# Patient Record
Sex: FEMALE | Race: WHITE | NOT HISPANIC OR LATINO | Employment: UNEMPLOYED | URBAN - METROPOLITAN AREA
[De-identification: names, ages, dates, MRNs, and addresses within clinical notes are randomized per-mention and may not be internally consistent; named-entity substitution may affect disease eponyms.]

---

## 2017-02-13 DIAGNOSIS — M67.88 RIGHT PERONEAL TENDONOSIS: ICD-10-CM

## 2017-02-13 DIAGNOSIS — M25.571 RIGHT ANKLE PAIN: Primary | ICD-10-CM

## 2017-02-23 ENCOUNTER — CLINICAL SUPPORT (OUTPATIENT)
Dept: REHABILITATION | Facility: HOSPITAL | Age: 47
End: 2017-02-23
Attending: ORTHOPAEDIC SURGERY
Payer: MEDICAID

## 2017-02-23 DIAGNOSIS — G89.29 CHRONIC PAIN OF RIGHT ANKLE: ICD-10-CM

## 2017-02-23 DIAGNOSIS — R26.2 DIFFICULTY WALKING: ICD-10-CM

## 2017-02-23 DIAGNOSIS — M25.571 CHRONIC PAIN OF RIGHT ANKLE: ICD-10-CM

## 2017-02-23 PROCEDURE — 97161 PT EVAL LOW COMPLEX 20 MIN: CPT | Mod: PN

## 2017-02-23 NOTE — PLAN OF CARE
TIME RECORD    Date: 02/23/2017    Start Time:  10:06 am   Stop Time:  10: 56 am     PROCEDURES:    TIMED  Procedure Min.   TE supervised (bike) 5'                      UNTIMED  Procedure Min.   Initial Evaluation 45'         Total Timed Minutes:  0  Total Timed Units:  0  Total Untimed Units:  1  Charges Billed/# of units:  1 IE     OUTPATIENT PHYSICAL THERAPY   PATIENT EVALUATION  Onset Date: approximately 3 years ago  Primary Diagnosis:   1. Chronic pain of right ankle     2. Difficulty walking       Treatment Diagnosis: (R) ankle pain, difficulty ambulating, difficulty performing physical activity  No past medical history on file.  Precautions: standard, HTN,   Prior Therapy: pt stated that she attended PT in the past for her neck  Medications: Bandar Douglass currently has no medications in their medication list.  Nutrition:  Obese  History of Present Illness: chronic - approximately 3 years  Prior Level of Function: Independent  Social History: Pt stated that she is not currently employed.   Place of Residence (Steps/Adaptations): Pt stated that she does not have any steps/stairs to enter her [facility] home.   Functional Deficits Leading to Referral/Nature of Injury: difficulty ambulating, difficulty standing for prolonged period of time, difficulty performing physical activity   Patient Therapy Goals: decrease pain, improve mobility    Subjective     Bandar Douglass states that she was doing P90X and she pushed off on (R) ankle and heard something pop approximately 3 years ago. Pt reported that she experienced immediate pain in (R) ankle. Pt stated that she continued to work out for two weeks and then had to stop due to pain in (R) ankle. Pt stated that she sought medical attention for her family care MD and she underwent an xray on her (R) ankle and was told xray looked normal. Pt stated that she then went to another MD who put her in a walking boot for months, which gave her some relief, but when stopped  wearing boot had same pain in (R) ankle. Pt stated that she went back to family care MD who sent her to a Podiatrist who pt stated gave her biofreeze and some stretches to do. Pt stated that in fall of 2016 she underwent MRI on (R) ankle and was told she did not have anything torn in (R) ankle and was referred to PT.  Pt stated that over the past 3 years she has tried insoles and wearing a sleeve brace on (R) ankle, which did not provide significant relief. Pt stated that she predominantly wears flip flops.  Pt stated that she has difficulty ambulating prolonged distances, difficulty standing on (R) LE, and difficulty doing physical activity/exercise.    Pain:  Location: (R) ankle    Description: Sharp  Activities Which Increase Pain: Standing, Walking and physical activity   Activities Which Decrease Pain: pain medication and stretches  Pain Scale: 3/10 at best 5/10 now  7/10 at worst    Objective       Palpation: pt c/o tenderness to palpation along (R) peroneal tendons posterior and inferior to (R) lateral malleolus     Range of Motion/Strength:     Hip Right  Left  Pain/Dysfunction with Movement    AROM MMT AROM MMT    Flexion WFL 5/5 WFL 5/5    Extension WFL 4/5 WFL 4/5    Abduction WFL 5/5 WFL 5/5      Knee Right  Left  Pain/Dysfunction with Movement    AROM MMT AROM MMT    Flexion WFL 4+/5 WFL 4+/5    Extension WFL 5/5 WFL 5/5      Ankle  Right  Left  Pain/Dysfunction with Movement    AROM PROM MMT AROM MMT ! = pain   Plantarflexion 50! NT 4/5 50 4/5    Dorsiflexion 3 ! 5 5/5 16 5/5    Inversion 35! 40 4/5 40 5/5    Eversion 20! NT 4/5 20 5/5      Circumferential measurements: Figure 8: (L) = 60 cm; (R) = 61 cm   Flexibility: decreased gastroc flexibility  Gait: Without AD  Analysis: Pt demo antalgic gait with (R) foot everted, decreased (R) heel strike, increased (R) knee flexion, decreased stance time on (R) LE, (B) pes planus, decreased virginia, decreased (B) step length  Bed  Mobility:Independent  Transfers: Independent  Special Tests: SLS stance: (L) = 19 seconds, increased ankle strategy noted; (R) = 3 seconds increased ankle strategy   Other: FOTO ankle survey score: 55% limited  Treatment: Treatment to consist of manual therapy, (B) LE strengthening/stretching, (R) ankle ROM therex, balance/gait training, IASTM, ASTYM, FDN, and modalities prn. POC was discussed with pt and pt verbalized understanding and was agreeable to POC. Pt was educated on importance of proper footwear. Pt verbalized understanding. Pt performed stationary bike x 5' with PT supervision without any c/o increased pain in (R) ankle.     Assessment       Initial Assessment (Pertinent finding, problem list and factors affecting outcome): Ms. Douglass is a 46 year old female with c/o chronic (R) ankle pain. Pt presents with decreased/painful (R) ankle ROM, decreased (R) ankle strength, impaired gait/balance, mild swelling in (R) ankle compared to (L) ankle, c/o tenderness to palpation along (R) peroneal tendons posterior and inferior to (R) lateral malleolus, and FOTO ankle survey score of 55% limited. Pt will benefit from skilled PT to address the impairments listed above in order to return pt to her highest level of function with decreased pain and limitation.     History  Co-morbidities and personal factors that may impact the plan of care Examination  Body Structures and Functions, activity limitations and participation restrictions that may impact the plan of care Clinical Presentation   Decision Making/ Complexity Score   Co-morbidities:         HTN        Personal Factors:    Body Regions: (B) LE     Body Systems: ROM, strength, gait/balance          Activity limitations: difficulty ambulating, difficulty standing for prolonged period of time      Participation Restrictions: unable to participate in physical activity/exercise         Evolving clinical presentation with changing characteristics  Low    FOTO: 55%  limited            Rehab Potiential: fair    Short Term Goals = Long Term Goals (8 Weeks): 4/23/17  1. Pt will be independent with HEP  2. Pt will improve (R) ankle strength to 5/5 on MMT  3. Pt will improve (R) ankle DF and IV AROM = (L) ankle AROM  4. Pt will report < 3/10 pain in (R) ankle with ADLs and physical activity   5. Pt will improve FOTO ankle survey score to </= 40% limited    Plan     Certification Period: 2/23/17 to 4/23/17  Recommended Treatment Plan: 2-3 times per week for 8 weeks: Electrical Stimulation Russian, Gait Training, Group Therapy, Locomotor Training, Manual Therapy, Moist Heat/ Ice, Neuromuscular Re-ed, Patient Education, Self Care, Therapeutic Activites, Therapeutic Exercise and Other IASTM, ASTYM, FDN, and modalities prn  Other Recommendations: n/a      Therapist: Jenny Rizo, PT    I CERTIFY THE NEED FOR THESE SERVICES FURNISHED UNDER THIS PLAN OF TREATMENT AND WHILE UNDER MY CARE    Physician's comments: ________________________________________________________________________________________________________________________________________________      Physician's Name: ___________________________________    I certify the need for these services furnished under this plan of treatment and while under my care.  Efrain Wise MD

## 2017-03-02 ENCOUNTER — CLINICAL SUPPORT (OUTPATIENT)
Dept: REHABILITATION | Facility: HOSPITAL | Age: 47
End: 2017-03-02
Attending: ORTHOPAEDIC SURGERY
Payer: MEDICAID

## 2017-03-02 DIAGNOSIS — R26.2 DIFFICULTY WALKING: ICD-10-CM

## 2017-03-02 DIAGNOSIS — G89.29 CHRONIC PAIN OF RIGHT ANKLE: ICD-10-CM

## 2017-03-02 DIAGNOSIS — M25.571 CHRONIC PAIN OF RIGHT ANKLE: ICD-10-CM

## 2017-03-02 PROCEDURE — 97110 THERAPEUTIC EXERCISES: CPT | Mod: PN

## 2017-03-02 NOTE — PROGRESS NOTES
"TIME RECORD    Date:  03/02/2017    Start Time:  12:00 pm  Stop Time:  12:44 pm    PROCEDURES:    TIMED  Procedure Min.   TE supervised 5' NC   TE 29'                 UNTIMED  Procedure Min.   CP 10'          Total Timed Minutes:  29'  Total Timed Units:  2  Total Untimed Units:  1  Charges Billed/# of units:  2 TE      Progress/Current Status    Subjective:     Patient ID: Bandar Douglass is a 46 y.o. female.  Diagnosis:   1. Chronic pain of right ankle     2. Difficulty walking       Pain: 4 /10 (R) lateral ankle  Pt stated that currently she is experiencing an allergic reaction to ant bites on her (R) ankle. Pt stated therefore her (R) ankle is more swollen.     Objective:     Pt participated in therex per log below 1:1 with PT x 29' and performed stationary bike at end of session x 5' supervised by PT. MT held today due to allergic reaction on (R) ankle. CP to (R) ankle at end of session with (R) LE elevated x 10'.     Date 3/2/17   POC 4/23/17   Visit 2   bike 5'   MT (prn) prn   gastroc stretch 3x30" fitter   ABCs 2x A-Z    Ankle tband   4 directions Yellow/peach 2x10 ea   BAPS (seated) 2x10 all directions   Towel crunch -   Towel inv/ev -   Ankle AROM -   //bars    Heel raise next   BAPS -   SLS 2x20" B   Seen by CK         Assessment:     Pt received VC from therapist for proper technique when performing therex. Pt reported that she was not experiencing any increased pain in (R) ankle after therapy session.     Patient Education/Response:     Pt was educated on and given handout on HEP consisting of seated gastroc stretch and ankle TB 4 ways. Pt instructed to stop performing therex if she experiences increased pain in (R) ankle. Pt verbalized understanding.     Plans and Goals:   Continue per POC, progress as tolerated     Short Term Goals = Long Term Goals (8 Weeks): 4/23/17  1. Pt will be independent with HEP  2. Pt will improve (R) ankle strength to 5/5 on MMT  3. Pt will improve (R) ankle DF and IV AROM " = (L) ankle AROM  4. Pt will report < 3/10 pain in (R) ankle with ADLs and physical activity   5. Pt will improve FOTO ankle survey score to </= 40% limited

## 2017-03-07 ENCOUNTER — CLINICAL SUPPORT (OUTPATIENT)
Dept: REHABILITATION | Facility: HOSPITAL | Age: 47
End: 2017-03-07
Attending: ORTHOPAEDIC SURGERY
Payer: MEDICAID

## 2017-03-07 DIAGNOSIS — R26.2 DIFFICULTY WALKING: ICD-10-CM

## 2017-03-07 DIAGNOSIS — M25.571 CHRONIC PAIN OF RIGHT ANKLE: ICD-10-CM

## 2017-03-07 DIAGNOSIS — G89.29 CHRONIC PAIN OF RIGHT ANKLE: ICD-10-CM

## 2017-03-07 PROCEDURE — 97110 THERAPEUTIC EXERCISES: CPT | Mod: PN

## 2017-03-07 NOTE — PROGRESS NOTES
"TIME RECORD    Date:  03/07/2017    Start Time:  10:03 am   Stop Time:  10:54 am    PROCEDURES:    TIMED  Procedure Min.   TE supervised 16' NC   TE 15'   MT 10'             UNTIMED  Procedure Min.   CP 10'         Total Timed Minutes:  25'  Total Timed Units:  2  Total Untimed Units:  1  Charges Billed/# of units:  2 TE      Progress/Current Status    Subjective:     Patient ID: Bandar Douglass is a 46 y.o. female.  Diagnosis:   1. Chronic pain of right ankle     2. Difficulty walking       Pain: 3 /10  Pt reported experiencing 3/10 pain in (R) ankle prior to therapy session.     Objective:     Pt received MT consisting of (B) LE elevated on wedge for retrograde edema reduction to (R) ankle, manual (R) gastroc stretch, and CFM to (R) peroneal tendons x 10'. Pt participated in therex per log below 1:1 with PT x 15' and supervised therex x 16' including stationary bike. CP to (R) ankle at end of session with (R) LE elevated x 10'.     Date 3/7/17 3/2/17   POC 4/23/17 4/23/17   Visit 3 2   bike 7' 5'   MT (prn) 10' prn   gastroc stretch 3x30" fitter 3x30" fitter   ABCs 2x A-Z 2x A-Z    Ankle tband   4 directions Yellow/peach 2x15 ea Yellow/peach 2x10 ea   BAPS (seated) 2x15 all directions 2x10 all directions   Towel crunch - -   Towel inv/ev - -   Ankle AROM - -   //bars     Heel raise 2x10 SL B next   BAPS - -   SLS 2x20" B 2x20" B   Seen by CK CK       Assessment:     Pt was able to tolerate additional reps noted per log above and additional therex of SL heel raises. Pt reported that she was not experiencing increased pain in (R) ankle after therapy session.     Patient Education/Response:     Continue with HEP     Plans and Goals:     Continue per POC, progress as tolerated     Short Term Goals = Long Term Goals (8 Weeks): 4/23/17  1. Pt will be independent with HEP  2. Pt will improve (R) ankle strength to 5/5 on MMT  3. Pt will improve (R) ankle DF and IV AROM = (L) ankle AROM  4. Pt will report < 3/10 pain in " (R) ankle with ADLs and physical activity   5. Pt will improve FOTO ankle survey score to </= 40% limited

## 2017-03-09 ENCOUNTER — CLINICAL SUPPORT (OUTPATIENT)
Dept: REHABILITATION | Facility: HOSPITAL | Age: 47
End: 2017-03-09
Attending: ORTHOPAEDIC SURGERY
Payer: MEDICAID

## 2017-03-09 DIAGNOSIS — R26.2 DIFFICULTY WALKING: ICD-10-CM

## 2017-03-09 DIAGNOSIS — G89.29 CHRONIC PAIN OF RIGHT ANKLE: ICD-10-CM

## 2017-03-09 DIAGNOSIS — M25.571 CHRONIC PAIN OF RIGHT ANKLE: ICD-10-CM

## 2017-03-09 PROCEDURE — 97110 THERAPEUTIC EXERCISES: CPT | Mod: PN

## 2017-03-09 NOTE — PROGRESS NOTES
"TIME RECORD    Date:  03/09/2017    Start Time:  900  Stop Time:  1005    PROCEDURES:    TIMED  Procedure Min.   Manual therapy 12   Therex 30     UNTIMED  Procedure Min.   Bike 8   Cold pack 10     Total Timed Minutes:  42  Total Timed Units:  3  Total Untimed Units:  0  Charges Billed/# of units:  3 TE      Progress/Current Status    Subjective:     Patient ID: Bandar Douglass is a 46 y.o. female.  Diagnosis:   1. Chronic pain of right ankle     2. Difficulty walking       Pain: 2 /10  "Actually its doing ok today, I still feel the popping especially first thing am."  States sometimes with stretching she feels like the right is not stretching as much as the left.    Objective:     Pt received MT consisting of (B) LE elevated on wedge for retrograde edema reduction to (R) ankle, manual (R) gastroc stretch, and CFM to (R) peroneal tendons x 12'. Pt participated in therex per log below 1:1 with PTA x 30', expanded standing therex with added step ups and step downs as noted and trial SL R gastroc stretch on stairs.  Performed stationary bike x 8 minutes with supervision. CP to (R) ankle at end of session with (R) LE elevated x 10'.     Date 3/9/17 3/7/17 3/2/17   POC 4/23/17 4/23/17 4/23/17   Visit 4 3 2   bike  7' 5'   MT (prn) 12' 10' prn   gastroc stretch 30"x3 fitter  Stairs R 3x30" fitter 3x30" fitter   ABCs A-Z x 2 2x A-Z 2x A-Z    Ankle tband   4 directions OTB 2x15 R Yellow/peach 2x15 ea Yellow/peach 2x10 ea   BAPS (seated) 2x15 R  6 dir 2x15 all directions 2x10 all directions   Towel crunch -- - -   Towel inv/ev - - -   Ankle AROM -- - -   //bars      Heel raise 2x12 B/R 2x10 SL B next   Step ups L1 2x10 R     Step downs      BAPS -- - -   SLS 2x10" B No UE 2x20" B 2x20" B   Seen by DH 1/6 CK CK       Assessment:     Patient able to increase activity as noted with added therex.  Required min assist at right knee with initial reps all directions on BAPS to decrease substitution with same.  Patient reports " ""good" after treatment.  "I know I've done something!"    Patient Education/Response:     Self stretching at stairs for SL gastroc focus R.  Demonstrated understanding.    Plans and Goals:     Continue per POC, progress as tolerated     Short Term Goals = Long Term Goals (8 Weeks): 4/23/17  1. Pt will be independent with HEP  2. Pt will improve (R) ankle strength to 5/5 on MMT  3. Pt will improve (R) ankle DF and IV AROM = (L) ankle AROM  4. Pt will report < 3/10 pain in (R) ankle with ADLs and physical activity   5. Pt will improve FOTO ankle survey score to </= 40% limited    "

## 2017-03-16 ENCOUNTER — CLINICAL SUPPORT (OUTPATIENT)
Dept: REHABILITATION | Facility: HOSPITAL | Age: 47
End: 2017-03-16
Attending: ORTHOPAEDIC SURGERY
Payer: MEDICAID

## 2017-03-16 DIAGNOSIS — R26.2 DIFFICULTY WALKING: ICD-10-CM

## 2017-03-16 DIAGNOSIS — G89.29 CHRONIC PAIN OF RIGHT ANKLE: ICD-10-CM

## 2017-03-16 DIAGNOSIS — M25.571 CHRONIC PAIN OF RIGHT ANKLE: ICD-10-CM

## 2017-03-16 PROCEDURE — 97110 THERAPEUTIC EXERCISES: CPT | Mod: PN

## 2017-03-16 NOTE — PROGRESS NOTES
"TIME RECORD    Date:  03/16/2017    Start Time:  900  Stop Time:  1000    PROCEDURES:    TIMED  Procedure Min.   Manual therapy 12   Therex 30                 UNTIMED  Procedure Min.   Bike 10         Total Timed Minutes:  42  Total Timed Units:  3  Total Untimed Units:  0  Charges Billed/# of units:  3  TE      Progress/Current Status    Subjective:     Patient ID: Bandar Douglass is a 46 y.o. female.  Diagnosis:   1. Chronic pain of right ankle     2. Difficulty walking       Pain: 3 /10  Patient states she was a little sore after last visit, but that night got a cramp in her calf that woke her, then the next night a cramp in her right hip.  "I don't know what that was about."    Objective:     Pt received MT consisting of (B) LE elevated on wedge for retrograde edema reduction to (R) ankle, manual (R) gastroc stretch, and CFM to (R) peroneal tendons x 10'. Pt participated in therex per log below 1:1 with PTA x 30', expanded standing therex with added step ups and step downs as noted and trial SL R gastroc stretch at wall.  Performed stationary bike x 10 minutes with supervision at end of session while completed FOTO.      Date 3/16/17 3/9/17 3/7/17 3/2/17   POC 4/23/17 4/23/17 4/23/17 4/23/17   Visit 5 FOTO 4 3 2   bike   7' 5'   MT (prn) 10 min 12' 10' prn   gastroc stretch 30"x3 wall 30"x3 fitter  Stairs R 3x30" fitter 3x30" fitter   ABCs A-z x2 A-Z x 2 2x A-Z 2x A-Z    Ankle tband   4 directions OTB 2x15 R OTB 2x15 R Yellow/peach 2x15 ea Yellow/peach 2x10 ea   BAPS (seated) 2x15 R  6 dir 2x15 R  6 dir 2x15 all directions 2x10 all directions   Towel crunch -- -- - -   Towel inv/ev -- - - -   Ankle AROM -- -- - -   //bars       Heel raise 2x15 B  2x10 R 2x12 B/R 2x10 SL B next   Step ups L1 2x10 R L1 2x10 R     Step downs L1 2x10 on R      BAPS  -- - -   SLS 2x20" R/L  No UE 2x10" B No UE 2x20" B 2x20" B   Seen by DH 2/6 DH 1/6 CK CK       Assessment:     Patient able to increase activity as noted including " trial step downs without complaint of increased pain or difficulty.  Able to decrease substitution at knee on BAPS with CW/CCW compared to last visit.    Patient Education/Response:     Self gastroc stretch in standing.  Demonstrated understanding.    Plans and Goals:     Continue per POC, progress as tolerated     Short Term Goals = Long Term Goals (8 Weeks): 4/23/17  1. Pt will be independent with HEP  2. Pt will improve (R) ankle strength to 5/5 on MMT  3. Pt will improve (R) ankle DF and IV AROM = (L) ankle AROM  4. Pt will report < 3/10 pain in (R) ankle with ADLs and physical activity   5. Pt will improve FOTO ankle survey score to </= 40% limited

## 2017-03-21 ENCOUNTER — CLINICAL SUPPORT (OUTPATIENT)
Dept: REHABILITATION | Facility: HOSPITAL | Age: 47
End: 2017-03-21
Attending: ORTHOPAEDIC SURGERY
Payer: MEDICAID

## 2017-03-21 DIAGNOSIS — M25.571 CHRONIC PAIN OF RIGHT ANKLE: ICD-10-CM

## 2017-03-21 DIAGNOSIS — R26.2 DIFFICULTY WALKING: ICD-10-CM

## 2017-03-21 DIAGNOSIS — G89.29 CHRONIC PAIN OF RIGHT ANKLE: ICD-10-CM

## 2017-03-21 PROCEDURE — 97110 THERAPEUTIC EXERCISES: CPT | Mod: PN

## 2017-03-21 NOTE — PROGRESS NOTES
"TIME RECORD    Date:  03/21/2017    Start Time:  905  Stop Time:  1000    PROCEDURES:    TIMED  Procedure Min.   Manual therapy 10   Therex 30     UNTIMED  Procedure Min.   Bike 10         Total Timed Minutes:  40  Total Timed Units:  3  Total Untimed Units:  0  Charges Billed/# of units:  3 TE      Progress/Current Status    Subjective:     Patient ID: Bandar Douglass is a 46 y.o. female.  Diagnosis:   1. Chronic pain of right ankle     2. Difficulty walking       "It always cracks first thing in the morning."  Patient reports only minimal calf cramping the night of her last visit.  "Doing much better with the wall stretch instead of the stairs.    Objective:     Pt received MT consisting of (B) LE elevated on wedge for retrograde edema reduction to (R) ankle, manual (R) gastroc stretch, and CFM to (R) peroneal tendons x 10'. Pt participated in therex per log below 1:1 with PTA x 30', added trial BAPS in standing today.  Performed stationary bike x 10 minutes with supervision at end of session .     Date 3/21/17 3/16/17 3/9/17 3/7/17 3/2/17   POC 4/23/17 4/23/17 4/23/17 4/23/17 4/23/17   Visit 6/10 5 FOTO 4 3 2   bike 10'   7' 5'   MT (prn) 10 min 10 min 12' 10' prn   gastroc stretch  30"x3 wall 30"x3 fitter  Stairs R 3x30" fitter 3x30" fitter   ABCs HEP A-z x2 A-Z x 2 2x A-Z 2x A-Z    Ankle tband   4 directions OTB   2x15 R  ^ next   OTB 2x15 R OTB 2x15 R Yellow/peach 2x15 ea Yellow/peach 2x10 ea   BAPS (seated) stand 2x15 R  6 dir 2x15 R  6 dir 2x15 all directions 2x10 all directions   Towel crunch  -- -- - -   Towel inv/ev  -- - - -   Ankle AROM  -- -- - -   //bars        Heel raise 2x15 B  2x10 R 2x15 B  2x10 R 2x12 B/R 2x10 SL B next   Step ups Bl 2x10 R L1 2x10 R L1 2x10 R     Step downs L1 2x12 on R L1 2x10 on R      BAPS L2 2x10   6 dir R  -- - -   SLS 25"x2 R/L  No UE 2x20" R/L  No UE 2x10" B No UE 2x20" B 2x20" B   Seen by DH 3/6 DH 2/6 DH 1/6 CK CK       Assessment:     Patient able to perform standing " BAPS with good technique.  Less substitution compared to BAPS in sitting.  Increased time with SLS without difficulty.  No complaint of pain after treatment.    Patient Education/Response:     Patient educated to continue HEP.  Verbalized understanding.    Plans and Goals:     Continue per POC, progress as tolerated     Short Term Goals = Long Term Goals (8 Weeks): 4/23/17  1. Pt will be independent with HEP  2. Pt will improve (R) ankle strength to 5/5 on MMT  3. Pt will improve (R) ankle DF and IV AROM = (L) ankle AROM  4. Pt will report < 3/10 pain in (R) ankle with ADLs and physical activity   5. Pt will improve FOTO ankle survey score to </= 40% limited

## 2017-03-23 ENCOUNTER — CLINICAL SUPPORT (OUTPATIENT)
Dept: REHABILITATION | Facility: HOSPITAL | Age: 47
End: 2017-03-23
Attending: ORTHOPAEDIC SURGERY
Payer: MEDICAID

## 2017-03-23 DIAGNOSIS — G89.29 CHRONIC PAIN OF RIGHT ANKLE: ICD-10-CM

## 2017-03-23 DIAGNOSIS — R26.2 DIFFICULTY WALKING: ICD-10-CM

## 2017-03-23 DIAGNOSIS — M25.571 CHRONIC PAIN OF RIGHT ANKLE: ICD-10-CM

## 2017-03-23 PROCEDURE — 97110 THERAPEUTIC EXERCISES: CPT | Mod: PN

## 2017-03-23 NOTE — PROGRESS NOTES
"TIME RECORD    Date:  03/23/2017    Start Time:  12:03 pm   Stop Time:  12:58 pm     PROCEDURES:    TIMED  Procedure Min.   TE supervised 5' NC   TE 30'   MT 10'              UNTIMED  Procedure Min.   CP  10'         Total Timed Minutes:  50'  Total Timed Units:  3  Total Untimed Units:  1  Charges Billed/# of units:  3 TE      Progress/Current Status    Subjective:     Patient ID: Bandar Douglass is a 46 y.o. female.  Diagnosis:   1. Chronic pain of right ankle     2. Difficulty walking       Pain: 2 /10 (R) ankle prior to therapy session  Pt stated that she has noticed improvement in her (R) ankle.     Objective:     Pt initiated therapy session x 5' on stationary bike supervised by PT. Pt received MT consisting of (B) LE elevated on wedge for retrograde edema reduction to (R) ankle, manual (R) gastroc stretch, and CFM to (R) peroneal tendons x 10'. Pt participated in therex per log below 1:1 with PT x 30'. Pt received CP to (R) ankle at the end of therapy session with (R) ankle elevated.     Date 3/23/17 3/21/17 3/16/17 3/9/17 3/7/17 3/2/17   POC 4/23/17 4/23/17 4/23/17 4/23/17 4/23/17 4/23/17   Visit 7/10 6/10 5 FOTO 4 3 2   bike 5' 10'   7' 5'   MT (prn) 10' 10 min 10 min 12' 10' prn   gastroc stretch 3x30" fitter   30"x3 wall 30"x3 fitter  Stairs R 3x30" fitter 3x30" fitter   ABCs HEP HEP A-z x2 A-Z x 2 2x A-Z 2x A-Z    Ankle tband   4 directions RTB 2x15 R OTB   2x15 R  ^ next   OTB 2x15 R OTB 2x15 R Yellow/peach 2x15 ea Yellow/peach 2x10 ea   BAPS (seated) - stand 2x15 R  6 dir 2x15 R  6 dir 2x15 all directions 2x10 all directions   Towel crunch -  -- -- - -   Towel inv/ev -  -- - - -   Ankle AROM -  -- -- - -   //bars         Heel raise 2x15 R 2x15 B  2x10 R 2x15 B  2x10 R 2x12 B/R 2x10 SL B next   Step ups Blue 2x15 R Bl 2x10 R L1 2x10 R L1 2x10 R     Step downs L1 2x12 on R L1 2x12 on R L1 2x10 on R      BAPS L2 2x15 6 dir R L2 2x10   6 dir R  -- - -   SLS 25"x3 R/L no U 25"x2 R/L  No UE 2x20" R/L  No UE " "2x10" B No UE 2x20" B 2x20" B   Tandem walking on blue foam 5x // fwd/bkwd        Seen by CK DH 3/6 DH 2/6 DH 1/6 CK CK       Assessment:     Pt was able to tolerate progression of therex noted per log above. Pt tolerated therapy session without any c/o increased pain in (R) ankle.     Patient Education/Response:     Continue with HEP     Plans and Goals:     Continue per POC, progress as tolerated     Short Term Goals = Long Term Goals (8 Weeks): 4/23/17  1. Pt will be independent with HEP  2. Pt will improve (R) ankle strength to 5/5 on MMT  3. Pt will improve (R) ankle DF and IV AROM = (L) ankle AROM  4. Pt will report < 3/10 pain in (R) ankle with ADLs and physical activity   5. Pt will improve FOTO ankle survey score to </= 40% limited    "

## 2017-03-28 ENCOUNTER — CLINICAL SUPPORT (OUTPATIENT)
Dept: REHABILITATION | Facility: HOSPITAL | Age: 47
End: 2017-03-28
Attending: ORTHOPAEDIC SURGERY
Payer: MEDICAID

## 2017-03-28 DIAGNOSIS — R26.2 DIFFICULTY WALKING: ICD-10-CM

## 2017-03-28 DIAGNOSIS — M25.571 CHRONIC PAIN OF RIGHT ANKLE: ICD-10-CM

## 2017-03-28 DIAGNOSIS — G89.29 CHRONIC PAIN OF RIGHT ANKLE: ICD-10-CM

## 2017-03-28 PROCEDURE — 97140 MANUAL THERAPY 1/> REGIONS: CPT | Mod: PN

## 2017-03-28 PROCEDURE — 97110 THERAPEUTIC EXERCISES: CPT | Mod: PN

## 2017-03-28 NOTE — PROGRESS NOTES
"TIME RECORD    Date:  03/28/2017    Start Time:  0900  Stop Time:  1051    PROCEDURES:    TIMED  Procedure Min.   Supervised TE 5   TE 28   MT 10             UNTIMED  Procedure Min.   CP 8         Total Timed Minutes:  38  Total Timed Units:  3  Total Untimed Units:  0  Charges Billed/# of units:  3 (2 TE, 1 MT)      Progress/Current Status    Subjective:     Patient ID: Bandar Douglass is a 46 y.o. female.  Diagnosis:   1. Chronic pain of right ankle     2. Difficulty walking       Pain: 2 /10  Pt reported that she was more stiff than in pain today.    Objective:     Pt initiated therapy session x 5' on stationary bike supervised by PT. Pt received MT consisting of (B) LE elevated on wedge for retrograde edema reduction to (R) ankle, manual (R) gastroc stretch, and CFM to (R) peroneal tendons and R calceanal rocking x 10'. Pt participated in therex per log below 1:1 with PT x 30'. Pt received CP to (R) ankle at the end of therapy session with (R) ankle elevated.     Date 3/28/17 3/23/17 3/21/17 3/16/17 3/9/17 3/7/17 3/2/17   POC 4/23/17 4/23/17 4/23/17 4/23/17 4/23/17 4/23/17 4/23/17   Visit 8/10 7/10 6/10 5 FOTO 4 3 2   bike 5' 5' 10'   7' 5'   MT (prn) 10' 10' 10 min 10 min 12' 10' prn   gastroc stretch 3x30" fitter  3x30" fitter   30"x3 wall 30"x3 fitter  Stairs R 3x30" fitter 3x30" fitter   ABCs HEP HEP HEP A-z x2 A-Z x 2 2x A-Z 2x A-Z    Ankle tband   4 directions RTB 2x15 R RTB 2x15 R OTB   2x15 R  ^ next   OTB 2x15 R OTB 2x15 R Yellow/peach 2x15 ea Yellow/peach 2x10 ea   BAPS (seated) Standing  2x15 R  6 dir  - stand 2x15 R  6 dir 2x15 R  6 dir 2x15 all directions 2x10 all directions   Towel crunch -- -  -- -- - -   Towel inv/ev -- -  -- - - -   Ankle AROM -- -  -- -- - -   //bars          Heel raise 2x15 R 2x15 R 2x15 B  2x10 R 2x15 B  2x10 R 2x12 B/R 2x10 SL B next   Step ups Blue 2x15 R Blue 2x15 R Bl 2x10 R L1 2x10 R L1 2x10 R     Step downs L1 2x15 on R L1 2x12 on R L1 2x12 on R L1 2x10 on R      BAPS " "See above L2 2x15 6 dir R L2 2x10   6 dir R  -- - -   SLS 30"x3 R/L no U 25"x3 R/L no U 25"x2 R/L  No UE 2x20" R/L  No UE 2x10" B No UE 2x20" B 2x20" B   Side stepping on foam 3 laps //         Tandem walking on blue foam 3 laps // 5x // fwd/bkwd        CP 8         Seen by FS CK DH 3/6 DH 2/6 DH 1/6 CK CK       Assessment:     Pt tolerated session well today with no reported increase in pain upon exit.    Patient Education/Response:     CONT HEP    Plans and Goals:     Continue per POC, progress as tolerated     Short Term Goals = Long Term Goals (8 Weeks): 4/23/17  1. Pt will be independent with HEP  2. Pt will improve (R) ankle strength to 5/5 on MMT  3. Pt will improve (R) ankle DF and IV AROM = (L) ankle AROM  4. Pt will report < 3/10 pain in (R) ankle with ADLs and physical activity   5. Pt will improve FOTO ankle survey score to </= 40% limited        "

## 2017-03-30 ENCOUNTER — CLINICAL SUPPORT (OUTPATIENT)
Dept: REHABILITATION | Facility: HOSPITAL | Age: 47
End: 2017-03-30
Attending: ORTHOPAEDIC SURGERY
Payer: MEDICAID

## 2017-03-30 DIAGNOSIS — G89.29 CHRONIC PAIN OF RIGHT ANKLE: ICD-10-CM

## 2017-03-30 DIAGNOSIS — R26.2 DIFFICULTY WALKING: ICD-10-CM

## 2017-03-30 DIAGNOSIS — M25.571 CHRONIC PAIN OF RIGHT ANKLE: ICD-10-CM

## 2017-03-30 PROCEDURE — 97110 THERAPEUTIC EXERCISES: CPT | Mod: PN

## 2017-03-30 NOTE — PROGRESS NOTES
"TIME RECORD    Date:  03/30/2017    Start Time:  9:00  Stop Time:  10:08    PROCEDURES:    TIMED  Procedure Min.   Bike 10 min    MT 25   TE 20             UNTIMED  Procedure Min.             Total Timed Minutes:  45  Total Timed Units:  3  Total Untimed Units:  0  Charges Billed/# of units:  3TE      Progress/Current Status    Subjective:     Patient ID: Bandar Douglass is a 46 y.o. female.  Diagnosis:   1. Chronic pain of right ankle     2. Difficulty walking       Pain: Pt reports she has been having tightness and ache to R lateral malleolar aspect of ankle. She is agreeable to PT session today.     Objective:     Pt initiated therapy session x 5' on stationary bike supervised by PTA. Pt then positioned on mat for Manual therapy consisting of (B) LE elevated on wedge for retrograde edema reduction to (R) ankle, manual (R) gastroc stretch, and CFM to (R) peroneal tendons and R calceanal rocking, IASTM to ahilles, Gastroc heads & mm belly, plantar fascia 25'. Pt participated in therex per log below 1:1 with PTA x 25'. Pt received CP to (R) ankle at the end of therapy session with (R) ankle elevated.     Date 3/30/17 3/28/17 3/23/17 3/21/17 3/16/17 3/9/17 3/7/17 3/2/17   POC 4/23/17 4/23/17 4/23/17 4/23/17 4/23/17 4/23/17 4/23/17 4/23/17   Visit 9/10 8/10 7/10 6/10 5 FOTO 4 3 2   bike 10 min  5' 5' 10'   7' 5'   MT (prn) 25 10' 10' 10 min 10 min 12' 10' prn   gastroc stretch 3x30" fitter L1 3x30" fitter  3x30" fitter   30"x3 wall 30"x3 fitter  Stairs R 3x30" fitter 3x30" fitter   ABCs - HEP HEP HEP A-z x2 A-Z x 2 2x A-Z 2x A-Z    Ankle tband   4 directions NT RTB 2x15 R RTB 2x15 R OTB   2x15 R  ^ next   OTB 2x15 R OTB 2x15 R Yellow/peach 2x15 ea Yellow/peach 2x10 ea   BAPS (seated) Standing  2x15 R  6 dir  Standing  2x15 R  6 dir  - stand 2x15 R  6 dir 2x15 R  6 dir 2x15 all directions 2x10 all directions   Towel crunch - -- -  -- -- - -   Towel inv/ev - -- -  -- - - -   Ankle AROM - -- -  -- -- - -   //bars        " "   Heel raise 2x15 R 2x15 R 2x15 R 2x15 B  2x10 R 2x15 B  2x10 R 2x12 B/R 2x10 SL B next   Step ups Blue 2x15 R Blue 2x15 R Blue 2x15 R Bl 2x10 R L1 2x10 R L1 2x10 R     Step downs L1 2x15 on R L1 2x15 on R L1 2x12 on R L1 2x12 on R L1 2x10 on R      BAPS - See above L2 2x15 6 dir R L2 2x10   6 dir R  -- - -   SLS oot 30"x3 R/L no U 25"x3 R/L no U 25"x2 R/L  No UE 2x20" R/L  No UE 2x10" B No UE 2x20" B 2x20" B   Side stepping on foam oot 3 laps //         Tandem walking on blue foam oot 3 laps // 5x // fwd/bkwd        CP 10 8         Seen by OSCAR 1/6 FS CK DH 3/6 DH 2/6 DH 1/6 CK CK       Assessment:     Pt responded well to manual therapy and use of IASTM today to R ankle. No reports of increased pain noted per pt.    Patient Education/Response:     Cont PT    Plans and Goals:   Cont PT as per POC  Short Term Goals = Long Term Goals (8 Weeks): 4/23/17  1. Pt will be independent with HEP  2. Pt will improve (R) ankle strength to 5/5 on MMT  3. Pt will improve (R) ankle DF and IV AROM = (L) ankle AROM  4. Pt will report < 3/10 pain in (R) ankle with ADLs and physical activity   5. Pt will improve FOTO ankle survey score to </= 40% limited        "

## 2017-03-30 NOTE — PROGRESS NOTES
"TIME RECORD    Date:  03/30/2017    Start Time:  ***  Stop Time:  ***    PROCEDURES:    TIMED  Procedure Min.                         UNTIMED  Procedure Min.             Total Timed Minutes:  ***  Total Timed Units:  ***  Total Untimed Units:  ***  Charges Billed/# of units:  ***      Progress/Current Status    Subjective:     Patient ID: Bandar Douglass is a 46 y.o. female.  Diagnosis:   1. Chronic pain of right ankle     2. Difficulty walking       Pain: {PAIN 0-10:52731} /10  ***    Objective:     t initiated therapy session x 5' on stationary bike supervised by PT. Pt received MT consisting of (B) LE elevated on wedge for retrograde edema reduction to (R) ankle, manual (R) gastroc stretch, and CFM to (R) peroneal tendons and R calceanal rocking x 10'. Pt participated in therex per log below 1:1 with PT x 30'. Pt received CP to (R) ankle at the end of therapy session with (R) ankle elevated.     Date  3/28/17 3/23/17 3/21/17 3/16/17 3/9/17 3/7/17 3/2/17   POC  4/23/17 4/23/17 4/23/17 4/23/17 4/23/17 4/23/17 4/23/17   Visit  8/10 7/10 6/10 5 FOTO 4 3 2   bike  5' 5' 10'   7' 5'   MT (prn)  10' 10' 10 min 10 min 12' 10' prn   gastroc stretch  3x30" fitter  3x30" fitter   30"x3 wall 30"x3 fitter  Stairs R 3x30" fitter 3x30" fitter   ABCs  HEP HEP HEP A-z x2 A-Z x 2 2x A-Z 2x A-Z    Ankle tband   4 directions  RTB 2x15 R RTB 2x15 R OTB   2x15 R  ^ next   OTB 2x15 R OTB 2x15 R Yellow/peach 2x15 ea Yellow/peach 2x10 ea   BAPS (seated)  Standing  2x15 R  6 dir  - stand 2x15 R  6 dir 2x15 R  6 dir 2x15 all directions 2x10 all directions   Towel crunch  -- -  -- -- - -   Towel inv/ev  -- -  -- - - -   Ankle AROM  -- -  -- -- - -   //bars           Heel raise  2x15 R 2x15 R 2x15 B  2x10 R 2x15 B  2x10 R 2x12 B/R 2x10 SL B next   Step ups  Blue 2x15 R Blue 2x15 R Bl 2x10 R L1 2x10 R L1 2x10 R     Step downs  L1 2x15 on R L1 2x12 on R L1 2x12 on R L1 2x10 on R      BAPS  See above L2 2x15 6 dir R L2 2x10   6 dir R  -- - - " "  SLS  30"x3 R/L no U 25"x3 R/L no U 25"x2 R/L  No UE 2x20" R/L  No UE 2x10" B No UE 2x20" B 2x20" B   Side stepping on foam  3 laps //         Tandem walking on blue foam  3 laps // 5x // fwd/bkwd        CP  8         Seen by  FS CK DH 3/6 DH 2/6 DH 1/6 CK CK       Assessment:     ***    Patient Education/Response:     ***    Plans and Goals:     ***    "

## 2017-04-04 ENCOUNTER — CLINICAL SUPPORT (OUTPATIENT)
Dept: REHABILITATION | Facility: HOSPITAL | Age: 47
End: 2017-04-04
Attending: ORTHOPAEDIC SURGERY
Payer: MEDICAID

## 2017-04-04 DIAGNOSIS — G89.29 CHRONIC PAIN OF RIGHT ANKLE: ICD-10-CM

## 2017-04-04 DIAGNOSIS — R26.2 DIFFICULTY WALKING: ICD-10-CM

## 2017-04-04 DIAGNOSIS — M25.571 CHRONIC PAIN OF RIGHT ANKLE: ICD-10-CM

## 2017-04-04 PROCEDURE — 97110 THERAPEUTIC EXERCISES: CPT | Mod: PN

## 2017-04-04 NOTE — PROGRESS NOTES
"TIME RECORD    Date:  04/04/2017    Start Time:  10:00 am   Stop Time:  11:06 am     PROCEDURES:    TIMED  Procedure Min.   TE supervised 5' NC   TE 31'   MT 20'             UNTIMED  Procedure Min.   CP 10'         Total Timed Minutes:  51'  Total Timed Units:  3  Total Untimed Units:  0  Charges Billed/# of units:  3 TE      Progress/Current Status    Subjective:     Patient ID: Bandar Douglass is a 46 y.o. female.  Diagnosis:   1. Chronic pain of right ankle     2. Difficulty walking       Pain: 3/10 (R) ankle prior to therapy session  Pt stated that she had a flare up of (R) ankle pain yesterday, possibly due to weather.     Objective:     Pt initiated therapy session x 5' on stationary bike supervised by PT. Pt then positioned on mat for Manual therapy consisting of (B) LE elevated on wedge for retrograde edema reduction to (R) ankle, manual (R) gastroc stretch, and CFM to (R) peroneal tendons, pt in prone for IASTM to achilles, Gastroc heads & mm belly x 20'. Pt participated in therex per log below 1:1 with PT x 31'. Pt received CP to (R) ankle at the end of therapy session with (R) ankle elevated.     Date 4/4/17 3/30/17 3/28/17 3/23/17 3/21/17 3/16/17 3/9/17 3/7/17 3/2/17   POC 4/23/17 4/23/17 4/23/17 4/23/17 4/23/17 4/23/17 4/23/17 4/23/17 4/23/17   Visit 10/10  FOTO done 9/10 8/10 7/10 6/10 5 FOTO 4 3 2   bike 5' 10 min  5' 5' 10'   7' 5'   MT (prn) 20' 25 10' 10' 10 min 10 min 12' 10' prn   gastroc stretch  3x30" fitter L1 3x30" fitter  3x30" fitter   30"x3 wall 30"x3 fitter  Stairs R 3x30" fitter 3x30" fitter   ABCs - - HEP HEP HEP A-z x2 A-Z x 2 2x A-Z 2x A-Z    Ankle tband   4 directions GTB 2x15 R NT RTB 2x15 R RTB 2x15 R OTB   2x15 R  ^ next   OTB 2x15 R OTB 2x15 R Yellow/peach 2x15 ea Yellow/peach 2x10 ea   BAPS (seated) - Standing  2x15 R  6 dir  Standing  2x15 R  6 dir  - stand 2x15 R  6 dir 2x15 R  6 dir 2x15 all directions 2x10 all directions   Towel crunch - - -- -  -- -- - -   Towel inv/ev - - " "-- -  -- - - -   Ankle AROM - - -- -  -- -- - -   //bars            Heel raise 2x15 R 2x15 R 2x15 R 2x15 R 2x15 B  2x10 R 2x15 B  2x10 R 2x12 B/R 2x10 SL B next   Step ups - Blue 2x15 R Blue 2x15 R Blue 2x15 R Bl 2x10 R L1 2x10 R L1 2x10 R     Step downs L1 2x15 R L1 2x15 on R L1 2x15 on R L1 2x12 on R L1 2x12 on R L1 2x10 on R      BAPS L2 2x15 6 dir R - See above L2 2x15 6 dir R L2 2x10   6 dir R  -- - -   SLS 30"x3 B// oot 30"x3 R/L no U 25"x3 R/L no U 25"x2 R/L  No UE 2x20" R/L  No UE 2x10" B No UE 2x20" B 2x20" B   Side stepping on foam 5x RTB around ankles //  oot 3 laps //         Tandem walking on blue foam 5x RTB around ankles // oot 3 laps // 5x // fwd/bkwd        Ball throws at [employer] 1x10 forward           CP 10' 10 8         Seen by CK JA 1/6 FS CK DH 3/6 DH 2/6 DH 1/6 CK CK     Range of Motion/Strength:      Hip Right   Left   Pain/Dysfunction with Movement     AROM MMT AROM MMT     Flexion WFL 5/5 WFL 5/5     Extension WFL 4+/5 WFL 4+/5     Abduction WFL 5/5 WFL 5/5        Knee Right   Left   Pain/Dysfunction with Movement     AROM MMT AROM MMT     Flexion WFL 5/5 WFL 5/5     Extension WFL 5/5 WFL 5/5        Ankle   Right   Left   Pain/Dysfunction with Movement     AROM PROM MMT AROM MMT ! = pain   Plantarflexion 50! NT 5/5 50 4/5     Dorsiflexion 3 5 5/5 13 5/5     Inversion 35 40 4+/5 40 5/5  (R) "uncomfortable"   Eversion 20 NT 4+/5 20 5/5  (R) = "uncomfortable"     FOTO ankle survey score: 42% limited    Assessment:     Pt reported decreased pain when performing (R) ankle IV and EV compared to initial evaluation. Pt also had improved FOTO ankle survey score of 42% limited compared to 55% limited on initial evaluation. Pt tolerated therapy session without any c/o increased pain in (R) ankle.     Patient Education/Response:     Continue with HEP     Plans and Goals:     Cont PT as per POC  Short Term Goals = Long Term Goals (8 Weeks): 4/23/17  1. Pt will be independent with HEP  2. Pt will " improve (R) ankle strength to 5/5 on MMT  3. Pt will improve (R) ankle DF and IV AROM = (L) ankle AROM  4. Pt will report < 3/10 pain in (R) ankle with ADLs and physical activity   5. Pt will improve FOTO ankle survey score to </= 40% limited

## 2017-04-06 ENCOUNTER — CLINICAL SUPPORT (OUTPATIENT)
Dept: REHABILITATION | Facility: HOSPITAL | Age: 47
End: 2017-04-06
Attending: ORTHOPAEDIC SURGERY
Payer: MEDICAID

## 2017-04-06 DIAGNOSIS — R26.2 DIFFICULTY WALKING: ICD-10-CM

## 2017-04-06 DIAGNOSIS — M25.571 CHRONIC PAIN OF RIGHT ANKLE: ICD-10-CM

## 2017-04-06 DIAGNOSIS — G89.29 CHRONIC PAIN OF RIGHT ANKLE: ICD-10-CM

## 2017-04-06 PROCEDURE — 97110 THERAPEUTIC EXERCISES: CPT | Mod: PN

## 2017-04-06 NOTE — PROGRESS NOTES
"TIME RECORD    Date:  04/06/2017    Start Time:  10:00 am   Stop Time:  11:09 am     PROCEDURES:    TIMED  Procedure Min.   TE supervised 29' NC   TE 10'   MT 20'             UNTIMED  Procedure Min.   CP 10'         Total Timed Minutes:  30'  Total Timed Units:  2  Total Untimed Units:  0  Charges Billed/# of units:  2 TE      Progress/Current Status    Subjective:     Patient ID: Bandar Douglass is a 46 y.o. female.  Diagnosis:   1. Chronic pain of right ankle     2. Difficulty walking       Pain: 3 /10  Pt stated that she was sitting in bed last night and bent her (R) toes forward and felt as if someone had dragged an elastic band across the back of her foot/achilles tendon. Pt stated that her (R) ankle was not feeling worse today, but felt more swollen.     Objective:     Pt initiated therapy session x 10' on stationary bike supervised by PT. Pt participated in therex per log below supervised by PT x 19' and 1:1 with PT x 10'.  Pt then positioned on mat for Manual therapy consisting of (B) LE elevated on wedge for retrograde edema reduction to (R) ankle, manual (R) gastroc stretch, and CFM to (R) peroneal tendons, pt in prone for IASTM to achilles, Gastroc heads & mm belly x 20'.  Pt received CP to (R) ankle at the end of therapy session with (R) ankle elevated.     Date 4/6/17 4/4/17 3/30/17 3/28/17 3/23/17 3/21/17 3/16/17 3/9/17 3/7/17 3/2/17   POC 4/23/17 4/23/17 4/23/17 4/23/17 4/23/17 4/23/17 4/23/17 4/23/17 4/23/17 4/23/17   Visit  10/10  FOTO done 9/10 8/10 7/10 6/10 5 FOTO 4 3 2   bike 10' 5' 10 min  5' 5' 10'   7' 5'   MT (prn) 20 20' 25 10' 10' 10 min 10 min 12' 10' prn   gastroc stretch   3x30" fitter L1 3x30" fitter  3x30" fitter   30"x3 wall 30"x3 fitter  Stairs R 3x30" fitter 3x30" fitter   ABCs - - - HEP HEP HEP A-z x2 A-Z x 2 2x A-Z 2x A-Z    Ankle tband   4 directions GTB 2x15 GTB 2x15 R NT RTB 2x15 R RTB 2x15 R OTB   2x15 R  ^ next   OTB 2x15 R OTB 2x15 R Yellow/peach 2x15 ea Yellow/peach 2x10 " "ea   BAPS (seated) - - Standing  2x15 R  6 dir  Standing  2x15 R  6 dir  - stand 2x15 R  6 dir 2x15 R  6 dir 2x15 all directions 2x10 all directions   Towel crunch - - - -- -  -- -- - -   Towel inv/ev - - - -- -  -- - - -   Ankle AROM - - - -- -  -- -- - -   //bars             Heel raise 2x15 R 2x15 R 2x15 R 2x15 R 2x15 R 2x15 B  2x10 R 2x15 B  2x10 R 2x12 B/R 2x10 SL B next   Step ups - - Blue 2x15 R Blue 2x15 R Blue 2x15 R Bl 2x10 R L1 2x10 R L1 2x10 R     Step downs L1 2x15 L1 2x15 R L1 2x15 on R L1 2x15 on R L1 2x12 on R L1 2x12 on R L1 2x10 on R      BAPS L2 2x15 6 directins L2 2x15 6 dir R - See above L2 2x15 6 dir R L2 2x10   6 dir R  -- - -   SLS 30"x3 B// 30"x3 B// oot 30"x3 R/L no U 25"x3 R/L no U 25"x2 R/L  No UE 2x20" R/L  No UE 2x10" B No UE 2x20" B 2x20" B   Side stepping on foam 5x RTB around ankles // 5x RTB around ankles //  oot 3 laps //         Tandem walking on blue foam 5x RTB around ankles // 5x RTB around ankles // oot 3 laps // 5x // fwd/bkwd        Ball throws at trampoline 1x10 forward (R)   1x10 sideways (R)  1x10 forward           CP 10' 10' 10 8         Seen by CK CK JA 1/6 FS CK DH 3/6 DH 2/6 DH 1/6 CK CK       Assessment:     Pt was able to tolerate therapy session without any c/o increased pain in (R) ankle.     Patient Education/Response:     Continue with HEP     Plans and Goals:     Cont PT as per POC  Short Term Goals = Long Term Goals (8 Weeks): 4/23/17  1. Pt will be independent with HEP  2. Pt will improve (R) ankle strength to 5/5 on MMT  3. Pt will improve (R) ankle DF and IV AROM = (L) ankle AROM  4. Pt will report < 3/10 pain in (R) ankle with ADLs and physical activity   5. Pt will improve FOTO ankle survey score to </= 40% limited    "

## 2017-04-20 ENCOUNTER — CLINICAL SUPPORT (OUTPATIENT)
Dept: REHABILITATION | Facility: HOSPITAL | Age: 47
End: 2017-04-20
Attending: ORTHOPAEDIC SURGERY
Payer: MEDICAID

## 2017-04-20 DIAGNOSIS — G89.29 CHRONIC PAIN OF RIGHT ANKLE: ICD-10-CM

## 2017-04-20 DIAGNOSIS — R26.2 DIFFICULTY WALKING: ICD-10-CM

## 2017-04-20 DIAGNOSIS — M25.571 CHRONIC PAIN OF RIGHT ANKLE: ICD-10-CM

## 2017-04-20 PROCEDURE — 97110 THERAPEUTIC EXERCISES: CPT | Mod: PN

## 2017-04-20 NOTE — Clinical Note
Please see PT DC summary for Bandar Douglass,  68. Thank you for this referral.   Jenyn Rizo, PT 2017

## 2017-04-20 NOTE — PROGRESS NOTES
"TIME RECORD    Date:  04/20/2017    Start Time:  10:02 am   Stop Time:  10:36 am    PROCEDURES:    TIMED  Procedure Min.   TE 34'                     UNTIMED  Procedure Min.             Total Timed Minutes:  34'  Total Timed Units:  2  Total Untimed Units:  0  Charges Billed/# of units:  2 TE      Progress/Current Status    Subjective:     Patient ID: Bandar Douglass is a 46 y.o. female.  Diagnosis:   1. Chronic pain of right ankle     2. Difficulty walking       Pain: 2 /10    Pt stated that she is having a hysterectomy first week of May and then having surgery on her neck over the summer. Pt stated that she has noticed 50% improvement in symptoms since SOC.   Objective:     Objective measurements were taken and pt participated in therex per log below 1:1 with PT x 34'    Date 4/20/17 4/18/17 4/6/17 4/4/17 3/30/17 3/28/17 3/23/17 3/21/17 3/16/17 3/9/17 3/7/17 3/2/17   POC 4/23/17 4/23/17 4/23/17 4/23/17 4/23/17 4/23/17 4/23/17 4/23/17 4/23/17 4/23/17 4/23/17 4/23/17   Visit 13 12 11 10/10  FOTO done 9/10 8/10 7/10 6/10 5 FOTO 4 3 2   bike -  10' 5' 10 min  5' 5' 10'   7' 5'   MT (prn) -  20 20' 25 10' 10' 10 min 10 min 12' 10' prn   gastroc stretch 3x30" fitter L1    3x30" fitter L1 3x30" fitter  3x30" fitter   30"x3 wall 30"x3 fitter  Stairs R 3x30" fitter 3x30" fitter   ABCs -  - - - HEP HEP HEP A-z x2 A-Z x 2 2x A-Z 2x A-Z    Ankle tband   4 directions GTB 2x15 R  GTB 2x15 GTB 2x15 R NT RTB 2x15 R RTB 2x15 R OTB   2x15 R  ^ next   OTB 2x15 R OTB 2x15 R Yellow/peach 2x15 ea Yellow/peach 2x10 ea   BAPS (seated) -  - - Standing  2x15 R  6 dir  Standing  2x15 R  6 dir  - stand 2x15 R  6 dir 2x15 R  6 dir 2x15 all directions 2x10 all directions   Towel crunch -  - - - -- -  -- -- - -   Towel inv/ev -  - - - -- -  -- - - -   Ankle AROM   - - - -- -  -- -- - -   //bars               Heel raise 2x15 R  2x15 R 2x15 R 2x15 R 2x15 R 2x15 R 2x15 B  2x10 R 2x15 B  2x10 R 2x12 B/R 2x10 SL B next   Step ups -  - - Blue 2x15 R " "Blue 2x15 R Blue 2x15 R Bl 2x10 R L1 2x10 R L1 2x10 R     Step downs L1 2x15   L1 2x15 L1 2x15 R L1 2x15 on R L1 2x15 on R L1 2x12 on R L1 2x12 on R L1 2x10 on R      BAPS -  L2 2x15 6 directins L2 2x15 6 dir R - See above L2 2x15 6 dir R L2 2x10   6 dir R  -- - -   SLS 30"x3 B //  30"x3 B// 30"x3 B// oot 30"x3 R/L no U 25"x3 R/L no U 25"x2 R/L  No UE 2x20" R/L  No UE 2x10" B No UE 2x20" B 2x20" B   Side stepping on foam 5x RTB around ankles //  5x RTB around ankles // 5x RTB around ankles //  oot 3 laps //         Tandem walking on blue foam 5x RTB around ankles //  5x RTB around ankles // 5x RTB around ankles // oot 3 laps // 5x // fwd/bkwd        Ball throws at trampoline 1x15 forward (R)   1x10 sideways (R)   1x10 forward (R)   1x10 sideways (R)  1x10 forward           CP   10' 10' 10 8         Seen by CK   CK CK JA 1/6 FS CK DH 3/6 DH 2/6 DH 1/6 CK CK       Assessment:     See DC summary below    Patient Education/Response:     See DC summary below    Plans and Goals:     DC from PT     REHAB SERVICES OUTPATIENT DISCHARGE SUMMARY  Physical Therapy      Name:  Bandartony Douglass  Date:  4/20/17  Date of Evaluation:  2/23/17  Physician:  Efrain Wise MD  Total # Of Visits: 13  Diagnosis:    1. Chronic pain of right ankle     2. Difficulty walking         Physical/Functional Status:  At time of discharge, patient reported 50% improvement in symptoms since beginning PT. Pt demo improved (B) LE strength, improved reports of (R) ankle pain, and improved FOTO ankle survey compared to initial evaluation. Pt stated that she is scheduled to undergo a hysterectomy the first week of May and then is planning to undergo neck surgery in the summer, so pt was agreeable to being discharged with [facility] at this time. See objective measurements below.     Range of Motion/Strength: 4/20/17     Hip Right   Left   Pain/Dysfunction with Movement     AROM MMT AROM MMT     Flexion WFL 5/5 WFL 5/5     Extension WFL 5/5 WFL 5/5   " "  Abduction WFL 5/5 WFL 5/5        Knee Right   Left   Pain/Dysfunction with Movement     AROM MMT AROM MMT     Flexion WFL 5/5 WFL 5/5     Extension WFL 5/5 WFL 5/5        Ankle   Right   Left   Pain/Dysfunction with Movement     AROM PROM MMT AROM MMT ! = pain   Plantarflexion 50! NT 5/5 50 5/5     Dorsiflexion 3 5 5/5 13 5/5     Inversion 40 40 5/5 40 5/5  (R) "uncomfortable"   Eversion 20 NT 5/5 20 5/5  (R) = "uncomfortable"     (L) = 57.5 cm; (R) = 58.5 cm   FOTO ankle survey score: 42% limited    Range of Motion/Strength: initial evaluation     Hip Right   Left   Pain/Dysfunction with Movement     AROM MMT AROM MMT     Flexion WFL 5/5 WFL 5/5     Extension WFL 4/5 WFL 4/5     Abduction WFL 5/5 WFL 5/5        Knee Right   Left   Pain/Dysfunction with Movement     AROM MMT AROM MMT     Flexion WFL 4+/5 WFL 4+/5     Extension WFL 5/5 WFL 5/5        Ankle   Right   Left   Pain/Dysfunction with Movement     AROM PROM MMT AROM MMT ! = pain   Plantarflexion 50! NT 4/5 50 4/5     Dorsiflexion 3 ! 5 5/5 16 5/5     Inversion 35! 40 4/5 40 5/5     Eversion 20! NT 4/5 20 5/5       FOTO ankle survey: 55% limited       The patient is to be discharged from our Therapy service for the following reason(s):  Patient has reached the maximum rehab potential for the present time and Patient requested discharge    Degree of Goal Achievement:  Patient has partially met goals    Cont to advance PT as per POC  Short Term Goals = Long Term Goals (8 Weeks): 4/23/17  1. Pt will be independent with HEP - MET  2. Pt will improve (R) ankle strength to 5/5 on MMT - MET  3. Pt will improve (R) ankle DF and IV AROM = (L) ankle AROM - PARTIALLY MET  4. Pt will report < 3/10 pain in (R) ankle with ADLs and physical activity - MET  5. Pt will improve FOTO ankle survey score to </= 40% limited - PARTIALLY MET    Patient Education:  Continue with HEP. Pt stated that she knew all of her exercises and did not need a handout on any of her exercises. "     Discharge Plan:  Home Program:  See above.

## 2017-05-25 ENCOUNTER — LAB VISIT (OUTPATIENT)
Dept: LAB | Facility: HOSPITAL | Age: 47
End: 2017-05-25
Attending: NURSE PRACTITIONER
Payer: MEDICAID

## 2017-05-25 DIAGNOSIS — E11.9 DIABETES MELLITUS WITHOUT COMPLICATION: Primary | ICD-10-CM

## 2017-05-25 DIAGNOSIS — E03.9 UNSPECIFIED HYPOTHYROIDISM: ICD-10-CM

## 2017-05-25 LAB
ALBUMIN SERPL BCP-MCNC: 4.1 G/DL
ALP SERPL-CCNC: 72 IU/L
ALT SERPL W/O P-5'-P-CCNC: 31 IU/L
ANION GAP SERPL CALC-SCNC: 12 MMOL/L
AST SERPL-CCNC: 56 IU/L
BACTERIA #/AREA URNS AUTO: ABNORMAL /HPF
BASOPHILS # BLD AUTO: 0.03 K/UL
BASOPHILS NFR BLD: 0.3 %
BILIRUB SERPL-MCNC: 0.7 MG/DL
BILIRUB UR QL STRIP: NEGATIVE
BUN SERPL-MCNC: 12 MG/DL
CALCIUM SERPL-MCNC: 9.4 MG/DL
CHLORIDE SERPL-SCNC: 98 MMOL/L
CHOLEST/HDLC SERPL: 5 {RATIO}
CLARITY UR REFRACT.AUTO: ABNORMAL
CO2 SERPL-SCNC: 30 MMOL/L
COLOR UR AUTO: YELLOW
CREAT SERPL-MCNC: 0.68 MG/DL
CREAT UR-MCNC: 27 MG/DL
DIFFERENTIAL METHOD: ABNORMAL
EOSINOPHIL # BLD AUTO: 1.6 K/UL
EOSINOPHIL NFR BLD: 18.4 %
ERYTHROCYTE [DISTWIDTH] IN BLOOD BY AUTOMATED COUNT: 12.5 %
EST. GFR  (AFRICAN AMERICAN): >60 ML/MIN/1.73 M^2
EST. GFR  (NON AFRICAN AMERICAN): >60 ML/MIN/1.73 M^2
GLUCOSE SERPL-MCNC: 146 MG/DL
GLUCOSE UR QL STRIP: ABNORMAL
HCT VFR BLD AUTO: 47.9 %
HDL/CHOLESTEROL RATIO: 19.8 %
HDLC SERPL-MCNC: 237 MG/DL
HDLC SERPL-MCNC: 47 MG/DL
HGB BLD-MCNC: 16.1 G/DL
HGB UR QL STRIP: ABNORMAL
KETONES UR QL STRIP: ABNORMAL
LDLC SERPL CALC-MCNC: 123.2 MG/DL
LEUKOCYTE ESTERASE UR QL STRIP: ABNORMAL
LYMPHOCYTES # BLD AUTO: 2.2 K/UL
LYMPHOCYTES NFR BLD: 24.3 %
MCH RBC QN AUTO: 30.9 PG
MCHC RBC AUTO-ENTMCNC: 33.6 %
MCV RBC AUTO: 92 FL
MICROALBUMIN UR DL<=1MG/L-MCNC: 8 UG/ML
MICROALBUMIN/CREATININE RATIO: 29.6 UG/MG
MICROSCOPIC COMMENT: ABNORMAL
MONOCYTES # BLD AUTO: 0.7 K/UL
MONOCYTES NFR BLD: 7.8 %
NEUTROPHILS # BLD AUTO: 4.3 K/UL
NEUTROPHILS NFR BLD: 48.2 %
NITRITE UR QL STRIP: NEGATIVE
NONHDLC SERPL-MCNC: 190 MG/DL
PH UR STRIP: 7 [PH] (ref 5–8)
PLATELET # BLD AUTO: 282 K/UL
PMV BLD AUTO: 10.7 FL
POTASSIUM SERPL-SCNC: 4.1 MMOL/L
PROT SERPL-MCNC: 7.6 G/DL
PROT UR QL STRIP: NEGATIVE
RBC # BLD AUTO: 5.21 M/UL
RBC #/AREA URNS AUTO: 15 /HPF (ref 0–4)
SODIUM SERPL-SCNC: 140 MMOL/L
SP GR UR STRIP: 1 (ref 1–1.03)
T3FREE SERPL-MCNC: 2.4 PG/ML
T4 SERPL-MCNC: 9 UG/DL
TRIGL SERPL-MCNC: 334 MG/DL
TSH SERPL DL<=0.005 MIU/L-ACNC: 1.27 UIU/ML
URN SPEC COLLECT METH UR: ABNORMAL
UROBILINOGEN UR STRIP-ACNC: NEGATIVE EU/DL
WBC # BLD AUTO: 8.84 K/UL
WBC #/AREA URNS AUTO: 30 /HPF (ref 0–5)
YEAST UR QL AUTO: ABNORMAL

## 2017-05-25 PROCEDURE — 80061 LIPID PANEL: CPT

## 2017-05-25 PROCEDURE — 84481 FREE ASSAY (FT-3): CPT | Mod: PO

## 2017-05-25 PROCEDURE — 83036 HEMOGLOBIN GLYCOSYLATED A1C: CPT | Mod: PO

## 2017-05-25 PROCEDURE — 84443 ASSAY THYROID STIM HORMONE: CPT

## 2017-05-25 PROCEDURE — 80053 COMPREHEN METABOLIC PANEL: CPT | Mod: PO

## 2017-05-25 PROCEDURE — 36415 COLL VENOUS BLD VENIPUNCTURE: CPT | Mod: PO

## 2017-05-25 PROCEDURE — 82570 ASSAY OF URINE CREATININE: CPT | Mod: PO

## 2017-05-25 PROCEDURE — 81000 URINALYSIS NONAUTO W/SCOPE: CPT | Mod: PO

## 2017-05-25 PROCEDURE — 84436 ASSAY OF TOTAL THYROXINE: CPT

## 2017-05-25 PROCEDURE — 85025 COMPLETE CBC W/AUTO DIFF WBC: CPT | Mod: PO

## 2017-05-26 LAB
ESTIMATED AVG GLUCOSE: 146 MG/DL
HBA1C MFR BLD HPLC: 6.7 %

## 2017-09-28 ENCOUNTER — HOSPITAL ENCOUNTER (EMERGENCY)
Facility: HOSPITAL | Age: 47
Discharge: HOME | End: 2017-09-28
Attending: FAMILY MEDICINE
Payer: MEDICAID

## 2017-09-28 VITALS
RESPIRATION RATE: 16 BRPM | DIASTOLIC BLOOD PRESSURE: 81 MMHG | HEART RATE: 96 BPM | HEIGHT: 69 IN | SYSTOLIC BLOOD PRESSURE: 159 MMHG | TEMPERATURE: 99 F | WEIGHT: 270 LBS | BODY MASS INDEX: 39.99 KG/M2 | OXYGEN SATURATION: 98 %

## 2017-09-28 DIAGNOSIS — K13.0 CELLULITIS OF LIP: Primary | ICD-10-CM

## 2017-09-28 PROCEDURE — 25000003 PHARM REV CODE 250: Performed by: FAMILY MEDICINE

## 2017-09-28 PROCEDURE — 99283 EMERGENCY DEPT VISIT LOW MDM: CPT

## 2017-09-28 RX ORDER — CLINDAMYCIN HYDROCHLORIDE 150 MG/1
300 CAPSULE ORAL
Status: COMPLETED | OUTPATIENT
Start: 2017-09-28 | End: 2017-09-28

## 2017-09-28 RX ORDER — IBUPROFEN 800 MG/1
800 TABLET ORAL 3 TIMES DAILY PRN
Qty: 30 TABLET | Refills: 0 | Status: SHIPPED | OUTPATIENT
Start: 2017-09-28 | End: 2019-05-17 | Stop reason: SDUPTHER

## 2017-09-28 RX ORDER — OXYCODONE AND ACETAMINOPHEN 10; 325 MG/1; MG/1
1 TABLET ORAL EVERY 4 HOURS PRN
COMMUNITY
End: 2018-10-28

## 2017-09-28 RX ORDER — AMLODIPINE BESYLATE 10 MG/1
10 TABLET ORAL DAILY
COMMUNITY
End: 2019-09-09

## 2017-09-28 RX ORDER — LEVOTHYROXINE SODIUM 25 UG/1
25 TABLET ORAL DAILY
COMMUNITY
End: 2019-05-17 | Stop reason: SDUPTHER

## 2017-09-28 RX ORDER — MELOXICAM 7.5 MG/1
7.5 TABLET ORAL DAILY
COMMUNITY
End: 2018-10-24

## 2017-09-28 RX ORDER — GABAPENTIN 300 MG/1
300 CAPSULE ORAL 3 TIMES DAILY
COMMUNITY
End: 2018-11-16

## 2017-09-28 RX ORDER — CLINDAMYCIN HYDROCHLORIDE 300 MG/1
300 CAPSULE ORAL EVERY 6 HOURS
Qty: 40 CAPSULE | Refills: 0 | Status: ON HOLD | OUTPATIENT
Start: 2017-09-28 | End: 2017-10-02 | Stop reason: HOSPADM

## 2017-09-28 RX ORDER — KETOROLAC TROMETHAMINE 10 MG/1
10 TABLET, FILM COATED ORAL
Status: COMPLETED | OUTPATIENT
Start: 2017-09-28 | End: 2017-09-28

## 2017-09-28 RX ADMIN — KETOROLAC TROMETHAMINE 10 MG: 10 TABLET, FILM COATED ORAL at 10:09

## 2017-09-28 RX ADMIN — CLINDAMYCIN HYDROCHLORIDE 300 MG: 150 CAPSULE ORAL at 10:09

## 2017-09-28 NOTE — ED PROVIDER NOTES
Encounter Date: 2017       History     Chief Complaint   Patient presents with    Lip Swelling     R sided upper lip swelling that started last night, states she feels it into her nose. Denies tongue swelling.     Patient complains of pimple on the right upper lip developing since last few days.  She tried to pop it yesterday and since then swelling and tenderness has increased.  She denies any fever.  No tongue swelling.  Did not eat anything new which can cause ALLERGIC reaction.  Patient denies taking any new medications.      The history is provided by the patient.     Review of patient's allergies indicates:   Allergen Reactions    Bactrim [sulfamethoxazole-trimethoprim] Swelling     Past Medical History:   Diagnosis Date    Diabetes mellitus     Hypertension     Thyroid disease      Past Surgical History:   Procedure Laterality Date    CERVICAL FUSION       SECTION      CHOLECYSTECTOMY      HERNIA REPAIR      HYSTERECTOMY      TONSILLECTOMY       History reviewed. No pertinent family history.  Social History   Substance Use Topics    Smoking status: Never Smoker    Smokeless tobacco: Never Used    Alcohol use Yes      Comment: socially     Review of Systems   Constitutional: Negative for activity change, appetite change and chills.   HENT: Negative for congestion, ear discharge, ear pain, rhinorrhea and sore throat.    Eyes: Negative for pain, discharge, redness and itching.   Respiratory: Negative for cough, choking, chest tightness, shortness of breath, wheezing and stridor.    Cardiovascular: Negative for chest pain and palpitations.   Gastrointestinal: Negative for abdominal distention, abdominal pain, nausea and vomiting.   Genitourinary: Negative for flank pain.   Musculoskeletal: Negative for back pain, neck pain and neck stiffness.   Neurological: Negative for dizziness, light-headedness and headaches.   Psychiatric/Behavioral: Negative for confusion.   All other systems  reviewed and are negative.      Physical Exam     Initial Vitals [09/28/17 1000]   BP Pulse Resp Temp SpO2   (!) 159/81 96 16 98.6 °F (37 °C) 98 %      MAP       107         Physical Exam    Nursing note and vitals reviewed.  Constitutional: She appears well-developed and well-nourished.   HENT:   Head: Normocephalic.   Mouth/Throat:       Right upper lip swelling and tenderness with small pimple.  Most of the swelling is from inflammation and infection.  No obvious fluctuation or abscess formation.   Eyes: Conjunctivae and EOM are normal. Pupils are equal, round, and reactive to light.   Neck: Normal range of motion.   Cardiovascular: Normal rate, regular rhythm, normal heart sounds and intact distal pulses. Exam reveals no gallop and no friction rub.    No murmur heard.  Pulmonary/Chest: Breath sounds normal. No respiratory distress. She has no wheezes. She has no rhonchi. She has no rales.   Abdominal: Soft. Bowel sounds are normal. She exhibits no distension.   Musculoskeletal: Normal range of motion.   Neurological: She is alert and oriented to person, place, and time. She has normal strength and normal reflexes. She displays normal reflexes. No cranial nerve deficit.   Skin: Skin is warm. Capillary refill takes less than 2 seconds.         ED Course   Procedures  Labs Reviewed - No data to display          Medical Decision Making:   ED Management:  Right upper lip pimple causing swelling and tenderness with surrounding inflammation and infection.  Patient will be started on clindamycin and ibuprofen.  Follow up with the primary care physician in next 2-3 days for reevaluation of the swelling.  Advised to follow-up ER if the swelling increases and not getting better in 1-2 days.                   ED Course      Clinical Impression:   The encounter diagnosis was Cellulitis of lip.    Disposition:   Disposition: Discharged  Condition: Jose A Siu MD  09/28/17 1038

## 2017-09-28 NOTE — ED TRIAGE NOTES
R sided upper lip swelling that started last night, states she feels it into her nose. Denies tongue swelling.

## 2017-09-30 ENCOUNTER — HOSPITAL ENCOUNTER (INPATIENT)
Facility: HOSPITAL | Age: 47
LOS: 2 days | Discharge: HOME | DRG: 603 | End: 2017-10-02
Attending: EMERGENCY MEDICINE | Admitting: INTERNAL MEDICINE
Payer: MEDICAID

## 2017-09-30 DIAGNOSIS — E03.9 HYPOTHYROIDISM, UNSPECIFIED TYPE: ICD-10-CM

## 2017-09-30 DIAGNOSIS — I10 HYPERTENSION, UNSPECIFIED TYPE: Chronic | ICD-10-CM

## 2017-09-30 DIAGNOSIS — L03.211 FACIAL CELLULITIS: Primary | ICD-10-CM

## 2017-09-30 DIAGNOSIS — M54.2 NECK PAIN: ICD-10-CM

## 2017-09-30 PROBLEM — E11.9 DMII (DIABETES MELLITUS, TYPE 2): Chronic | Status: ACTIVE | Noted: 2017-09-30

## 2017-09-30 LAB
ALBUMIN SERPL BCP-MCNC: 4.3 G/DL
ALP SERPL-CCNC: 80 U/L
ALT SERPL W/O P-5'-P-CCNC: 37 U/L
ANION GAP SERPL CALC-SCNC: 9 MMOL/L
AST SERPL-CCNC: 81 U/L
BASOPHILS # BLD AUTO: 0.04 K/UL
BASOPHILS NFR BLD: 0.5 %
BILIRUB SERPL-MCNC: 0.6 MG/DL
BUN SERPL-MCNC: 10 MG/DL
CALCIUM SERPL-MCNC: 9.8 MG/DL
CHLORIDE SERPL-SCNC: 103 MMOL/L
CO2 SERPL-SCNC: 27 MMOL/L
CREAT SERPL-MCNC: 0.61 MG/DL
DIFFERENTIAL METHOD: ABNORMAL
EOSINOPHIL # BLD AUTO: 0.5 K/UL
EOSINOPHIL NFR BLD: 6.6 %
ERYTHROCYTE [DISTWIDTH] IN BLOOD BY AUTOMATED COUNT: 12.7 %
EST. GFR  (AFRICAN AMERICAN): >60 ML/MIN/1.73 M^2
EST. GFR  (NON AFRICAN AMERICAN): >60 ML/MIN/1.73 M^2
GLUCOSE SERPL-MCNC: 130 MG/DL
HCT VFR BLD AUTO: 44.2 %
HGB BLD-MCNC: 15.2 G/DL
INR PPP: 1.1
LYMPHOCYTES # BLD AUTO: 1.9 K/UL
LYMPHOCYTES NFR BLD: 24.7 %
MCH RBC QN AUTO: 31.2 PG
MCHC RBC AUTO-ENTMCNC: 34.4 G/DL
MCV RBC AUTO: 91 FL
MONOCYTES # BLD AUTO: 0.6 K/UL
MONOCYTES NFR BLD: 8 %
NEUTROPHILS # BLD AUTO: 4.6 K/UL
NEUTROPHILS NFR BLD: 59.2 %
PLATELET # BLD AUTO: 238 K/UL
PMV BLD AUTO: 9.7 FL
POCT GLUCOSE: 137 MG/DL (ref 70–110)
POTASSIUM SERPL-SCNC: 4.2 MMOL/L
PROT SERPL-MCNC: 7.7 G/DL
PROTHROMBIN TIME: 12.1 SEC
RBC # BLD AUTO: 4.87 M/UL
SODIUM SERPL-SCNC: 139 MMOL/L
WBC # BLD AUTO: 7.72 K/UL

## 2017-09-30 PROCEDURE — 11000001 HC ACUTE MED/SURG PRIVATE ROOM

## 2017-09-30 PROCEDURE — 63600175 PHARM REV CODE 636 W HCPCS: Performed by: INTERNAL MEDICINE

## 2017-09-30 PROCEDURE — A4216 STERILE WATER/SALINE, 10 ML: HCPCS | Performed by: INTERNAL MEDICINE

## 2017-09-30 PROCEDURE — 25000003 PHARM REV CODE 250: Performed by: EMERGENCY MEDICINE

## 2017-09-30 PROCEDURE — 96365 THER/PROPH/DIAG IV INF INIT: CPT

## 2017-09-30 PROCEDURE — 25000003 PHARM REV CODE 250: Performed by: INTERNAL MEDICINE

## 2017-09-30 PROCEDURE — 94761 N-INVAS EAR/PLS OXIMETRY MLT: CPT

## 2017-09-30 PROCEDURE — 99285 EMERGENCY DEPT VISIT HI MDM: CPT | Mod: 25

## 2017-09-30 PROCEDURE — 25000003 PHARM REV CODE 250: Performed by: STUDENT IN AN ORGANIZED HEALTH CARE EDUCATION/TRAINING PROGRAM

## 2017-09-30 PROCEDURE — 85025 COMPLETE CBC W/AUTO DIFF WBC: CPT

## 2017-09-30 PROCEDURE — 85610 PROTHROMBIN TIME: CPT

## 2017-09-30 PROCEDURE — 96367 TX/PROPH/DG ADDL SEQ IV INF: CPT

## 2017-09-30 PROCEDURE — 63600175 PHARM REV CODE 636 W HCPCS: Performed by: EMERGENCY MEDICINE

## 2017-09-30 PROCEDURE — 80053 COMPREHEN METABOLIC PANEL: CPT

## 2017-09-30 PROCEDURE — 25500020 PHARM REV CODE 255: Performed by: EMERGENCY MEDICINE

## 2017-09-30 PROCEDURE — 96366 THER/PROPH/DIAG IV INF ADDON: CPT

## 2017-09-30 RX ORDER — IBUPROFEN 200 MG
16 TABLET ORAL
Status: DISCONTINUED | OUTPATIENT
Start: 2017-09-30 | End: 2017-10-02 | Stop reason: HOSPADM

## 2017-09-30 RX ORDER — SODIUM CHLORIDE 0.9 % (FLUSH) 0.9 %
3 SYRINGE (ML) INJECTION EVERY 8 HOURS
Status: DISCONTINUED | OUTPATIENT
Start: 2017-09-30 | End: 2017-10-02 | Stop reason: HOSPADM

## 2017-09-30 RX ORDER — HYDROCODONE BITARTRATE AND ACETAMINOPHEN 5; 325 MG/1; MG/1
1 TABLET ORAL EVERY 4 HOURS PRN
Status: DISCONTINUED | OUTPATIENT
Start: 2017-09-30 | End: 2017-09-30

## 2017-09-30 RX ORDER — CYCLOBENZAPRINE HCL 10 MG
10 TABLET ORAL DAILY PRN
Status: DISCONTINUED | OUTPATIENT
Start: 2017-09-30 | End: 2017-10-02 | Stop reason: HOSPADM

## 2017-09-30 RX ORDER — IBUPROFEN 200 MG
24 TABLET ORAL
Status: DISCONTINUED | OUTPATIENT
Start: 2017-09-30 | End: 2017-10-02 | Stop reason: HOSPADM

## 2017-09-30 RX ORDER — ALPRAZOLAM 1 MG/1
1 TABLET ORAL NIGHTLY PRN
COMMUNITY
End: 2018-11-16

## 2017-09-30 RX ORDER — HYDROCODONE BITARTRATE AND ACETAMINOPHEN 5; 325 MG/1; MG/1
2 TABLET ORAL EVERY 4 HOURS PRN
Status: DISCONTINUED | OUTPATIENT
Start: 2017-09-30 | End: 2017-10-02 | Stop reason: HOSPADM

## 2017-09-30 RX ORDER — DIAZEPAM 10 MG/1
10 TABLET ORAL NIGHTLY PRN
COMMUNITY
End: 2018-11-16

## 2017-09-30 RX ORDER — GABAPENTIN 300 MG/1
300 CAPSULE ORAL 3 TIMES DAILY
Status: DISCONTINUED | OUTPATIENT
Start: 2017-09-30 | End: 2017-10-02 | Stop reason: HOSPADM

## 2017-09-30 RX ORDER — INSULIN ASPART 100 [IU]/ML
1-10 INJECTION, SOLUTION INTRAVENOUS; SUBCUTANEOUS
Status: DISCONTINUED | OUTPATIENT
Start: 2017-09-30 | End: 2017-10-02 | Stop reason: HOSPADM

## 2017-09-30 RX ORDER — ENOXAPARIN SODIUM 100 MG/ML
40 INJECTION SUBCUTANEOUS EVERY 24 HOURS
Status: DISCONTINUED | OUTPATIENT
Start: 2017-09-30 | End: 2017-10-02 | Stop reason: HOSPADM

## 2017-09-30 RX ORDER — CYCLOBENZAPRINE HCL 10 MG
10 TABLET ORAL DAILY
COMMUNITY
End: 2018-11-16

## 2017-09-30 RX ORDER — LEVOTHYROXINE SODIUM 25 UG/1
25 TABLET ORAL DAILY
Status: DISCONTINUED | OUTPATIENT
Start: 2017-10-01 | End: 2017-10-02 | Stop reason: HOSPADM

## 2017-09-30 RX ORDER — DIAZEPAM 5 MG/1
10 TABLET ORAL NIGHTLY PRN
Status: DISCONTINUED | OUTPATIENT
Start: 2017-09-30 | End: 2017-10-02 | Stop reason: HOSPADM

## 2017-09-30 RX ORDER — GLUCAGON 1 MG
1 KIT INJECTION
Status: DISCONTINUED | OUTPATIENT
Start: 2017-09-30 | End: 2017-10-02 | Stop reason: HOSPADM

## 2017-09-30 RX ORDER — AMLODIPINE BESYLATE 5 MG/1
10 TABLET ORAL DAILY
Status: DISCONTINUED | OUTPATIENT
Start: 2017-10-01 | End: 2017-10-02 | Stop reason: HOSPADM

## 2017-09-30 RX ADMIN — ENOXAPARIN SODIUM 40 MG: 100 INJECTION SUBCUTANEOUS at 06:09

## 2017-09-30 RX ADMIN — PIPERACILLIN SODIUM, TAZOBACTAM SODIUM 4.5 G: 4; .5 INJECTION, POWDER, LYOPHILIZED, FOR SOLUTION INTRAVENOUS at 11:09

## 2017-09-30 RX ADMIN — VANCOMYCIN HYDROCHLORIDE 1000 MG: 1 INJECTION, POWDER, LYOPHILIZED, FOR SOLUTION INTRAVENOUS at 01:09

## 2017-09-30 RX ADMIN — CYCLOBENZAPRINE HYDROCHLORIDE 10 MG: 10 TABLET, FILM COATED ORAL at 11:09

## 2017-09-30 RX ADMIN — SODIUM CHLORIDE, PRESERVATIVE FREE 3 ML: 5 INJECTION INTRAVENOUS at 11:09

## 2017-09-30 RX ADMIN — PIPERACILLIN SODIUM, TAZOBACTAM SODIUM 3.38 G: 4; .5 INJECTION, POWDER, LYOPHILIZED, FOR SOLUTION INTRAVENOUS at 03:09

## 2017-09-30 RX ADMIN — HYDROCODONE BITARTRATE AND ACETAMINOPHEN 2 TABLET: 5; 325 TABLET ORAL at 11:09

## 2017-09-30 RX ADMIN — DIAZEPAM 10 MG: 5 TABLET ORAL at 11:09

## 2017-09-30 RX ADMIN — GABAPENTIN 300 MG: 300 CAPSULE ORAL at 11:09

## 2017-09-30 RX ADMIN — IOHEXOL 75 ML: 350 INJECTION, SOLUTION INTRAVENOUS at 02:09

## 2017-09-30 NOTE — ED PROVIDER NOTES
Encounter Date: 2017       History     Chief Complaint   Patient presents with    Mouth Lesions     PT c/o abscess to RT upper lip for the past few days.  PT seen here for this and dx with cellulitis.  Pt started on antibiotics with no improvement.  PT with swelling to RT upper lip noted.       Seen yesterday for same complaint, placed on clindamycin with instructions to return for worsening, which is subjectively worse per patient account.      The history is provided by the patient.   Facial Injury    The current episode started several days ago. The incident occurred at home. The injury mechanism was a cut/puncture wound ((pimple)). There is an injury to the lip. It is unlikely that a foreign body is present. Pertinent negatives include no visual disturbance and no headaches.     Review of patient's allergies indicates:   Allergen Reactions    Bactrim [sulfamethoxazole-trimethoprim] Swelling     Past Medical History:   Diagnosis Date    Diabetes mellitus     Hypertension     Thyroid disease      Past Surgical History:   Procedure Laterality Date    CERVICAL FUSION       SECTION      CHOLECYSTECTOMY      HERNIA REPAIR      HYSTERECTOMY      TONSILLECTOMY       History reviewed. No pertinent family history.  Social History   Substance Use Topics    Smoking status: Never Smoker    Smokeless tobacco: Never Used    Alcohol use Yes      Comment: socially     Review of Systems   Constitutional: Negative for chills and fever.   HENT: Positive for facial swelling. Negative for dental problem, drooling, sore throat, trouble swallowing and voice change.    Eyes: Negative for photophobia, pain and visual disturbance.   Respiratory: Negative for shortness of breath and stridor.    Neurological: Negative for headaches.   All other systems reviewed and are negative.      Physical Exam     Initial Vitals [17 1203]   BP Pulse Resp Temp SpO2   (!) 158/85 97 18 98.4 °F (36.9 °C) 98 %      MAP        109.33         Physical Exam    Nursing note and vitals reviewed.  Constitutional: She appears well-developed and well-nourished.   HENT:   Mouth/Throat: Oropharynx is clear and moist.   Upper lip edema/cellulitis with R sided pocked region at apparent portal of entry.  R maxillary STS  Floor without edema, tongue normal, mild RL lip swelling   Eyes: Conjunctivae and EOM are normal.   Neck: Normal range of motion. Neck supple.   Anterior scar well healed faint erythema anterior neck, no edema, R sided submandibular tenderness without edema/cellulitis/fluctuance with normal appearance     Cardiovascular: Normal rate, regular rhythm, normal heart sounds and intact distal pulses.   No murmur heard.  Pulmonary/Chest: Breath sounds normal. No respiratory distress.   Abdominal: Soft. There is no tenderness.   Musculoskeletal: Normal range of motion. She exhibits no edema or tenderness.   Lymphadenopathy:     She has cervical adenopathy.   Neurological: She is alert and oriented to person, place, and time. She has normal strength. No cranial nerve deficit.   Skin: Skin is warm and dry. Capillary refill takes less than 2 seconds.   Psychiatric: She has a normal mood and affect.         ED Course   Procedures  Labs Reviewed   CBC W/ AUTO DIFFERENTIAL - Abnormal; Notable for the following:        Result Value    MCH 31.2 (*)     All other components within normal limits   COMPREHENSIVE METABOLIC PANEL - Abnormal; Notable for the following:     Glucose 130 (*)     AST 81 (*)     All other components within normal limits   PROTIME-INR          X-Rays:   Independently Interpreted Readings:   Other Readings:  CT Maxillofacial With Contrast (Final result)   Result time 09/30/17 14:17:44   Final result by Yonathan Garcia MD (09/30/17 14:17:44)             Impression:      Soft tissue edema adjacent to the right seventh rib gland and extending inferiorly with multiple bilateral nonenlarged cervical lymph nodes.  No abscess formation  identified.  Followup suggested to insure resolution of patient's cervical adenopathy.    All CT scans at this facility use dose modulation, iterative reconstruction, and/or weight based dosing when appropriate to reduce radiation dose to as low as reasonably achievable.      Electronically signed by: ALEXIS MARS MD  Date: 09/30/17  Time: 14:17         Narrative:     Exam: CT scan of the face without contrast    History:   Facial swelling and pain.  Possible abscess.    Findings:    The paranasal sinuses are clear.    No fractures are identified.  There is some edema in the soft tissues adjacent to the right submandibular gland.  No abscess formation is identified.  There are subcentimeter lymph nodes in the internal jugular chain regions.  The orbital structures are intact.                 Medical Decision Making:   Differential Diagnosis:   Not limited to abscess, cellulitis, ludwigs  ED Management:  Covered for CHIRAG due to recent sx/hospitalization, DM, area of concern, will admit for iv abx on tele to [facility] with no anticipated airway compromise at this time.                   ED Course      Clinical Impression:   The encounter diagnosis was Facial cellulitis.    Disposition:   Disposition: Discharged  Condition: Stable                        Guy J. Lefort, MD  09/30/17 1534

## 2017-09-30 NOTE — PROGRESS NOTES
Patient is a transfer from [facility].  Patient is complaining of swelling lips and lower face pain of 3/10.  Patient is resting in bed at this time.  Will notify MD patient has arrived.  Patient's admit assessment initiated.  Oriented to room and environment.  Instructed to call for assistance if needed for the bathroom.  Safety maintained.  Will continue to monitor.  MDs in room at this time.

## 2017-09-30 NOTE — H&P
U Internal Medicine History and Physical - Resident Note    Admitting Team: U IM Team B  Attending Physician: Negrito  Resident: Cody  Interns: Robert    Date of Admit: 2017    Chief Complaint     Lip infection for two days    Subjective:      History of Present Illness:  Bandar Douglass is a 47 y.o. female who  has a past medical history of Diabetes mellitus; Hypertension; and Thyroid disease.. The patient presented to [facility] on 2017 with a primary complaint of Mouth Lesions (PT c/o abscess to RT upper lip for the past few days.  PT seen here for this and dx with cellulitis.  Pt started on antibiotics with no improvement.  PT with swelling to RT upper lip noted.  )    Patient presents for lip infection for two days. States woke up Thursday morning with swelling in her upper lip with tenderness. Patient states that she called her PCP who told her to go to the ER. Patient presented to ER and was discharged with clindamycin. Patient states she went home and took clinda as prescribed for a total of 7 doses without any improvement in symptoms. Patient states she felt like the infection was spreading inferiorly to involve her cheek so she returned to ED today. She was started on IV vanc and zosyn. Patient states that she already feels an improvement after one dose of each IV abx. She denies febrile symptoms, chills. She denies trauma, vision changes, or recent dental work. She denies other complaints at this time.     Past Medical History:  Past Medical History:   Diagnosis Date    Diabetes mellitus     Hypertension     Thyroid disease        Past Surgical History:  Past Surgical History:   Procedure Laterality Date    CERVICAL FUSION       SECTION      CHOLECYSTECTOMY      HERNIA REPAIR      HYSTERECTOMY      TONSILLECTOMY         Allergies:  Review of patient's allergies indicates:   Allergen Reactions    Bactrim [sulfamethoxazole-trimethoprim] Swelling        Home Medications:  Prior to Admission medications    Medication Sig Start Date End Date Taking? Authorizing Provider   amlodipine (NORVASC) 10 MG tablet Take 10 mg by mouth once daily.    Historical Provider, MD   canagliflozin (INVOKANA) 300 mg Tab tablet Take 300 mg by mouth once daily.    Historical Provider, MD   clindamycin (CLEOCIN) 300 MG capsule Take 1 capsule (300 mg total) by mouth every 6 (six) hours. 17   Stuart Siu MD   gabapentin (NEURONTIN) 300 MG capsule Take 300 mg by mouth 3 (three) times daily.    Historical Provider, MD   ibuprofen (ADVIL,MOTRIN) 800 MG tablet Take 1 tablet (800 mg total) by mouth 3 (three) times daily as needed. 17   Stuart Siu MD   levothyroxine (SYNTHROID) 25 MCG tablet Take 25 mcg by mouth once daily.    Historical Provider, MD   meloxicam (MOBIC) 7.5 MG tablet Take 7.5 mg by mouth once daily.    Historical Provider, MD   oxycodone-acetaminophen (PERCOCET)  mg per tablet Take 1 tablet by mouth every 4 (four) hours as needed for Pain.    Historical Provider, MD   TIZANIDINE HCL (ZANAFLEX ORAL) Take by mouth.    Historical Provider, MD       Family History:  History reviewed. No pertinent family history.    Social History:  Social History   Substance Use Topics    Smoking status: Never Smoker    Smokeless tobacco: Never Used    Alcohol use Yes      Comment: socially       Review of Systems:  Pertinent items are noted in HPI. All other systems are reviewed and are negative.    Health Maintaince :   Primary Care Physician: Green  Immunizations:     TDap is up to date,.  Influenza is up to date,.  Pneumovax is up to date,.  Cancer Screening:  PAP: is up to date.  Mammogram: is not up to date. Last done 2016  Colonoscopy: is not indicated     Objective:   Last 24 Hour Vital Signs:  BP  Min: 123/86  Max: 158/85  Temp  Av.8 °F (37.1 °C)  Min: 98 °F (36.7 °C)  Max: 99.9 °F (37.7 °C)  Pulse  Av.8  Min: 75  Max: 97  Resp  Av.5   "Min: 16  Max: 18  SpO2  Av %  Min: 98 %  Max: 98 %  Height  Av' 9" (175.3 cm)  Min: 5' 9" (175.3 cm)  Max: 5' 9" (175.3 cm)  Weight  Av.5 kg (270 lb)  Min: 122.5 kg (270 lb)  Max: 122.5 kg (270 lb)  Body mass index is 39.87 kg/m².  No intake/output data recorded.    Physical Examination:  General appearance: alert, appears stated age, cooperative and no distress  Eyes: conjunctivae/corneas clear. PERRL, EOM's intact.  Nose: Nares normal. Septum midline. Mucosa normal. No drainage or sinus tenderness.  Throat: Upper lip with edema and erythema with slight ulceration. Tender to palpation. Lower lip, mucosa, teeth, and gums appear normal. No abscess or fluid collection visualized  Neck: no adenopathy, no JVD, supple, symmetrical, trachea midline, thyroid not enlarged, symmetric, no tenderness/mass/nodules and anterior scar 2/2 cervical fusion, well healed  Lungs: Very minor end expiratory wheezing bilateral lung basees  Heart: regular rate and rhythm, S1, S2 normal, no murmur, click, rub or gallop  Abdomen: soft, non-tender; bowel sounds normal; no masses,  no organomegaly and obese  Extremities: extremities normal, atraumatic, no cyanosis or edema  Pulses: 2+ and symmetric  Skin: Skin color, texture, turgor normal. No rashes or lesions  Neurologic: AAOx3    Laboratory:  Most Recent Data:  CBC: Lab Results   Component Value Date    WBC 7.72 2017    HGB 15.2 2017    HCT 44.2 2017     2017    MCV 91 2017    RDW 12.7 2017     BMP: Lab Results   Component Value Date     2017    K 4.2 2017     2017    CO2 27 2017    BUN 10 2017    CREATININE 0.61 2017     (H) 2017    CALCIUM 9.8 2017     LFTs: Lab Results   Component Value Date    PROT 7.7 2017    ALBUMIN 4.3 2017    BILITOT 0.6 2017    AST 81 (H) 2017    ALKPHOS 80 2017    ALT 37 2017     Coags:   Lab Results "   Component Value Date    INR 1.1 09/30/2017     FLP: Lab Results   Component Value Date    CHOL 237 (H) 05/25/2017    HDL 47 05/25/2017    LDLCALC 123.2 05/25/2017    TRIG 334 (H) 05/25/2017    CHOLHDL 19.8 (L) 05/25/2017     DM: Lab Results   Component Value Date    HGBA1C 6.7 (H) 05/25/2017    HGBA1C 6.7 (H) 09/20/2016    HGBA1C 6.9 (H) 05/06/2016    LDLCALC 123.2 05/25/2017    CREATININE 0.61 09/30/2017     Thyroid: Lab Results   Component Value Date    TSH 1.268 05/25/2017    FREET4 1.02 05/22/2015    K7KYZKO 9.0 05/25/2017    X1JWAJV 90 05/22/2015     Anemia: No results found for: IRON, TIBC, FERRITIN, ENZGSJWT94, FOLATE  Cardiac: No results found for: TROPONINI, CKTOTAL, CKMB, BNP  Urinalysis: Lab Results   Component Value Date    COLORU Yellow 05/25/2017    SPECGRAV 1.005 05/25/2017    NITRITE Negative 05/25/2017    KETONESU 2+ (A) 05/25/2017    UROBILINOGEN Negative 05/25/2017    WBCUA 30 (H) 05/25/2017       Trended Lab Data:    Recent Labs  Lab 09/30/17  1306   WBC 7.72   HGB 15.2   HCT 44.2      MCV 91   RDW 12.7      K 4.2      CO2 27   BUN 10   CREATININE 0.61   *   PROT 7.7   ALBUMIN 4.3   BILITOT 0.6   AST 81*   ALKPHOS 80   ALT 37       Trended Cardiac Data:  No results for input(s): TROPONINI, CKTOTAL, CKMB, BNP in the last 168 hours.    Microbiology Data:  None    Other Laboratory Data:    Other Results:  EKG (my interpretation): none    Radiology:  Imaging Results          CT Maxillofacial With Contrast (Final result)  Result time 09/30/17 14:17:44    Final result by Yonathan Garcia MD (09/30/17 14:17:44)                 Impression:     Soft tissue edema adjacent to the right seventh rib gland and extending inferiorly with multiple bilateral nonenlarged cervical lymph nodes.  No abscess formation identified.  Followup suggested to insure resolution of patient's cervical adenopathy.    All CT scans at this facility use dose modulation, iterative reconstruction, and/or weight  based dosing when appropriate to reduce radiation dose to as low as reasonably achievable.      Electronically signed by: ALEXIS MARS MD  Date:     09/30/17  Time:    14:17              Narrative:    Exam: CT scan of the face without contrast    History:   Facial swelling and pain.  Possible abscess.    Findings:    The paranasal sinuses are clear.    No fractures are identified.  There is some edema in the soft tissues adjacent to the right submandibular gland.  No abscess formation is identified.  There are subcentimeter lymph nodes in the internal jugular chain regions.  The orbital structures are intact.                                 Assessment:     Bandar Douglass is a 47 y.o. female with:  Patient Active Problem List    Diagnosis Date Noted    Facial cellulitis 09/30/2017    Chronic pain of right ankle 02/23/2017    Difficulty walking 02/23/2017    Neck pain 08/14/2015    Decreased  strength 08/14/2015    Decreased strength 08/14/2015        Plan:     Facial cellulitis  - Upper lip cellulitis that began two days prior to arrival.  - States awoke with upper lip edema and erythema.  - Prescribed clindamycin initially without improvement after total of 7 doses  - States she felt like cellulitis was spreading inferiorly to involve cheek.  - Denies trauma or febrile symptoms  - WBC unremarkable, no tachycardia or hypotension.  - S/p one dose of vanc and zosyn in ED, will continue.  - Norco for pain  - Patient reports improvement in edema after IV abx  - ENT consulted    DMII  - On invokana at home, will cover with SSI while inpatient  - Repeat A1C pending    Hypothyroidism  - Continue home synthroid  - TSH pending    HTN  - BP controlled on arrival  - Continue home amlodipine    Cervical fusion  - S/p cervical fusion one month ago  - Takes gabapentin, zanaflex as needed, will continue.    F: None  E: replete as needed  N: diabetic    Ppx: lovenox    Dispo: pending improvement in cellulitis with IV abx,  ENT consult    Code Status:     Full    Jaswant Woodwardell  \[facility]\"" Internal Medicine HO-III  \[facility]\"" Internal Medicine Service    \[facility]\"" Medicine Hospitalist Pager numbers:   [facility] Hospitalist Medicine Team A (Randal/Iain): [phone]  \[facility]\"" Hospitalist Medicine Team B (Alba/Negrito):  [phone]

## 2017-10-01 LAB
ANION GAP SERPL CALC-SCNC: 11 MMOL/L
BASOPHILS # BLD AUTO: ABNORMAL K/UL
BASOPHILS NFR BLD: 0 %
BUN SERPL-MCNC: 10 MG/DL
CALCIUM SERPL-MCNC: 9.8 MG/DL
CHLORIDE SERPL-SCNC: 103 MMOL/L
CO2 SERPL-SCNC: 26 MMOL/L
CREAT SERPL-MCNC: 0.8 MG/DL
DIFFERENTIAL METHOD: ABNORMAL
EOSINOPHIL # BLD AUTO: ABNORMAL K/UL
EOSINOPHIL NFR BLD: 6 %
ERYTHROCYTE [DISTWIDTH] IN BLOOD BY AUTOMATED COUNT: 12.7 %
EST. GFR  (AFRICAN AMERICAN): >60 ML/MIN/1.73 M^2
EST. GFR  (NON AFRICAN AMERICAN): >60 ML/MIN/1.73 M^2
ESTIMATED AVG GLUCOSE: 146 MG/DL
GLUCOSE SERPL-MCNC: 143 MG/DL
HBA1C MFR BLD HPLC: 6.7 %
HCT VFR BLD AUTO: 43.6 %
HGB BLD-MCNC: 14.8 G/DL
LYMPHOCYTES # BLD AUTO: ABNORMAL K/UL
LYMPHOCYTES NFR BLD: 48 %
MCH RBC QN AUTO: 31.2 PG
MCHC RBC AUTO-ENTMCNC: 33.9 G/DL
MCV RBC AUTO: 92 FL
MONOCYTES # BLD AUTO: ABNORMAL K/UL
MONOCYTES NFR BLD: 6 %
NEUTROPHILS NFR BLD: 38 %
NEUTS BAND NFR BLD MANUAL: 2 %
PLATELET # BLD AUTO: 255 K/UL
PLATELET BLD QL SMEAR: ABNORMAL
PMV BLD AUTO: 9.8 FL
POCT GLUCOSE: 135 MG/DL (ref 70–110)
POCT GLUCOSE: 151 MG/DL (ref 70–110)
POCT GLUCOSE: 151 MG/DL (ref 70–110)
POTASSIUM SERPL-SCNC: 4 MMOL/L
RBC # BLD AUTO: 4.74 M/UL
SODIUM SERPL-SCNC: 140 MMOL/L
TSH SERPL DL<=0.005 MIU/L-ACNC: 0.96 UIU/ML
WBC # BLD AUTO: 6.39 K/UL

## 2017-10-01 PROCEDURE — 85007 BL SMEAR W/DIFF WBC COUNT: CPT

## 2017-10-01 PROCEDURE — S0077 INJECTION, CLINDAMYCIN PHOSP: HCPCS | Performed by: INTERNAL MEDICINE

## 2017-10-01 PROCEDURE — 84443 ASSAY THYROID STIM HORMONE: CPT

## 2017-10-01 PROCEDURE — 11000001 HC ACUTE MED/SURG PRIVATE ROOM

## 2017-10-01 PROCEDURE — 80048 BASIC METABOLIC PNL TOTAL CA: CPT

## 2017-10-01 PROCEDURE — 36415 COLL VENOUS BLD VENIPUNCTURE: CPT

## 2017-10-01 PROCEDURE — 85027 COMPLETE CBC AUTOMATED: CPT

## 2017-10-01 PROCEDURE — 25000003 PHARM REV CODE 250: Performed by: STUDENT IN AN ORGANIZED HEALTH CARE EDUCATION/TRAINING PROGRAM

## 2017-10-01 PROCEDURE — 94761 N-INVAS EAR/PLS OXIMETRY MLT: CPT

## 2017-10-01 PROCEDURE — 83036 HEMOGLOBIN GLYCOSYLATED A1C: CPT

## 2017-10-01 PROCEDURE — A4216 STERILE WATER/SALINE, 10 ML: HCPCS | Performed by: INTERNAL MEDICINE

## 2017-10-01 PROCEDURE — 63600175 PHARM REV CODE 636 W HCPCS: Performed by: INTERNAL MEDICINE

## 2017-10-01 PROCEDURE — 25000003 PHARM REV CODE 250: Performed by: INTERNAL MEDICINE

## 2017-10-01 RX ORDER — L. ACIDOPHILUS/L.BULGARICUS 100MM CELL
1 GRANULES IN PACKET (EA) ORAL 2 TIMES DAILY
Status: DISCONTINUED | OUTPATIENT
Start: 2017-10-01 | End: 2017-10-02 | Stop reason: HOSPADM

## 2017-10-01 RX ORDER — CLINDAMYCIN PHOSPHATE 600 MG/50ML
600 INJECTION, SOLUTION INTRAVENOUS
Status: DISCONTINUED | OUTPATIENT
Start: 2017-10-01 | End: 2017-10-02 | Stop reason: HOSPADM

## 2017-10-01 RX ADMIN — VANCOMYCIN HYDROCHLORIDE 1250 MG: 10 INJECTION, POWDER, LYOPHILIZED, FOR SOLUTION INTRAVENOUS at 03:10

## 2017-10-01 RX ADMIN — GABAPENTIN 300 MG: 300 CAPSULE ORAL at 02:10

## 2017-10-01 RX ADMIN — GABAPENTIN 300 MG: 300 CAPSULE ORAL at 09:10

## 2017-10-01 RX ADMIN — PIPERACILLIN SODIUM, TAZOBACTAM SODIUM 4.5 G: 4; .5 INJECTION, POWDER, LYOPHILIZED, FOR SOLUTION INTRAVENOUS at 07:10

## 2017-10-01 RX ADMIN — LACTOBACILLUS ACIDOPHILUS / LACTOBACILLUS BULGARICUS 1 EACH: 100 MILLION CFU STRENGTH GRANULES at 09:10

## 2017-10-01 RX ADMIN — CLINDAMYCIN IN 5 PERCENT DEXTROSE 600 MG: 12 INJECTION, SOLUTION INTRAVENOUS at 11:10

## 2017-10-01 RX ADMIN — HYDROCODONE BITARTRATE AND ACETAMINOPHEN 2 TABLET: 5; 325 TABLET ORAL at 02:10

## 2017-10-01 RX ADMIN — SODIUM CHLORIDE, PRESERVATIVE FREE 3 ML: 5 INJECTION INTRAVENOUS at 02:10

## 2017-10-01 RX ADMIN — LEVOTHYROXINE SODIUM 25 MCG: 25 TABLET ORAL at 08:10

## 2017-10-01 RX ADMIN — ENOXAPARIN SODIUM 40 MG: 100 INJECTION SUBCUTANEOUS at 05:10

## 2017-10-01 RX ADMIN — AMLODIPINE BESYLATE 10 MG: 5 TABLET ORAL at 08:10

## 2017-10-01 RX ADMIN — LACTOBACILLUS ACIDOPHILUS / LACTOBACILLUS BULGARICUS 1 EACH: 100 MILLION CFU STRENGTH GRANULES at 11:10

## 2017-10-01 RX ADMIN — SODIUM CHLORIDE, PRESERVATIVE FREE 3 ML: 5 INJECTION INTRAVENOUS at 06:10

## 2017-10-01 RX ADMIN — CLINDAMYCIN IN 5 PERCENT DEXTROSE 600 MG: 12 INJECTION, SOLUTION INTRAVENOUS at 05:10

## 2017-10-01 RX ADMIN — HYDROCODONE BITARTRATE AND ACETAMINOPHEN 2 TABLET: 5; 325 TABLET ORAL at 09:10

## 2017-10-01 RX ADMIN — SODIUM CHLORIDE, PRESERVATIVE FREE 3 ML: 5 INJECTION INTRAVENOUS at 10:10

## 2017-10-01 RX ADMIN — HYDROCODONE BITARTRATE AND ACETAMINOPHEN 2 TABLET: 5; 325 TABLET ORAL at 08:10

## 2017-10-01 RX ADMIN — GABAPENTIN 300 MG: 300 CAPSULE ORAL at 07:10

## 2017-10-01 NOTE — PROGRESS NOTES
Notified Dr. Leslie Patient requested some of her home medication she takes for her neck surgery she had 4 weeks ago. Dr Leslie said he would look at her home medication and order something.

## 2017-10-01 NOTE — PLAN OF CARE
Problem: Patient Care Overview  Goal: Plan of Care Review  Outcome: Ongoing (interventions implemented as appropriate)  Pt's SpO2 94% on RA. No respiratory distress noted. Will continue to monitor SpO2.

## 2017-10-01 NOTE — PROGRESS NOTES
"[facility] Resident HO-III Progress Note    Subjective:      Patient states continued improvement with IV antibiotics. She states lip remains edematous but cheek and jaw improved, nearly back to baseline     Objective:   Last 24 Hour Vital Signs:  BP  Min: 95/54  Max: 158/85  Temp  Av.4 °F (36.9 °C)  Min: 97.3 °F (36.3 °C)  Max: 99.9 °F (37.7 °C)  Pulse  Av.6  Min: 60  Max: 97  Resp  Av.8  Min: 16  Max: 18  SpO2  Av.6 %  Min: 92 %  Max: 98 %  Height  Av' 9" (175.3 cm)  Min: 5' 9" (175.3 cm)  Max: 5' 9" (175.3 cm)  Weight  Av.5 kg (270 lb)  Min: 122.5 kg (270 lb)  Max: 122.5 kg (270 lb)  I/O last 3 completed shifts:  In: 220 [P.O.:120; IV Piggyback:100]  Out: 700 [Urine:700]    Physical Examination:    General appearance: alert, appears stated age, cooperative and no distress  Eyes: conjunctivae/corneas clear. PERRL, EOM's intact.  Nose: Nares normal. Septum midline. Mucosa normal. No drainage or sinus tenderness.  Throat: Upper lip with edema and erythema with slight ulceration. Tender to palpation. Lower lip, mucosa, teeth, and gums appear normal. No abscess or fluid collection visualized  Neck: no adenopathy, no JVD, supple, symmetrical, trachea midline, thyroid not enlarged, symmetric, no tenderness/mass/nodules and anterior scar 2/2 cervical fusion, well healed  Lungs: Very minor end expiratory wheezing bilateral lung basees  Heart: regular rate and rhythm, S1, S2 normal, no murmur, click, rub or gallop  Abdomen: soft, non-tender; bowel sounds normal; no masses,  no organomegaly and obese  Extremities: extremities normal, atraumatic, no cyanosis or edema  Pulses: 2+ and symmetric  Skin: Skin color, texture, turgor normal. No rashes or lesions  Neurologic: AAOx3     Laboratory:    Laboratory Data Reviewed: yes  Pertinent Findings:      Recent Labs  Lab 17  1306 10/01/17  0455   WBC 7.72 6.39   HGB 15.2 14.8   HCT 44.2 43.6    255   MCV 91 92   INR 1.1  --     "       Recent Labs  Lab 09/30/17  1306 10/01/17  0455    140   K 4.2 4.0    103   CO2 27 26   BUN 10 10   CREATININE 0.61 0.8   AST 81*  --    ALT 37  --    ALBUMIN 4.3  --         Microbiology Data Reviewed: yes  Pertinent Findings:  Microbiology Results (last 7 days)     ** No results found for the last 168 hours. **          Other Results:  EKG (my interpretation): None new    Radiology Data Reviewed: yes  Pertinent Findings:  Imaging Results          CT Maxillofacial With Contrast (Final result)  Result time 09/30/17 14:17:44    Final result by Alexis Mars MD (09/30/17 14:17:44)                 Impression:     Soft tissue edema adjacent to the right seventh rib gland and extending inferiorly with multiple bilateral nonenlarged cervical lymph nodes.  No abscess formation identified.  Followup suggested to insure resolution of patient's cervical adenopathy.    All CT scans at this facility use dose modulation, iterative reconstruction, and/or weight based dosing when appropriate to reduce radiation dose to as low as reasonably achievable.      Electronically signed by: ALEXIS MARS MD  Date:     09/30/17  Time:    14:17              Narrative:    Exam: CT scan of the face without contrast    History:   Facial swelling and pain.  Possible abscess.    Findings:    The paranasal sinuses are clear.    No fractures are identified.  There is some edema in the soft tissues adjacent to the right submandibular gland.  No abscess formation is identified.  There are subcentimeter lymph nodes in the internal jugular chain regions.  The orbital structures are intact.                              Current Medications:   Infusions:           Scheduled:   amlodipine  10 mg Oral Daily    enoxaparin  40 mg Subcutaneous Daily    gabapentin  300 mg Oral TID    levothyroxine  25 mcg Oral Daily    piperacillin-tazobactam (ZOSYN) IVPB  4.5 g Intravenous Q8H    sodium chloride 0.9%  3 mL Intravenous Q8H    vancomycin  (VANCOCIN) IVPB  1,250 mg Intravenous Q12H         PRN:  cyclobenzaprine, dextrose 50%, dextrose 50%, diazePAM, glucagon (human recombinant), glucose, glucose, hydrocodone-acetaminophen 5-325mg, insulin aspart    Antibiotics and Day Number of Therapy:  Antibiotics     Start     Stop Route Frequency Ordered    10/01/17 0300  vancomycin (VANCOCIN) 1,250 mg in dextrose 5 % 250 mL IVPB      -- IV Every 12 hours (non-standard times) 09/30/17 1739    09/30/17 1845  piperacillin-tazobactam 4.5 g in dextrose 5 % 100 mL IVPB (ready to mix system)      -- IV Every 8 hours (non-standard times) 09/30/17 1726          Lines and Day Number of Therapy:    Assessment:     Bandar Douglass is a 47 y.o.female with  Patient Active Problem List    Diagnosis Date Noted    Facial cellulitis 09/30/2017    Hypothyroidism 09/30/2017    HTN (hypertension) 09/30/2017    DMII (diabetes mellitus, type 2) 09/30/2017    Chronic pain of right ankle 02/23/2017    Difficulty walking 02/23/2017    Neck pain 08/14/2015    Decreased  strength 08/14/2015    Decreased strength 08/14/2015        Plan:     Facial cellulitis  - Upper lip cellulitis that began two days prior to arrival.  - States awoke with upper lip edema and erythema.  - Prescribed clindamycin initially without improvement after total of 7 doses  - States she felt like cellulitis was spreading inferiorly to involve cheek.  - Denies trauma or febrile symptoms  - WBC unremarkable, no tachycardia or hypotension.  - S/p one dose of vanc and zosyn in ED, will continue.  - Norco for pain  - Patient reports improvement in edema after IV abx  - ENT consulted     DMII  - On invokana at home, will cover with SSI while inpatient  - Repeat A1C pending     Hypothyroidism  - Continue home synthroid  - TSH pending     HTN  - BP controlled on arrival  - Continue home amlodipine     Cervical fusion  - S/p cervical fusion one month ago  - Takes gabapentin, zanaflex as needed, will  continue.     F: None  E: replete as needed  N: diabetic     Ppx: lovenox     Dispo: pending improvement in cellulitis with IV abx, ENT consult    Jaswant Ross  [facility] HO-III  [facility] Internal [facility] Service Team B    [facility] Medicine Hospitalist Pager numbers:   [facility] Hospitalist Medicine Team A (Randal/Iain): [phone]  [facility] Hospitalist Medicine Team B (Alba/Negrito):  [phone]

## 2017-10-01 NOTE — PLAN OF CARE
Problem: Patient Care Overview  Goal: Plan of Care Review  Outcome: Ongoing (interventions implemented as appropriate)   Patient remains free from falls, bed alarm in use and patient instructed to call for assistance. HS blood glucose 137. Medicated for pain with good results. IV antibiotics given as ordered.

## 2017-10-02 ENCOUNTER — TELEPHONE (OUTPATIENT)
Dept: OTOLARYNGOLOGY | Facility: CLINIC | Age: 47
End: 2017-10-02

## 2017-10-02 VITALS
DIASTOLIC BLOOD PRESSURE: 85 MMHG | BODY MASS INDEX: 39.99 KG/M2 | TEMPERATURE: 99 F | RESPIRATION RATE: 18 BRPM | OXYGEN SATURATION: 97 % | HEART RATE: 76 BPM | HEIGHT: 69 IN | SYSTOLIC BLOOD PRESSURE: 130 MMHG | WEIGHT: 270 LBS

## 2017-10-02 LAB
ANION GAP SERPL CALC-SCNC: 10 MMOL/L
BASOPHILS # BLD AUTO: 0.03 K/UL
BASOPHILS NFR BLD: 0.5 %
BUN SERPL-MCNC: 9 MG/DL
CALCIUM SERPL-MCNC: 9.4 MG/DL
CHLORIDE SERPL-SCNC: 100 MMOL/L
CO2 SERPL-SCNC: 25 MMOL/L
CREAT SERPL-MCNC: 0.7 MG/DL
DIFFERENTIAL METHOD: ABNORMAL
EOSINOPHIL # BLD AUTO: 0.6 K/UL
EOSINOPHIL NFR BLD: 10 %
ERYTHROCYTE [DISTWIDTH] IN BLOOD BY AUTOMATED COUNT: 12.5 %
EST. GFR  (AFRICAN AMERICAN): >60 ML/MIN/1.73 M^2
EST. GFR  (NON AFRICAN AMERICAN): >60 ML/MIN/1.73 M^2
GLUCOSE SERPL-MCNC: 136 MG/DL
HCT VFR BLD AUTO: 43.8 %
HGB BLD-MCNC: 14.9 G/DL
LYMPHOCYTES # BLD AUTO: 2 K/UL
LYMPHOCYTES NFR BLD: 31.8 %
MCH RBC QN AUTO: 30.8 PG
MCHC RBC AUTO-ENTMCNC: 34 G/DL
MCV RBC AUTO: 91 FL
MONOCYTES # BLD AUTO: 0.6 K/UL
MONOCYTES NFR BLD: 9.3 %
NEUTROPHILS # BLD AUTO: 3.1 K/UL
NEUTROPHILS NFR BLD: 47.5 %
PLATELET # BLD AUTO: 250 K/UL
PMV BLD AUTO: 9.6 FL
POCT GLUCOSE: 141 MG/DL (ref 70–110)
POTASSIUM SERPL-SCNC: 3.5 MMOL/L
RBC # BLD AUTO: 4.83 M/UL
SODIUM SERPL-SCNC: 135 MMOL/L
WBC # BLD AUTO: 6.42 K/UL

## 2017-10-02 PROCEDURE — 99232 SBSQ HOSP IP/OBS MODERATE 35: CPT | Mod: ,,, | Performed by: OTOLARYNGOLOGY

## 2017-10-02 PROCEDURE — A4216 STERILE WATER/SALINE, 10 ML: HCPCS | Performed by: INTERNAL MEDICINE

## 2017-10-02 PROCEDURE — 94761 N-INVAS EAR/PLS OXIMETRY MLT: CPT

## 2017-10-02 PROCEDURE — 85025 COMPLETE CBC W/AUTO DIFF WBC: CPT

## 2017-10-02 PROCEDURE — 25000003 PHARM REV CODE 250: Performed by: INTERNAL MEDICINE

## 2017-10-02 PROCEDURE — 25000003 PHARM REV CODE 250: Performed by: STUDENT IN AN ORGANIZED HEALTH CARE EDUCATION/TRAINING PROGRAM

## 2017-10-02 PROCEDURE — S0077 INJECTION, CLINDAMYCIN PHOSP: HCPCS | Performed by: INTERNAL MEDICINE

## 2017-10-02 PROCEDURE — 80048 BASIC METABOLIC PNL TOTAL CA: CPT

## 2017-10-02 PROCEDURE — 36415 COLL VENOUS BLD VENIPUNCTURE: CPT

## 2017-10-02 RX ORDER — CLINDAMYCIN HYDROCHLORIDE 300 MG/1
300 CAPSULE ORAL EVERY 6 HOURS
Qty: 40 CAPSULE | Refills: 0 | Status: SHIPPED | OUTPATIENT
Start: 2017-10-02 | End: 2017-10-02 | Stop reason: HOSPADM

## 2017-10-02 RX ORDER — L. ACIDOPHILUS/L.BULGARICUS 100MM CELL
1 GRANULES IN PACKET (EA) ORAL 2 TIMES DAILY
Qty: 30 PACKET | Refills: 0 | Status: SHIPPED | OUTPATIENT
Start: 2017-10-02 | End: 2018-10-02

## 2017-10-02 RX ORDER — LINEZOLID 600 MG/1
600 TABLET, FILM COATED ORAL EVERY 12 HOURS
Qty: 20 TABLET | Refills: 0 | Status: SHIPPED | OUTPATIENT
Start: 2017-10-02 | End: 2017-10-12

## 2017-10-02 RX ADMIN — LEVOTHYROXINE SODIUM 25 MCG: 25 TABLET ORAL at 08:10

## 2017-10-02 RX ADMIN — AMLODIPINE BESYLATE 10 MG: 5 TABLET ORAL at 08:10

## 2017-10-02 RX ADMIN — SODIUM CHLORIDE, PRESERVATIVE FREE 3 ML: 5 INJECTION INTRAVENOUS at 02:10

## 2017-10-02 RX ADMIN — CLINDAMYCIN IN 5 PERCENT DEXTROSE 600 MG: 12 INJECTION, SOLUTION INTRAVENOUS at 11:10

## 2017-10-02 RX ADMIN — SODIUM CHLORIDE, PRESERVATIVE FREE 3 ML: 5 INJECTION INTRAVENOUS at 05:10

## 2017-10-02 RX ADMIN — LACTOBACILLUS ACIDOPHILUS / LACTOBACILLUS BULGARICUS 1 EACH: 100 MILLION CFU STRENGTH GRANULES at 08:10

## 2017-10-02 RX ADMIN — GABAPENTIN 300 MG: 300 CAPSULE ORAL at 02:10

## 2017-10-02 RX ADMIN — HYDROCODONE BITARTRATE AND ACETAMINOPHEN 2 TABLET: 5; 325 TABLET ORAL at 08:10

## 2017-10-02 RX ADMIN — HYDROCODONE BITARTRATE AND ACETAMINOPHEN 2 TABLET: 5; 325 TABLET ORAL at 02:10

## 2017-10-02 RX ADMIN — CLINDAMYCIN IN 5 PERCENT DEXTROSE 600 MG: 12 INJECTION, SOLUTION INTRAVENOUS at 12:10

## 2017-10-02 RX ADMIN — GABAPENTIN 300 MG: 300 CAPSULE ORAL at 05:10

## 2017-10-02 RX ADMIN — CLINDAMYCIN IN 5 PERCENT DEXTROSE 600 MG: 12 INJECTION, SOLUTION INTRAVENOUS at 05:10

## 2017-10-02 NOTE — PLAN OF CARE
Patient presents with    Mouth Lesions       PT c/o abscess to RT upper lip for the past few days.  PT seen here for this and dx with cellulitis.  Pt started on antibiotics with no improvement.  PT with swelling to RT upper lip noted.       Pt is independent with ADLs, no HH, has glucometer. Lives with supportive friend, Everardo who will provide transportation on discharge       10/01/17 7812   Discharge Assessment   Assessment Type Discharge Planning Assessment   Confirmed/corrected address and phone number on facesheet? Yes   Assessment information obtained from? Patient  (patient and friend, Everardo)   Expected Length of Stay (days) 2   Communicated expected length of stay with patient/caregiver yes   Prior to hospitilization cognitive status: Alert/Oriented   Prior to hospitalization functional status: Independent   Current cognitive status: Alert/Oriented   Current Functional Status: Independent   Facility Arrived From: [facility] ER   Lives With friend(s)   Able to Return to Prior Arrangements yes   Is patient able to care for self after discharge? Yes   Who are your caregiver(s) and their phone number(s)? Friend: Everardo Tate [phone]   Patient's perception of discharge disposition home or selfcare   Readmission Within The Last 30 Days no previous admission in last 30 days   Patient currently being followed by outpatient case management? No   Patient currently receives any other outside agency services? No   Equipment Currently Used at Home glucometer   Do you have any problems affording any of your prescribed medications? No   Is the patient taking medications as prescribed? yes   Does the patient have transportation home? Yes   Transportation Available family or friend will provide;car   Dialysis Name and Scheduled days N/A   Does the patient receive services at the [facility]? No   Discharge Plan A Home   Discharge Plan B Home with family   Patient/Family In Agreement With Plan yes    Does the patient have transportation to healthcare appointments? Yes     Gunjan Mora RN Transitional Navigator  [phone]

## 2017-10-02 NOTE — TELEPHONE ENCOUNTER
----- Message from Aaliyah Spaulding sent at 10/2/2017  9:15 AM CDT -----  Contact: [phone]/Nicolasa   CONSULTS:   Patient: Evonne Portillo  : 1970  Clinic#: [id number]  Room /bed number: Room 501  Referring MD: Dr Jaswant Lara   Diagnosis: Facial Cellulitis  Person calling & phone number: [phone]/Nicolasa

## 2017-10-02 NOTE — PROGRESS NOTES
[facility] Internal Medicine Resident HO-III Progress Note    Subjective:      Patient states all but her upper lip has resolved. Still endorses erythema and induration in that area but cheek/jaw area improved drastically. She denies fever, chills.     Objective:   Last 24 Hour Vital Signs:  BP  Min: 121/78  Max: 143/83  Temp  Av.4 °F (36.9 °C)  Min: 98.1 °F (36.7 °C)  Max: 99.1 °F (37.3 °C)  Pulse  Av.2  Min: 66  Max: 81  Resp  Av.6  Min: 17  Max: 20  SpO2  Av.3 %  Min: 92 %  Max: 94 %  I/O last 3 completed shifts:  In: 870 [P.O.:320; I.V.:100; IV Piggyback:450]  Out: 2200 [Urine:2200]    Physical Examination:    General appearance: alert, appears stated age, cooperative and no distress  Eyes: conjunctivae/corneas clear. PERRL, EOM's intact.  Nose: Nares normal. Septum midline. Mucosa normal. No drainage or sinus tenderness.  Throat: Upper lip with edema and erythema with slight ulceration. Tender to palpation. Lower lip, mucosa, teeth, and gums appear normal. No abscess or fluid collection visualized  Neck: no adenopathy, no JVD, supple, symmetrical, trachea midline, thyroid not enlarged, symmetric, no tenderness/mass/nodules and anterior scar 2/2 cervical fusion, well healed  Lungs: Very minor end expiratory wheezing bilateral lung basees  Heart: regular rate and rhythm, S1, S2 normal, no murmur, click, rub or gallop  Abdomen: soft, non-tender; bowel sounds normal; no masses,  no organomegaly and obese  Extremities: extremities normal, atraumatic, no cyanosis or edema  Pulses: 2+ and symmetric  Skin: Skin color, texture, turgor normal. No rashes or lesions  Neurologic: AAOx3     Laboratory:    Laboratory Data Reviewed: yes  Pertinent Findings:      Recent Labs  Lab 17  1306 10/01/17  0455 10/02/17  0709   WBC 7.72 6.39 6.42   HGB 15.2 14.8 14.9   HCT 44.2 43.6 43.8    255 250   MCV 91 92 91   INR 1.1  --   --           Recent Labs  Lab 17  1306 10/01/17  0455    140   K 4.2  4.0    103   CO2 27 26   BUN 10 10   CREATININE 0.61 0.8   AST 81*  --    ALT 37  --    ALBUMIN 4.3  --         Microbiology Data Reviewed: yes  Pertinent Findings:  Microbiology Results (last 7 days)     ** No results found for the last 168 hours. **          Other Results:  EKG (my interpretation): None new    Radiology Data Reviewed: yes  Pertinent Findings:  Imaging Results          CT Maxillofacial With Contrast (Final result)  Result time 09/30/17 14:17:44    Final result by Alexis Mars MD (09/30/17 14:17:44)                 Impression:     Soft tissue edema adjacent to the right seventh rib gland and extending inferiorly with multiple bilateral nonenlarged cervical lymph nodes.  No abscess formation identified.  Followup suggested to insure resolution of patient's cervical adenopathy.    All CT scans at this facility use dose modulation, iterative reconstruction, and/or weight based dosing when appropriate to reduce radiation dose to as low as reasonably achievable.      Electronically signed by: ALEXIS MARS MD  Date:     09/30/17  Time:    14:17              Narrative:    Exam: CT scan of the face without contrast    History:   Facial swelling and pain.  Possible abscess.    Findings:    The paranasal sinuses are clear.    No fractures are identified.  There is some edema in the soft tissues adjacent to the right submandibular gland.  No abscess formation is identified.  There are subcentimeter lymph nodes in the internal jugular chain regions.  The orbital structures are intact.                              Current Medications:   Infusions:           Scheduled:   amlodipine  10 mg Oral Daily    clindamycin (CLEOCIN) IVPB  600 mg Intravenous Q6H    enoxaparin  40 mg Subcutaneous Daily    gabapentin  300 mg Oral TID    lactobacillus acidophilus & bulgar  1 packet Oral BID    levothyroxine  25 mcg Oral Daily    sodium chloride 0.9%  3 mL Intravenous Q8H         PRN:  cyclobenzaprine, dextrose  50%, dextrose 50%, diazePAM, glucagon (human recombinant), glucose, glucose, hydrocodone-acetaminophen 5-325mg, insulin aspart    Antibiotics and Day Number of Therapy:  Antibiotics     Start     Stop Route Frequency Ordered    10/01/17 1115  clindamycin 600 MG/50 ML D5W 600 mg/50 mL IVPB 600 mg      -- IV Every 6 hours (non-standard times) 10/01/17 1014          Lines and Day Number of Therapy:    Assessment:     Bandar Douglass is a 47 y.o.female with  Patient Active Problem List    Diagnosis Date Noted    Facial cellulitis 09/30/2017    Hypothyroidism 09/30/2017    HTN (hypertension) 09/30/2017    DMII (diabetes mellitus, type 2) 09/30/2017    Chronic pain of right ankle 02/23/2017    Difficulty walking 02/23/2017    Neck pain 08/14/2015    Decreased  strength 08/14/2015    Decreased strength 08/14/2015        Plan:     Facial cellulitis  - Upper lip cellulitis that began two days prior to arrival.  - States awoke with upper lip edema and erythema.  - Prescribed clindamycin initially without improvement after total of 7 doses  - States she felt like cellulitis was spreading inferiorly to involve cheek.  - Denies trauma or febrile symptoms  - WBC unremarkable, no tachycardia or hypotension.  - S/p one dose of vanc and zosyn in ED. Switched to IV clinda. If good response will discharge with PO clinda.  - Norco for pain  - Patient reports improvement in edema after IV abx  - ENT consulted     DMII  - On invokana at home, will cover with SSI while inpatient  - Repeat A1C 6.7%     Hypothyroidism  - Continue home synthroid  - TSH wnl     HTN  - BP controlled on arrival  - Continue home amlodipine     Cervical fusion  - S/p cervical fusion one month ago  - Takes gabapentin, zanaflex as needed, will continue.     F: None  E: replete as needed  N: diabetic     Ppx: lovenox     Dispo: Likely discharge today with PO abx, ENT consult    Jaswant Ross  [facility] Internal Medicine -III  [facility] Internal Medicine Service  Team B    [facility] Medicine Hospitalist Pager numbers:   [facility] Hospitalist Medicine Team A (Randal/Iain): [phone]  [facility] Hospitalist Medicine Team B (Alba/Negrito):  [phone]

## 2017-10-02 NOTE — PROGRESS NOTES
Patient AAOx3, NAD noted.  Bedside delivery of medications done.   at the bedside.  Reviewed discharge instructions with patient.  Patient verbalized understanding.  IV removed.  Safety has been maintained.  Patient walked with  to lobby.

## 2017-10-02 NOTE — PROGRESS NOTES
Message received from pharmacy, patients abx  will need prior auth . Pharmacy will update patient and TN.  Team also  Updated of above. TN to follow.      update:  zyvox script $3. Pt updated and able to afford medication.

## 2017-10-02 NOTE — CONSULTS
[facility]  Otorhinolaryngology-Head & Neck Surgery  Consult Note    Patient Name: Bandar Douglass  MRN: [id number]  Code Status: Full Code  Admission Date: 9/30/2017  Hospital Length of Stay: 2 days  Attending Physician: Rakesh Willis MD  Primary Care Provider: Chastity Silva NP    Patient information was obtained from patient, past medical records and ER records.     Consults  Subjective:     Chief Complaint/Reason for Admission: facial cellulitis    History of Present Illness: History of Present Illness:  Bandar Douglass is a 47 y.o. female who  has a past medical history of Diabetes mellitus; Hypertension; and Thyroid disease. She presented to [facility] on 9/30/2017 with a primary complaint of Mouth Lesions     She presented to the ED on 9/30/17 for lip infection for two days. She woke up Thursday morning with swelling in her upper lip with tenderness and presented to the ER where she was diagnosed with facial cellulitis and was discharged on Clindamycin. Patient states she went home and took clinda as prescribed for a total of 7 doses without any improvement in symptoms. Patient states she felt like the infection was spreading inferiorly to involve her cheek so she returned to ED today. She was started on IV vanc and zosyn. She states that the swelling has improved to the right cheek region but still notes redness and swelling above her right upper lip.  She denies febrile symptoms, chills. She denies trauma, vision changes, or recent dental work. She denies other complaints at this time.     Medications:  Continuous Infusions:   Scheduled Meds:   amlodipine  10 mg Oral Daily    clindamycin (CLEOCIN) IVPB  600 mg Intravenous Q6H    enoxaparin  40 mg Subcutaneous Daily    gabapentin  300 mg Oral TID    lactobacillus acidophilus & bulgar  1 packet Oral BID    levothyroxine  25 mcg Oral Daily    sodium chloride 0.9%  3 mL Intravenous Q8H     PRN Meds:cyclobenzaprine,  dextrose 50%, dextrose 50%, diazePAM, glucagon (human recombinant), glucose, glucose, hydrocodone-acetaminophen 5-325mg, insulin aspart     No current facility-administered medications on file prior to encounter.      Current Outpatient Prescriptions on File Prior to Encounter   Medication Sig    amlodipine (NORVASC) 10 MG tablet Take 10 mg by mouth once daily.    canagliflozin (INVOKANA) 300 mg Tab tablet Take 300 mg by mouth once daily.    gabapentin (NEURONTIN) 300 MG capsule Take 300 mg by mouth 3 (three) times daily.    ibuprofen (ADVIL,MOTRIN) 800 MG tablet Take 1 tablet (800 mg total) by mouth 3 (three) times daily as needed.    levothyroxine (SYNTHROID) 25 MCG tablet Take 25 mcg by mouth once daily.    meloxicam (MOBIC) 7.5 MG tablet Take 7.5 mg by mouth once daily.    oxycodone-acetaminophen (PERCOCET)  mg per tablet Take 1 tablet by mouth every 4 (four) hours as needed for Pain.    TIZANIDINE HCL (ZANAFLEX ORAL) Take by mouth.    [DISCONTINUED] clindamycin (CLEOCIN) 300 MG capsule Take 1 capsule (300 mg total) by mouth every 6 (six) hours.       Review of patient's allergies indicates:   Allergen Reactions    Bactrim [sulfamethoxazole-trimethoprim] Swelling       Past Medical History:   Diagnosis Date    Diabetes mellitus     Hypertension     Thyroid disease      Past Surgical History:   Procedure Laterality Date    CERVICAL FUSION       SECTION      CHOLECYSTECTOMY      HERNIA REPAIR      HYSTERECTOMY      TONSILLECTOMY       Family History     None        Social History Main Topics    Smoking status: Never Smoker    Smokeless tobacco: Never Used    Alcohol use Yes      Comment: socially    Drug use: No    Sexual activity: Not on file     Review of Systems   Constitutional: Negative for chills, fever and unexpected weight change.   Eyes: Negative for photophobia, pain and visual disturbance.   Respiratory: Negative for shortness of breath, wheezing and stridor.     Cardiovascular: Negative for chest pain and palpitations.   Gastrointestinal: Negative for abdominal pain, blood in stool and vomiting.   Musculoskeletal: Negative for gait problem and myalgias.   Skin: Negative for pallor and rash.   Neurological: Negative for seizures, syncope and weakness.   Hematological: Negative for adenopathy. Does not bruise/bleed easily.   Psychiatric/Behavioral: Negative for confusion and dysphoric mood.     Objective:     Vital Signs (Most Recent):  Temp: 99.1 °F (37.3 °C) (10/02/17 0834)  Pulse: 76 (10/02/17 0834)  Resp: 18 (10/02/17 0834)  BP: 130/85 (10/02/17 0834)  SpO2: (!) 93 % (10/02/17 0924) Vital Signs (24h Range):  Temp:  [98.1 °F (36.7 °C)-99.1 °F (37.3 °C)] 99.1 °F (37.3 °C)  Pulse:  [66-81] 76  Resp:  [17-20] 18  SpO2:  [92 %-94 %] 93 %  BP: (121-143)/(78-92) 130/85     Weight: 122.5 kg (270 lb)  Body mass index is 39.87 kg/m².         Physical Exam     Constitutional: Well appearing / communicating.  NAD.  Eyes: EOM I Bilaterally  Head/Face: Normocephalic.  Negative paranasal sinus pressure/tenderness.  Salivary glands WNL.  House Brackmann I Bilaterally. No facial edema noted in the right cheek region. No erythema.     Right Ear: External Auditory Canal WNL,TM w/o masses/lesions/perforations.  Auricle WNL.  Left Ear: External Auditory Canal WNL,TM w/o masses/lesions/perforations. Auricle WNL.  Nose: No gross nasal septal deviation. Inferior Turbinates 3+ bilaterally. No septal perforation. No masses/lesions. External nasal skin without masses/lesions.  Oral Cavity: Gingiva/lips: right upper lip with erythema and edema noted. There is an open faruncle which when unroofed produced no purulent drainage.  Induration present.  No fluctuance noted.  FOM Soft, no masses palpated. Oral Tongue mobile. Hard Palate WNL.  Oropharynx: BOT WNL. No masses/lesions noted. Tonsillar fossa/pharyngeal wall without lesions. Posterior oropharynx WNL.  Soft palate without masses. Midline uvula.    Neck/Lymphatic: No LAD I-VI bilaterally.  No thyromegaly.  No masses noted on exam.    Mirror laryngoscopy/nasopharyngoscopy: Active gag reflex.  Unable to perform.    Neuro/Psychiatric: AOx3.  Normal mood and affect.   Cardiovascular: Normal carotid pulses bilaterally, no increasing jugular venous distention noted at cervical region bilaterally.    Respiratory: Normal respiratory effort, no stridor, no retractions noted.      Significant Labs:  CBC:   Recent Labs  Lab 10/02/17  0709   WBC 6.42   RBC 4.83   HGB 14.9   HCT 43.8      MCV 91   MCH 30.8   MCHC 34.0       Significant Diagnostics:  CT: I have reviewed all pertinent results/findings within the past 24 hours and my personal findings are:  No evidence of facial abscess noted.     Assessment/Plan:     * Facial cellulitis    No evidence of abscess or surgical intervention is necessary at this time. Continue with current antibiotic therapy and pain control. Expect continued improvement with current treatment plan.  Follow up with PCP upon discharge.           VTE Risk Mitigation         Ordered     enoxaparin injection 40 mg  Daily     Route:  Subcutaneous        09/30/17 1726     Medium Risk of VTE  Once      09/30/17 1726          Thank you for your consult. I will sign off. Please contact us if you have any additional questions.    Inez Banegas MD  Otorhinolaryngology-Head & Neck Surgery  [facility]

## 2017-10-02 NOTE — SUBJECTIVE & OBJECTIVE
Medications:  Continuous Infusions:   Scheduled Meds:   amlodipine  10 mg Oral Daily    clindamycin (CLEOCIN) IVPB  600 mg Intravenous Q6H    enoxaparin  40 mg Subcutaneous Daily    gabapentin  300 mg Oral TID    lactobacillus acidophilus & bulgar  1 packet Oral BID    levothyroxine  25 mcg Oral Daily    sodium chloride 0.9%  3 mL Intravenous Q8H     PRN Meds:cyclobenzaprine, dextrose 50%, dextrose 50%, diazePAM, glucagon (human recombinant), glucose, glucose, hydrocodone-acetaminophen 5-325mg, insulin aspart     No current facility-administered medications on file prior to encounter.      Current Outpatient Prescriptions on File Prior to Encounter   Medication Sig    amlodipine (NORVASC) 10 MG tablet Take 10 mg by mouth once daily.    canagliflozin (INVOKANA) 300 mg Tab tablet Take 300 mg by mouth once daily.    gabapentin (NEURONTIN) 300 MG capsule Take 300 mg by mouth 3 (three) times daily.    ibuprofen (ADVIL,MOTRIN) 800 MG tablet Take 1 tablet (800 mg total) by mouth 3 (three) times daily as needed.    levothyroxine (SYNTHROID) 25 MCG tablet Take 25 mcg by mouth once daily.    meloxicam (MOBIC) 7.5 MG tablet Take 7.5 mg by mouth once daily.    oxycodone-acetaminophen (PERCOCET)  mg per tablet Take 1 tablet by mouth every 4 (four) hours as needed for Pain.    TIZANIDINE HCL (ZANAFLEX ORAL) Take by mouth.    [DISCONTINUED] clindamycin (CLEOCIN) 300 MG capsule Take 1 capsule (300 mg total) by mouth every 6 (six) hours.       Review of patient's allergies indicates:   Allergen Reactions    Bactrim [sulfamethoxazole-trimethoprim] Swelling       Past Medical History:   Diagnosis Date    Diabetes mellitus     Hypertension     Thyroid disease      Past Surgical History:   Procedure Laterality Date    CERVICAL FUSION       SECTION      CHOLECYSTECTOMY      HERNIA REPAIR      HYSTERECTOMY      TONSILLECTOMY       Family History     None        Social History Main Topics    Smoking  status: Never Smoker    Smokeless tobacco: Never Used    Alcohol use Yes      Comment: socially    Drug use: No    Sexual activity: Not on file     Review of Systems   Constitutional: Negative for chills, fever and unexpected weight change.   Eyes: Negative for photophobia, pain and visual disturbance.   Respiratory: Negative for shortness of breath, wheezing and stridor.    Cardiovascular: Negative for chest pain and palpitations.   Gastrointestinal: Negative for abdominal pain, blood in stool and vomiting.   Musculoskeletal: Negative for gait problem and myalgias.   Skin: Negative for pallor and rash.   Neurological: Negative for seizures, syncope and weakness.   Hematological: Negative for adenopathy. Does not bruise/bleed easily.   Psychiatric/Behavioral: Negative for confusion and dysphoric mood.     Objective:     Vital Signs (Most Recent):  Temp: 99.1 °F (37.3 °C) (10/02/17 0834)  Pulse: 76 (10/02/17 0834)  Resp: 18 (10/02/17 0834)  BP: 130/85 (10/02/17 0834)  SpO2: (!) 93 % (10/02/17 0924) Vital Signs (24h Range):  Temp:  [98.1 °F (36.7 °C)-99.1 °F (37.3 °C)] 99.1 °F (37.3 °C)  Pulse:  [66-81] 76  Resp:  [17-20] 18  SpO2:  [92 %-94 %] 93 %  BP: (121-143)/(78-92) 130/85     Weight: 122.5 kg (270 lb)  Body mass index is 39.87 kg/m².         Physical Exam     Constitutional: Well appearing / communicating.  NAD.  Eyes: EOM I Bilaterally  Head/Face: Normocephalic.  Negative paranasal sinus pressure/tenderness.  Salivary glands WNL.  House Brackmann I Bilaterally. No facial edema noted in the right cheek region. No erythema.     Right Ear: External Auditory Canal WNL,TM w/o masses/lesions/perforations.  Auricle WNL.  Left Ear: External Auditory Canal WNL,TM w/o masses/lesions/perforations. Auricle WNL.  Nose: No gross nasal septal deviation. Inferior Turbinates 3+ bilaterally. No septal perforation. No masses/lesions. External nasal skin without masses/lesions.  Oral Cavity: Gingiva/lips: right upper lip with  erythema and edema noted. There is an open faruncle which when unroofed produced no purulent drainage.  Induration present.  No fluctuance noted.  FOM Soft, no masses palpated. Oral Tongue mobile. Hard Palate WNL.  Oropharynx: BOT WNL. No masses/lesions noted. Tonsillar fossa/pharyngeal wall without lesions. Posterior oropharynx WNL.  Soft palate without masses. Midline uvula.   Neck/Lymphatic: No LAD I-VI bilaterally.  No thyromegaly.  No masses noted on exam.    Mirror laryngoscopy/nasopharyngoscopy: Active gag reflex.  Unable to perform.    Neuro/Psychiatric: AOx3.  Normal mood and affect.   Cardiovascular: Normal carotid pulses bilaterally, no increasing jugular venous distention noted at cervical region bilaterally.    Respiratory: Normal respiratory effort, no stridor, no retractions noted.      Significant Labs:  CBC:   Recent Labs  Lab 10/02/17  0709   WBC 6.42   RBC 4.83   HGB 14.9   HCT 43.8      MCV 91   MCH 30.8   MCHC 34.0       Significant Diagnostics:  CT: I have reviewed all pertinent results/findings within the past 24 hours and my personal findings are:  No evidence of facial abscess noted.

## 2017-10-02 NOTE — PLAN OF CARE
PT WILL DISCHARGE TODAY, O/P PHARMACY USED AND MEDICATION DELIVERED TO BEDSIDE PT HAS NO HH OR DME NEEDS.   PATIENT HAS ALREADY MADE CONTACT WITH HER PCP TO ESTABLISH FOLLOW UP APPT    FRIEND AT BEDSIDE TO TRANSPORT PATIENT.            10/02/17 1550   Final Note   Assessment Type Final Discharge Note   Discharge Disposition Home   What phone number can be called within the next 1-3 days to see how you are doing after discharge? [phone]   Hospital Follow Up  Appt(s) scheduled? Yes   Discharge plans and expectations educations in teach back method with documentation complete? Yes   Right Care Referral Info   Post Acute Recommendation No Care

## 2017-10-02 NOTE — ASSESSMENT & PLAN NOTE
No evidence of abscess or surgical intervention is necessary at this time. Continue with current antibiotic therapy and pain control. Expect continued improvement with current treatment plan.  Follow up with PCP upon discharge.

## 2017-10-02 NOTE — PLAN OF CARE
Problem: Patient Care Overview  Goal: Plan of Care Review  Outcome: Ongoing (interventions implemented as appropriate)  Pt AA&O x 4. Pt occasionally complains of pain. PRN pain meds given as ordered. Swelling and redness noted to right side of face. Antiobiotics given as scheduled. Respirations even and unlabored. No evidence of distress noted. Safety maintained. Bed in lowest position, side rails up x 3, call light and personal items within reach. Pt notified to call for assistance if needed. Pt verbalizes understanding. Family at the bedside. Will continue to monitor.

## 2017-10-03 NOTE — PROGRESS NOTES
.Pharmacy New Medication Education    Patient accepted medication education.    Pharmacy educated patient on name and purpose of medications and possible side effects, using the teach-back method.     Amlodipine  Clindamycin  Flexeril  D50%  Diazepam  Lovenox  Gabapentin  Glucagon  Glucose  Norco  Novolog  Lactinex  Synthroid      Learners of pharmacy medication education included:  patient    Patient +/- learner response:  teachback

## 2017-10-03 NOTE — DISCHARGE SUMMARY
[facility] Discharge Summary    Primary Team: [facility] IM Team B  Attending Physician: Negrito  Resident: Cody  Intern: Robert    Date of Admit: 9/30/2017  Date of Discharge: 10/2/2017    Discharge to: Home  Condition: Good    Discharge Diagnoses     Patient Active Problem List   Diagnosis    Neck pain    Decreased  strength    Decreased strength    Chronic pain of right ankle    Difficulty walking    Facial cellulitis    Hypothyroidism    HTN (hypertension)    DMII (diabetes mellitus, type 2)       Consultants and Procedures     Consultants:  ENT    Procedures:   None    Brief History of Present Illness      Bandar Douglass is a 47 y.o. female who  has a past medical history of Diabetes mellitus; Hypertension; and Thyroid disease.  The patient presented to [facility] on 9/30/2017 with a primary complaint of Mouth Lesions (PT c/o abscess to RT upper lip for the past few days.  PT seen here for this and dx with cellulitis.  Pt started on antibiotics with no improvement.  PT with swelling to RT upper lip noted.  )    Patient presents for lip infection for two days. States woke up Thursday morning with swelling in her upper lip with tenderness. Patient states that she called her PCP who told her to go to the ER. Patient presented to ER and was discharged with clindamycin. Patient states she went home and took clinda as prescribed for a total of 7 doses without any improvement in symptoms. Patient states she felt like the infection was spreading inferiorly to involve her cheek so she returned to ED today. She was started on IV vanc and zosyn. Patient states that she already feels an improvement after one dose of each IV abx. She denies febrile symptoms, chills. She denies trauma, vision changes, or recent dental work. She denies other complaints at this time.     For the full HPI please refer to the History & Physical from this admission.    Hospital Course By Problem with  Pertinent Findings     Facial cellulitis  - Upper lip cellulitis that began two days prior to arrival.  - States awoke with upper lip edema and erythema.  - Prescribed clindamycin initially without improvement after total of 7 doses  - States she felt like cellulitis was spreading inferiorly to involve cheek.  - Denied trauma or febrile symptoms  - WBC unremarkable, no tachycardia or hypotension.  - S/p one dose of vanc and zosyn in ED. Switched to IV clinda.   - Patient responded well to IV abx, discharged with 10 days of PO linezolid  - Patient reported improvement in edema after IV abx  - ENT consulted, no intervention, will follow up as outpatient.     DMII  - On invokana at home, covered with SSI while inpatient  - Repeat A1C 6.7%     Hypothyroidism  - Continued home synthroid  - TSH wnl     HTN  - BP controlled on arrival  - Continued home amlodipine     Cervical fusion  - S/p cervical fusion one month ago  - Takes gabapentin, zanaflex as needed, continued.    Discharge Medications        Medication List      START taking these medications    lactobacillus acidophilus & bulgar 100 million cell packet  Commonly known as:  LACTINEX  Take 1 packet (1 each total) by mouth 2 (two) times daily.     linezolid 600 mg Tab  Commonly known as:  ZYVOX  Take 1 tablet (600 mg total) by mouth every 12 (twelve) hours.        CONTINUE taking these medications    alprazolam 1 MG tablet  Commonly known as:  XANAX     amlodipine 10 MG tablet  Commonly known as:  NORVASC     cyclobenzaprine 10 MG tablet  Commonly known as:  FLEXERIL     diazePAM 10 MG Tab  Commonly known as:  VALIUM     gabapentin 300 MG capsule  Commonly known as:  NEURONTIN     ibuprofen 800 MG tablet  Commonly known as:  ADVIL,MOTRIN  Take 1 tablet (800 mg total) by mouth 3 (three) times daily as needed.     INVOKANA 300 mg Tab tablet  Generic drug:  canagliflozin     levothyroxine 25 MCG tablet  Commonly known as:  SYNTHROID     meloxicam 7.5 MG  tablet  Commonly known as:  MOBIC     oxycodone-acetaminophen  mg per tablet  Commonly known as:  PERCOCET     ZANAFLEX ORAL        STOP taking these medications    clindamycin 300 MG capsule  Commonly known as:  CLEOCIN           Where to Get Your Medications      You can get these medications from any pharmacy    Bring a paper prescription for each of these medications  · lactobacillus acidophilus & bulgar 100 million cell packet  · linezolid 600 mg Tab         Discharge Information:   Diet:  Diabetic    Physical Activity:  As tolerated    Instructions:  1. Take all medications as prescribed  2. Keep all follow-up appointments  3. Return to the hospital or call your primary care physicians if any worsening symptoms occur.    Follow-Up Appointments:  Follow-up Information     Chastity Silva NP.    Specialty:  Family Medicine  Why:  PATIENT HAS MADE CONTACT TO SCHEDULE APPT  Contact information:  [address]  [address]  [facility]  [place] [address] [phone]                     Jaswant Ross  [facility], -[facility]

## 2018-05-01 ENCOUNTER — LAB VISIT (OUTPATIENT)
Dept: LAB | Facility: HOSPITAL | Age: 48
End: 2018-05-01
Attending: NURSE PRACTITIONER
Payer: MEDICAID

## 2018-05-01 DIAGNOSIS — E11.9 TYPE 2 DIABETES MELLITUS WITHOUT COMPLICATIONS: ICD-10-CM

## 2018-05-01 DIAGNOSIS — E03.9 HYPOTHYROIDISM, UNSPECIFIED: ICD-10-CM

## 2018-05-01 DIAGNOSIS — E55.9 VITAMIN D DEFICIENCY, UNSPECIFIED: Primary | ICD-10-CM

## 2018-05-01 LAB
25(OH)D3+25(OH)D2 SERPL-MCNC: 44 NG/ML
ALBUMIN SERPL BCP-MCNC: 4.4 G/DL
ALP SERPL-CCNC: 60 U/L
ALT SERPL W/O P-5'-P-CCNC: 30 U/L
ANION GAP SERPL CALC-SCNC: 14 MMOL/L
AST SERPL-CCNC: 48 U/L
BASOPHILS # BLD AUTO: 0.03 K/UL
BASOPHILS NFR BLD: 0.6 %
BILIRUB SERPL-MCNC: 0.5 MG/DL
BUN SERPL-MCNC: 11 MG/DL
CALCIUM SERPL-MCNC: 10.1 MG/DL
CHLORIDE SERPL-SCNC: 100 MMOL/L
CHOLEST SERPL-MCNC: 185 MG/DL
CHOLEST/HDLC SERPL: 3 {RATIO}
CO2 SERPL-SCNC: 31 MMOL/L
CREAT SERPL-MCNC: 0.64 MG/DL
DIFFERENTIAL METHOD: ABNORMAL
EOSINOPHIL # BLD AUTO: 0.3 K/UL
EOSINOPHIL NFR BLD: 4.8 %
ERYTHROCYTE [DISTWIDTH] IN BLOOD BY AUTOMATED COUNT: 12.6 %
EST. GFR  (AFRICAN AMERICAN): >60 ML/MIN/1.73 M^2
EST. GFR  (NON AFRICAN AMERICAN): >60 ML/MIN/1.73 M^2
ESTIMATED AVG GLUCOSE: 105 MG/DL
GLUCOSE SERPL-MCNC: 115 MG/DL
HBA1C MFR BLD HPLC: 5.3 %
HCT VFR BLD AUTO: 48.8 %
HDLC SERPL-MCNC: 62 MG/DL
HDLC SERPL: 33.5 %
HGB BLD-MCNC: 15.9 G/DL
LDLC SERPL CALC-MCNC: 104.4 MG/DL
LYMPHOCYTES # BLD AUTO: 1.1 K/UL
LYMPHOCYTES NFR BLD: 21.9 %
MCH RBC QN AUTO: 30.9 PG
MCHC RBC AUTO-ENTMCNC: 32.6 G/DL
MCV RBC AUTO: 95 FL
MONOCYTES # BLD AUTO: 0.5 K/UL
MONOCYTES NFR BLD: 9.6 %
NEUTROPHILS # BLD AUTO: 3.3 K/UL
NEUTROPHILS NFR BLD: 62.9 %
NONHDLC SERPL-MCNC: 123 MG/DL
PLATELET # BLD AUTO: 270 K/UL
PMV BLD AUTO: 10.3 FL
POTASSIUM SERPL-SCNC: 4.6 MMOL/L
PROT SERPL-MCNC: 7.5 G/DL
RBC # BLD AUTO: 5.14 M/UL
SODIUM SERPL-SCNC: 145 MMOL/L
T4 FREE SERPL-MCNC: 1.43 NG/DL
T4 SERPL-MCNC: 10.5 UG/DL
TRIGL SERPL-MCNC: 93 MG/DL
TSH SERPL DL<=0.005 MIU/L-ACNC: 0.69 UIU/ML
WBC # BLD AUTO: 5.2 K/UL

## 2018-05-01 PROCEDURE — 80061 LIPID PANEL: CPT

## 2018-05-01 PROCEDURE — 36415 COLL VENOUS BLD VENIPUNCTURE: CPT | Mod: PO

## 2018-05-01 PROCEDURE — 84443 ASSAY THYROID STIM HORMONE: CPT | Mod: PO

## 2018-05-01 PROCEDURE — 84436 ASSAY OF TOTAL THYROXINE: CPT

## 2018-05-01 PROCEDURE — 85025 COMPLETE CBC W/AUTO DIFF WBC: CPT | Mod: PO

## 2018-05-01 PROCEDURE — 82306 VITAMIN D 25 HYDROXY: CPT | Mod: PO

## 2018-05-01 PROCEDURE — 83036 HEMOGLOBIN GLYCOSYLATED A1C: CPT

## 2018-05-01 PROCEDURE — 80053 COMPREHEN METABOLIC PANEL: CPT | Mod: PO

## 2018-05-01 PROCEDURE — 84439 ASSAY OF FREE THYROXINE: CPT

## 2018-05-02 ENCOUNTER — APPOINTMENT (OUTPATIENT)
Dept: LAB | Facility: HOSPITAL | Age: 48
End: 2018-05-02
Attending: NURSE PRACTITIONER
Payer: MEDICAID

## 2018-05-02 DIAGNOSIS — E11.9 TYPE 2 DIABETES MELLITUS WITHOUT COMPLICATIONS: Primary | ICD-10-CM

## 2018-05-02 LAB
CREAT UR-MCNC: 70 MG/DL
MICROALBUMIN UR DL<=1MG/L-MCNC: 22 UG/ML
MICROALBUMIN/CREATININE RATIO: 31.4 UG/MG

## 2018-05-02 PROCEDURE — 82043 UR ALBUMIN QUANTITATIVE: CPT | Mod: PO

## 2018-05-25 DIAGNOSIS — R22.2 MASS IN CHEST: Primary | ICD-10-CM

## 2018-10-24 ENCOUNTER — HOSPITAL ENCOUNTER (EMERGENCY)
Facility: HOSPITAL | Age: 48
Discharge: HOME | End: 2018-10-24
Attending: FAMILY MEDICINE
Payer: MEDICAID

## 2018-10-24 VITALS
WEIGHT: 235 LBS | OXYGEN SATURATION: 97 % | SYSTOLIC BLOOD PRESSURE: 143 MMHG | HEART RATE: 72 BPM | RESPIRATION RATE: 20 BRPM | DIASTOLIC BLOOD PRESSURE: 88 MMHG | TEMPERATURE: 98 F | HEIGHT: 69 IN | BODY MASS INDEX: 34.8 KG/M2

## 2018-10-24 DIAGNOSIS — M79.603 ARM PAIN: ICD-10-CM

## 2018-10-24 DIAGNOSIS — T63.691A MARINE ANIMAL STING, ACCIDENTAL OR UNINTENTIONAL, INITIAL ENCOUNTER: Primary | ICD-10-CM

## 2018-10-24 PROCEDURE — 63600175 PHARM REV CODE 636 W HCPCS: Performed by: PHYSICIAN ASSISTANT

## 2018-10-24 PROCEDURE — 96372 THER/PROPH/DIAG INJ SC/IM: CPT

## 2018-10-24 PROCEDURE — 99284 EMERGENCY DEPT VISIT MOD MDM: CPT | Mod: 25

## 2018-10-24 RX ORDER — KETOROLAC TROMETHAMINE 30 MG/ML
15 INJECTION, SOLUTION INTRAMUSCULAR; INTRAVENOUS
Status: COMPLETED | OUTPATIENT
Start: 2018-10-24 | End: 2018-10-24

## 2018-10-24 RX ORDER — NAPROXEN 500 MG/1
500 TABLET ORAL 2 TIMES DAILY WITH MEALS
Qty: 12 TABLET | Refills: 0 | OUTPATIENT
Start: 2018-10-24 | End: 2018-11-04

## 2018-10-24 RX ORDER — DOXYCYCLINE 100 MG/1
100 CAPSULE ORAL 2 TIMES DAILY
Qty: 14 CAPSULE | Refills: 0 | Status: SHIPPED | OUTPATIENT
Start: 2018-10-24 | End: 2018-10-31

## 2018-10-24 RX ADMIN — KETOROLAC TROMETHAMINE 15 MG: 30 INJECTION, SOLUTION INTRAMUSCULAR; INTRAVENOUS at 02:10

## 2018-10-24 NOTE — ED PROVIDER NOTES
"Encounter Date: 10/24/2018       History     Chief Complaint   Patient presents with    Arm Pain     Pt states was finned by a catfish 2 weeks ago, states "they had to pull it out."  Firm raised area noted to right inner forearm.       48-year-old female presents to the emergency department for evaluation of 2 week history of right forearm pain status post injury. She reports that she was stabbed in the arm by the katharine of a catfish 2 weeks ago she was fishing.  She reports that she had severe pain for the first 10 days but it seemed to be improving.  She states that yesterday the area around the sting became mildly swollen and the pain became mildly worse.  She denies any redness or warmth to the arm.  She has been attempting treatment with ibuprofen with mild relief of symptoms. She denies any fever, chills, body aches, headache, dizziness, vision changes, neck pain, chest pain, nausea, vomiting or numbness/weakness/tingling to the upper extremities. She reports her blood glucose levels have been within normal limits.           Review of patient's allergies indicates:   Allergen Reactions    Bactrim [sulfamethoxazole-trimethoprim] Swelling     Past Medical History:   Diagnosis Date    Diabetes mellitus     Hypertension     Thyroid disease      Past Surgical History:   Procedure Laterality Date    CERVICAL FUSION       SECTION      CHOLECYSTECTOMY      HERNIA REPAIR      HYSTERECTOMY      TONSILLECTOMY       History reviewed. No pertinent family history.  Social History     Tobacco Use    Smoking status: Never Smoker    Smokeless tobacco: Never Used   Substance Use Topics    Alcohol use: Yes     Comment: socially    Drug use: No     Review of Systems   Constitutional: Negative for activity change, appetite change and fever.   HENT: Negative for congestion, ear discharge, facial swelling, postnasal drip, sinus pressure, sinus pain, sore throat, trouble swallowing and voice change.    Eyes: " Negative for photophobia and visual disturbance.   Respiratory: Negative for cough, chest tightness, shortness of breath and wheezing.    Cardiovascular: Negative for chest pain.   Gastrointestinal: Negative for abdominal pain, constipation, diarrhea, nausea and vomiting.   Genitourinary: Negative for decreased urine volume, dysuria and hematuria.   Musculoskeletal: Negative for back pain and neck pain.   Skin: Negative for rash.   Neurological: Negative for dizziness, syncope, weakness, light-headedness, numbness and headaches.       Physical Exam     Initial Vitals [10/24/18 1352]   BP Pulse Resp Temp SpO2   (!) 143/81 73 18 98 °F (36.7 °C) 99 %      MAP       --         Physical Exam    Nursing note and vitals reviewed.  Constitutional: She appears well-developed and well-nourished. She is not diaphoretic. No distress.   HENT:   Head: Normocephalic and atraumatic.   Right Ear: External ear normal.   Left Ear: External ear normal.   Nose: Nose normal.   Mouth/Throat: Oropharynx is clear and moist.   Eyes: Conjunctivae and EOM are normal. Pupils are equal, round, and reactive to light.   Neck: Normal range of motion. Neck supple.   Cardiovascular: Normal rate, regular rhythm and normal heart sounds.   Pulmonary/Chest: Breath sounds normal. No respiratory distress. She has no wheezes. She has no rhonchi. She has no rales. She exhibits no tenderness.   Abdominal: Soft. Bowel sounds are normal. She exhibits no distension. There is no tenderness. There is no rebound.   Musculoskeletal: Normal range of motion.        Right shoulder: She exhibits normal range of motion, no tenderness and no bony tenderness.        Right elbow: She exhibits normal range of motion. No tenderness found.        Right wrist: She exhibits normal range of motion, no tenderness and no bony tenderness.        Cervical back: She exhibits normal range of motion, no tenderness and no bony tenderness.        Thoracic back: She exhibits normal range  of motion, no tenderness and no bony tenderness.        Lumbar back: She exhibits normal range of motion and no bony tenderness.        Right upper arm: She exhibits no tenderness, no bony tenderness and no swelling.        Right forearm: She exhibits tenderness. She exhibits no bony tenderness.        Arms:       Right hand: She exhibits normal range of motion, no tenderness and no bony tenderness. Normal sensation noted. Normal strength noted.   Lymphadenopathy:     She has no cervical adenopathy.   Neurological: She is alert and oriented to person, place, and time. No cranial nerve deficit.   Skin: Skin is warm and dry.   Psychiatric: She has a normal mood and affect.         ED Course   Procedures  Labs Reviewed - No data to display       Imaging Results          X-Ray Forearm Right (Final result)  Result time 10/24/18 14:27:23    Final result by ZARINA Casas Sr., MD (10/24/18 14:27:23)                 Impression:      Normal study.      Electronically signed by: Kwabena Casas MD  Date:    10/24/2018  Time:    14:27             Narrative:    EXAMINATION:  XR FOREARM RIGHT    CLINICAL HISTORY:  Pain in arm, unspecified    COMPARISON:  None    FINDINGS:  There is no fracture. There is no dislocation.                                 Medical Decision Making:   Initial Assessment:   48-year-old female presents for evaluation of arm pain status post catfish sting.  Physical exam reveals a nontoxic-appearing female in no acute distress. Patient is afebrile vital signs within normal limits.  Neurological exam reveals an alert and oriented patient.  Neck is supple, no meningeal signs noted. Lungs clear to auscultation bilaterally.  Respiratory distress or accessory muscle use noted. Abdominal exam reveals a soft abdomen, nontender to palpation. No CVA tenderness noted.  Examination of the right upper extremity reveals small Puncture wound noted to the ventral aspect of the mid right forearm with surrounding  2 x 2  cm area of mild non circumferential swelling noted.  No induration, warmth, erythema or fluctuance noted. Full range of motion, sensation and peripheral pulses intact in upper extremities bilaterally  Differential Diagnosis:   Localized reaction to catfish sting  X-ray ordered to assess for possible imbedded foreign body or osseous injury.  I carefully considered but doubt serious etiology including compartment syndrome, deep space abscess or cellulitis.    ED Management:  No incision and drainage indicated at this time.  X-ray reveals no acute findings.  Discussed these findings at length with the patient verbalizes understanding and agreement course of treatment.  Instructed the patient to follow up with her primary care provider for re-evaluation within 3 days.  Instructed the patient to return to the emergency department immediately for any new or worsening symptoms.                      Clinical Impression:   The primary encounter diagnosis was Marine animal sting, accidental or unintentional, initial encounter. A diagnosis of Arm pain was also pertinent to this visit.                             Ani Shook PA-C  10/24/18 6024

## 2018-10-24 NOTE — DISCHARGE INSTRUCTIONS
The x-ray of your arm did not reveal any evidence of imbedded foreign body.  You are instructed to follow up with your primary care provider for re-evaluation within 3 days.  You are instructed to return to the emergency department immediately for any new or worsening symptoms

## 2018-10-28 ENCOUNTER — HOSPITAL ENCOUNTER (EMERGENCY)
Facility: HOSPITAL | Age: 48
Discharge: HOME | End: 2018-10-28
Attending: SURGERY
Payer: MEDICAID

## 2018-10-28 VITALS
OXYGEN SATURATION: 98 % | SYSTOLIC BLOOD PRESSURE: 143 MMHG | TEMPERATURE: 99 F | DIASTOLIC BLOOD PRESSURE: 86 MMHG | BODY MASS INDEX: 34.7 KG/M2 | HEART RATE: 73 BPM | WEIGHT: 235 LBS | RESPIRATION RATE: 20 BRPM

## 2018-10-28 DIAGNOSIS — L02.413 ABSCESS OF FOREARM, RIGHT: Primary | ICD-10-CM

## 2018-10-28 LAB
ALBUMIN SERPL BCP-MCNC: 4.4 G/DL
ALP SERPL-CCNC: 67 U/L
ALT SERPL W/O P-5'-P-CCNC: 20 U/L
ANION GAP SERPL CALC-SCNC: 9 MMOL/L
AST SERPL-CCNC: 47 U/L
BASOPHILS # BLD AUTO: 0.05 K/UL
BASOPHILS NFR BLD: 0.8 %
BILIRUB SERPL-MCNC: 0.7 MG/DL
BUN SERPL-MCNC: 12 MG/DL
CALCIUM SERPL-MCNC: 9.4 MG/DL
CHLORIDE SERPL-SCNC: 103 MMOL/L
CO2 SERPL-SCNC: 26 MMOL/L
CREAT SERPL-MCNC: 0.53 MG/DL
DIFFERENTIAL METHOD: ABNORMAL
EOSINOPHIL # BLD AUTO: 0.6 K/UL
EOSINOPHIL NFR BLD: 9.8 %
ERYTHROCYTE [DISTWIDTH] IN BLOOD BY AUTOMATED COUNT: 12.4 %
EST. GFR  (AFRICAN AMERICAN): >60 ML/MIN/1.73 M^2
EST. GFR  (NON AFRICAN AMERICAN): >60 ML/MIN/1.73 M^2
GLUCOSE SERPL-MCNC: 130 MG/DL
HCT VFR BLD AUTO: 44.8 %
HGB BLD-MCNC: 15.3 G/DL
LYMPHOCYTES # BLD AUTO: 2.1 K/UL
LYMPHOCYTES NFR BLD: 32.4 %
MCH RBC QN AUTO: 30.8 PG
MCHC RBC AUTO-ENTMCNC: 34.2 G/DL
MCV RBC AUTO: 90 FL
MONOCYTES # BLD AUTO: 0.6 K/UL
MONOCYTES NFR BLD: 9.2 %
NEUTROPHILS # BLD AUTO: 3.1 K/UL
NEUTROPHILS NFR BLD: 47.5 %
PLATELET # BLD AUTO: 273 K/UL
PMV BLD AUTO: 9.3 FL
POTASSIUM SERPL-SCNC: 4.2 MMOL/L
PROT SERPL-MCNC: 7.7 G/DL
RBC # BLD AUTO: 4.97 M/UL
SODIUM SERPL-SCNC: 138 MMOL/L
WBC # BLD AUTO: 6.52 K/UL

## 2018-10-28 PROCEDURE — 96365 THER/PROPH/DIAG IV INF INIT: CPT | Mod: 59

## 2018-10-28 PROCEDURE — 87040 BLOOD CULTURE FOR BACTERIA: CPT

## 2018-10-28 PROCEDURE — 80053 COMPREHEN METABOLIC PANEL: CPT

## 2018-10-28 PROCEDURE — 25000003 PHARM REV CODE 250: Performed by: SURGERY

## 2018-10-28 PROCEDURE — 99284 EMERGENCY DEPT VISIT MOD MDM: CPT | Mod: 25

## 2018-10-28 PROCEDURE — 85025 COMPLETE CBC W/AUTO DIFF WBC: CPT

## 2018-10-28 PROCEDURE — 10060 I&D ABSCESS SIMPLE/SINGLE: CPT

## 2018-10-28 PROCEDURE — 63600175 PHARM REV CODE 636 W HCPCS: Performed by: SURGERY

## 2018-10-28 PROCEDURE — 87070 CULTURE OTHR SPECIMN AEROBIC: CPT | Mod: 59

## 2018-10-28 RX ORDER — OXYCODONE AND ACETAMINOPHEN 5; 325 MG/1; MG/1
1 TABLET ORAL
Status: COMPLETED | OUTPATIENT
Start: 2018-10-28 | End: 2018-10-28

## 2018-10-28 RX ORDER — LIDOCAINE HYDROCHLORIDE 10 MG/ML
5 INJECTION INFILTRATION; PERINEURAL
Status: COMPLETED | OUTPATIENT
Start: 2018-10-28 | End: 2018-10-28

## 2018-10-28 RX ORDER — OXYCODONE AND ACETAMINOPHEN 5; 325 MG/1; MG/1
1 TABLET ORAL EVERY 6 HOURS PRN
Qty: 12 TABLET | Refills: 0 | Status: SHIPPED | OUTPATIENT
Start: 2018-10-28 | End: 2018-11-16

## 2018-10-28 RX ORDER — CLINDAMYCIN HYDROCHLORIDE 300 MG/1
300 CAPSULE ORAL EVERY 8 HOURS
Qty: 15 CAPSULE | Refills: 0 | Status: SHIPPED | OUTPATIENT
Start: 2018-10-28 | End: 2018-11-16

## 2018-10-28 RX ADMIN — OXYCODONE HYDROCHLORIDE AND ACETAMINOPHEN 1 TABLET: 5; 325 TABLET ORAL at 11:10

## 2018-10-28 RX ADMIN — SODIUM CHLORIDE 1000 ML: 0.9 INJECTION, SOLUTION INTRAVENOUS at 11:10

## 2018-10-28 RX ADMIN — PIPERACILLIN AND TAZOBACTAM 4.5 G: 4; .5 INJECTION, POWDER, LYOPHILIZED, FOR SOLUTION INTRAVENOUS; PARENTERAL at 11:10

## 2018-10-28 RX ADMIN — LIDOCAINE HYDROCHLORIDE 5 ML: 10 INJECTION, SOLUTION EPIDURAL; INFILTRATION; INTRACAUDAL; PERINEURAL at 11:10

## 2018-10-28 NOTE — DISCHARGE INSTRUCTIONS
Will notify our results of culture if antibiotics need to be changed.  Return to primary doctor or ED for wound recheck in 3 days

## 2018-10-28 NOTE — ED PROVIDER NOTES
Encounter Date: 10/28/2018       History     Chief Complaint   Patient presents with    Arm Pain     right arm pain 2 weeks ago was bitten by a catfish, brusing and swelling to right arm.     Patient has had right arm pain in the forearm for 2 weeks after being stung by a   Cat fish and she has been seen in the past visits for cellulitis and prescribed antibiotics she now presents with the forearm swelling consistent with abscess      The history is provided by the patient.   Abscess    This is a new problem. The current episode started several days ago. The problem occurs occasionally. The problem has been gradually worsening. The abscess is present on the right arm. The pain is at a severity of 7/10. The abscess is characterized by redness, painfulness and swelling. Associated symptoms include a fever.     Review of patient's allergies indicates:   Allergen Reactions    Bactrim [sulfamethoxazole-trimethoprim] Swelling     Past Medical History:   Diagnosis Date    Diabetes mellitus     Hypertension     Thyroid disease      Past Surgical History:   Procedure Laterality Date    CERVICAL FUSION       SECTION      CHOLECYSTECTOMY      HERNIA REPAIR      HYSTERECTOMY      TONSILLECTOMY       History reviewed. No pertinent family history.  Social History     Tobacco Use    Smoking status: Never Smoker    Smokeless tobacco: Never Used   Substance Use Topics    Alcohol use: Yes     Comment: socially    Drug use: No     Review of Systems   Constitutional: Positive for fever.   HENT: Negative.    Eyes: Negative.    Respiratory: Negative.    Cardiovascular: Negative.    Gastrointestinal: Negative.    Endocrine: Negative.    Genitourinary: Negative.    Musculoskeletal: Positive for joint swelling.   Skin: Positive for wound.   Allergic/Immunologic: Negative.    Hematological: Negative.        Physical Exam     Initial Vitals [10/28/18 1020]   BP Pulse Resp Temp SpO2   (!) 143/86 73 20 98.9 °F (37.2 °C)  98 %      MAP       --         Physical Exam    Vitals reviewed.  Constitutional: She appears well-developed and well-nourished.   HENT:   Head: Normocephalic.   Eyes: Conjunctivae are normal.   Neck: Normal range of motion.   Cardiovascular: Normal rate and intact distal pulses.   Pulmonary/Chest: Breath sounds normal.   Abdominal: Soft.   Musculoskeletal: She exhibits tenderness.        Arms:  Neurological: She is alert and oriented to person, place, and time.   Skin: Skin is warm and dry. Capillary refill takes less than 2 seconds.         ED Course   I & D - Incision and Drainage  Date/Time: 10/28/2018 6:02 PM  Performed by: EDU Medrano III, MD  Authorized by: EDU Medrano III, MD   Consent Done: Not Needed  Type: abscess  Body area: upper extremity    Anesthesia:  Local Anesthetic: lidocaine 1% without epinephrine  Anesthetic total: 3 mL  Patient sedated: no  Scalpel size: 11  Incision type: single straight  Complexity: simple  Drainage: pus  Drainage amount: scant  Wound treatment: wound left open  Complications: No  Specimens: No  Implants: No        Labs Reviewed   CBC W/ AUTO DIFFERENTIAL - Abnormal; Notable for the following components:       Result Value    Eos # 0.6 (*)     Eosinophil% 9.8 (*)     All other components within normal limits   COMPREHENSIVE METABOLIC PANEL - Abnormal; Notable for the following components:    Glucose 130 (*)     AST 47 (*)     All other components within normal limits   CULTURE, BLOOD   CULTURE, AEROBIC  (SPECIFY SOURCE)          Imaging Results    None          Medical Decision Making:   Initial Assessment:   Right forearm abscess secondary to cat fish fin injury  ED Management:  Incision drainage done culture done patient was given IV Zosyn and discharged on clindamycin wound recheck in 3 days                      Clinical Impression:   The encounter diagnosis was Abscess of forearm, right.                             EDU Medrano III, MD  10/28/18 5415

## 2018-10-31 LAB — BACTERIA SPEC AEROBE CULT: NO GROWTH

## 2018-11-02 LAB — BACTERIA BLD CULT: NORMAL

## 2018-11-04 ENCOUNTER — HOSPITAL ENCOUNTER (EMERGENCY)
Facility: HOSPITAL | Age: 48
Discharge: HOME | End: 2018-11-04
Attending: EMERGENCY MEDICINE
Payer: MEDICAID

## 2018-11-04 VITALS
OXYGEN SATURATION: 96 % | WEIGHT: 235 LBS | RESPIRATION RATE: 20 BRPM | HEART RATE: 61 BPM | HEIGHT: 69 IN | SYSTOLIC BLOOD PRESSURE: 116 MMHG | DIASTOLIC BLOOD PRESSURE: 79 MMHG | BODY MASS INDEX: 34.8 KG/M2 | TEMPERATURE: 98 F

## 2018-11-04 DIAGNOSIS — M79.601 PAIN OF RIGHT UPPER EXTREMITY: Primary | ICD-10-CM

## 2018-11-04 LAB
BASOPHILS # BLD AUTO: 0.07 K/UL
BASOPHILS NFR BLD: 1 %
DIFFERENTIAL METHOD: ABNORMAL
EOSINOPHIL # BLD AUTO: 0.6 K/UL
EOSINOPHIL NFR BLD: 8 %
ERYTHROCYTE [DISTWIDTH] IN BLOOD BY AUTOMATED COUNT: 12.5 %
HCT VFR BLD AUTO: 45.3 %
HGB BLD-MCNC: 15.3 G/DL
LYMPHOCYTES # BLD AUTO: 1.4 K/UL
LYMPHOCYTES NFR BLD: 19.8 %
MCH RBC QN AUTO: 30.6 PG
MCHC RBC AUTO-ENTMCNC: 33.8 G/DL
MCV RBC AUTO: 91 FL
MONOCYTES # BLD AUTO: 0.6 K/UL
MONOCYTES NFR BLD: 9 %
NEUTROPHILS # BLD AUTO: 4.2 K/UL
NEUTROPHILS NFR BLD: 61 %
PLATELET # BLD AUTO: 280 K/UL
PMV BLD AUTO: 9.3 FL
RBC # BLD AUTO: 5 M/UL
WBC # BLD AUTO: 6.87 K/UL

## 2018-11-04 PROCEDURE — 96372 THER/PROPH/DIAG INJ SC/IM: CPT

## 2018-11-04 PROCEDURE — 99284 EMERGENCY DEPT VISIT MOD MDM: CPT

## 2018-11-04 PROCEDURE — 63600175 PHARM REV CODE 636 W HCPCS: Performed by: PHYSICIAN ASSISTANT

## 2018-11-04 PROCEDURE — 85025 COMPLETE CBC W/AUTO DIFF WBC: CPT

## 2018-11-04 RX ORDER — KETOROLAC TROMETHAMINE 30 MG/ML
15 INJECTION, SOLUTION INTRAMUSCULAR; INTRAVENOUS
Status: COMPLETED | OUTPATIENT
Start: 2018-11-04 | End: 2018-11-04

## 2018-11-04 RX ORDER — DICLOFENAC SODIUM 50 MG/1
50 TABLET, DELAYED RELEASE ORAL 2 TIMES DAILY
Qty: 10 TABLET | Refills: 0 | Status: SHIPPED | OUTPATIENT
Start: 2018-11-04 | End: 2018-11-16

## 2018-11-04 RX ADMIN — KETOROLAC TROMETHAMINE 15 MG: 30 INJECTION, SOLUTION INTRAMUSCULAR at 11:11

## 2018-11-04 NOTE — DISCHARGE INSTRUCTIONS
You are advised to follow up with her primary care provider for re-evaluation within 3 days.  You are instructed to return to the emergency department immediately for any new or worsening symptoms.

## 2018-11-04 NOTE — ED PROVIDER NOTES
Encounter Date: 2018       History     Chief Complaint   Patient presents with    Pain     Pt states has been to ED multiple times s/p being stuck by a catfish fin approx 1 month ago.  States continued bruising to Right upper arm, pain and swelling at site.       48-year-old female presents to the emergency department for evaluation of continued pain and mild bruising to the right forearm status post catfish sting 1 month ago.  She reports that after the initial evaluation 2 weeks ago she returned to this emergency department 1 week ago with a small abscess to the arm.  She reports that Dr. Medrano incised and drained the abscess and place her on clindamycin.  She states that she has been taking the clindamycin as prescribed and today is the last day.  She reports that she still has some mild swelling to the site but denies any redness or warmth.  She reports that she still has some mild bruising to the right forearm since the  initial injury. She denies any current numbness, tingling, or weakness to the arm.          Review of patient's allergies indicates:   Allergen Reactions    Bactrim [sulfamethoxazole-trimethoprim] Swelling     Past Medical History:   Diagnosis Date    Diabetes mellitus     Hypertension     Thyroid disease      Past Surgical History:   Procedure Laterality Date    CERVICAL FUSION       SECTION      CHOLECYSTECTOMY      HERNIA REPAIR      HYSTERECTOMY      TONSILLECTOMY       History reviewed. No pertinent family history.  Social History     Tobacco Use    Smoking status: Never Smoker    Smokeless tobacco: Never Used   Substance Use Topics    Alcohol use: Yes     Comment: socially    Drug use: No     Review of Systems   Constitutional: Negative for activity change, appetite change and fever.   HENT: Negative for congestion, facial swelling, rhinorrhea, sinus pressure, sinus pain, sore throat, trouble swallowing and voice change.    Eyes: Negative for photophobia  and visual disturbance.   Respiratory: Negative for cough, chest tightness, shortness of breath and wheezing.    Cardiovascular: Negative for chest pain.   Gastrointestinal: Negative for abdominal pain, constipation, diarrhea, nausea and vomiting.   Genitourinary: Negative for decreased urine volume, dysuria and hematuria.   Musculoskeletal: Negative for back pain and neck pain.   Skin: Positive for wound.   Neurological: Negative for dizziness, syncope, weakness, light-headedness, numbness and headaches.   Hematological: Bruises/bleeds easily.       Physical Exam     Initial Vitals [11/04/18 1104]   BP Pulse Resp Temp SpO2   (!) 141/81 78 18 98.6 °F (37 °C) 97 %      MAP       --         Physical Exam    Nursing note and vitals reviewed.  Constitutional: She appears well-developed and well-nourished. She is not diaphoretic. No distress.   HENT:   Head: Normocephalic and atraumatic.   Right Ear: External ear normal.   Left Ear: External ear normal.   Nose: Nose normal.   Mouth/Throat: Oropharynx is clear and moist.   Eyes: Conjunctivae and EOM are normal. Pupils are equal, round, and reactive to light.   Neck: Normal range of motion. Neck supple.   Cardiovascular: Normal rate, regular rhythm and normal heart sounds.   Pulmonary/Chest: Breath sounds normal. No respiratory distress. She has no wheezes. She has no rhonchi. She has no rales. She exhibits no tenderness.   Abdominal: Soft. Bowel sounds are normal. She exhibits no distension. There is no tenderness. There is no rebound and no CVA tenderness.   Musculoskeletal: Normal range of motion.        Right shoulder: She exhibits normal range of motion, no tenderness and no bony tenderness.        Right elbow: She exhibits normal range of motion. No tenderness found. No radial head tenderness noted.        Right wrist: She exhibits normal range of motion and no tenderness.        Right forearm: She exhibits tenderness. She exhibits no bony tenderness, no swelling and  no edema.        Arms:       Right hand: She exhibits normal range of motion, no tenderness and no bony tenderness.   Lymphadenopathy:     She has no cervical adenopathy.   Neurological: She is alert and oriented to person, place, and time.   Skin: Skin is warm and dry.   Psychiatric: She has a normal mood and affect.         ED Course   Procedures  Labs Reviewed   CBC W/ AUTO DIFFERENTIAL - Abnormal; Notable for the following components:       Result Value    Eos # 0.6 (*)     All other components within normal limits          Imaging Results    None          Medical Decision Making:   Initial Assessment:   48-year-old female presents for evaluation of continued right forearm pain and well-healing bruising.  Physical exam reveals a nontoxic-appearing female in no acute distress. Patient is afebrile vital signs within normal limits.  Neurological exam reveals an alert and oriented patient.  No tenderness to palpation noted over the paraspinal musculature of the spinous processes of the cervical, thoracic or lumbar spine.  Lungs clear to auscultation bilaterally.  No respiratory distress or accessory muscle use noted. Examination of the right upper extremity revealsWell-healing incision noted to the medial aspect of the right proximal forearm.  No surrounding erythema, edema or induration noted.  No fluctuance noted.  No wound dehiscence or discharge noted. No vesicles, pustules or ulcers. Well-healing ecchymosis noted to the volar aspect of the distal right forearm.  No fresh ecchymosis noted. No erythema, induration or warmth noted. No bony instability or crepitus noted. Full range of motion, sensation and peripheral pulses intact in upper extremities bilaterally.  Differential Diagnosis:   I carefully considered but doubt serious etiology including deep space abscess, flexor tenosynovitis, circumferential cellulitis, osteomyelitis and compartment syndrome.  Arm strain  Neuropathy  I carefully considered but doubt  fracture or dislocation.  No repeat imaging is indicated at this time.  ED Management:  CBC reveals no evidence of leukocytosis, anemia or decreased platelets.  Discussed these findings at length with the patient verbalizes understanding and agreement course of treatment.  Patient given Toradol for pain control.  Instructed the patient to follow up with her primary care provider for re-evaluation and to return to the emergency department immediately for any new or worsening symptoms. I discussed this patient at length with  who is in agreement with the course of treatment.                      Clinical Impression:   The encounter diagnosis was Pain of right upper extremity.                             Ani Shook PA-C  11/04/18 1313       Ani Shook PA-C  11/04/18 1317

## 2018-11-17 PROBLEM — E87.6 HYPOKALEMIA: Status: ACTIVE | Noted: 2018-11-17

## 2018-11-17 PROBLEM — N39.0 URINARY TRACT INFECTION WITHOUT HEMATURIA: Status: ACTIVE | Noted: 2018-11-17

## 2018-11-17 PROBLEM — B96.20 BACTEREMIA DUE TO ESCHERICHIA COLI: Status: ACTIVE | Noted: 2018-11-17

## 2018-11-17 PROBLEM — L03.211 FACIAL CELLULITIS: Status: RESOLVED | Noted: 2017-09-30 | Resolved: 2018-11-17

## 2018-11-17 PROBLEM — N12 PYELONEPHRITIS: Status: ACTIVE | Noted: 2018-11-17

## 2018-11-17 PROBLEM — R78.81 BACTEREMIA DUE TO ESCHERICHIA COLI: Status: ACTIVE | Noted: 2018-11-17

## 2018-11-18 PROBLEM — B96.20 E-COLI UTI: Status: ACTIVE | Noted: 2018-11-17

## 2019-04-22 ENCOUNTER — TELEPHONE (OUTPATIENT)
Dept: FAMILY MEDICINE | Facility: CLINIC | Age: 49
End: 2019-04-22

## 2019-04-22 NOTE — TELEPHONE ENCOUNTER
----- Message from Marilou Gill sent at 4/22/2019  1:08 PM CDT -----  Contact: Self/ [phone]  New patient asked to schedule a new patient appointment to establish care and physical. Patient is looking to come May 17.    Please call.

## 2019-05-17 ENCOUNTER — OFFICE VISIT (OUTPATIENT)
Dept: FAMILY MEDICINE | Facility: CLINIC | Age: 49
End: 2019-05-17
Payer: COMMERCIAL

## 2019-05-17 VITALS
SYSTOLIC BLOOD PRESSURE: 132 MMHG | HEIGHT: 69 IN | TEMPERATURE: 99 F | BODY MASS INDEX: 39.51 KG/M2 | DIASTOLIC BLOOD PRESSURE: 82 MMHG | HEART RATE: 78 BPM | OXYGEN SATURATION: 98 % | WEIGHT: 266.75 LBS | RESPIRATION RATE: 14 BRPM

## 2019-05-17 DIAGNOSIS — E66.9 OBESITY (BMI 30-39.9): ICD-10-CM

## 2019-05-17 DIAGNOSIS — M54.2 CHRONIC NECK PAIN: ICD-10-CM

## 2019-05-17 DIAGNOSIS — E11.65 TYPE 2 DIABETES MELLITUS WITH HYPERGLYCEMIA, WITHOUT LONG-TERM CURRENT USE OF INSULIN: Chronic | ICD-10-CM

## 2019-05-17 DIAGNOSIS — F32.A ANXIETY AND DEPRESSION: ICD-10-CM

## 2019-05-17 DIAGNOSIS — K59.09 CHRONIC CONSTIPATION: ICD-10-CM

## 2019-05-17 DIAGNOSIS — G89.29 CHRONIC NECK PAIN: ICD-10-CM

## 2019-05-17 DIAGNOSIS — E03.9 HYPOTHYROIDISM, UNSPECIFIED TYPE: ICD-10-CM

## 2019-05-17 DIAGNOSIS — F41.9 ANXIETY AND DEPRESSION: ICD-10-CM

## 2019-05-17 DIAGNOSIS — I10 ESSENTIAL HYPERTENSION: Primary | Chronic | ICD-10-CM

## 2019-05-17 PROBLEM — M54.12 CERVICAL RADICULOPATHY: Status: ACTIVE | Noted: 2017-06-20

## 2019-05-17 PROBLEM — M19.90 ARTHRITIS: Status: ACTIVE | Noted: 2019-05-17

## 2019-05-17 PROBLEM — E11.9 DIABETES MELLITUS: Status: ACTIVE | Noted: 2017-09-30

## 2019-05-17 PROBLEM — N63.0 BREAST LUMP: Status: ACTIVE | Noted: 2019-05-17

## 2019-05-17 PROCEDURE — 99204 PR OFFICE/OUTPT VISIT, NEW, LEVL IV, 45-59 MIN: ICD-10-PCS | Mod: S$GLB,,, | Performed by: NURSE PRACTITIONER

## 2019-05-17 PROCEDURE — 99204 OFFICE O/P NEW MOD 45 MIN: CPT | Mod: S$GLB,,, | Performed by: NURSE PRACTITIONER

## 2019-05-17 PROCEDURE — 3075F SYST BP GE 130 - 139MM HG: CPT | Mod: CPTII,S$GLB,, | Performed by: NURSE PRACTITIONER

## 2019-05-17 PROCEDURE — 3008F BODY MASS INDEX DOCD: CPT | Mod: CPTII,S$GLB,, | Performed by: NURSE PRACTITIONER

## 2019-05-17 PROCEDURE — 3079F PR MOST RECENT DIASTOLIC BLOOD PRESSURE 80-89 MM HG: ICD-10-PCS | Mod: CPTII,S$GLB,, | Performed by: NURSE PRACTITIONER

## 2019-05-17 PROCEDURE — 3044F HG A1C LEVEL LT 7.0%: CPT | Mod: CPTII,S$GLB,, | Performed by: NURSE PRACTITIONER

## 2019-05-17 PROCEDURE — 99999 PR PBB SHADOW E&M-EST. PATIENT-LVL IV: CPT | Mod: PBBFAC,,, | Performed by: NURSE PRACTITIONER

## 2019-05-17 PROCEDURE — 3044F PR MOST RECENT HEMOGLOBIN A1C LEVEL <7.0%: ICD-10-PCS | Mod: CPTII,S$GLB,, | Performed by: NURSE PRACTITIONER

## 2019-05-17 PROCEDURE — 99999 PR PBB SHADOW E&M-EST. PATIENT-LVL IV: ICD-10-PCS | Mod: PBBFAC,,, | Performed by: NURSE PRACTITIONER

## 2019-05-17 PROCEDURE — 3075F PR MOST RECENT SYSTOLIC BLOOD PRESS GE 130-139MM HG: ICD-10-PCS | Mod: CPTII,S$GLB,, | Performed by: NURSE PRACTITIONER

## 2019-05-17 PROCEDURE — 3079F DIAST BP 80-89 MM HG: CPT | Mod: CPTII,S$GLB,, | Performed by: NURSE PRACTITIONER

## 2019-05-17 PROCEDURE — 3008F PR BODY MASS INDEX (BMI) DOCUMENTED: ICD-10-PCS | Mod: CPTII,S$GLB,, | Performed by: NURSE PRACTITIONER

## 2019-05-17 RX ORDER — LUBIPROSTONE 24 UG/1
24 CAPSULE ORAL 2 TIMES DAILY
COMMUNITY
End: 2019-05-17 | Stop reason: SDUPTHER

## 2019-05-17 RX ORDER — LUBIPROSTONE 24 UG/1
24 CAPSULE ORAL DAILY
Qty: 30 CAPSULE | Refills: 5 | Status: SHIPPED | OUTPATIENT
Start: 2019-05-17 | End: 2019-12-09

## 2019-05-17 RX ORDER — ALPRAZOLAM 1 MG/1
1 TABLET ORAL NIGHTLY PRN
Qty: 30 TABLET | Refills: 0 | Status: SHIPPED | OUTPATIENT
Start: 2019-05-17 | End: 2019-06-10 | Stop reason: SDUPTHER

## 2019-05-17 RX ORDER — OMEGA-3 FATTY ACIDS 1000 MG
CAPSULE ORAL
COMMUNITY
Start: 2017-04-27 | End: 2022-10-17

## 2019-05-17 RX ORDER — LEVOTHYROXINE SODIUM 200 UG/1
200 TABLET ORAL
Qty: 30 TABLET | Refills: 5 | Status: SHIPPED | OUTPATIENT
Start: 2019-05-17 | End: 2019-09-30 | Stop reason: SDUPTHER

## 2019-05-17 RX ORDER — MULTIVITAMIN WITH IRON
TABLET ORAL
COMMUNITY
Start: 2017-04-27

## 2019-05-17 RX ORDER — ALBUTEROL SULFATE 90 UG/1
AEROSOL, METERED RESPIRATORY (INHALATION)
COMMUNITY
End: 2019-12-09 | Stop reason: SDUPTHER

## 2019-05-17 RX ORDER — MELOXICAM 7.5 MG/1
TABLET ORAL
COMMUNITY
End: 2019-05-17 | Stop reason: SDUPTHER

## 2019-05-17 RX ORDER — IBUPROFEN 100 MG/5ML
SUSPENSION, ORAL (FINAL DOSE FORM) ORAL
COMMUNITY
Start: 2017-04-27 | End: 2023-02-15

## 2019-05-17 RX ORDER — MELOXICAM 7.5 MG/1
7.5 TABLET ORAL
COMMUNITY
Start: 2017-04-27 | End: 2019-05-17 | Stop reason: SDUPTHER

## 2019-05-17 RX ORDER — LEVOTHYROXINE SODIUM 200 UG/1
200 TABLET ORAL
COMMUNITY
Start: 2017-04-27 | End: 2019-05-17 | Stop reason: SDUPTHER

## 2019-05-17 RX ORDER — MELOXICAM 7.5 MG/1
7.5 TABLET ORAL 2 TIMES DAILY PRN
Qty: 60 TABLET | Refills: 0 | Status: SHIPPED | OUTPATIENT
Start: 2019-05-17 | End: 2019-12-09

## 2019-05-17 RX ORDER — NEFAZODONE HYDROCHLORIDE 100 MG/1
100 TABLET ORAL 2 TIMES DAILY
Qty: 60 TABLET | Refills: 0 | Status: SHIPPED | OUTPATIENT
Start: 2019-05-17 | End: 2019-09-09 | Stop reason: SDUPTHER

## 2019-05-17 RX ORDER — LANOLIN ALCOHOL/MO/W.PET/CERES
1000 CREAM (GRAM) TOPICAL
COMMUNITY
Start: 2017-04-27 | End: 2023-02-15

## 2019-05-17 RX ORDER — ALPRAZOLAM 1 MG/1
1 TABLET ORAL 2 TIMES DAILY
COMMUNITY
End: 2019-05-17 | Stop reason: SDUPTHER

## 2019-05-17 RX ORDER — NEFAZODONE HYDROCHLORIDE 100 MG/1
100 TABLET ORAL 2 TIMES DAILY
COMMUNITY
End: 2019-05-17 | Stop reason: SDUPTHER

## 2019-05-17 NOTE — PROGRESS NOTES
"Subjective:       Patient ID: Bandar Douglass is a 48 y.o. female.    Chief Complaint: Establish Care (Pt would like to establish care/ find pcp) and Medication Refill (Pt would like to get some meds refilled )    49 y/o female presents to clinic to establish care and medication refills. Previous PCP-ABHIJEET Nunez at [facility] in [place], [place]. Patient has history of hypertension, diabetes mellitus, hypothyroidism, anxiety and depression, chronic neck pain, and chronic constipation.    Patient has hypertension. She is prescribed Norvasc for blood pressure control. Patient reports discontinuing medication 6 months ago as she felt it was no longer indicated. Patient states she is working to lose weight and following low sodium diet. Blood pressure within acceptable range today. Blood pressure 132/82, pulse 78, temperature 98.5 °F (36.9 °C), temperature source Oral, resp. rate 14, height 5' 9" (1.753 m), weight 121 kg (266 lb 12.1 oz), SpO2 98 %.  No chest pain, headache, shortness of breath, or peripheral edema.    Patient as diabetes mellitus. She is prescribed Invokana 300 mg daily, but she is taking 150 mg daily. Patient does not check blood glucose at home. Hemoglobin A1c was 5.5% in 11/2018. Denies hypo- or hyperglycemia complications. No blurry vision, numbness or tingling in extremities, or foot ulcers.    Patient has history of hypothyroidism. She is taking levothyroxine 200 mcg daily. Patient states she has been on current dose for several years with stable TSH. Last TSH 11/2018 was 1.7. Patient denies medication side effects.    Patient has history of depression and anxiety for several years. She is taking Serzone and Xanax daily for symptoms. Patient states medication is working well to control symptoms. Denies depressed mood, anxiety, palpitations, chest pain, SI/HI. No patient hospitalizations for depression or anxiety.    Patient has chronic constipation. She is taking Amitiza daily. " Patient reports 1 normal bowel movement every other day or every two days. Patient denies abdominal pain, nausea, vomiting, bloody or dark stools.     Patient has chronic neck pain. She is taking Mobic and Percocet prn for pain. Patient denies neck injury or trauma. Patient states she has tried physical therapy in the past but it did not help. Patient declined referral to specialist. I informed patient I do not treat chronic pain and would refer to pain management.       Ref. Range 11/19/2018 04:30   WBC Latest Ref Range: 3.90 - 12.70 K/uL 8.43   RBC Latest Ref Range: 4.00 - 5.40 M/uL 3.69 (L)   Hemoglobin Latest Ref Range: 12.0 - 16.0 g/dL 11.4 (L)   Hematocrit Latest Ref Range: 37.0 - 48.5 % 33.7 (L)   MCV Latest Ref Range: 82 - 98 fL 91   MCH Latest Ref Range: 27.0 - 31.0 pg 30.9   MCHC Latest Ref Range: 32.0 - 36.0 g/dL 33.8   RDW Latest Ref Range: 11.5 - 14.5 % 13.2   Platelets Latest Ref Range: 150 - 350 K/uL 201   MPV Latest Ref Range: 9.2 - 12.9 fL 9.5   Gran% Latest Ref Range: 38.0 - 73.0 % 72.0   Lymph% Latest Ref Range: 18.0 - 48.0 % 11.0 (L)   Lymph # Latest Ref Range: 1.0 - 4.8 K/uL CANCELED   Mono% Latest Ref Range: 4.0 - 15.0 % 10.0   Mono # Latest Ref Range: 0.3 - 1.0 K/uL CANCELED   Eosinophil% Latest Ref Range: 0.0 - 8.0 % 3.0   Eos # Latest Ref Range: 0.0 - 0.5 K/uL CANCELED   Basophil% Latest Ref Range: 0.0 - 1.9 % 0.0   Baso # Latest Ref Range: 0.00 - 0.20 K/uL CANCELED   BANDS Latest Units: % 4.0   Aniso Unknown Slight   Hypo Unknown Occasional   Differential Method Unknown Manual   Sodium Latest Ref Range: 136 - 145 mmol/L 138   Potassium Latest Ref Range: 3.5 - 5.1 mmol/L 3.1 (L)   Chloride Latest Ref Range: 95 - 110 mmol/L 106   CO2 Latest Ref Range: 23 - 29 mmol/L 27   Anion Gap Latest Ref Range: 8 - 16 mmol/L 5 (L)   BUN, Bld Latest Ref Range: 7 - 17 mg/dL 19 (H)   Creatinine Latest Ref Range: 0.50 - 1.40 mg/dL 1.01   eGFR if non African American Latest Ref Range: >60 mL/min/1.73 m^2 >60.0    eGFR if African American Latest Ref Range: >60 mL/min/1.73 m^2 >60.0   Glucose Latest Ref Range: 70 - 110 mg/dL 141 (H)   Calcium Latest Ref Range: 8.7 - 10.5 mg/dL 9.3      Ref. Range 11/17/2018 08:40   Hemoglobin A1C External Latest Ref Range: 4.0 - 5.6 % 5.5   Estimated Avg Glucose Latest Ref Range: 68 - 131 mg/dL 111     Current Outpatient Medications   Medication Sig Dispense Refill    albuterol (VENTOLIN HFA) 90 mcg/actuation inhaler Ventolin HFA 90 mcg/actuation aerosol inhaler   INHALE 2 PUFFS BY MOUTH EVERY 4 HOURS AS NEEDED FOR COUGHING, WHEEZING, OR SHORTNESS OF BREATH      ALPRAZolam (XANAX) 1 MG tablet Take 1 tablet (1 mg total) by mouth nightly as needed for Anxiety. 30 tablet 0    ascorbic acid, vitamin C, (VITAMIN C) 1000 MG tablet Take by mouth.      canagliflozin (INVOKANA) 300 mg Tab tablet Take 300 mg by mouth once daily.      cyanocobalamin (VITAMIN B-12) 1000 MCG tablet Take 1,000 mcg by mouth.      ergocalciferol, vitamin D2, (VITAMIN D ORAL) Take 1,000 mg by mouth.      gluc sifuentes/chondro sifuentes A/vit C/Mn (GLUCOSAMINE 1500 COMPLEX ORAL) Take 1,500 mg by mouth.      levothyroxine (SYNTHROID) 200 MCG tablet Take 1 tablet (200 mcg total) by mouth before breakfast. 30 tablet 5    lubiprostone (AMITIZA) 24 MCG Cap Take 1 capsule (24 mcg total) by mouth once daily. 30 capsule 5    meloxicam (MOBIC) 7.5 MG tablet Take 1 tablet (7.5 mg total) by mouth 2 (two) times daily as needed for Pain. 60 tablet 0    multivitamin with iron Tab Take by mouth.      nefazodone (SERZONE) 100 MG Tab Take 1 tablet (100 mg total) by mouth 2 (two) times daily. 60 tablet 0    omega-3 fatty acids (FISH OIL CONCENTRATE) 1,000 mg Cap Take by mouth.      oxyCODONE-acetaminophen (PERCOCET) 5-325 mg per tablet Take 1 tablet by mouth every 6 (six) hours as needed for Pain. 30 tablet 0    amlodipine (NORVASC) 10 MG tablet Take 10 mg by mouth once daily.       No current facility-administered medications for this  visit.        Past Medical History:   Diagnosis Date    Breast lump 2019    Diabetes mellitus     Hypertension     Thyroid disease        Past Surgical History:   Procedure Laterality Date    CERVICAL FUSION       SECTION      CHOLECYSTECTOMY      HERNIA REPAIR      HYSTERECTOMY      TONSILLECTOMY         History reviewed. No pertinent family history.    Social History     Socioeconomic History    Marital status: Single     Spouse name: Not on file    Number of children: Not on file    Years of education: Not on file    Highest education level: Not on file   Occupational History    Not on file   Social Needs    Financial resource strain: Not very hard    Food insecurity:     Worry: Never true     Inability: Never true    Transportation needs:     Medical: No     Non-medical: No   Tobacco Use    Smoking status: Never Smoker    Smokeless tobacco: Never Used   Substance and Sexual Activity    Alcohol use: Yes     Frequency: 2-4 times a month     Drinks per session: 3 or 4     Binge frequency: Never     Comment: socially    Drug use: No    Sexual activity: Not on file   Lifestyle    Physical activity:     Days per week: 4 days     Minutes per session: 50 min    Stress: To some extent   Relationships    Social connections:     Talks on phone: Once a week     Gets together: Once a week     Attends Confucianist service: Not on file     Active member of club or organization: Yes     Attends meetings of clubs or organizations: Never     Relationship status:    Other Topics Concern    Not on file   Social History Narrative    Not on file       Review of Systems   Constitutional: Negative for activity change, diaphoresis and unexpected weight change.   HENT: Negative for hearing loss, rhinorrhea, tinnitus and trouble swallowing.    Eyes: Negative for discharge and visual disturbance.   Respiratory: Negative for chest tightness and wheezing.    Cardiovascular: Negative for chest  "pain and palpitations.   Gastrointestinal: Negative for blood in stool, constipation, diarrhea and vomiting.   Endocrine: Negative for polydipsia, polyphagia and polyuria.   Genitourinary: Negative for difficulty urinating, dysuria, hematuria and menstrual problem.   Musculoskeletal: Positive for arthralgias and neck pain. Negative for joint swelling.   Neurological: Negative for dizziness, weakness, numbness and headaches.   Psychiatric/Behavioral: Negative for confusion and dysphoric mood.         Objective:     Vitals:    05/17/19 0856   BP: 132/82   BP Location: Left arm   Patient Position: Sitting   BP Method: Medium (Manual)   Pulse: 78   Resp: 14   Temp: 98.5 °F (36.9 °C)   TempSrc: Oral   SpO2: 98%   Weight: 121 kg (266 lb 12.1 oz)   Height: 5' 9" (1.753 m)          Physical Exam   Constitutional: She is oriented to person, place, and time. She appears well-developed. No distress.   +obesity with: Body mass index is 39.39 kg/m².   HENT:   Head: Normocephalic.   Right Ear: Tympanic membrane and ear canal normal.   Left Ear: Tympanic membrane and ear canal normal.   Mouth/Throat: Uvula is midline and oropharynx is clear and moist.   Eyes: Pupils are equal, round, and reactive to light.   Neck: Normal range of motion. Neck supple. No thyromegaly present.   Cardiovascular: Normal rate, regular rhythm and intact distal pulses.   No murmur heard.  Pulses:       Dorsalis pedis pulses are 2+ on the right side, and 2+ on the left side.        Posterior tibial pulses are 2+ on the right side, and 2+ on the left side.   Pulmonary/Chest: Effort normal and breath sounds normal.   Abdominal: Soft. Bowel sounds are normal. There is no tenderness.   Musculoskeletal: Normal range of motion.        Right foot: There is normal range of motion and no deformity.        Left foot: There is normal range of motion and no deformity.   Feet:   Right Foot:   Protective Sensation: 8 sites tested. 8 sites sensed.   Skin Integrity: " Negative for ulcer or skin breakdown.   Left Foot:   Protective Sensation: 8 sites tested. 8 sites sensed.   Skin Integrity: Negative for ulcer or skin breakdown.   Neurological: She is alert and oriented to person, place, and time.   Skin: Skin is warm and dry. Capillary refill takes less than 2 seconds.   Psychiatric: She has a normal mood and affect. Her behavior is normal.         Assessment:         ICD-10-CM ICD-9-CM   1. Essential hypertension I10 401.9   2. Type 2 diabetes mellitus with hyperglycemia, without long-term current use of insulin E11.65 250.00     790.29   3. Obesity (BMI 30-39.9) E66.9 278.00   4. Hypothyroidism, unspecified type E03.9 244.9   5. Anxiety and depression F41.9 300.00    F32.9 311   6. Chronic constipation K59.09 564.00   7. Chronic neck pain M54.2 723.1    G89.29 338.29       Plan:       Essential hypertension  -     Comprehensive metabolic panel; Future; Expected date: 05/17/2019    Type 2 diabetes mellitus with hyperglycemia, without long-term current use of insulin  -     Comprehensive metabolic panel; Future; Expected date: 05/17/2019  -     Hemoglobin A1c; Future; Expected date: 05/17/2019  -     Lipid panel; Future; Expected date: 05/17/2019    Obesity (BMI 30-39.9)     -  Weight loss advised. Dietary and exercise counseling done.    Hypothyroidism, unspecified type  -     CBC auto differential; Future; Expected date: 05/17/2019  -     TSH; Future; Expected date: 05/17/2019  -     levothyroxine (SYNTHROID) 200 MCG tablet; Take 1 tablet (200 mcg total) by mouth before breakfast.  Dispense: 30 tablet; Refill: 5    Anxiety and depression  -     nefazodone (SERZONE) 100 MG Tab; Take 1 tablet (100 mg total) by mouth 2 (two) times daily.  Dispense: 60 tablet; Refill: 0  -     ALPRAZolam (XANAX) 1 MG tablet; Take 1 tablet (1 mg total) by mouth nightly as needed for Anxiety.  Dispense: 30 tablet; Refill: 0    Chronic constipation  -     lubiprostone (AMITIZA) 24 MCG Cap; Take 1  capsule (24 mcg total) by mouth once daily.  Dispense: 30 capsule; Refill: 5    Chronic neck pain  -     meloxicam (MOBIC) 7.5 MG tablet; Take 1 tablet (7.5 mg total) by mouth 2 (two) times daily as needed for Pain.  Dispense: 60 tablet; Refill: 0    Patient instructed to follow up with myself and another health provider within [facility] or [facility] for lab results and medication management.     Follow up in about 2 weeks (around 5/31/2019) for lab results, medication management.     Patient's Medications   New Prescriptions    No medications on file   Previous Medications    ALBUTEROL (VENTOLIN HFA) 90 MCG/ACTUATION INHALER    Ventolin HFA 90 mcg/actuation aerosol inhaler   INHALE 2 PUFFS BY MOUTH EVERY 4 HOURS AS NEEDED FOR COUGHING, WHEEZING, OR SHORTNESS OF BREATH    AMLODIPINE (NORVASC) 10 MG TABLET    Take 10 mg by mouth once daily.    ASCORBIC ACID, VITAMIN C, (VITAMIN C) 1000 MG TABLET    Take by mouth.    CANAGLIFLOZIN (INVOKANA) 300 MG TAB TABLET    Take 300 mg by mouth once daily.    CYANOCOBALAMIN (VITAMIN B-12) 1000 MCG TABLET    Take 1,000 mcg by mouth.    ERGOCALCIFEROL, VITAMIN D2, (VITAMIN D ORAL)    Take 1,000 mg by mouth.    GLUC BAEZ/CHONDRO BAEZ A/VIT C/MN (GLUCOSAMINE 1500 COMPLEX ORAL)    Take 1,500 mg by mouth.    MULTIVITAMIN WITH IRON TAB    Take by mouth.    OMEGA-3 FATTY ACIDS (FISH OIL CONCENTRATE) 1,000 MG CAP    Take by mouth.    OXYCODONE-ACETAMINOPHEN (PERCOCET) 5-325 MG PER TABLET    Take 1 tablet by mouth every 6 (six) hours as needed for Pain.   Modified Medications    Modified Medication Previous Medication    ALPRAZOLAM (XANAX) 1 MG TABLET ALPRAZolam (XANAX) 1 MG tablet       Take 1 tablet (1 mg total) by mouth nightly as needed for Anxiety.    Take 1 mg by mouth 2 (two) times daily.    LEVOTHYROXINE (SYNTHROID) 200 MCG TABLET levothyroxine (SYNTHROID) 200 MCG tablet       Take 1 tablet (200 mcg total) by mouth before breakfast.    Take 200 mcg by mouth.    LUBIPROSTONE  (AMITIZA) 24 MCG CAP lubiprostone (AMITIZA) 24 MCG Cap       Take 1 capsule (24 mcg total) by mouth once daily.    Take 24 mcg by mouth 2 (two) times daily.    MELOXICAM (MOBIC) 7.5 MG TABLET meloxicam (MOBIC) 7.5 MG tablet       Take 1 tablet (7.5 mg total) by mouth 2 (two) times daily as needed for Pain.    meloxicam 7.5 mg tablet   TAKE 1 TABLET BY MOUTH TWICE A DAY AS NEEDED FOR SHOULDER PAIN    NEFAZODONE (SERZONE) 100 MG TAB nefazodone (SERZONE) 100 MG Tab       Take 1 tablet (100 mg total) by mouth 2 (two) times daily.    Take 100 mg by mouth 2 (two) times daily.   Discontinued Medications    CIPROFLOXACIN HCL (CIPRO) 500 MG TABLET    Take 1 tablet (500 mg total) by mouth every 12 (twelve) hours.    IBUPROFEN (ADVIL,MOTRIN) 800 MG TABLET    Take 1 tablet (800 mg total) by mouth 3 (three) times daily as needed.    LEVOTHYROXINE (SYNTHROID) 25 MCG TABLET    Take 25 mcg by mouth once daily.    MELOXICAM (MOBIC) 7.5 MG TABLET    Take 7.5 mg by mouth.    ONDANSETRON (ZOFRAN) 4 MG TABLET    Take 1 tablet (4 mg total) by mouth every 8 (eight) hours as needed.

## 2019-05-18 PROBLEM — F32.A ANXIETY AND DEPRESSION: Status: ACTIVE | Noted: 2019-05-18

## 2019-05-18 PROBLEM — N63.0 BREAST LUMP: Status: RESOLVED | Noted: 2019-05-17 | Resolved: 2019-05-18

## 2019-05-18 PROBLEM — F41.9 ANXIETY AND DEPRESSION: Status: ACTIVE | Noted: 2019-05-18

## 2019-05-18 PROBLEM — E66.9 OBESITY (BMI 30-39.9): Status: ACTIVE | Noted: 2019-05-18

## 2019-05-18 PROBLEM — K59.09 CHRONIC CONSTIPATION: Status: ACTIVE | Noted: 2019-05-18

## 2019-05-25 ENCOUNTER — LAB VISIT (OUTPATIENT)
Dept: LAB | Facility: HOSPITAL | Age: 49
End: 2019-05-25
Attending: NURSE PRACTITIONER
Payer: COMMERCIAL

## 2019-05-25 DIAGNOSIS — E11.65 TYPE 2 DIABETES MELLITUS WITH HYPERGLYCEMIA, WITHOUT LONG-TERM CURRENT USE OF INSULIN: Chronic | ICD-10-CM

## 2019-05-25 DIAGNOSIS — E03.9 HYPOTHYROIDISM, UNSPECIFIED TYPE: ICD-10-CM

## 2019-05-25 DIAGNOSIS — I10 ESSENTIAL HYPERTENSION: Chronic | ICD-10-CM

## 2019-05-25 LAB
ALBUMIN SERPL BCP-MCNC: 3.6 G/DL (ref 3.5–5.2)
ALP SERPL-CCNC: 55 U/L (ref 55–135)
ALT SERPL W/O P-5'-P-CCNC: 12 U/L (ref 10–44)
ANION GAP SERPL CALC-SCNC: 8 MMOL/L (ref 8–16)
AST SERPL-CCNC: 25 U/L (ref 10–40)
BASOPHILS # BLD AUTO: 0.06 K/UL (ref 0–0.2)
BASOPHILS NFR BLD: 1 % (ref 0–1.9)
BILIRUB SERPL-MCNC: 0.3 MG/DL (ref 0.1–1)
BUN SERPL-MCNC: 11 MG/DL (ref 6–20)
CALCIUM SERPL-MCNC: 10.3 MG/DL (ref 8.7–10.5)
CHLORIDE SERPL-SCNC: 104 MMOL/L (ref 95–110)
CHOLEST SERPL-MCNC: 183 MG/DL (ref 120–199)
CHOLEST/HDLC SERPL: 3.8 {RATIO} (ref 2–5)
CO2 SERPL-SCNC: 28 MMOL/L (ref 23–29)
CREAT SERPL-MCNC: 0.8 MG/DL (ref 0.5–1.4)
DIFFERENTIAL METHOD: ABNORMAL
EOSINOPHIL # BLD AUTO: 0.5 K/UL (ref 0–0.5)
EOSINOPHIL NFR BLD: 8.1 % (ref 0–8)
ERYTHROCYTE [DISTWIDTH] IN BLOOD BY AUTOMATED COUNT: 11.8 % (ref 11.5–14.5)
EST. GFR  (AFRICAN AMERICAN): >60 ML/MIN/1.73 M^2
EST. GFR  (NON AFRICAN AMERICAN): >60 ML/MIN/1.73 M^2
ESTIMATED AVG GLUCOSE: 123 MG/DL (ref 68–131)
GLUCOSE SERPL-MCNC: 148 MG/DL (ref 70–110)
HBA1C MFR BLD HPLC: 5.9 % (ref 4–5.6)
HCT VFR BLD AUTO: 43.1 % (ref 37–48.5)
HDLC SERPL-MCNC: 48 MG/DL (ref 40–75)
HDLC SERPL: 26.2 % (ref 20–50)
HGB BLD-MCNC: 14.4 G/DL (ref 12–16)
LDLC SERPL CALC-MCNC: 110.8 MG/DL (ref 63–159)
LYMPHOCYTES # BLD AUTO: 2.2 K/UL (ref 1–4.8)
LYMPHOCYTES NFR BLD: 37.3 % (ref 18–48)
MCH RBC QN AUTO: 30.3 PG (ref 27–31)
MCHC RBC AUTO-ENTMCNC: 33.4 G/DL (ref 32–36)
MCV RBC AUTO: 91 FL (ref 82–98)
MONOCYTES # BLD AUTO: 0.4 K/UL (ref 0.3–1)
MONOCYTES NFR BLD: 7.5 % (ref 4–15)
NEUTROPHILS # BLD AUTO: 2.5 K/UL (ref 1.8–7.7)
NEUTROPHILS NFR BLD: 41.9 % (ref 38–73)
NONHDLC SERPL-MCNC: 135 MG/DL
PLATELET # BLD AUTO: 257 K/UL (ref 150–350)
PMV BLD AUTO: 8.8 FL (ref 9.2–12.9)
POTASSIUM SERPL-SCNC: 4.5 MMOL/L (ref 3.5–5.1)
PROT SERPL-MCNC: 7.2 G/DL (ref 6–8.4)
RBC # BLD AUTO: 4.75 M/UL (ref 4–5.4)
SODIUM SERPL-SCNC: 140 MMOL/L (ref 136–145)
TRIGL SERPL-MCNC: 121 MG/DL (ref 30–150)
TSH SERPL DL<=0.005 MIU/L-ACNC: 3.84 UIU/ML (ref 0.4–4)
WBC # BLD AUTO: 5.9 K/UL (ref 3.9–12.7)

## 2019-05-25 PROCEDURE — 80061 LIPID PANEL: CPT

## 2019-05-25 PROCEDURE — 85025 COMPLETE CBC W/AUTO DIFF WBC: CPT

## 2019-05-25 PROCEDURE — 80053 COMPREHEN METABOLIC PANEL: CPT

## 2019-05-25 PROCEDURE — 84443 ASSAY THYROID STIM HORMONE: CPT

## 2019-05-25 PROCEDURE — 83036 HEMOGLOBIN GLYCOSYLATED A1C: CPT

## 2019-05-25 PROCEDURE — 36415 COLL VENOUS BLD VENIPUNCTURE: CPT

## 2019-06-10 DIAGNOSIS — F32.A ANXIETY AND DEPRESSION: ICD-10-CM

## 2019-06-10 DIAGNOSIS — F41.9 ANXIETY AND DEPRESSION: ICD-10-CM

## 2019-06-10 RX ORDER — ALPRAZOLAM 1 MG/1
1 TABLET ORAL NIGHTLY PRN
Qty: 30 TABLET | Refills: 0 | Status: SHIPPED | OUTPATIENT
Start: 2019-06-10 | End: 2019-10-23 | Stop reason: SDUPTHER

## 2019-06-10 NOTE — TELEPHONE ENCOUNTER
----- Message from Soraya Wallace sent at 6/10/2019 10:00 AM CDT -----  Contact: Patient  Type:  RX Refill Request    Who Called: Bandar  Refill or New Rx: refill  RX Name and Strength: ALPRAZolam (XANAX) 1 MG tablet  How is the patient currently taking it? (ex. 1XDay): !xday  Is this a 30 day or 90 day RX: 30 day  Preferred Pharmacy with phone number: [id number] - [place], [place] - [address] [facility] [address]  Local or Mail Order: Local  Ordering Provider: JENNIFER Rojas  Would the patient rather a call back or a response via [facility]?  Call back  Best Call Back Number: [phone]  Additional Information: none

## 2019-09-09 ENCOUNTER — OFFICE VISIT (OUTPATIENT)
Dept: FAMILY MEDICINE | Facility: CLINIC | Age: 49
End: 2019-09-09
Payer: OTHER GOVERNMENT

## 2019-09-09 ENCOUNTER — TELEPHONE (OUTPATIENT)
Dept: FAMILY MEDICINE | Facility: CLINIC | Age: 49
End: 2019-09-09

## 2019-09-09 VITALS
WEIGHT: 287.38 LBS | DIASTOLIC BLOOD PRESSURE: 82 MMHG | TEMPERATURE: 99 F | OXYGEN SATURATION: 96 % | HEIGHT: 69 IN | BODY MASS INDEX: 42.56 KG/M2 | HEART RATE: 85 BPM | SYSTOLIC BLOOD PRESSURE: 130 MMHG

## 2019-09-09 DIAGNOSIS — E03.9 HYPOTHYROIDISM, UNSPECIFIED TYPE: ICD-10-CM

## 2019-09-09 DIAGNOSIS — F41.9 ANXIETY AND DEPRESSION: ICD-10-CM

## 2019-09-09 DIAGNOSIS — K59.09 CHRONIC CONSTIPATION: ICD-10-CM

## 2019-09-09 DIAGNOSIS — G47.09 OTHER INSOMNIA: ICD-10-CM

## 2019-09-09 DIAGNOSIS — R73.03 PREDIABETES: ICD-10-CM

## 2019-09-09 DIAGNOSIS — F32.A ANXIETY AND DEPRESSION: ICD-10-CM

## 2019-09-09 DIAGNOSIS — D17.1 LIPOMA OF TORSO: ICD-10-CM

## 2019-09-09 DIAGNOSIS — Z00.00 ANNUAL PHYSICAL EXAM: Primary | ICD-10-CM

## 2019-09-09 DIAGNOSIS — I10 ESSENTIAL HYPERTENSION: Chronic | ICD-10-CM

## 2019-09-09 DIAGNOSIS — M54.2 NECK PAIN: ICD-10-CM

## 2019-09-09 LAB
ESTIMATED AVG GLUCOSE: 148 MG/DL (ref 68–131)
HBA1C MFR BLD HPLC: 6.8 % (ref 4–5.6)
T4 FREE SERPL-MCNC: 1.28 NG/DL (ref 0.71–1.51)
TSH SERPL DL<=0.005 MIU/L-ACNC: 4.16 UIU/ML (ref 0.4–4)

## 2019-09-09 PROCEDURE — 99214 OFFICE O/P EST MOD 30 MIN: CPT | Mod: S$PBB,,, | Performed by: INTERNAL MEDICINE

## 2019-09-09 PROCEDURE — 99999 PR PBB SHADOW E&M-EST. PATIENT-LVL V: CPT | Mod: PBBFAC,,, | Performed by: INTERNAL MEDICINE

## 2019-09-09 PROCEDURE — 84443 ASSAY THYROID STIM HORMONE: CPT

## 2019-09-09 PROCEDURE — 99999 PR PBB SHADOW E&M-EST. PATIENT-LVL V: ICD-10-PCS | Mod: PBBFAC,,, | Performed by: INTERNAL MEDICINE

## 2019-09-09 PROCEDURE — 83036 HEMOGLOBIN GLYCOSYLATED A1C: CPT

## 2019-09-09 PROCEDURE — 36415 COLL VENOUS BLD VENIPUNCTURE: CPT

## 2019-09-09 PROCEDURE — 99215 OFFICE O/P EST HI 40 MIN: CPT | Mod: PBBFAC,PN | Performed by: INTERNAL MEDICINE

## 2019-09-09 PROCEDURE — 84439 ASSAY OF FREE THYROXINE: CPT

## 2019-09-09 PROCEDURE — 99214 PR OFFICE/OUTPT VISIT, EST, LEVL IV, 30-39 MIN: ICD-10-PCS | Mod: S$PBB,,, | Performed by: INTERNAL MEDICINE

## 2019-09-09 RX ORDER — NEFAZODONE HYDROCHLORIDE 100 MG/1
100 TABLET ORAL 2 TIMES DAILY
Qty: 180 TABLET | Refills: 1 | Status: SHIPPED | OUTPATIENT
Start: 2019-09-09 | End: 2019-10-25 | Stop reason: SDUPTHER

## 2019-09-09 RX ORDER — ESZOPICLONE 3 MG/1
3 TABLET, FILM COATED ORAL NIGHTLY
Qty: 30 TABLET | Refills: 5 | Status: SHIPPED | OUTPATIENT
Start: 2019-09-09 | End: 2019-10-03 | Stop reason: SDUPTHER

## 2019-09-09 NOTE — PROGRESS NOTES
[facility] Note    Chief Complaint      Chief Complaint   Patient presents with    Annual Exam    Establish Care    Medication Refill     History of Present Illness      Bandar Douglass is a 48 y.o. female who presents today for annual preventative visit.  Patient comes to appointment alone.     Problem List Items Addressed This Visit     HTN (hypertension) (Chronic)    Current Assessment & Plan     Was on amlodipine in past, lost a bunch of weight.  Watches sodium in diet.         Neck pain    Current Assessment & Plan     Had cervical fusion C5-C7 at [facility] several years ago.  Has neck pain when weather is bad.  Was on Percocet and meloxicam with previous PCP, percocet lasted her a year.  Has never had epidural injections.  Refer to pain mgmt.  Constant pain all the time.         Relevant Orders    Ambulatory Referral to Pain Clinic    Hypothyroidism    Current Assessment & Plan     Last TSH in 5/2019, on synthroid 200 mcg daily.  Has gained 70 pounds since last year.           Relevant Orders    TSH    T4, free    Prediabetes    Current Assessment & Plan     A1C 5.9 in 5/2019, stopped taking invokana because no longer on insurance.  Had GI upset with Metformin in past.         Relevant Orders    Hemoglobin A1c    Anxiety and depression    Current Assessment & Plan     Stable on serzone 100 mg BID with xanax PRN.  No Panic attacks, SI/HI.         Relevant Medications    nefazodone (SERZONE) 100 MG Tab    Chronic constipation    Current Assessment & Plan     Stable on Amitiza, psyllium, stool softener.  Has BM every few days. Drinks a lot of water.         Other insomnia    Current Assessment & Plan     No success with Ambien (nightmares).  Belsomra, Mirtazipine, Trazodone did not work.  Took a Lunesta in past which worked decently well for her.         Relevant Medications    eszopiclone (LUNESTA) 3 mg Tab    Annual physical exam - Primary    Current Assessment & Plan     Gained 70  pounds in last year.  Hasn't been exercising faithfully, trying to get started again.         Lipoma of torso    Relevant Orders    US Soft Tissue Misc          Health Maintenance   Topic Date Due    Mammogram  2020    Lipid Panel  2024    TETANUS VACCINE  10/01/2028       Past Medical History:   Diagnosis Date    Breast lump 2019    Diabetes mellitus     Hypertension     Thyroid disease        Past Surgical History:   Procedure Laterality Date    CERVICAL FUSION       SECTION      CHOLECYSTECTOMY      HERNIA REPAIR      HYSTERECTOMY      TONSILLECTOMY         family history is not on file.    Social History     Tobacco Use    Smoking status: Never Smoker    Smokeless tobacco: Never Used   Substance Use Topics    Alcohol use: Yes     Frequency: 2-4 times a month     Drinks per session: 3 or 4     Binge frequency: Never     Comment: socially    Drug use: No       Review of Systems   Constitutional: Negative for chills and fever.   HENT: Negative for congestion, hearing loss and sore throat.    Eyes: Negative for blurred vision and discharge.   Respiratory: Positive for wheezing. Negative for cough and shortness of breath.    Cardiovascular: Negative for chest pain and palpitations.   Gastrointestinal: Positive for constipation. Negative for blood in stool, diarrhea, nausea and vomiting.   Genitourinary: Negative for dysuria and hematuria.   Musculoskeletal: Positive for neck pain. Negative for falls and myalgias.   Skin: Negative for itching and rash.   Neurological: Positive for weakness and headaches. Negative for dizziness.   Endo/Heme/Allergies: Negative for polydipsia.        Outpatient Encounter Medications as of 2019   Medication Sig Dispense Refill    albuterol (VENTOLIN HFA) 90 mcg/actuation inhaler Ventolin HFA 90 mcg/actuation aerosol inhaler   INHALE 2 PUFFS BY MOUTH EVERY 4 HOURS AS NEEDED FOR COUGHING, WHEEZING, OR SHORTNESS OF BREATH      ALPRAZolam  (XANAX) 1 MG tablet Take 1 tablet (1 mg total) by mouth nightly as needed for Anxiety. 30 tablet 0    ascorbic acid, vitamin C, (VITAMIN C) 1000 MG tablet Take by mouth.      cyanocobalamin (VITAMIN B-12) 1000 MCG tablet Take 1,000 mcg by mouth.      ergocalciferol, vitamin D2, (VITAMIN D ORAL) Take 1,000 mg by mouth.      gluc sifuentes/chondro sifuentes A/vit C/Mn (GLUCOSAMINE 1500 COMPLEX ORAL) Take 1,500 mg by mouth.      levothyroxine (SYNTHROID) 200 MCG tablet Take 1 tablet (200 mcg total) by mouth before breakfast. 30 tablet 5    lubiprostone (AMITIZA) 24 MCG Cap Take 1 capsule (24 mcg total) by mouth once daily. 30 capsule 5    meloxicam (MOBIC) 7.5 MG tablet Take 1 tablet (7.5 mg total) by mouth 2 (two) times daily as needed for Pain. 60 tablet 0    multivitamin with iron Tab Take by mouth.      nefazodone (SERZONE) 100 MG Tab Take 1 tablet (100 mg total) by mouth 2 (two) times daily. 180 tablet 1    omega-3 fatty acids (FISH OIL CONCENTRATE) 1,000 mg Cap Take by mouth.      oxyCODONE-acetaminophen (PERCOCET) 5-325 mg per tablet Take 1 tablet by mouth every 6 (six) hours as needed for Pain. 30 tablet 0    [DISCONTINUED] canagliflozin (INVOKANA) 300 mg Tab tablet Take 300 mg by mouth once daily.      [DISCONTINUED] nefazodone (SERZONE) 100 MG Tab Take 1 tablet (100 mg total) by mouth 2 (two) times daily. 60 tablet 0    eszopiclone (LUNESTA) 3 mg Tab Take 1 tablet (3 mg total) by mouth every evening. 30 tablet 5    [DISCONTINUED] amlodipine (NORVASC) 10 MG tablet Take 10 mg by mouth once daily.       No facility-administered encounter medications on file as of 9/9/2019.         Review of patient's allergies indicates:   Allergen Reactions    Bactrim [sulfamethoxazole-trimethoprim] Swelling    Adhesive Rash     tapes       Physical Exam      Vital Signs  Temp: 98.8 °F (37.1 °C)  Temp src: Oral  Pulse: 85  SpO2: 96 %  BP: 130/82  BP Location: Left arm  Patient Position: Sitting  Pain Score: 0-No pain  Height  "and Weight  Height: 5' 9" (175.3 cm)  Weight: 130.3 kg (287 lb 5.9 oz)  BSA (Calculated - sq m): 2.52 sq meters  BMI (Calculated): 42.5  Weight in (lb) to have BMI = 25: 168.9]    Physical Exam   Constitutional: She is oriented to person, place, and time. She appears well-developed and well-nourished.   HENT:   Head: Normocephalic and atraumatic.   Right Ear: External ear normal.   Left Ear: External ear normal.   Eyes: Right eye exhibits no discharge. Left eye exhibits no discharge.   Neck: Normal range of motion. No thyromegaly present.   Cardiovascular: Normal rate, regular rhythm, normal heart sounds and intact distal pulses.   No murmur heard.  Pulmonary/Chest: Effort normal and breath sounds normal. No respiratory distress.   Abdominal: Soft. Bowel sounds are normal. She exhibits no distension. There is no tenderness.   Musculoskeletal: Normal range of motion. She exhibits no deformity.   Neurological: She is alert and oriented to person, place, and time.   Skin: Skin is warm and dry. No rash noted.   Psychiatric: She has a normal mood and affect. Her behavior is normal.        Laboratory:  CBC:  No results for input(s): WBC, RBC, HGB, HCT, PLT, MCV, MCH, MCHC in the last 2160 hours.  CMP:  No results for input(s): GLU, CALCIUM, ALBUMIN, PROT, NA, K, CO2, CL, BUN, ALKPHOS, ALT, AST, BILITOT in the last 2160 hours.    Invalid input(s): CREATININ  URINALYSIS:  No results for input(s): COLORU, CLARITYU, SPECGRAV, PHUR, PROTEINUA, GLUCOSEU, BILIRUBINCON, BLOODU, WBCU, RBCU, BACTERIA, MUCUS, NITRITE, LEUKOCYTESUR, UROBILINOGEN, HYALINECASTS in the last 2160 hours.   LIPIDS:  No results for input(s): TSH, HDL, CHOL, TRIG, LDLCALC, CHOLHDL, NONHDLCHOL, TOTALCHOLEST in the last 2160 hours.  TSH:  No results for input(s): TSH in the last 2160 hours.  A1C:  No results for input(s): HGBA1C in the last 2160 hours.    Radiology:  No imaging on file    Assessment/Plan     Bandar Douglass is a 48 y.o.female with:    1. " Annual physical exam    2. Hypothyroidism, unspecified type  - TSH  - T4, free    3. Chronic constipation    4. Anxiety and depression  - nefazodone (SERZONE) 100 MG Tab; Take 1 tablet (100 mg total) by mouth 2 (two) times daily.  Dispense: 180 tablet; Refill: 1    5. Other insomnia  - eszopiclone (LUNESTA) 3 mg Tab; Take 1 tablet (3 mg total) by mouth every evening.  Dispense: 30 tablet; Refill: 5    6. Prediabetes  - Hemoglobin A1c    7. Neck pain  - Ambulatory Referral to Pain Clinic    8. Essential hypertension    9. Lipoma of torso  - US Soft Tissue Misc; Future    -Labs today  -Will try Lunesta for sleep  -Get flu shot at pharmacy  -Refer to pain mgmt.    -Hopeful that once thyroid, neck pain, insomnia are improved, anxiety/depression will also improve  -Continue current medications and maintain follow up with specialists.  Return to clinic in 3 months.    Regina Nava MD  [facility]                Answers for HPI/ROS submitted by the patient on 9/9/2019   activity change: Yes  unexpected weight change: Yes  rhinorrhea: No  trouble swallowing: No  visual disturbance: No  chest tightness: No  polyuria: No  difficulty urinating: No  menstrual problem: No  joint swelling: No  arthralgias: Yes  confusion: No  dysphoric mood: Yes

## 2019-09-09 NOTE — ASSESSMENT & PLAN NOTE
A1C 5.9 in 5/2019, stopped taking invokana because no longer on insurance.  Had GI upset with Metformin in past.

## 2019-09-09 NOTE — ASSESSMENT & PLAN NOTE
Had cervical fusion C5-C7 at [facility] several years ago.  Has neck pain when weather is bad.  Was on Percocet and meloxicam with previous PCP, percocet lasted her a year.  Has never had epidural injections.  Refer to pain mgmt.  Constant pain all the time.

## 2019-09-09 NOTE — ASSESSMENT & PLAN NOTE
No success with Ambien (nightmares).  Belsomra, Mirtazipine, Trazodone did not work.  Took a Lunesta in past which worked decently well for her.

## 2019-09-10 DIAGNOSIS — R73.03 PREDIABETES: ICD-10-CM

## 2019-09-10 DIAGNOSIS — E03.9 HYPOTHYROIDISM, UNSPECIFIED TYPE: ICD-10-CM

## 2019-09-10 DIAGNOSIS — R73.03 PREDIABETES: Primary | ICD-10-CM

## 2019-09-10 RX ORDER — GLIMEPIRIDE 1 MG/1
1 TABLET ORAL
Qty: 30 TABLET | Refills: 5 | Status: SHIPPED | OUTPATIENT
Start: 2019-09-10 | End: 2019-09-10 | Stop reason: SDUPTHER

## 2019-09-10 RX ORDER — LEVOTHYROXINE SODIUM 25 UG/1
25 TABLET ORAL DAILY
Qty: 30 TABLET | Refills: 5 | Status: SHIPPED | OUTPATIENT
Start: 2019-09-10 | End: 2019-09-30 | Stop reason: SDUPTHER

## 2019-09-10 NOTE — PROGRESS NOTES
A1C up to 6.8, 6.5 and up is considered diabetes.  Would like to start her on glimepiride 1 mg daily and repeat A1C in 3 months.    TSH is mildly elevated, given her symptoms would like to increase her levothyroxine to 225 mg daily, will send new rx for 25 mcg tablet for her to take in addition to 200 mcg tablet.    Will order repeat A1C and thyroid labs to be done in 3 months.

## 2019-09-11 RX ORDER — GLIMEPIRIDE 1 MG/1
1 TABLET ORAL
Qty: 30 TABLET | Refills: 5 | Status: SHIPPED | OUTPATIENT
Start: 2019-09-11 | End: 2019-09-30 | Stop reason: SDUPTHER

## 2019-09-12 ENCOUNTER — TELEPHONE (OUTPATIENT)
Dept: FAMILY MEDICINE | Facility: CLINIC | Age: 49
End: 2019-09-12

## 2019-09-12 NOTE — TELEPHONE ENCOUNTER
----- Message from Concepción Grayson MA sent at 9/10/2019  9:55 AM CDT -----  Left message to call back

## 2019-09-19 NOTE — PROGRESS NOTES
[facility]  New Patient H&P    Referring Provider: Regina Nava Md  [address]  [address]  [place] [address]    Chief Complaint:   Chief Complaint   Patient presents with    Neck Pain     LEFT SIDE       History of Present Illness: Bandar Douglass is a 49 y.o. female referred by Dr. Regina Nava for Neck pain.      Had cervical fusion C5-7 with Dr. Trujillo at [facility] in 2017 which relieved left arm pain, but not neck/shoulder pain. Pain is now mostly localized over the left upper trapezius, levator scapula area.  She will get radiation over the back of the head into the forehead on the left side mostly.  Pain is associated with headaches.  Pain is worse with rainy weather.  Pain is worse with turning the head towards the left side.  She denies any weakness or numbness in her arms.  She denies any changes in bowel or bladder function.  She does have some constipation at times.  She denies any fevers or infections.    Onset: 2012  Location: Neck, shoulder  Radiation: to forehead to back of neck and shoulders  Quality: Aching, Tight and Deep, tight  Exacerbating Factors: sitting and weather  Alleviating Factors: laying down, massage and medications  Associated Symptoms: denies night fever/night sweats, urinary incontinence, bowel incontinence, significant weight loss, significant motor weakness and loss of sensations    Severity: Currently: 2/10   Typical Range: 2-6/10     Exacerbation: 6/10   Pain Disability Index  Family/Home Responsibilities:: 2  Recreation:: 3  Social Activity:: 2  Occupation:: 4  Sexual Behavior:: 3  Self Care:: 1  Life-Support Activities:: 3  Pain Disability Index (PDI): 18    Previous Therapies:  PT/OT: yes 2017 before surgery  Surgery: yes FUSION CERVICAL ANTERIOR  C5-7  Previous Medications:   - NSAIDS: Tried off and on. Ibuprofen usually.   - Muscle Relaxants: Flexeril not helpful.   - TCAs: Elavil years ago, not sure.   - SNRIs: Cymbalta for sleep, didn't help.   -  Topicals: Not tried  - Anticonvulsants: Gabapentin 300mg BID, not helpful.   - Opioids: Oxycodone    Current Pain Medications:  1. Mobic 7.5 mg not sure if helpful    Blood Thinners: None    Full Medication List:    Current Outpatient Medications:     albuterol (VENTOLIN HFA) 90 mcg/actuation inhaler, Ventolin HFA 90 mcg/actuation aerosol inhaler  INHALE 2 PUFFS BY MOUTH EVERY 4 HOURS AS NEEDED FOR COUGHING, WHEEZING, OR SHORTNESS OF BREATH, Disp: , Rfl:     ascorbic acid, vitamin C, (VITAMIN C) 1000 MG tablet, Take by mouth., Disp: , Rfl:     cyanocobalamin (VITAMIN B-12) 1000 MCG tablet, Take 1,000 mcg by mouth., Disp: , Rfl:     ergocalciferol, vitamin D2, (VITAMIN D ORAL), Take 1,000 mg by mouth., Disp: , Rfl:     eszopiclone (LUNESTA) 3 mg Tab, Take 1 tablet (3 mg total) by mouth every evening., Disp: 30 tablet, Rfl: 5    glimepiride (AMARYL) 1 MG tablet, Take 1 tablet (1 mg total) by mouth before breakfast., Disp: 30 tablet, Rfl: 5    gluc sifuentes/chondro sifuentes A/vit C/Mn (GLUCOSAMINE 1500 COMPLEX ORAL), Take 1,500 mg by mouth., Disp: , Rfl:     levothyroxine (SYNTHROID) 200 MCG tablet, Take 1 tablet (200 mcg total) by mouth before breakfast., Disp: 30 tablet, Rfl: 5    levothyroxine (SYNTHROID) 25 MCG tablet, Take 1 tablet (25 mcg total) by mouth once daily., Disp: 30 tablet, Rfl: 5    lubiprostone (AMITIZA) 24 MCG Cap, Take 1 capsule (24 mcg total) by mouth once daily., Disp: 30 capsule, Rfl: 5    meloxicam (MOBIC) 7.5 MG tablet, Take 1 tablet (7.5 mg total) by mouth 2 (two) times daily as needed for Pain., Disp: 60 tablet, Rfl: 0    multivitamin with iron Tab, Take by mouth., Disp: , Rfl:     nefazodone (SERZONE) 100 MG Tab, Take 1 tablet (100 mg total) by mouth 2 (two) times daily., Disp: 180 tablet, Rfl: 1    omega-3 fatty acids (FISH OIL CONCENTRATE) 1,000 mg Cap, Take by mouth., Disp: , Rfl:     ALPRAZolam (XANAX) 1 MG tablet, Take 1 tablet (1 mg total) by mouth nightly as needed for Anxiety.  (Patient not taking: Reported on 2019), Disp: 30 tablet, Rfl: 0    oxyCODONE-acetaminophen (PERCOCET) 5-325 mg per tablet, Take 1 tablet by mouth every 6 (six) hours as needed for Pain. (Patient not taking: Reported on 2019), Disp: 30 tablet, Rfl: 0    tiZANidine (ZANAFLEX) 4 MG tablet, Take 4 tablets (16 mg total) by mouth nightly as needed. Start with 1 tab initially. May increase by 1 tab every 3-5 days, Disp: 120 tablet, Rfl: 3     Review of Systems:  Review of Systems   Constitutional: Negative for fever and weight loss.   HENT: Negative for ear pain and tinnitus.    Eyes: Negative for pain and redness.   Respiratory: Negative for cough and shortness of breath.    Cardiovascular: Negative for chest pain and palpitations.   Gastrointestinal: Positive for constipation. Negative for heartburn.   Genitourinary: Negative.         Denies urinary incontinence. Denies urine retention.    Musculoskeletal: Positive for back pain, joint pain and neck pain.   Skin: Negative for itching and rash.   Neurological: Positive for headaches. Negative for tingling, focal weakness and seizures.   Endo/Heme/Allergies: Negative for environmental allergies. Does not bruise/bleed easily.   Psychiatric/Behavioral: Positive for depression. The patient is nervous/anxious and has insomnia.        Allergies:  Bactrim [sulfamethoxazole-trimethoprim] and Adhesive     Medical History:   has a past medical history of Breast lump (2019), Diabetes mellitus, Hypertension, and Thyroid disease.    Surgical History:   has a past surgical history that includes Cervical fusion; Hysterectomy; Hernia repair; Cholecystectomy;  section; and Tonsillectomy.    Social History:   reports that she has never smoked. She has never used smokeless tobacco. She reports that she drinks alcohol. She reports that she does not use drugs.    Physical Exam:  BP (!) 153/105   Pulse 80   Wt 136 kg (299 lb 13.2 oz)   BMI 44.28 kg/m²   Recheck BP  149/89  GEN: No acute distress. Calm, comfortable  HENT: Normocephalic, atraumatic, moist mucous membranes  EYE: Anicteric sclera, non-injected.   CV: Non-diaphoretic. Pulses 2+ in BUE.  RESP: Breathing comfortably. Chest expansion symmetric.  EXT: No clubbing, cyanosis. No varicosities.  SKIN: Warm, & dry to palpation. No visible rashes or lesions of exposed skin.   PSYCH: Pleasant mood and appropriate affect. Recent and remote memory intact.   GAIT: Independent, normal ambulation  Neck Exam:       Inspection: No erythema, bruising. Normal cervical lordosis. Anterior surgical incision well-healed      Palpation: (+) TTP of cervical paraspinals, upper trapezius and levator scapulae on the left.       ROM: No significant limitation in lateral rotation, but pain with lateral rotation on the left      Provocative Maneuvers:  (-) Spurling's bilaterally  Shoulder Exam:       Inspection: No erythema, bruising, surgical incisions      Palpation: No TTP of AC joint, subacromial area.       ROM: No limitations or pain bilaterally  Neurologic Exam:     Alert. Speech is fluent and appropriate.     Cranial Nerves: Extra-ocular movements intact. Pupils equal. No strabismus. Face Symmetric. Shoulder shrug symmetric.       Strength:  Left elbow extension 4+ out of 5, otherwise 5/5 throughout bilateral upper extremities     Sensation: Grossly intact to light touch in bilateral upper extremities     Reflexes: 1+ in b/l patella, biceps, brachioradialis.  Could not elicit bilateral triceps or Achilles.     Tone: No abnormality appreciated in bilateral upper or lower extremities     (-) Massey bilaterally         Imaging:  Cervical Spine MRI from 2017 that patient brought in record.  MRI revealed spondylotic changes with reversal of lordosis. He has are most prominent at C5-6 with disc osteophyte complex and left paracentral/midline extrusion resulting in severe thecal sac stenosis and cord flattening with myelomalacia and severe left  foraminal stenosis.  Lesser degree of findings at C6-7 but also with cord flattening, myelomalacia, and severe left foraminal stenosis.    X-ray after surgery in 2017.  Patient brought in outside record.  Status post anterior fusion of C5-C7.  Normal alignment noted. No evidence of hardware failure or loosening.    Labs:  BMP  Lab Results   Component Value Date     05/25/2019    K 4.5 05/25/2019     05/25/2019    CO2 28 05/25/2019    BUN 11 05/25/2019    CREATININE 0.8 05/25/2019    CALCIUM 10.3 05/25/2019    ANIONGAP 8 05/25/2019    ESTGFRAFRICA >60 05/25/2019    EGFRNONAA >60 05/25/2019     Lab Results   Component Value Date    ALT 12 05/25/2019    AST 25 05/25/2019    ALKPHOS 55 05/25/2019    BILITOT 0.3 05/25/2019     Lab Results   Component Value Date     05/25/2019       Assessment:  Bandar Douglass is a 49 y.o. female with the following diagnoses based on history, exam, and imaging:    Problem List Items Addressed This Visit     None      Visit Diagnoses     Chronic neck pain    -  Primary    Relevant Orders    X-Ray Cervical Spine Complete 5 view    Ambulatory consult to Physical Therapy    MRI Cervical Spine W WO Cont    Status post cervical spinal fusion        Relevant Orders    X-Ray Cervical Spine Complete 5 view    Ambulatory consult to Physical Therapy    MRI Cervical Spine W WO Cont    Cervical cord myelomalacia        Relevant Orders    X-Ray Cervical Spine Complete 5 view    Ambulatory consult to Physical Therapy    MRI Cervical Spine W WO Cont    Spondylosis of cervical region without myelopathy or radiculopathy        Relevant Medications    tiZANidine (ZANAFLEX) 4 MG tablet    Other Relevant Orders    X-Ray Cervical Spine Complete 5 view    Ambulatory consult to Physical Therapy    MRI Cervical Spine W WO Cont    Cervical myofascial pain syndrome        Relevant Medications    tiZANidine (ZANAFLEX) 4 MG tablet    Other Relevant Orders    Ambulatory consult to Physical Therapy     Muscle spasm        Relevant Medications    tiZANidine (ZANAFLEX) 4 MG tablet    Insomnia secondary to chronic pain        Relevant Medications    tiZANidine (ZANAFLEX) 4 MG tablet          Her pain appears multifactorial with facetogenic in myofascial components.  It appears surgery in 2017 relieve the radicular component in the left arm.  She likely is now having advanced arthritis above the level of the fusion with some aspects of cervicogenic headache.  Previous MRI did have evidence of myelomalacia, but no signs of upper motor neuron involvement on exam today.    Treatment Plan: I discussed with the patient the following assessment and recommendations. The following is the plan the patient agreed upon:  - PT/OT/HEP:  Referral to physical therapy internal.  - Procedures:  Hold off on anything for now.  May consider left C3, C4 and 3rd occipital nerve block followed by possible RFA in the future if she only has minimal or no relief with physical therapy.  She may actually benefit from trigger point injections in the left upper trap and levator scapula.  - Medications:  Will start tizanidine 4 mg tablets.  I discussed with patient that she may take up to 4 tablets at nighttime, but increase slowly to see how this will affect her.  Side effects discussed with the patient including sedation and low blood pressure.  - Imaging:  Will update MRI and x-ray.  We will do MRI with contrast to assess for recurrent herniation versus scar tissue.  - Labs: Reviewed.  Medications are appropriately dosed for current hepatorenal function.    Follow Up: RTC 8 weeks or sooner as needed.    Anayeli Monge M.D.  Interventional Pain Medicine / Physical Medicine & Rehabilitation    Disclaimer: This note was partly generated using dictation software which may occasionally result in transcription errors.

## 2019-09-20 ENCOUNTER — PATIENT OUTREACH (OUTPATIENT)
Dept: ADMINISTRATIVE | Facility: OTHER | Age: 49
End: 2019-09-20

## 2019-09-24 ENCOUNTER — OFFICE VISIT (OUTPATIENT)
Dept: PAIN MEDICINE | Facility: CLINIC | Age: 49
End: 2019-09-24
Payer: OTHER GOVERNMENT

## 2019-09-24 VITALS
WEIGHT: 293 LBS | HEART RATE: 80 BPM | BODY MASS INDEX: 44.28 KG/M2 | SYSTOLIC BLOOD PRESSURE: 153 MMHG | DIASTOLIC BLOOD PRESSURE: 105 MMHG

## 2019-09-24 DIAGNOSIS — M54.2 CHRONIC NECK PAIN: Primary | ICD-10-CM

## 2019-09-24 DIAGNOSIS — M62.838 MUSCLE SPASM: ICD-10-CM

## 2019-09-24 DIAGNOSIS — M47.812 SPONDYLOSIS OF CERVICAL REGION WITHOUT MYELOPATHY OR RADICULOPATHY: ICD-10-CM

## 2019-09-24 DIAGNOSIS — G89.29 INSOMNIA SECONDARY TO CHRONIC PAIN: ICD-10-CM

## 2019-09-24 DIAGNOSIS — M79.18 CERVICAL MYOFASCIAL PAIN SYNDROME: ICD-10-CM

## 2019-09-24 DIAGNOSIS — G89.29 CHRONIC NECK PAIN: Primary | ICD-10-CM

## 2019-09-24 DIAGNOSIS — G95.89 CERVICAL CORD MYELOMALACIA: ICD-10-CM

## 2019-09-24 DIAGNOSIS — G47.01 INSOMNIA SECONDARY TO CHRONIC PAIN: ICD-10-CM

## 2019-09-24 DIAGNOSIS — Z98.1 STATUS POST CERVICAL SPINAL FUSION: ICD-10-CM

## 2019-09-24 PROCEDURE — 99999 PR PBB SHADOW E&M-EST. PATIENT-LVL III: CPT | Mod: PBBFAC,,, | Performed by: PHYSICAL MEDICINE & REHABILITATION

## 2019-09-24 PROCEDURE — 99244 PR OFFICE CONSULTATION,LEVEL IV: ICD-10-PCS | Mod: S$PBB,,, | Performed by: PHYSICAL MEDICINE & REHABILITATION

## 2019-09-24 PROCEDURE — 99213 OFFICE O/P EST LOW 20 MIN: CPT | Mod: PBBFAC,PO | Performed by: PHYSICAL MEDICINE & REHABILITATION

## 2019-09-24 PROCEDURE — 99244 OFF/OP CNSLTJ NEW/EST MOD 40: CPT | Mod: S$PBB,,, | Performed by: PHYSICAL MEDICINE & REHABILITATION

## 2019-09-24 PROCEDURE — 99999 PR PBB SHADOW E&M-EST. PATIENT-LVL III: ICD-10-PCS | Mod: PBBFAC,,, | Performed by: PHYSICAL MEDICINE & REHABILITATION

## 2019-09-24 RX ORDER — TIZANIDINE 4 MG/1
16 TABLET ORAL NIGHTLY PRN
Qty: 120 TABLET | Refills: 3 | Status: SHIPPED | OUTPATIENT
Start: 2019-09-24 | End: 2019-10-25 | Stop reason: SDUPTHER

## 2019-09-24 NOTE — LETTER
September 24, 2019      Regina Nava MD  [address]  [address]  [place] [facility] [address]           [facility]  [address]  [place] [place] [address]  Phone: [phone]          Patient: Bandar Douglass   MR Number: [id number]   YOB: 1970   Date of Visit: 9/24/2019       Dear Dr. Regina Nava:    Thank you for referring Bandar Douglass to me for evaluation. Attached you will find relevant portions of my assessment and plan of care.    If you have questions, please do not hesitate to call me. I look forward to following Bandar Douglass along with you.    Sincerely,    Anayeli Mogne MD    Enclosure  CC:  No Recipients    If you would like to receive this communication electronically, please contact [email or web address] or [phone] to request more information on [employer] access.    For providers and/or their staff who would like to refer a patient to [facility], please contact us through our one-stop-shop provider referral line, [facility] , at [phone].    If you feel you have received this communication in error or would no longer like to receive these types of communications, please e-mail [email or web address]

## 2019-09-27 DIAGNOSIS — E11.9 TYPE 2 DIABETES MELLITUS WITHOUT COMPLICATION: ICD-10-CM

## 2019-09-28 ENCOUNTER — HOSPITAL ENCOUNTER (OUTPATIENT)
Dept: RADIOLOGY | Facility: HOSPITAL | Age: 49
Discharge: HOME | End: 2019-09-28
Attending: PHYSICAL MEDICINE & REHABILITATION
Payer: OTHER GOVERNMENT

## 2019-09-28 DIAGNOSIS — G89.29 CHRONIC NECK PAIN: ICD-10-CM

## 2019-09-28 DIAGNOSIS — Z98.1 STATUS POST CERVICAL SPINAL FUSION: ICD-10-CM

## 2019-09-28 DIAGNOSIS — M54.2 CHRONIC NECK PAIN: ICD-10-CM

## 2019-09-28 DIAGNOSIS — G95.89 CERVICAL CORD MYELOMALACIA: ICD-10-CM

## 2019-09-28 DIAGNOSIS — M47.812 SPONDYLOSIS OF CERVICAL REGION WITHOUT MYELOPATHY OR RADICULOPATHY: ICD-10-CM

## 2019-09-28 PROCEDURE — 72050 XR CERVICAL SPINE COMPLETE 5 VIEW: ICD-10-PCS | Mod: 26,,, | Performed by: RADIOLOGY

## 2019-09-28 PROCEDURE — 72050 X-RAY EXAM NECK SPINE 4/5VWS: CPT | Mod: TC,FY

## 2019-09-28 PROCEDURE — 72050 X-RAY EXAM NECK SPINE 4/5VWS: CPT | Mod: 26,,, | Performed by: RADIOLOGY

## 2019-09-29 ENCOUNTER — PATIENT MESSAGE (OUTPATIENT)
Dept: FAMILY MEDICINE | Facility: CLINIC | Age: 49
End: 2019-09-29

## 2019-09-29 DIAGNOSIS — E03.9 HYPOTHYROIDISM, UNSPECIFIED TYPE: ICD-10-CM

## 2019-09-29 DIAGNOSIS — R73.03 PREDIABETES: ICD-10-CM

## 2019-09-29 DIAGNOSIS — G47.09 OTHER INSOMNIA: ICD-10-CM

## 2019-09-30 RX ORDER — LEVOTHYROXINE SODIUM 200 UG/1
200 TABLET ORAL
Qty: 90 TABLET | Refills: 3 | Status: SHIPPED | OUTPATIENT
Start: 2019-09-30 | End: 2020-07-05 | Stop reason: SDUPTHER

## 2019-09-30 RX ORDER — LEVOTHYROXINE SODIUM 25 UG/1
25 TABLET ORAL DAILY
Qty: 90 TABLET | Refills: 3 | Status: SHIPPED | OUTPATIENT
Start: 2019-09-30 | End: 2020-07-06

## 2019-09-30 RX ORDER — GLIMEPIRIDE 1 MG/1
1 TABLET ORAL
Qty: 90 TABLET | Refills: 5 | Status: SHIPPED | OUTPATIENT
Start: 2019-09-30 | End: 2019-12-13

## 2019-10-02 PROBLEM — Z74.09 IMPAIRED MOBILITY AND ADLS: Status: ACTIVE | Noted: 2019-10-02

## 2019-10-02 PROBLEM — Z78.9 IMPAIRED MOBILITY AND ADLS: Status: ACTIVE | Noted: 2019-10-02

## 2019-10-02 PROBLEM — R29.898 DECREASED RANGE OF MOTION OF NECK: Status: ACTIVE | Noted: 2019-10-02

## 2019-10-03 RX ORDER — ESZOPICLONE 3 MG/1
3 TABLET, FILM COATED ORAL NIGHTLY
Qty: 30 TABLET | Refills: 3 | Status: SHIPPED | OUTPATIENT
Start: 2019-10-03 | End: 2019-12-09

## 2019-10-04 ENCOUNTER — HOSPITAL ENCOUNTER (OUTPATIENT)
Dept: RADIOLOGY | Facility: HOSPITAL | Age: 49
Discharge: HOME | End: 2019-10-04
Attending: PHYSICAL MEDICINE & REHABILITATION
Payer: OTHER GOVERNMENT

## 2019-10-04 DIAGNOSIS — G95.89 CERVICAL CORD MYELOMALACIA: ICD-10-CM

## 2019-10-04 DIAGNOSIS — Z98.1 STATUS POST CERVICAL SPINAL FUSION: ICD-10-CM

## 2019-10-04 DIAGNOSIS — G89.29 CHRONIC NECK PAIN: ICD-10-CM

## 2019-10-04 DIAGNOSIS — M47.812 SPONDYLOSIS OF CERVICAL REGION WITHOUT MYELOPATHY OR RADICULOPATHY: ICD-10-CM

## 2019-10-04 DIAGNOSIS — M54.2 CHRONIC NECK PAIN: ICD-10-CM

## 2019-10-07 ENCOUNTER — TELEPHONE (OUTPATIENT)
Dept: PAIN MEDICINE | Facility: CLINIC | Age: 49
End: 2019-10-07

## 2019-10-07 NOTE — TELEPHONE ENCOUNTER
Called patient and told her that as soon as I get the written referral from , I would fax that over to [facility] at the number she provided. Pt verbalized understanding.       ----- Message from Sherrell Tenorio sent at 10/7/2019  9:54 AM CDT -----  Contact: PT   Pt calling in to get an order for a OPEN MRI sent to [facility] .     [facility] fax number is [phone]      Pt can be reached at [phone]  Thanks

## 2019-10-09 ENCOUNTER — PATIENT MESSAGE (OUTPATIENT)
Dept: PAIN MEDICINE | Facility: CLINIC | Age: 49
End: 2019-10-09

## 2019-10-09 ENCOUNTER — TELEPHONE (OUTPATIENT)
Dept: PAIN MEDICINE | Facility: CLINIC | Age: 49
End: 2019-10-09

## 2019-10-09 DIAGNOSIS — M54.50 LOW BACK PAIN, NON-SPECIFIC: ICD-10-CM

## 2019-10-09 NOTE — TELEPHONE ENCOUNTER
External MRI with and without contrast ordered    ----- Message from Carlee Skinner MA sent at 10/9/2019  9:41 AM CDT -----  Patient is requesting a referral for the MRI be sent outside network. Can you please print out an order for the MRI and I will be glad to send that to [facility].     Thanks.   CB

## 2019-10-15 ENCOUNTER — PATIENT OUTREACH (OUTPATIENT)
Dept: ADMINISTRATIVE | Facility: HOSPITAL | Age: 49
End: 2019-10-15

## 2019-10-17 ENCOUNTER — TELEPHONE (OUTPATIENT)
Dept: FAMILY MEDICINE | Facility: CLINIC | Age: 49
End: 2019-10-17

## 2019-10-17 ENCOUNTER — PATIENT MESSAGE (OUTPATIENT)
Dept: FAMILY MEDICINE | Facility: CLINIC | Age: 49
End: 2019-10-17

## 2019-10-17 NOTE — TELEPHONE ENCOUNTER
----- Message from Kamila Dickson sent at 10/17/2019  2:58 PM CDT -----  Contact: Self  Pt is returning your call.  She cannot answer her phone at work, so you won't be able to get in contact with her.  She suggests a texting her.    She can be reached at [phone].    Thank you.

## 2019-10-18 DIAGNOSIS — Z12.11 COLON CANCER SCREENING: ICD-10-CM

## 2019-10-22 ENCOUNTER — PATIENT OUTREACH (OUTPATIENT)
Dept: ADMINISTRATIVE | Facility: HOSPITAL | Age: 49
End: 2019-10-22

## 2019-10-23 ENCOUNTER — PATIENT MESSAGE (OUTPATIENT)
Dept: FAMILY MEDICINE | Facility: CLINIC | Age: 49
End: 2019-10-23

## 2019-10-23 DIAGNOSIS — F32.A ANXIETY AND DEPRESSION: ICD-10-CM

## 2019-10-23 DIAGNOSIS — F41.9 ANXIETY AND DEPRESSION: ICD-10-CM

## 2019-10-23 RX ORDER — ALPRAZOLAM 1 MG/1
1 TABLET ORAL NIGHTLY PRN
Qty: 30 TABLET | Refills: 0 | Status: SHIPPED | OUTPATIENT
Start: 2019-10-23 | End: 2019-10-29

## 2019-10-24 ENCOUNTER — PATIENT OUTREACH (OUTPATIENT)
Dept: ADMINISTRATIVE | Facility: HOSPITAL | Age: 49
End: 2019-10-24

## 2019-10-24 ENCOUNTER — TELEPHONE (OUTPATIENT)
Dept: ADMINISTRATIVE | Facility: HOSPITAL | Age: 49
End: 2019-10-24

## 2019-10-25 DIAGNOSIS — G89.29 INSOMNIA SECONDARY TO CHRONIC PAIN: ICD-10-CM

## 2019-10-25 DIAGNOSIS — G47.01 INSOMNIA SECONDARY TO CHRONIC PAIN: ICD-10-CM

## 2019-10-25 DIAGNOSIS — M47.812 SPONDYLOSIS OF CERVICAL REGION WITHOUT MYELOPATHY OR RADICULOPATHY: ICD-10-CM

## 2019-10-25 DIAGNOSIS — F32.A ANXIETY AND DEPRESSION: ICD-10-CM

## 2019-10-25 DIAGNOSIS — F41.9 ANXIETY AND DEPRESSION: ICD-10-CM

## 2019-10-25 DIAGNOSIS — M62.838 MUSCLE SPASM: ICD-10-CM

## 2019-10-25 DIAGNOSIS — M79.18 CERVICAL MYOFASCIAL PAIN SYNDROME: ICD-10-CM

## 2019-10-25 RX ORDER — NEFAZODONE HYDROCHLORIDE 100 MG/1
100 TABLET ORAL 2 TIMES DAILY
Qty: 180 TABLET | Refills: 1 | Status: SHIPPED | OUTPATIENT
Start: 2019-10-25 | End: 2020-08-06

## 2019-10-25 RX ORDER — TIZANIDINE 4 MG/1
16 TABLET ORAL NIGHTLY PRN
Qty: 120 TABLET | Refills: 3 | Status: SHIPPED | OUTPATIENT
Start: 2019-10-25 | End: 2020-02-22

## 2019-10-25 RX ORDER — PRAVASTATIN SODIUM 20 MG/1
20 TABLET ORAL DAILY
Qty: 90 TABLET | Refills: 3 | Status: SHIPPED | OUTPATIENT
Start: 2019-10-25 | End: 2020-09-09 | Stop reason: SDUPTHER

## 2019-10-28 ENCOUNTER — PATIENT MESSAGE (OUTPATIENT)
Dept: FAMILY MEDICINE | Facility: CLINIC | Age: 49
End: 2019-10-28

## 2019-10-29 ENCOUNTER — PATIENT MESSAGE (OUTPATIENT)
Dept: FAMILY MEDICINE | Facility: CLINIC | Age: 49
End: 2019-10-29

## 2019-10-29 ENCOUNTER — PATIENT MESSAGE (OUTPATIENT)
Dept: PAIN MEDICINE | Facility: CLINIC | Age: 49
End: 2019-10-29

## 2019-10-29 DIAGNOSIS — F41.9 ANXIETY AND DEPRESSION: ICD-10-CM

## 2019-10-29 DIAGNOSIS — M54.2 NECK PAIN: ICD-10-CM

## 2019-10-29 DIAGNOSIS — F32.A ANXIETY AND DEPRESSION: ICD-10-CM

## 2019-10-29 RX ORDER — ALPRAZOLAM 1 MG/1
1 TABLET ORAL NIGHTLY PRN
Qty: 90 TABLET | Refills: 1 | Status: SHIPPED | OUTPATIENT
Start: 2019-10-29 | End: 2020-04-14 | Stop reason: SDUPTHER

## 2019-10-29 NOTE — TELEPHONE ENCOUNTER
Patient is reauesting her Xanax be sent to [facility] mail order, not [facility] local pharm... Is thi something you want to do?

## 2019-11-15 ENCOUNTER — TELEPHONE (OUTPATIENT)
Dept: FAMILY MEDICINE | Facility: CLINIC | Age: 49
End: 2019-11-15

## 2019-11-18 ENCOUNTER — PATIENT OUTREACH (OUTPATIENT)
Dept: ADMINISTRATIVE | Facility: HOSPITAL | Age: 49
End: 2019-11-18

## 2019-11-20 ENCOUNTER — TELEPHONE (OUTPATIENT)
Dept: FAMILY MEDICINE | Facility: CLINIC | Age: 49
End: 2019-11-20

## 2019-11-20 RX ORDER — PREGABALIN 75 MG/1
75 CAPSULE ORAL 2 TIMES DAILY
Qty: 60 CAPSULE | Refills: 5 | Status: SHIPPED | OUTPATIENT
Start: 2019-11-20 | End: 2019-11-29 | Stop reason: SDUPTHER

## 2019-11-20 NOTE — TELEPHONE ENCOUNTER
Called patient and left voicemail, no answer.      Got her MRI results from 11/20/19 MRI Chris at [facility].  Has multiple nerves that are being pinched as they exit the spine. Her spinal cord looks ok.  Would like her to see a neurosurgeon. Does she want to go back to [facility] or see someone in the [facility]?

## 2019-11-29 ENCOUNTER — PATIENT MESSAGE (OUTPATIENT)
Dept: FAMILY MEDICINE | Facility: CLINIC | Age: 49
End: 2019-11-29

## 2019-11-29 DIAGNOSIS — E11.9 TYPE 2 DIABETES MELLITUS WITHOUT COMPLICATION, UNSPECIFIED WHETHER LONG TERM INSULIN USE: ICD-10-CM

## 2019-11-29 RX ORDER — PREGABALIN 75 MG/1
75 CAPSULE ORAL 2 TIMES DAILY
Qty: 60 CAPSULE | Refills: 5 | Status: SHIPPED | OUTPATIENT
Start: 2019-11-29 | End: 2019-12-30 | Stop reason: SDUPTHER

## 2019-12-09 ENCOUNTER — OFFICE VISIT (OUTPATIENT)
Dept: FAMILY MEDICINE | Facility: CLINIC | Age: 49
End: 2019-12-09
Payer: OTHER GOVERNMENT

## 2019-12-09 VITALS
SYSTOLIC BLOOD PRESSURE: 124 MMHG | BODY MASS INDEX: 43.4 KG/M2 | TEMPERATURE: 98 F | HEART RATE: 86 BPM | OXYGEN SATURATION: 100 % | WEIGHT: 293 LBS | DIASTOLIC BLOOD PRESSURE: 78 MMHG | RESPIRATION RATE: 16 BRPM | HEIGHT: 69 IN

## 2019-12-09 DIAGNOSIS — M54.12 CERVICAL RADICULOPATHY: ICD-10-CM

## 2019-12-09 DIAGNOSIS — I10 ESSENTIAL HYPERTENSION: Chronic | ICD-10-CM

## 2019-12-09 DIAGNOSIS — E03.9 ACQUIRED HYPOTHYROIDISM: ICD-10-CM

## 2019-12-09 DIAGNOSIS — G47.09 OTHER INSOMNIA: ICD-10-CM

## 2019-12-09 DIAGNOSIS — E11.9 TYPE 2 DIABETES MELLITUS WITHOUT COMPLICATION, WITHOUT LONG-TERM CURRENT USE OF INSULIN: ICD-10-CM

## 2019-12-09 DIAGNOSIS — K59.00 CONSTIPATION, UNSPECIFIED CONSTIPATION TYPE: Primary | ICD-10-CM

## 2019-12-09 PROBLEM — Z00.00 ANNUAL PHYSICAL EXAM: Status: RESOLVED | Noted: 2019-09-09 | Resolved: 2019-12-09

## 2019-12-09 PROCEDURE — 99999 PR PBB SHADOW E&M-EST. PATIENT-LVL III: ICD-10-PCS | Mod: PBBFAC,,, | Performed by: INTERNAL MEDICINE

## 2019-12-09 PROCEDURE — 99214 PR OFFICE/OUTPT VISIT, EST, LEVL IV, 30-39 MIN: ICD-10-PCS | Mod: S$PBB,,, | Performed by: INTERNAL MEDICINE

## 2019-12-09 PROCEDURE — 99999 PR PBB SHADOW E&M-EST. PATIENT-LVL III: CPT | Mod: PBBFAC,,, | Performed by: INTERNAL MEDICINE

## 2019-12-09 PROCEDURE — 99213 OFFICE O/P EST LOW 20 MIN: CPT | Mod: PBBFAC,PN | Performed by: INTERNAL MEDICINE

## 2019-12-09 PROCEDURE — 99214 OFFICE O/P EST MOD 30 MIN: CPT | Mod: S$PBB,,, | Performed by: INTERNAL MEDICINE

## 2019-12-09 RX ORDER — ALBUTEROL SULFATE 90 UG/1
1 AEROSOL, METERED RESPIRATORY (INHALATION) EVERY 4 HOURS PRN
Qty: 1 INHALER | Refills: 5 | Status: SHIPPED | OUTPATIENT
Start: 2019-12-09 | End: 2021-01-12

## 2019-12-09 NOTE — ASSESSMENT & PLAN NOTE
A1C 6.8 in 9/2019, now on glimepiride.  No issues with hypoglycemia.  No issues with feet, UTD on eye exam.

## 2019-12-09 NOTE — PROGRESS NOTES
[facility] Note    Chief Complaint      Chief Complaint   Patient presents with    Diabetes     History of Present Illness      Bandar Douglass is a 49 y.o. female who presents today for DM2 follow up.  Patient comes to appointment alone.     Problem List Items Addressed This Visit     HTN (hypertension) (Chronic)    Current Assessment & Plan     Was on amlodipine in past, lost a bunch of weight.  Watches sodium in diet.         Hypothyroidism    Current Assessment & Plan     Feeling better on synthroid 225 mcg daily, due for repeat TSH.  Has gained 70 pounds since last year.           Type 2 diabetes mellitus without complication, without long-term current use of insulin    Current Assessment & Plan     A1C 6.8 in 9/2019, now on glimepiride.  No issues with hypoglycemia.  No issues with feet, UTD on eye exam.           Cervical radiculopathy    Overview     Added automatically from request for surgery [id number]         Current Assessment & Plan     Started on lyrica 3 days ago.  Pain is same so far, planning to see Dr. Trujillo in 2/2020.  No improvement with PT after seeing pain management, numbness/tingling started after. Also had dry needling.         Other insomnia    Current Assessment & Plan     No success with Ambien (nightmares).  Belsomra, Mirtazipine, Trazodone did not work.  Tried lunesta after last visit, did not stay asleep. Only thing that works is low dose benzos.           Other Visit Diagnoses     Constipation, unspecified constipation type    -  Primary    Relevant Medications    linaCLOtide (LINZESS) 72 mcg Cap capsule          Health Maintenance   Topic Date Due    Eye Exam  09/17/1979    Urine Microalbumin  05/02/2019    Colonoscopy  09/17/2019    Hemoglobin A1c  03/09/2020    Mammogram  05/01/2020    Foot Exam  05/17/2020    Lipid Panel  05/25/2020    TETANUS VACCINE  10/01/2028    Pneumococcal Vaccine (Medium Risk)  Completed       Past Medical History:    Diagnosis Date    Breast lump 2019    Diabetes mellitus     Hypertension     Thyroid disease        Past Surgical History:   Procedure Laterality Date    CERVICAL FUSION  2017    C5-7 Anterior Fusion     SECTION      CHOLECYSTECTOMY      HERNIA REPAIR      HYSTERECTOMY      LUMBAR DISCECTOMY  2000    SPINE SURGERY      TONSILLECTOMY         family history is not on file.    Social History     Tobacco Use    Smoking status: Never Smoker    Smokeless tobacco: Never Used   Substance Use Topics    Alcohol use: Yes     Frequency: 2-4 times a month     Drinks per session: 3 or 4     Binge frequency: Never     Comment: socially    Drug use: No       Review of Systems   Constitutional: Negative for chills, fever and weight loss.   HENT: Negative for congestion, hearing loss and sore throat.    Eyes: Negative for blurred vision and discharge.   Respiratory: Positive for wheezing. Negative for cough and shortness of breath.    Cardiovascular: Negative for chest pain and palpitations.   Gastrointestinal: Positive for constipation. Negative for abdominal pain, blood in stool, diarrhea, nausea and vomiting.   Genitourinary: Negative for dysuria and hematuria.   Musculoskeletal: Positive for back pain and neck pain. Negative for falls and myalgias.   Skin: Negative for itching and rash.   Neurological: Positive for tingling, weakness and headaches. Negative for dizziness.   Endo/Heme/Allergies: Negative for polydipsia.        Outpatient Encounter Medications as of 2019   Medication Sig Dispense Refill    albuterol (VENTOLIN HFA) 90 mcg/actuation inhaler Inhale 1 puff into the lungs every 4 (four) hours as needed for Wheezing. Rescue 1 Inhaler 5    ALPRAZolam (XANAX) 1 MG tablet Take 1 tablet (1 mg total) by mouth nightly as needed for Anxiety. 90 tablet 1    ascorbic acid, vitamin C, (VITAMIN C) 1000 MG tablet Take by mouth.      cyanocobalamin (VITAMIN B-12) 1000 MCG tablet Take 1,000 mcg  by mouth.      ergocalciferol, vitamin D2, (VITAMIN D ORAL) Take 1,000 mg by mouth.      glimepiride (AMARYL) 1 MG tablet Take 1 tablet (1 mg total) by mouth before breakfast. 90 tablet 5    gluc sifuentes/chondro sifuentes A/vit C/Mn (GLUCOSAMINE 1500 COMPLEX ORAL) Take 1,500 mg by mouth.      levothyroxine (SYNTHROID) 200 MCG tablet Take 1 tablet (200 mcg total) by mouth before breakfast. 90 tablet 3    levothyroxine (SYNTHROID) 25 MCG tablet Take 1 tablet (25 mcg total) by mouth once daily. 90 tablet 3    linaCLOtide (LINZESS) 72 mcg Cap capsule Take 1 capsule (72 mcg total) by mouth once daily. 90 capsule 1    multivitamin with iron Tab Take by mouth.      nefazodone (SERZONE) 100 MG Tab Take 1 tablet (100 mg total) by mouth 2 (two) times daily. 180 tablet 1    omega-3 fatty acids (FISH OIL CONCENTRATE) 1,000 mg Cap Take by mouth.      oxyCODONE-acetaminophen (PERCOCET) 5-325 mg per tablet Take 1 tablet by mouth every 6 (six) hours as needed for Pain. (Patient not taking: Reported on 9/24/2019) 30 tablet 0    pravastatin (PRAVACHOL) 20 MG tablet Take 1 tablet (20 mg total) by mouth once daily. 90 tablet 3    pregabalin (LYRICA) 75 MG capsule Take 1 capsule (75 mg total) by mouth 2 (two) times daily. 60 capsule 5    tiZANidine (ZANAFLEX) 4 MG tablet Take 4 tablets (16 mg total) by mouth nightly as needed. Start with 1 tab initially. May increase by 1 tab every 3-5 days 120 tablet 3    [DISCONTINUED] albuterol (VENTOLIN HFA) 90 mcg/actuation inhaler Ventolin HFA 90 mcg/actuation aerosol inhaler   INHALE 2 PUFFS BY MOUTH EVERY 4 HOURS AS NEEDED FOR COUGHING, WHEEZING, OR SHORTNESS OF BREATH      [DISCONTINUED] ALPRAZolam (XANAX) 1 MG tablet Take 1 tablet (1 mg total) by mouth nightly as needed for Anxiety. (Patient not taking: Reported on 9/24/2019) 30 tablet 0    [DISCONTINUED] eszopiclone (LUNESTA) 3 mg Tab Take 1 tablet (3 mg total) by mouth every evening. 30 tablet 3    [DISCONTINUED] lubiprostone (AMITIZA)  "24 MCG Cap Take 1 capsule (24 mcg total) by mouth once daily. 30 capsule 5    [DISCONTINUED] meloxicam (MOBIC) 7.5 MG tablet Take 1 tablet (7.5 mg total) by mouth 2 (two) times daily as needed for Pain. 60 tablet 0    [DISCONTINUED] nefazodone (SERZONE) 100 MG Tab Take 1 tablet (100 mg total) by mouth 2 (two) times daily. 180 tablet 1    [DISCONTINUED] tiZANidine (ZANAFLEX) 4 MG tablet Take 4 tablets (16 mg total) by mouth nightly as needed. Start with 1 tab initially. May increase by 1 tab every 3-5 days 120 tablet 3     No facility-administered encounter medications on file as of 12/9/2019.         Review of patient's allergies indicates:   Allergen Reactions    Bactrim [sulfamethoxazole-trimethoprim] Swelling    Adhesive Rash     tapes       Physical Exam      Vital Signs  Temp: 98.3 °F (36.8 °C)  Temp src: Oral  Pulse: 86  Resp: 16  SpO2: 100 %  BP: 124/78  Pain Score:   3  Pain Loc: Neck  Height and Weight  Height: 5' 9" (175.3 cm)  Weight: 136 kg (299 lb 13.2 oz)  BSA (Calculated - sq m): 2.57 sq meters  BMI (Calculated): 44.3  Weight in (lb) to have BMI = 25: 168.9]    Physical Exam   Constitutional: She is oriented to person, place, and time. She appears well-developed and well-nourished.   HENT:   Head: Normocephalic and atraumatic.   Right Ear: External ear normal.   Left Ear: External ear normal.   Eyes: Right eye exhibits no discharge. Left eye exhibits no discharge.   Neck: Normal range of motion. No thyromegaly present.   Cardiovascular: Normal rate, regular rhythm, normal heart sounds and intact distal pulses.   No murmur heard.  Pulmonary/Chest: Effort normal and breath sounds normal. No respiratory distress.   Abdominal: Soft. Bowel sounds are normal. She exhibits no distension. There is no tenderness.   Musculoskeletal: Normal range of motion. She exhibits no deformity.   Neurological: She is alert and oriented to person, place, and time.   Skin: Skin is warm and dry. No rash noted. "   Psychiatric: She has a normal mood and affect. Her behavior is normal.        Laboratory:  CBC:  No results for input(s): WBC, RBC, HGB, HCT, PLT, MCV, MCH, MCHC in the last 2160 hours.  CMP:  No results for input(s): GLU, CALCIUM, ALBUMIN, PROT, NA, K, CO2, CL, BUN, ALKPHOS, ALT, AST, BILITOT in the last 2160 hours.    Invalid input(s): CREATININ  URINALYSIS:  No results for input(s): COLORU, CLARITYU, SPECGRAV, PHUR, PROTEINUA, GLUCOSEU, BILIRUBINCON, BLOODU, WBCU, RBCU, BACTERIA, MUCUS, NITRITE, LEUKOCYTESUR, UROBILINOGEN, HYALINECASTS in the last 2160 hours.   LIPIDS:  No results for input(s): TSH, HDL, CHOL, TRIG, LDLCALC, CHOLHDL, NONHDLCHOL, TOTALCHOLEST in the last 2160 hours.  TSH:  No results for input(s): TSH in the last 2160 hours.  A1C:  No results for input(s): HGBA1C in the last 2160 hours.    Radiology:  No pertinent imaging    Assessment/Plan     Bandar Douglass is a 49 y.o.female with:    1. Constipation, unspecified constipation type  - linaCLOtide (LINZESS) 72 mcg Cap capsule; Take 1 capsule (72 mcg total) by mouth once daily.  Dispense: 90 capsule; Refill: 1    2. Acquired hypothyroidism    3. Type 2 diabetes mellitus without complication, without long-term current use of insulin    4. Cervical radiculopathy    5. Essential hypertension    6. Other insomnia    -Labs this week in [place] (A1C, TFT's)  -Will try Linzess for constipation, stop Amitiza and laxatives.  OK to continue stool softener and probiotics.  Consider GI referral if not improving  -Stop Lunesta  -get eye exam results from [facility] on [address]  -Continue current medications and maintain follow up with specialists.  Return to clinic in 3 months.    Regina Nava MD  [facility] [place]          Answers for HPI/ROS submitted by the patient on 12/8/2019   Back pain  Chronicity: chronic  Onset: more than 1 year ago  Frequency: daily  Progression since onset: gradually worsening  Pain location: thoracic  spine  Pain quality: shooting, stabbing  Pain is: the same all the time  Stiffness is present: all day  bladder incontinence: No  bowel incontinence: No  leg pain: No  numbness: Yes  paresis: No  paresthesias: Yes  pelvic pain: No  perianal numbness: No  genital pain: No  Pain severity: severe  Treatments tried: analgesics, acupuncture, heat, ice, muscle relaxant  Improvement on treatment: mild

## 2019-12-09 NOTE — ASSESSMENT & PLAN NOTE
Not improving on Amitiza, psyllium, probiotics, stool softener.  Has BM every 3 days. Drinks a lot of water.

## 2019-12-09 NOTE — ASSESSMENT & PLAN NOTE
Started on lyrica 3 days ago.  Pain is same so far, planning to see Dr. Trujillo in 2/2020.  No improvement with PT after seeing pain management, numbness/tingling started after. Also had dry needling.

## 2019-12-09 NOTE — ASSESSMENT & PLAN NOTE
Feeling better on synthroid 225 mcg daily, due for repeat TSH.  Has gained 70 pounds since last year.

## 2019-12-10 ENCOUNTER — PATIENT MESSAGE (OUTPATIENT)
Dept: FAMILY MEDICINE | Facility: CLINIC | Age: 49
End: 2019-12-10

## 2019-12-10 DIAGNOSIS — K59.00 CONSTIPATION, UNSPECIFIED CONSTIPATION TYPE: ICD-10-CM

## 2019-12-12 ENCOUNTER — TELEPHONE (OUTPATIENT)
Dept: FAMILY MEDICINE | Facility: CLINIC | Age: 49
End: 2019-12-12

## 2019-12-12 ENCOUNTER — TELEPHONE (OUTPATIENT)
Dept: PHARMACY | Facility: CLINIC | Age: 49
End: 2019-12-12

## 2019-12-12 DIAGNOSIS — N95.1 MENOPAUSAL SYMPTOMS: Primary | ICD-10-CM

## 2019-12-13 DIAGNOSIS — R73.03 PREDIABETES: ICD-10-CM

## 2019-12-13 RX ORDER — GLIMEPIRIDE 4 MG/1
4 TABLET ORAL
Qty: 90 TABLET | Refills: 3 | Status: SHIPPED | OUTPATIENT
Start: 2019-12-13 | End: 2020-10-27

## 2019-12-17 ENCOUNTER — TELEPHONE (OUTPATIENT)
Dept: FAMILY MEDICINE | Facility: CLINIC | Age: 49
End: 2019-12-17

## 2019-12-17 NOTE — TELEPHONE ENCOUNTER
----- Message from Marquita Tony sent at 12/17/2019  8:56 AM CST -----  Contact: Self [phone]  Patient is returning your call. Patient states she received a call from this office. Please advise.

## 2019-12-18 ENCOUNTER — TELEPHONE (OUTPATIENT)
Dept: FAMILY MEDICINE | Facility: CLINIC | Age: 49
End: 2019-12-18

## 2019-12-18 NOTE — TELEPHONE ENCOUNTER
----- Message from Elena Fuentes sent at 12/18/2019  6:15 AM CST -----  Contact: [employer] request  Message     Appointment Request From: Bandar Douglass    With Provider:     Reason for visit:     Comments:  Concepción.     Can you transfer my Linzess and Lyrica to [facility] now? I had both filled at [facility] but would prefer next month comes from [facility]. They do take about 10 days to process/mail prescriptions so if you could refill with them now I'd appreciate it.     Thank you.     ----- Message -----  From: Med Assistant Beebe  Sent: 12/12/19, 11:59 AM  To: Bandar Douglass  Subject: Linzess    Vik Portillo, I just wanted to let you know that your Linzess has been approved and the copay is $24. You will be able to  at the [facility].   Thank you Concepción SHIPLEY

## 2019-12-30 DIAGNOSIS — K59.00 CONSTIPATION, UNSPECIFIED CONSTIPATION TYPE: ICD-10-CM

## 2019-12-30 RX ORDER — PREGABALIN 75 MG/1
75 CAPSULE ORAL 2 TIMES DAILY
Qty: 180 CAPSULE | Refills: 1 | Status: SHIPPED | OUTPATIENT
Start: 2019-12-30 | End: 2022-10-17

## 2019-12-30 NOTE — TELEPHONE ENCOUNTER
----- Message from Sheila Costa sent at 12/30/2019  3:08 PM CST -----  Contact: Self - Per Pt Portal  Type:  RX Refill Request    Who Called: Patient (via Pt Portal)    RX Name and Strength: pregabalin (LYRICA) 75 MG capsule    How is the patient currently taking it? (ex. 1XDay): BID    Is this a 30 day or 90 day RX: 30 Day    Preferred Pharmacy with phone number: [facility]: [phone]    Local or Mail Order:Mail Order    Ordering Provider: Regina Nava MD    Would the patient rather a call back or a response via [facility]? phone    Best Call Back Number: [phone]    Additional Information: Pt also needs the following filled:    RX Name and Strength: linaCLOtide (LINZESS) 72 mcg Cap capsule    How is the patient currently taking it? (ex. 1XDay): QD    Is this a 30 day or 90 day RX: 90 Day    Preferred Pharmacy with phone number: [facility]: [phone]    Local or Mail Order:Mail Order    Ordering Provider: Regina Nava MD

## 2020-01-05 ENCOUNTER — PATIENT MESSAGE (OUTPATIENT)
Dept: FAMILY MEDICINE | Facility: CLINIC | Age: 50
End: 2020-01-05

## 2020-01-06 ENCOUNTER — PATIENT MESSAGE (OUTPATIENT)
Dept: ADMINISTRATIVE | Facility: HOSPITAL | Age: 50
End: 2020-01-06

## 2020-01-08 ENCOUNTER — PATIENT OUTREACH (OUTPATIENT)
Dept: ADMINISTRATIVE | Facility: HOSPITAL | Age: 50
End: 2020-01-08

## 2020-02-07 ENCOUNTER — TELEPHONE (OUTPATIENT)
Dept: FAMILY MEDICINE | Facility: CLINIC | Age: 50
End: 2020-02-07

## 2020-02-07 NOTE — TELEPHONE ENCOUNTER
----- Message from Annie Lal sent at 2/7/2020  3:01 PM CST -----  Contact: self  Pt called to speak with nurse in office about referral.          Pt callback number [phone]

## 2020-02-10 ENCOUNTER — PATIENT MESSAGE (OUTPATIENT)
Dept: FAMILY MEDICINE | Facility: CLINIC | Age: 50
End: 2020-02-10

## 2020-02-10 DIAGNOSIS — M54.2 NECK PAIN: Primary | ICD-10-CM

## 2020-02-10 DIAGNOSIS — R29.898 DECREASED RANGE OF MOTION OF NECK: ICD-10-CM

## 2020-02-10 DIAGNOSIS — M54.12 CERVICAL RADICULOPATHY: ICD-10-CM

## 2020-03-03 ENCOUNTER — TELEPHONE (OUTPATIENT)
Dept: ADMINISTRATIVE | Facility: HOSPITAL | Age: 50
End: 2020-03-03

## 2020-03-13 ENCOUNTER — PATIENT MESSAGE (OUTPATIENT)
Dept: FAMILY MEDICINE | Facility: CLINIC | Age: 50
End: 2020-03-13

## 2020-03-13 DIAGNOSIS — K59.00 CONSTIPATION, UNSPECIFIED CONSTIPATION TYPE: ICD-10-CM

## 2020-03-16 NOTE — TELEPHONE ENCOUNTER
Tried to call patient (see mychart message), line was busy.    Would like to swap her to video visit and she needs to do A1C before appt.

## 2020-03-18 ENCOUNTER — PATIENT MESSAGE (OUTPATIENT)
Dept: FAMILY MEDICINE | Facility: CLINIC | Age: 50
End: 2020-03-18

## 2020-03-23 ENCOUNTER — PATIENT MESSAGE (OUTPATIENT)
Dept: FAMILY MEDICINE | Facility: CLINIC | Age: 50
End: 2020-03-23

## 2020-03-24 RX ORDER — OXYCODONE AND ACETAMINOPHEN 5; 325 MG/1; MG/1
1 TABLET ORAL EVERY 6 HOURS PRN
Qty: 28 TABLET | Refills: 0 | Status: SHIPPED | OUTPATIENT
Start: 2020-03-24 | End: 2022-10-17

## 2020-03-25 ENCOUNTER — PATIENT MESSAGE (OUTPATIENT)
Dept: FAMILY MEDICINE | Facility: CLINIC | Age: 50
End: 2020-03-25

## 2020-04-01 ENCOUNTER — PATIENT MESSAGE (OUTPATIENT)
Dept: FAMILY MEDICINE | Facility: CLINIC | Age: 50
End: 2020-04-01

## 2020-04-02 ENCOUNTER — PATIENT MESSAGE (OUTPATIENT)
Dept: FAMILY MEDICINE | Facility: CLINIC | Age: 50
End: 2020-04-02

## 2020-04-02 ENCOUNTER — TELEPHONE (OUTPATIENT)
Dept: PHARMACY | Facility: CLINIC | Age: 50
End: 2020-04-02

## 2020-04-02 DIAGNOSIS — E11.65 TYPE 2 DIABETES MELLITUS WITH HYPERGLYCEMIA, WITHOUT LONG-TERM CURRENT USE OF INSULIN: Primary | ICD-10-CM

## 2020-04-06 DIAGNOSIS — F32.A ANXIETY AND DEPRESSION: ICD-10-CM

## 2020-04-06 DIAGNOSIS — F41.9 ANXIETY AND DEPRESSION: ICD-10-CM

## 2020-04-06 RX ORDER — ALPRAZOLAM 1 MG/1
1 TABLET ORAL NIGHTLY PRN
Qty: 90 TABLET | Refills: 1 | OUTPATIENT
Start: 2020-04-06

## 2020-04-09 ENCOUNTER — PATIENT OUTREACH (OUTPATIENT)
Dept: ADMINISTRATIVE | Facility: HOSPITAL | Age: 50
End: 2020-04-09

## 2020-04-09 ENCOUNTER — TELEPHONE (OUTPATIENT)
Dept: ADMINISTRATIVE | Facility: HOSPITAL | Age: 50
End: 2020-04-09

## 2020-04-13 ENCOUNTER — TELEPHONE (OUTPATIENT)
Dept: FAMILY MEDICINE | Facility: CLINIC | Age: 50
End: 2020-04-13

## 2020-04-13 DIAGNOSIS — F32.A ANXIETY AND DEPRESSION: ICD-10-CM

## 2020-04-13 DIAGNOSIS — F41.9 ANXIETY AND DEPRESSION: ICD-10-CM

## 2020-04-13 RX ORDER — ALPRAZOLAM 1 MG/1
1 TABLET ORAL NIGHTLY PRN
Qty: 90 TABLET | Refills: 1 | Status: CANCELLED | OUTPATIENT
Start: 2020-04-13

## 2020-04-14 ENCOUNTER — PATIENT MESSAGE (OUTPATIENT)
Dept: FAMILY MEDICINE | Facility: CLINIC | Age: 50
End: 2020-04-14

## 2020-04-14 DIAGNOSIS — F32.A ANXIETY AND DEPRESSION: ICD-10-CM

## 2020-04-14 DIAGNOSIS — F41.9 ANXIETY AND DEPRESSION: ICD-10-CM

## 2020-04-14 RX ORDER — ALPRAZOLAM 1 MG/1
1 TABLET ORAL NIGHTLY PRN
Qty: 90 TABLET | Refills: 1 | Status: SHIPPED | OUTPATIENT
Start: 2020-04-14 | End: 2020-09-10 | Stop reason: SDUPTHER

## 2020-04-21 ENCOUNTER — PATIENT MESSAGE (OUTPATIENT)
Dept: FAMILY MEDICINE | Facility: CLINIC | Age: 50
End: 2020-04-21

## 2020-04-21 DIAGNOSIS — E11.65 TYPE 2 DIABETES MELLITUS WITH HYPERGLYCEMIA, WITHOUT LONG-TERM CURRENT USE OF INSULIN: ICD-10-CM

## 2020-04-22 ENCOUNTER — PATIENT MESSAGE (OUTPATIENT)
Dept: FAMILY MEDICINE | Facility: CLINIC | Age: 50
End: 2020-04-22

## 2020-04-22 DIAGNOSIS — E11.9 TYPE 2 DIABETES MELLITUS WITHOUT COMPLICATION, WITHOUT LONG-TERM CURRENT USE OF INSULIN: Primary | ICD-10-CM

## 2020-04-23 ENCOUNTER — PATIENT MESSAGE (OUTPATIENT)
Dept: ADMINISTRATIVE | Facility: HOSPITAL | Age: 50
End: 2020-04-23

## 2020-04-23 ENCOUNTER — TELEPHONE (OUTPATIENT)
Dept: ADMINISTRATIVE | Facility: HOSPITAL | Age: 50
End: 2020-04-23

## 2020-04-23 ENCOUNTER — PATIENT OUTREACH (OUTPATIENT)
Dept: ADMINISTRATIVE | Facility: HOSPITAL | Age: 50
End: 2020-04-23

## 2020-05-05 ENCOUNTER — PATIENT MESSAGE (OUTPATIENT)
Dept: FAMILY MEDICINE | Facility: CLINIC | Age: 50
End: 2020-05-05

## 2020-05-05 RX ORDER — NYSTATIN AND TRIAMCINOLONE ACETONIDE 100000; 1 [USP'U]/G; MG/G
OINTMENT TOPICAL 2 TIMES DAILY
Qty: 15 G | Refills: 0 | Status: SHIPPED | OUTPATIENT
Start: 2020-05-05 | End: 2020-05-06 | Stop reason: SDUPTHER

## 2020-05-06 RX ORDER — NYSTATIN AND TRIAMCINOLONE ACETONIDE 100000; 1 [USP'U]/G; MG/G
OINTMENT TOPICAL 2 TIMES DAILY
Qty: 15 G | Refills: 0 | Status: SHIPPED | OUTPATIENT
Start: 2020-05-06 | End: 2022-10-17

## 2020-05-12 ENCOUNTER — PATIENT MESSAGE (OUTPATIENT)
Dept: FAMILY MEDICINE | Facility: CLINIC | Age: 50
End: 2020-05-12

## 2020-06-19 DIAGNOSIS — E11.9 TYPE 2 DIABETES MELLITUS WITHOUT COMPLICATION: ICD-10-CM

## 2020-06-22 ENCOUNTER — TELEPHONE (OUTPATIENT)
Dept: ADMINISTRATIVE | Facility: HOSPITAL | Age: 50
End: 2020-06-22

## 2020-06-22 ENCOUNTER — PATIENT MESSAGE (OUTPATIENT)
Dept: FAMILY MEDICINE | Facility: CLINIC | Age: 50
End: 2020-06-22

## 2020-06-22 DIAGNOSIS — K59.00 CONSTIPATION, UNSPECIFIED CONSTIPATION TYPE: ICD-10-CM

## 2020-06-22 NOTE — TELEPHONE ENCOUNTER
----- Message from Regina Nava MD sent at 6/19/2020  9:40 AM CDT -----  Please call to schedule A1C and lipids

## 2020-06-22 NOTE — TELEPHONE ENCOUNTER
Please let patient know Dr REDD will be out until 7/1 and can address this matter when she returns

## 2020-08-06 ENCOUNTER — OFFICE VISIT (OUTPATIENT)
Dept: FAMILY MEDICINE | Facility: CLINIC | Age: 50
End: 2020-08-06
Payer: OTHER GOVERNMENT

## 2020-08-06 VITALS
BODY MASS INDEX: 43.4 KG/M2 | RESPIRATION RATE: 16 BRPM | TEMPERATURE: 98 F | HEIGHT: 69 IN | HEART RATE: 88 BPM | SYSTOLIC BLOOD PRESSURE: 138 MMHG | OXYGEN SATURATION: 96 % | WEIGHT: 293 LBS | DIASTOLIC BLOOD PRESSURE: 86 MMHG

## 2020-08-06 DIAGNOSIS — N95.1 MENOPAUSAL SYMPTOMS: ICD-10-CM

## 2020-08-06 DIAGNOSIS — K59.09 CHRONIC CONSTIPATION: ICD-10-CM

## 2020-08-06 DIAGNOSIS — F41.9 ANXIETY AND DEPRESSION: ICD-10-CM

## 2020-08-06 DIAGNOSIS — Z11.59 SCREENING FOR VIRAL DISEASE: ICD-10-CM

## 2020-08-06 DIAGNOSIS — M54.2 NECK PAIN: ICD-10-CM

## 2020-08-06 DIAGNOSIS — E03.9 ACQUIRED HYPOTHYROIDISM: Primary | ICD-10-CM

## 2020-08-06 DIAGNOSIS — G47.09 OTHER INSOMNIA: ICD-10-CM

## 2020-08-06 DIAGNOSIS — I15.2 HYPERTENSION ASSOCIATED WITH DIABETES: ICD-10-CM

## 2020-08-06 DIAGNOSIS — G56.22 LESION OF LEFT ULNAR NERVE: ICD-10-CM

## 2020-08-06 DIAGNOSIS — F32.A ANXIETY AND DEPRESSION: ICD-10-CM

## 2020-08-06 DIAGNOSIS — Z11.4 SCREENING FOR HIV (HUMAN IMMUNODEFICIENCY VIRUS): ICD-10-CM

## 2020-08-06 DIAGNOSIS — E11.9 TYPE 2 DIABETES MELLITUS WITHOUT COMPLICATION, WITHOUT LONG-TERM CURRENT USE OF INSULIN: ICD-10-CM

## 2020-08-06 DIAGNOSIS — Z12.39 SCREENING BREAST EXAMINATION: ICD-10-CM

## 2020-08-06 DIAGNOSIS — E11.59 HYPERTENSION ASSOCIATED WITH DIABETES: ICD-10-CM

## 2020-08-06 DIAGNOSIS — E55.9 VITAMIN D DEFICIENCY: ICD-10-CM

## 2020-08-06 PROBLEM — E66.813 CLASS 3 SEVERE OBESITY DUE TO EXCESS CALORIES WITH SERIOUS COMORBIDITY AND BODY MASS INDEX (BMI) OF 40.0 TO 44.9 IN ADULT: Status: ACTIVE | Noted: 2019-05-18

## 2020-08-06 PROBLEM — E66.01 CLASS 3 SEVERE OBESITY DUE TO EXCESS CALORIES WITH SERIOUS COMORBIDITY AND BODY MASS INDEX (BMI) OF 40.0 TO 44.9 IN ADULT: Status: ACTIVE | Noted: 2019-05-18

## 2020-08-06 PROCEDURE — 99999 PR PBB SHADOW E&M-EST. PATIENT-LVL IV: CPT | Mod: PBBFAC,,, | Performed by: INTERNAL MEDICINE

## 2020-08-06 PROCEDURE — 99214 OFFICE O/P EST MOD 30 MIN: CPT | Mod: S$PBB,,, | Performed by: INTERNAL MEDICINE

## 2020-08-06 PROCEDURE — 99214 OFFICE O/P EST MOD 30 MIN: CPT | Mod: PBBFAC,PN | Performed by: INTERNAL MEDICINE

## 2020-08-06 PROCEDURE — 99999 PR PBB SHADOW E&M-EST. PATIENT-LVL IV: ICD-10-PCS | Mod: PBBFAC,,, | Performed by: INTERNAL MEDICINE

## 2020-08-06 PROCEDURE — 99214 PR OFFICE/OUTPT VISIT, EST, LEVL IV, 30-39 MIN: ICD-10-PCS | Mod: S$PBB,,, | Performed by: INTERNAL MEDICINE

## 2020-08-06 RX ORDER — VORTIOXETINE 10 MG/1
10 TABLET, FILM COATED ORAL DAILY
Qty: 90 TABLET | Refills: 3 | Status: SHIPPED | OUTPATIENT
Start: 2020-08-06 | End: 2020-08-24 | Stop reason: SDUPTHER

## 2020-08-06 RX ORDER — AMITRIPTYLINE HYDROCHLORIDE 10 MG/1
10 TABLET, FILM COATED ORAL NIGHTLY PRN
Qty: 90 TABLET | Refills: 3 | Status: SHIPPED | OUTPATIENT
Start: 2020-08-06 | End: 2020-10-12 | Stop reason: SDUPTHER

## 2020-08-06 RX ORDER — FLAXSEED OIL 1000 MG
1 CAPSULE ORAL DAILY
COMMUNITY
End: 2023-02-15

## 2020-08-06 NOTE — ASSESSMENT & PLAN NOTE
No success with Ambien (nightmares).  Belsomra, Mirtazipine, Trazodone did not work.  Took a Lunesta in past which worked decently well for her but had nightmares.

## 2020-08-06 NOTE — ASSESSMENT & PLAN NOTE
Dr. Trujillo planning for next neck surgery, pandemic has delayed it.  Weaning off of Lyrica because if wasn't helping with pain.

## 2020-08-06 NOTE — PROGRESS NOTES
[facility] Note    Chief Complaint      Chief Complaint   Patient presents with    Follow-up     History of Present Illness      Bandar Douglass is a 49 y.o. female who presents today for DM2 follow up.  Patient comes to appointment alone.     Problem List Items Addressed This Visit     Neck pain    Current Assessment & Plan     Dr. Trujillo planning for next neck surgery, pandemic has delayed it.  Weaning off of Lyrica because if wasn't helping with pain.         Hypothyroidism - Primary    Current Assessment & Plan     Stable on synthroid 225 mcg daily, due for repeat TSH.  Cannot lose weight.         Relevant Orders    TSH    T4, free    Hypertension associated with diabetes    Current Assessment & Plan     Was on amlodipine in past, lost a bunch of weight.  Watches sodium in diet.         Type 2 diabetes mellitus without complication, without long-term current use of insulin    Current Assessment & Plan     A1C7.5 in 12/2019, stable on Rybelsus 14 mg, glimepiride 4 mg. no issues with hypoglycemia.  No issues with feet, UTD on eye exam.  Hasn't been eating a lot, has protein shake for breakfast.  Not exercising.  Eating a lot of turkey and eggs.         Relevant Orders    Hemoglobin A1C    Comprehensive metabolic panel    CBC auto differential    Lipid Panel    Anxiety and depression    Current Assessment & Plan     Stable on xanax PRN. Stopped serzone on her own, didn't feel like it was working.  Had been on for a long time.  Wants to try Trintellix.  Has been on cymbalta, wellbutrin, Viibryd, Celexa, all of these didn't work. No Panic attacks, SI/HI.         Relevant Medications    vortioxetine (TRINTELLIX) 10 mg Tab    Chronic constipation    Current Assessment & Plan     Now on Linzess, psyllium, stool softener, ex lax.  Went 12 times yesterday after taking laxative. Has BM every 3-4 days. Miralax did not work in past. Drinks a lot of water.         Relevant Medications    lactulose  (CEPHULAC) 20 gram Pack    Other insomnia    Current Assessment & Plan     No success with Ambien (nightmares).  Belsomra, Mirtazipine, Trazodone did not work.  Took a Lunesta in past which worked decently well for her but had nightmares.         Relevant Medications    amitriptyline (ELAVIL) 10 MG tablet    Lesion of left ulnar nerve    Current Assessment & Plan     Repaired by [facility] physician, doing much better since repair.           Other Visit Diagnoses     Screening for viral disease        Relevant Orders    Hepatitis C Antibody    Screening for HIV (human immunodeficiency virus)        Relevant Orders    HIV 1/2 Ag/Ab (4th Gen)    Screening breast examination        Relevant Orders    Mammo Digital Screening Bilat w/ Ralph    Vitamin D deficiency        Relevant Orders    Vitamin D    Menopausal symptoms        Relevant Orders    Estradiol    PROGESTERONE    Follicle stimulating hormone    Luteinizing hormone          Health Maintenance   Topic Date Due    Hepatitis C Screening  1970    Eye Exam  1979    Mammogram  2020    Lipid Panel  2020    Hemoglobin A1c  2020    Urine Microalbumin  2020    Foot Exam  2021    TETANUS VACCINE  10/01/2028    Pneumococcal Vaccine (Medium Risk)  Completed       Past Medical History:   Diagnosis Date    Breast lump 2019    Diabetes mellitus     Hypertension     Thyroid disease        Past Surgical History:   Procedure Laterality Date    CERVICAL FUSION  2017    C5-7 Anterior Fusion     SECTION      CHOLECYSTECTOMY      DECOMPRESSION OF ULNAR NERVE Left 2020    HERNIA REPAIR      HYSTERECTOMY      LUMBAR DISCECTOMY      SPINE SURGERY      TONSILLECTOMY         family history is not on file.    Social History     Tobacco Use    Smoking status: Never Smoker    Smokeless tobacco: Never Used   Substance Use Topics    Alcohol use: Yes     Frequency: 2-4 times a month     Drinks per session: 3 or  4     Binge frequency: Never     Comment: socially    Drug use: No       Review of Systems   Constitutional: Negative for chills and fever.   HENT: Negative for congestion and sore throat.    Eyes: Negative for blurred vision and discharge.   Respiratory: Negative for cough and shortness of breath.    Cardiovascular: Negative for chest pain and palpitations.   Gastrointestinal: Positive for constipation. Negative for diarrhea, nausea and vomiting.   Genitourinary: Negative for dysuria and hematuria.   Musculoskeletal: Negative for falls and myalgias.   Skin: Negative for itching and rash.   Neurological: Negative for dizziness and headaches.        Outpatient Encounter Medications as of 8/6/2020   Medication Sig Dispense Refill    albuterol (VENTOLIN HFA) 90 mcg/actuation inhaler Inhale 1 puff into the lungs every 4 (four) hours as needed for Wheezing. Rescue 1 Inhaler 5    ALPRAZolam (XANAX) 1 MG tablet Take 1 tablet (1 mg total) by mouth nightly as needed for Anxiety. 90 tablet 1    ascorbic acid, vitamin C, (VITAMIN C) 1000 MG tablet Take by mouth.      cyanocobalamin (VITAMIN B-12) 1000 MCG tablet Take 1,000 mcg by mouth.      ergocalciferol, vitamin D2, (VITAMIN D ORAL) Take 1,000 mg by mouth.      flaxseed oil 1,000 mg Cap Take 1 capsule by mouth once daily.      glimepiride (AMARYL) 4 MG tablet Take 1 tablet (4 mg total) by mouth before breakfast. 90 tablet 3    gluc sifuentes/chondro sifuentes A/vit C/Mn (GLUCOSAMINE 1500 COMPLEX ORAL) Take 1,500 mg by mouth.      levothyroxine (SYNTHROID) 25 MCG tablet TAKE 1 TABLET DAILY 90 tablet 3    LEVOXYL 200 mcg tablet TAKE 1 TABLET BEFORE BREAKFAST 90 tablet 3    linaCLOtide (LINZESS) 290 mcg Cap capsule Take 1 capsule (290 mcg total) by mouth once daily. 90 capsule 3    multivitamin with iron Tab Take by mouth.      nystatin-triamcinolone (MYCOLOG) ointment Apply topically 2 (two) times daily. 15 g 0    oxyCODONE-acetaminophen (PERCOCET) 5-325 mg per tablet Take 1  "tablet by mouth every 6 (six) hours as needed for Pain. 28 tablet 0    pravastatin (PRAVACHOL) 20 MG tablet Take 1 tablet (20 mg total) by mouth once daily. 90 tablet 3    semaglutide (RYBELSUS) 14 mg tablet Take 1 tablet (14 mg total) by mouth once daily. 90 tablet 3    traMADoL (ULTRAM) 50 mg tablet Take 1 tablet by mouth every 6 (six) hours as needed for Pain for up to 14 days 60 tablet 3    amitriptyline (ELAVIL) 10 MG tablet Take 1 tablet (10 mg total) by mouth nightly as needed for Insomnia. 90 tablet 3    lactulose (CEPHULAC) 20 gram Pack Take 1 packet (20 g total) by mouth 3 (three) times daily. 30 packet 11    omega-3 fatty acids (FISH OIL CONCENTRATE) 1,000 mg Cap Take by mouth.      pregabalin (LYRICA) 75 MG capsule Take 1 capsule (75 mg total) by mouth 2 (two) times daily. 180 capsule 1    vortioxetine (TRINTELLIX) 10 mg Tab Take 1 tablet (10 mg total) by mouth once daily. 90 tablet 3    [DISCONTINUED] levothyroxine (SYNTHROID) 200 MCG tablet Take 1 tablet (200 mcg total) by mouth before breakfast. 90 tablet 3    [DISCONTINUED] nefazodone (SERZONE) 100 MG Tab Take 1 tablet (100 mg total) by mouth 2 (two) times daily. (Patient not taking: Reported on 8/6/2020) 180 tablet 1     No facility-administered encounter medications on file as of 8/6/2020.         Review of patient's allergies indicates:   Allergen Reactions    Bactrim [sulfamethoxazole-trimethoprim] Swelling    Adhesive Rash     tapes       Physical Exam      Vital Signs  Temp: 98.2 °F (36.8 °C)  Temp src: Oral  Pulse: 88  Resp: 16  SpO2: 96 %  BP: (!) 140/88  BP Location: Left arm  Patient Position: Sitting  Height and Weight  Height: 5' 9" (175.3 cm)  Weight: (!) 136.7 kg (301 lb 5.9 oz)  BSA (Calculated - sq m): 2.58 sq meters  BMI (Calculated): 44.5  Weight in (lb) to have BMI = 25: 168.9]    Physical Exam  Constitutional:       Appearance: She is well-developed.   HENT:      Head: Normocephalic and atraumatic.      Right Ear: " External ear normal.      Left Ear: External ear normal.   Eyes:      General:         Right eye: No discharge.         Left eye: No discharge.   Neck:      Musculoskeletal: Normal range of motion.      Thyroid: No thyromegaly.   Cardiovascular:      Rate and Rhythm: Normal rate and regular rhythm.      Heart sounds: Normal heart sounds. No murmur.   Pulmonary:      Effort: Pulmonary effort is normal. No respiratory distress.      Breath sounds: Normal breath sounds.   Abdominal:      General: Bowel sounds are normal. There is no distension.      Palpations: Abdomen is soft.      Tenderness: There is no abdominal tenderness.   Musculoskeletal: Normal range of motion.         General: No deformity.   Skin:     General: Skin is warm and dry.      Findings: No rash.   Neurological:      Mental Status: She is alert and oriented to person, place, and time.   Psychiatric:         Behavior: Behavior normal.          Laboratory:  CBC:  No results for input(s): WBC, RBC, HGB, HCT, PLT, MCV, MCH, MCHC in the last 2160 hours.  CMP:  No results for input(s): GLU, CALCIUM, ALBUMIN, PROT, NA, K, CO2, CL, BUN, ALKPHOS, ALT, AST, BILITOT in the last 2160 hours.    Invalid input(s): CREATININ  URINALYSIS:  No results for input(s): COLORU, CLARITYU, SPECGRAV, PHUR, PROTEINUA, GLUCOSEU, BILIRUBINCON, BLOODU, WBCU, RBCU, BACTERIA, MUCUS, NITRITE, LEUKOCYTESUR, UROBILINOGEN, HYALINECASTS in the last 2160 hours.   LIPIDS:  No results for input(s): TSH, HDL, CHOL, TRIG, LDLCALC, CHOLHDL, NONHDLCHOL, TOTALCHOLEST in the last 2160 hours.  TSH:  No results for input(s): TSH in the last 2160 hours.  A1C:  No results for input(s): HGBA1C in the last 2160 hours.    Radiology:  No pertinent imaging    Assessment/Plan     Bandar Douglass is a 49 y.o.female with:    1. Acquired hypothyroidism  - TSH; Future  - T4, free; Future    2. Type 2 diabetes mellitus without complication, without long-term current use of insulin  - Hemoglobin A1C;  Future  - Comprehensive metabolic panel; Future  - CBC auto differential; Future  - Lipid Panel; Future    3. Screening for viral disease  - Hepatitis C Antibody; Future    4. Screening for HIV (human immunodeficiency virus)  - HIV 1/2 Ag/Ab (4th Gen); Future    5. Screening breast examination  - Mammo Digital Screening Bilat w/ Ralph; Future    6. Chronic constipation  - lactulose (CEPHULAC) 20 gram Pack; Take 1 packet (20 g total) by mouth 3 (three) times daily.  Dispense: 30 packet; Refill: 11    7. Other insomnia  - amitriptyline (ELAVIL) 10 MG tablet; Take 1 tablet (10 mg total) by mouth nightly as needed for Insomnia.  Dispense: 90 tablet; Refill: 3    8. Hypertension associated with diabetes    9. Anxiety and depression  - vortioxetine (TRINTELLIX) 10 mg Tab; Take 1 tablet (10 mg total) by mouth once daily.  Dispense: 90 tablet; Refill: 3    10. Vitamin D deficiency  - Vitamin D; Future    11. Menopausal symptoms  - Estradiol; Future  - PROGESTERONE; Future  - Follicle stimulating hormone; Future  - Luteinizing hormone; Future    12. Neck pain    13. Lesion of left ulnar nerve    -For sleep, will try Elavil 10 mg at night, titrate up to 50 mg if needed  -Refer to GI (will call Dr. Culp, he did her last c-scope) for possible colonoscopy given refractory constipation, will try Lactulose.  -labs downstairs  -start Trintellix, will do 1/2 tablet for 1st week then increase to whole tablet if tolerating ok  -will watch BP at home to see if she needs to start Amlodipine again  -Continue current medications and maintain follow up with specialists.  Return to clinic via virtual visit.    Regina Nava MD  [facility] [place]

## 2020-08-06 NOTE — ASSESSMENT & PLAN NOTE
Stable on xanax PRN. Stopped serzone on her own, didn't feel like it was working.  Had been on for a long time.  Wants to try Trintellix.  Has been on cymbalta, wellbutrin, Viibryd, Celexa, all of these didn't work. No Panic attacks, SI/HI.

## 2020-08-06 NOTE — ASSESSMENT & PLAN NOTE
Now on Linzess, psyllium, stool softener, ex lax.  Went 12 times yesterday after taking laxative. Has BM every 3-4 days. Miralax did not work in past. Drinks a lot of water.

## 2020-08-10 ENCOUNTER — PATIENT MESSAGE (OUTPATIENT)
Dept: FAMILY MEDICINE | Facility: CLINIC | Age: 50
End: 2020-08-10

## 2020-08-19 ENCOUNTER — PATIENT MESSAGE (OUTPATIENT)
Dept: FAMILY MEDICINE | Facility: CLINIC | Age: 50
End: 2020-08-19

## 2020-08-19 DIAGNOSIS — M54.12 RADICULOPATHY, CERVICAL REGION: ICD-10-CM

## 2020-08-24 ENCOUNTER — PATIENT MESSAGE (OUTPATIENT)
Dept: FAMILY MEDICINE | Facility: CLINIC | Age: 50
End: 2020-08-24

## 2020-08-24 DIAGNOSIS — F32.A ANXIETY AND DEPRESSION: ICD-10-CM

## 2020-08-24 DIAGNOSIS — F41.9 ANXIETY AND DEPRESSION: ICD-10-CM

## 2020-08-24 RX ORDER — VORTIOXETINE 10 MG/1
10 TABLET, FILM COATED ORAL DAILY
Qty: 90 TABLET | Refills: 3 | Status: SHIPPED | OUTPATIENT
Start: 2020-08-24 | End: 2021-10-04 | Stop reason: SDUPTHER

## 2020-09-10 ENCOUNTER — PATIENT MESSAGE (OUTPATIENT)
Dept: FAMILY MEDICINE | Facility: CLINIC | Age: 50
End: 2020-09-10

## 2020-09-10 DIAGNOSIS — F41.9 ANXIETY AND DEPRESSION: ICD-10-CM

## 2020-09-10 DIAGNOSIS — F32.A ANXIETY AND DEPRESSION: ICD-10-CM

## 2020-09-11 RX ORDER — ALPRAZOLAM 1 MG/1
1 TABLET ORAL NIGHTLY PRN
Qty: 90 TABLET | Refills: 1 | Status: SHIPPED | OUTPATIENT
Start: 2020-09-11 | End: 2021-03-05 | Stop reason: SDUPTHER

## 2020-09-16 ENCOUNTER — PATIENT MESSAGE (OUTPATIENT)
Dept: FAMILY MEDICINE | Facility: CLINIC | Age: 50
End: 2020-09-16

## 2020-10-12 ENCOUNTER — PATIENT MESSAGE (OUTPATIENT)
Dept: FAMILY MEDICINE | Facility: CLINIC | Age: 50
End: 2020-10-12

## 2020-10-12 DIAGNOSIS — G47.09 OTHER INSOMNIA: ICD-10-CM

## 2020-10-12 RX ORDER — AMITRIPTYLINE HYDROCHLORIDE 10 MG/1
10 TABLET, FILM COATED ORAL NIGHTLY PRN
Qty: 90 TABLET | Refills: 3 | Status: SHIPPED | OUTPATIENT
Start: 2020-10-12 | End: 2021-09-09

## 2020-11-19 RX ORDER — AZITHROMYCIN 250 MG/1
TABLET, FILM COATED ORAL
Qty: 6 TABLET | Refills: 0 | Status: SHIPPED | OUTPATIENT
Start: 2020-11-19 | End: 2020-11-24

## 2020-11-19 RX ORDER — AZITHROMYCIN 250 MG/1
TABLET, FILM COATED ORAL
Qty: 6 TABLET | Refills: 0 | Status: SHIPPED | OUTPATIENT
Start: 2020-11-19 | End: 2020-11-19

## 2020-12-02 ENCOUNTER — PATIENT MESSAGE (OUTPATIENT)
Dept: FAMILY MEDICINE | Facility: CLINIC | Age: 50
End: 2020-12-02

## 2020-12-12 ENCOUNTER — PATIENT MESSAGE (OUTPATIENT)
Dept: FAMILY MEDICINE | Facility: CLINIC | Age: 50
End: 2020-12-12

## 2020-12-12 DIAGNOSIS — R10.9 ABDOMINAL PAIN, UNSPECIFIED ABDOMINAL LOCATION: Primary | ICD-10-CM

## 2020-12-15 NOTE — ASSESSMENT & PLAN NOTE
No success with Ambien (nightmares).  Belsomra, Mirtazipine, Trazodone did not work.  Tried lunesta after last visit, did not stay asleep. Only thing that works is low dose benzos.   Reviewed nursing triage note. Agree with recommendations.

## 2020-12-24 ENCOUNTER — PATIENT MESSAGE (OUTPATIENT)
Dept: FAMILY MEDICINE | Facility: CLINIC | Age: 50
End: 2020-12-24

## 2020-12-28 ENCOUNTER — PATIENT MESSAGE (OUTPATIENT)
Dept: FAMILY MEDICINE | Facility: CLINIC | Age: 50
End: 2020-12-28

## 2021-01-18 ENCOUNTER — PATIENT MESSAGE (OUTPATIENT)
Dept: FAMILY MEDICINE | Facility: CLINIC | Age: 51
End: 2021-01-18

## 2021-01-21 ENCOUNTER — PATIENT MESSAGE (OUTPATIENT)
Dept: FAMILY MEDICINE | Facility: CLINIC | Age: 51
End: 2021-01-21

## 2021-02-17 DIAGNOSIS — E11.9 TYPE 2 DIABETES MELLITUS WITHOUT COMPLICATION: ICD-10-CM

## 2021-02-24 DIAGNOSIS — E11.9 TYPE 2 DIABETES MELLITUS WITHOUT COMPLICATION: ICD-10-CM

## 2021-03-05 DIAGNOSIS — F41.9 ANXIETY AND DEPRESSION: ICD-10-CM

## 2021-03-05 DIAGNOSIS — F32.A ANXIETY AND DEPRESSION: ICD-10-CM

## 2021-03-07 RX ORDER — ALPRAZOLAM 1 MG/1
1 TABLET ORAL NIGHTLY PRN
Qty: 90 TABLET | Refills: 1 | Status: SHIPPED | OUTPATIENT
Start: 2021-03-07 | End: 2021-10-10 | Stop reason: SDUPTHER

## 2021-03-29 ENCOUNTER — PATIENT MESSAGE (OUTPATIENT)
Dept: FAMILY MEDICINE | Facility: CLINIC | Age: 51
End: 2021-03-29

## 2021-04-05 ENCOUNTER — TELEPHONE (OUTPATIENT)
Dept: ADMINISTRATIVE | Facility: HOSPITAL | Age: 51
End: 2021-04-05

## 2021-05-06 ENCOUNTER — PATIENT MESSAGE (OUTPATIENT)
Dept: RESEARCH | Facility: HOSPITAL | Age: 51
End: 2021-05-06

## 2021-07-07 ENCOUNTER — PATIENT MESSAGE (OUTPATIENT)
Dept: ADMINISTRATIVE | Facility: HOSPITAL | Age: 51
End: 2021-07-07

## 2021-07-08 ENCOUNTER — TELEPHONE (OUTPATIENT)
Dept: ADMINISTRATIVE | Facility: HOSPITAL | Age: 51
End: 2021-07-08

## 2021-07-08 ENCOUNTER — PATIENT OUTREACH (OUTPATIENT)
Dept: ADMINISTRATIVE | Facility: HOSPITAL | Age: 51
End: 2021-07-08

## 2021-08-04 ENCOUNTER — PATIENT MESSAGE (OUTPATIENT)
Dept: ADMINISTRATIVE | Facility: HOSPITAL | Age: 51
End: 2021-08-04

## 2021-10-05 ENCOUNTER — PATIENT MESSAGE (OUTPATIENT)
Dept: ADMINISTRATIVE | Facility: HOSPITAL | Age: 51
End: 2021-10-05

## 2021-10-10 DIAGNOSIS — F32.A ANXIETY AND DEPRESSION: ICD-10-CM

## 2021-10-10 DIAGNOSIS — F41.9 ANXIETY AND DEPRESSION: ICD-10-CM

## 2021-10-11 DIAGNOSIS — F32.A ANXIETY AND DEPRESSION: ICD-10-CM

## 2021-10-11 DIAGNOSIS — F41.9 ANXIETY AND DEPRESSION: ICD-10-CM

## 2021-10-11 RX ORDER — ALPRAZOLAM 1 MG/1
1 TABLET ORAL NIGHTLY PRN
Qty: 90 TABLET | Refills: 1 | OUTPATIENT
Start: 2021-10-11

## 2021-10-11 RX ORDER — ALPRAZOLAM 1 MG/1
1 TABLET ORAL NIGHTLY PRN
Qty: 90 TABLET | Refills: 0 | Status: SHIPPED | OUTPATIENT
Start: 2021-10-11 | End: 2021-12-21 | Stop reason: SDUPTHER

## 2021-10-18 ENCOUNTER — PATIENT MESSAGE (OUTPATIENT)
Dept: ADMINISTRATIVE | Facility: HOSPITAL | Age: 51
End: 2021-10-18
Payer: OTHER GOVERNMENT

## 2021-10-22 ENCOUNTER — PATIENT MESSAGE (OUTPATIENT)
Dept: FAMILY MEDICINE | Facility: CLINIC | Age: 51
End: 2021-10-22
Payer: OTHER GOVERNMENT

## 2021-11-18 ENCOUNTER — PATIENT MESSAGE (OUTPATIENT)
Dept: FAMILY MEDICINE | Facility: CLINIC | Age: 51
End: 2021-11-18
Payer: OTHER GOVERNMENT

## 2021-12-07 ENCOUNTER — TELEPHONE (OUTPATIENT)
Dept: ADMINISTRATIVE | Facility: HOSPITAL | Age: 51
End: 2021-12-07
Payer: OTHER GOVERNMENT

## 2021-12-07 ENCOUNTER — OFFICE VISIT (OUTPATIENT)
Dept: FAMILY MEDICINE | Facility: CLINIC | Age: 51
End: 2021-12-07
Payer: OTHER GOVERNMENT

## 2021-12-07 VITALS
OXYGEN SATURATION: 93 % | SYSTOLIC BLOOD PRESSURE: 120 MMHG | WEIGHT: 293 LBS | DIASTOLIC BLOOD PRESSURE: 80 MMHG | HEART RATE: 78 BPM | HEIGHT: 69 IN | BODY MASS INDEX: 43.4 KG/M2 | TEMPERATURE: 98 F

## 2021-12-07 DIAGNOSIS — K59.09 CHRONIC CONSTIPATION: ICD-10-CM

## 2021-12-07 DIAGNOSIS — F32.A ANXIETY AND DEPRESSION: ICD-10-CM

## 2021-12-07 DIAGNOSIS — F41.9 ANXIETY AND DEPRESSION: ICD-10-CM

## 2021-12-07 DIAGNOSIS — R05.9 COUGH: ICD-10-CM

## 2021-12-07 DIAGNOSIS — E11.9 TYPE 2 DIABETES MELLITUS WITHOUT COMPLICATION, WITHOUT LONG-TERM CURRENT USE OF INSULIN: Primary | ICD-10-CM

## 2021-12-07 DIAGNOSIS — E55.9 VITAMIN D DEFICIENCY: ICD-10-CM

## 2021-12-07 DIAGNOSIS — E03.9 ACQUIRED HYPOTHYROIDISM: ICD-10-CM

## 2021-12-07 DIAGNOSIS — I15.2 HYPERTENSION ASSOCIATED WITH DIABETES: ICD-10-CM

## 2021-12-07 DIAGNOSIS — Z12.31 ENCOUNTER FOR SCREENING MAMMOGRAM FOR MALIGNANT NEOPLASM OF BREAST: ICD-10-CM

## 2021-12-07 DIAGNOSIS — E11.59 HYPERTENSION ASSOCIATED WITH DIABETES: ICD-10-CM

## 2021-12-07 DIAGNOSIS — E66.01 CLASS 3 SEVERE OBESITY DUE TO EXCESS CALORIES WITH SERIOUS COMORBIDITY AND BODY MASS INDEX (BMI) OF 40.0 TO 44.9 IN ADULT: ICD-10-CM

## 2021-12-07 PROCEDURE — 99999 PR PBB SHADOW E&M-EST. PATIENT-LVL V: CPT | Mod: PBBFAC,,, | Performed by: INTERNAL MEDICINE

## 2021-12-07 PROCEDURE — 99999 PR PBB SHADOW E&M-EST. PATIENT-LVL V: ICD-10-PCS | Mod: PBBFAC,,, | Performed by: INTERNAL MEDICINE

## 2021-12-07 PROCEDURE — 99214 PR OFFICE/OUTPT VISIT, EST, LEVL IV, 30-39 MIN: ICD-10-PCS | Mod: S$PBB,,, | Performed by: INTERNAL MEDICINE

## 2021-12-07 PROCEDURE — 99215 OFFICE O/P EST HI 40 MIN: CPT | Mod: PBBFAC,PN | Performed by: INTERNAL MEDICINE

## 2021-12-07 PROCEDURE — 99214 OFFICE O/P EST MOD 30 MIN: CPT | Mod: S$PBB,,, | Performed by: INTERNAL MEDICINE

## 2021-12-07 RX ORDER — PLECANATIDE 3 MG/1
1 TABLET ORAL EVERY OTHER DAY
COMMUNITY
Start: 2021-10-04 | End: 2022-10-17

## 2021-12-07 RX ORDER — PROMETHAZINE HYDROCHLORIDE AND DEXTROMETHORPHAN HYDROBROMIDE 6.25; 15 MG/5ML; MG/5ML
5 SYRUP ORAL EVERY 4 HOURS PRN
Qty: 118 ML | Refills: 0 | Status: SHIPPED | OUTPATIENT
Start: 2021-12-07 | End: 2021-12-17

## 2021-12-07 RX ORDER — PRAVASTATIN SODIUM 20 MG/1
20 TABLET ORAL DAILY
Qty: 90 TABLET | Refills: 3 | Status: SHIPPED | OUTPATIENT
Start: 2021-12-07 | End: 2022-04-17 | Stop reason: SDUPTHER

## 2021-12-07 RX ORDER — BENZONATATE 200 MG/1
200 CAPSULE ORAL 3 TIMES DAILY PRN
Qty: 30 CAPSULE | Refills: 0 | Status: SHIPPED | OUTPATIENT
Start: 2021-12-07 | End: 2021-12-17

## 2021-12-07 RX ORDER — FLUTICASONE PROPIONATE AND SALMETEROL 250; 50 UG/1; UG/1
1 POWDER RESPIRATORY (INHALATION) 2 TIMES DAILY
Qty: 60 EACH | Refills: 0 | Status: SHIPPED | OUTPATIENT
Start: 2021-12-07 | End: 2022-09-18 | Stop reason: SDUPTHER

## 2021-12-07 RX ORDER — PREDNISONE 20 MG/1
40 TABLET ORAL DAILY
Qty: 10 TABLET | Refills: 0 | Status: SHIPPED | OUTPATIENT
Start: 2021-12-07 | End: 2022-10-17

## 2021-12-21 ENCOUNTER — PATIENT MESSAGE (OUTPATIENT)
Dept: INTERNAL MEDICINE | Facility: CLINIC | Age: 51
End: 2021-12-21
Payer: OTHER GOVERNMENT

## 2021-12-21 ENCOUNTER — PATIENT MESSAGE (OUTPATIENT)
Dept: FAMILY MEDICINE | Facility: CLINIC | Age: 51
End: 2021-12-21
Payer: OTHER GOVERNMENT

## 2021-12-21 DIAGNOSIS — F41.9 ANXIETY AND DEPRESSION: ICD-10-CM

## 2021-12-21 DIAGNOSIS — E11.9 TYPE 2 DIABETES MELLITUS WITHOUT COMPLICATION, WITHOUT LONG-TERM CURRENT USE OF INSULIN: ICD-10-CM

## 2021-12-21 DIAGNOSIS — F32.A ANXIETY AND DEPRESSION: ICD-10-CM

## 2021-12-21 DIAGNOSIS — E11.9 TYPE 2 DIABETES MELLITUS WITHOUT COMPLICATION, WITHOUT LONG-TERM CURRENT USE OF INSULIN: Primary | ICD-10-CM

## 2021-12-21 DIAGNOSIS — R73.03 PREDIABETES: ICD-10-CM

## 2021-12-21 RX ORDER — GLIMEPIRIDE 4 MG/1
8 TABLET ORAL
Qty: 180 TABLET | Refills: 3 | Status: SHIPPED | OUTPATIENT
Start: 2021-12-21 | End: 2022-10-17

## 2021-12-21 RX ORDER — ORAL SEMAGLUTIDE 7 MG/1
7 TABLET ORAL DAILY
Qty: 90 TABLET | Refills: 3 | Status: SHIPPED | OUTPATIENT
Start: 2021-12-21 | End: 2022-02-23

## 2021-12-21 RX ORDER — ALPRAZOLAM 1 MG/1
1 TABLET ORAL NIGHTLY PRN
Qty: 90 TABLET | Refills: 0 | Status: SHIPPED | OUTPATIENT
Start: 2021-12-21 | End: 2022-03-21

## 2021-12-22 ENCOUNTER — PATIENT MESSAGE (OUTPATIENT)
Dept: FAMILY MEDICINE | Facility: CLINIC | Age: 51
End: 2021-12-22
Payer: OTHER GOVERNMENT

## 2021-12-27 ENCOUNTER — PATIENT MESSAGE (OUTPATIENT)
Dept: ADMINISTRATIVE | Facility: HOSPITAL | Age: 51
End: 2021-12-27
Payer: OTHER GOVERNMENT

## 2022-01-10 ENCOUNTER — PATIENT MESSAGE (OUTPATIENT)
Dept: ADMINISTRATIVE | Facility: HOSPITAL | Age: 52
End: 2022-01-10
Payer: OTHER GOVERNMENT

## 2022-02-06 ENCOUNTER — PATIENT MESSAGE (OUTPATIENT)
Dept: INTERNAL MEDICINE | Facility: CLINIC | Age: 52
End: 2022-02-06
Payer: OTHER GOVERNMENT

## 2022-02-07 ENCOUNTER — PATIENT MESSAGE (OUTPATIENT)
Dept: INTERNAL MEDICINE | Facility: CLINIC | Age: 52
End: 2022-02-07
Payer: OTHER GOVERNMENT

## 2022-02-07 RX ORDER — PANTOPRAZOLE SODIUM 40 MG/1
40 TABLET, DELAYED RELEASE ORAL DAILY
Qty: 30 TABLET | Refills: 11 | Status: SHIPPED | OUTPATIENT
Start: 2022-02-07 | End: 2022-02-23 | Stop reason: SDUPTHER

## 2022-02-13 ENCOUNTER — PATIENT MESSAGE (OUTPATIENT)
Dept: INTERNAL MEDICINE | Facility: CLINIC | Age: 52
End: 2022-02-13
Payer: OTHER GOVERNMENT

## 2022-02-14 ENCOUNTER — PATIENT MESSAGE (OUTPATIENT)
Dept: INTERNAL MEDICINE | Facility: CLINIC | Age: 52
End: 2022-02-14
Payer: OTHER GOVERNMENT

## 2022-02-14 DIAGNOSIS — E11.9 TYPE 2 DIABETES MELLITUS WITHOUT COMPLICATION, WITHOUT LONG-TERM CURRENT USE OF INSULIN: Primary | ICD-10-CM

## 2022-02-23 RX ORDER — PANTOPRAZOLE SODIUM 40 MG/1
40 TABLET, DELAYED RELEASE ORAL DAILY
Qty: 90 TABLET | Refills: 3 | Status: SHIPPED | OUTPATIENT
Start: 2022-02-23 | End: 2023-02-15 | Stop reason: SDUPTHER

## 2022-03-18 ENCOUNTER — PATIENT MESSAGE (OUTPATIENT)
Dept: INTERNAL MEDICINE | Facility: CLINIC | Age: 52
End: 2022-03-18
Payer: OTHER GOVERNMENT

## 2022-03-19 DIAGNOSIS — F32.A ANXIETY AND DEPRESSION: ICD-10-CM

## 2022-03-19 DIAGNOSIS — F41.9 ANXIETY AND DEPRESSION: ICD-10-CM

## 2022-03-19 NOTE — TELEPHONE ENCOUNTER
No new care gaps identified.  Powered by [employer]. Reference number: [id number].   3/19/2022 9:16:01 AM CDT

## 2022-03-21 RX ORDER — ALPRAZOLAM 1 MG/1
TABLET ORAL
Qty: 90 TABLET | Refills: 1 | Status: SHIPPED | OUTPATIENT
Start: 2022-03-21 | End: 2022-09-12

## 2022-04-17 ENCOUNTER — PATIENT MESSAGE (OUTPATIENT)
Dept: INTERNAL MEDICINE | Facility: CLINIC | Age: 52
End: 2022-04-17
Payer: OTHER GOVERNMENT

## 2022-04-17 RX ORDER — PRAVASTATIN SODIUM 20 MG/1
20 TABLET ORAL DAILY
Qty: 90 TABLET | Refills: 3 | Status: SHIPPED | OUTPATIENT
Start: 2022-04-17 | End: 2023-02-15 | Stop reason: SDUPTHER

## 2022-04-18 NOTE — TELEPHONE ENCOUNTER
No new care gaps identified.  Powered by [employer]. Reference number: [id number].   4/17/2022 7:38:56 PM CDT

## 2022-06-15 ENCOUNTER — PATIENT MESSAGE (OUTPATIENT)
Dept: INTERNAL MEDICINE | Facility: CLINIC | Age: 52
End: 2022-06-15
Payer: OTHER GOVERNMENT

## 2022-07-04 ENCOUNTER — PATIENT MESSAGE (OUTPATIENT)
Dept: INTERNAL MEDICINE | Facility: CLINIC | Age: 52
End: 2022-07-04
Payer: OTHER GOVERNMENT

## 2022-07-04 DIAGNOSIS — E11.9 TYPE 2 DIABETES MELLITUS WITHOUT COMPLICATION, WITHOUT LONG-TERM CURRENT USE OF INSULIN: Primary | ICD-10-CM

## 2022-07-04 DIAGNOSIS — E11.59 HYPERTENSION ASSOCIATED WITH DIABETES: ICD-10-CM

## 2022-07-04 DIAGNOSIS — E66.01 CLASS 3 SEVERE OBESITY DUE TO EXCESS CALORIES WITH SERIOUS COMORBIDITY AND BODY MASS INDEX (BMI) OF 40.0 TO 44.9 IN ADULT: ICD-10-CM

## 2022-07-04 DIAGNOSIS — I15.2 HYPERTENSION ASSOCIATED WITH DIABETES: ICD-10-CM

## 2022-07-21 DIAGNOSIS — G47.09 OTHER INSOMNIA: ICD-10-CM

## 2022-07-21 DIAGNOSIS — E11.9 TYPE 2 DIABETES MELLITUS WITHOUT COMPLICATION: ICD-10-CM

## 2022-07-22 RX ORDER — AMITRIPTYLINE HYDROCHLORIDE 10 MG/1
TABLET, FILM COATED ORAL
Qty: 90 TABLET | Refills: 0 | Status: SHIPPED | OUTPATIENT
Start: 2022-07-22 | End: 2022-10-24

## 2022-07-22 NOTE — TELEPHONE ENCOUNTER
No new care gaps identified.  [facility] Embedded Care Gaps. Reference number: [id number]. 7/21/2022   10:03:53 PM CDT

## 2022-07-22 NOTE — TELEPHONE ENCOUNTER
Refill Routing Note   Medication(s) are not appropriate for processing by [facility] for the following reason(s):      - Indication is outside of scope for [facility]    [facility] action(s):  Route          Medication reconciliation completed: No     Appointments  past 12m or future 3m with PCP    Date Provider   Last Visit   12/7/2021 Regina Nava MD   Next Visit   Visit date not found Regina Nava MD   ED visits in past 90 days: 0        Note composed:10:14 PM 07/21/2022

## 2022-08-12 ENCOUNTER — PATIENT MESSAGE (OUTPATIENT)
Dept: INTERNAL MEDICINE | Facility: CLINIC | Age: 52
End: 2022-08-12
Payer: OTHER GOVERNMENT

## 2022-08-24 ENCOUNTER — PATIENT MESSAGE (OUTPATIENT)
Dept: ADMINISTRATIVE | Facility: HOSPITAL | Age: 52
End: 2022-08-24
Payer: OTHER GOVERNMENT

## 2022-08-24 ENCOUNTER — PATIENT MESSAGE (OUTPATIENT)
Dept: INTERNAL MEDICINE | Facility: CLINIC | Age: 52
End: 2022-08-24
Payer: OTHER GOVERNMENT

## 2022-08-24 DIAGNOSIS — E11.9 TYPE 2 DIABETES MELLITUS WITHOUT COMPLICATION, WITHOUT LONG-TERM CURRENT USE OF INSULIN: ICD-10-CM

## 2022-08-24 DIAGNOSIS — Z00.00 ANNUAL PHYSICAL EXAM: Primary | ICD-10-CM

## 2022-08-24 DIAGNOSIS — E03.9 ACQUIRED HYPOTHYROIDISM: ICD-10-CM

## 2022-08-26 ENCOUNTER — TELEPHONE (OUTPATIENT)
Dept: BARIATRICS | Facility: CLINIC | Age: 52
End: 2022-08-26
Payer: OTHER GOVERNMENT

## 2022-08-30 ENCOUNTER — TELEPHONE (OUTPATIENT)
Dept: BARIATRICS | Facility: CLINIC | Age: 52
End: 2022-08-30
Payer: OTHER GOVERNMENT

## 2022-08-30 NOTE — TELEPHONE ENCOUNTER
----- Message from Vani Flores sent at 8/30/2022  9:16 AM CDT -----  Pt calling to schedule appt , pt says she had her consult yesterday and was told to call scheduling for an appt . Please call     Confirmed patient's contact info below:  Contact Name: Bandar Douglass  Phone Number: [phone]

## 2022-09-01 ENCOUNTER — TELEPHONE (OUTPATIENT)
Dept: BARIATRICS | Facility: CLINIC | Age: 52
End: 2022-09-01
Payer: OTHER GOVERNMENT

## 2022-09-01 NOTE — TELEPHONE ENCOUNTER
----- Message from Zia Aldridge sent at 9/1/2022  9:02 AM CDT -----  Regarding: sched new pt appt      The Pt states that she was told to call dat to sched her new Pt appt since she completed the financial coord appt     # [phone]

## 2022-09-01 NOTE — TELEPHONE ENCOUNTER
Returned pt call in regard to scheduling consult for Bariatric services. Scheduled pt successfully virtually with pernell and rd. Appt date and time agreed upon with pt. Pt is aware of how to complete virtual visit through "[employer] pernell.

## 2022-09-02 ENCOUNTER — OFFICE VISIT (OUTPATIENT)
Dept: BARIATRICS | Facility: CLINIC | Age: 52
End: 2022-09-02
Payer: OTHER GOVERNMENT

## 2022-09-02 VITALS — BODY MASS INDEX: 44.3 KG/M2 | WEIGHT: 293 LBS

## 2022-09-02 DIAGNOSIS — I15.2 HYPERTENSION ASSOCIATED WITH DIABETES: ICD-10-CM

## 2022-09-02 DIAGNOSIS — E11.9 TYPE 2 DIABETES MELLITUS WITHOUT COMPLICATION, WITHOUT LONG-TERM CURRENT USE OF INSULIN: ICD-10-CM

## 2022-09-02 DIAGNOSIS — E66.01 CLASS 3 SEVERE OBESITY DUE TO EXCESS CALORIES WITH SERIOUS COMORBIDITY AND BODY MASS INDEX (BMI) OF 40.0 TO 44.9 IN ADULT: Primary | ICD-10-CM

## 2022-09-02 DIAGNOSIS — E11.59 HYPERTENSION ASSOCIATED WITH DIABETES: ICD-10-CM

## 2022-09-02 PROCEDURE — 99205 OFFICE O/P NEW HI 60 MIN: CPT | Mod: 95,,, | Performed by: NURSE PRACTITIONER

## 2022-09-02 PROCEDURE — 99205 PR OFFICE/OUTPT VISIT, NEW, LEVL V, 60-74 MIN: ICD-10-PCS | Mod: 95,,, | Performed by: NURSE PRACTITIONER

## 2022-09-02 NOTE — PROGRESS NOTES
The patient location is: home  The chief complaint leading to consultation is: New Consult    Visit type: audiovisual    Review of Systems:   Negative unless otherwise noted positive-  Gen- Weakness/ Fatigue  Neuro- Confusion  CV- Chest Pain/ Palpitations  Resp: Cough/ SOB  GI- Nausea/Vomiting  Extrem- Pain/Swelling      Physical Exam:  General- Patient alert and oriented x3 in NAD  HEENT- PERRLA, EOMI, OP clear  Resp- No increased WOB noted. Not using accessory muscles.  GI- Non tender/non-distended  Extrem- No cyanosis, clubbing, edema.   Skin-  No Jaundice. No visible skin lesions.        Face to Face time with patient: 45  60 minutes of total time spent on the encounter, which includes face to face time and non-face to face time preparing to see the patient (eg, review of tests), Obtaining and/or reviewing separately obtained history, Documenting clinical information in the electronic or other health record, Independently interpreting results (not separately reported) and communicating results to the patient/family/caregiver, or Care coordination (not separately reported).     Each patient to whom he or she provides medical services by telemedicine is:  (1) informed of the relationship between the physician and patient and the respective role of any other health care provider with respect to management of the patient; and (2) notified that he or she may decline to receive medical services by telemedicine and may withdraw from such care at any time.      BARIATRIC NEW PATIENT EVALUATION    CHIEF COMPLAINT:   Morbid obesity, body mass index is 44.3 kg/m². and inability to lose weight.    HPI:  Bandar Douglass is a 51 y.o. morbidly obese female. Her current body mass index is 44.3 kg/m². She has multiple associated comorbidities including diabetes mellitus type 2 non insulin dependent , essential hypertension, depression, and anxiety.  She has struggled with excess weight since childhood.  Her highest adult weight  was 330 lbs at age 41, and her lowest adult weight was 250 lbs at age 17. The patient has tried naltrexone-bupropion (Contrave) and rybelsus, nutri system .  The patient was most successful with exercise and calorie coutning with a weight loss of 100 lbs.  Her current exercise includes walking 5 times a week. She denies any history of eating disorder such as anorexia, bulimia, or taking laxatives for weight loss, and denies any addiction including illicit substances, alcohol, or gambling.  Patient states she has a good  support system.  She lives with family.  She is currently employed [employer] .  She  denies a history of GERD.  The patient's goal is 185 lbs .    ESS: Score of 3, reviewed 2022.  Does not need Sleep Study.    Pre op weight-300  IBW-153    Gallbladder removed     PAST MEDICAL HISTORY:  Past Medical History:   Diagnosis Date    Breast lump 2019    Diabetes mellitus     Hypertension     Thyroid disease        PAST SURGICAL HISTORY:  Past Surgical History:   Procedure Laterality Date    BREAST BIOPSY Left     CERVICAL FUSION  2017    C5-7 Anterior Fusion     SECTION      CHOLECYSTECTOMY      DECOMPRESSION OF ULNAR NERVE Left 2020    HERNIA REPAIR      HYSTERECTOMY      LUMBAR DISCECTOMY  2000    SPINE SURGERY      TONSILLECTOMY         FAMILY HISTORY:  No family history on file.     SOCIAL HISTORY:  Social History     Socioeconomic History    Marital status:    Tobacco Use    Smoking status: Never    Smokeless tobacco: Never   Substance and Sexual Activity    Alcohol use: Yes     Comment: socially    Drug use: No       MEDICATIONS:  Medications have been reviewed.    ALLERGIES:  Allergies have been reviewed.    Review of Systems   Constitutional:  Negative for chills and fever.   HENT:  Negative for ear pain, nosebleeds and sore throat.    Eyes:  Negative for blurred vision and double vision.   Respiratory:  Negative for cough and shortness of  breath.    Cardiovascular:  Negative for chest pain, palpitations, orthopnea, claudication and leg swelling.   Gastrointestinal:  Negative for abdominal pain, constipation, diarrhea, heartburn, nausea and vomiting.   Genitourinary:  Negative for dysuria and urgency.   Musculoskeletal:  Negative for back pain and joint pain.   Skin:  Negative for rash.   Neurological:  Negative for dizziness, tingling, focal weakness and headaches.   Endo/Heme/Allergies:  Does not bruise/bleed easily.   Psychiatric/Behavioral:  Negative for depression and suicidal ideas.      Vitals:    09/02/22 1235   Weight: 136.1 kg (300 lb)   PainSc: 0-No pain       Physical Exam  Vitals and nursing note reviewed.   Constitutional:       Appearance: She is well-developed. She is morbidly obese.   HENT:      Head: Normocephalic.      Nose: Nose normal.      Mouth/Throat:      Mouth: Mucous membranes are moist.   Eyes:      Extraocular Movements: Extraocular movements intact.   Cardiovascular:      Rate and Rhythm: Normal rate and regular rhythm.      Heart sounds: Normal heart sounds.   Pulmonary:      Effort: Pulmonary effort is normal.      Breath sounds: Normal breath sounds.   Abdominal:      General: Bowel sounds are normal.      Palpations: Abdomen is soft.   Musculoskeletal:         General: Normal range of motion.      Cervical back: Normal range of motion.   Skin:     General: Skin is warm and dry.      Capillary Refill: Capillary refill takes less than 2 seconds.   Neurological:      Mental Status: She is alert and oriented to person, place, and time.   Psychiatric:         Mood and Affect: Mood normal.        DIAGNOSIS:  1. Morbid obesity, body mass index is 44.3 kg/m². and inability to lose weight.  2. Co-morbidities: diabetes mellitus type 2 non insulin dependent , essential hypertension, depression, and anxiety    PLAN:  The patient is a good candidate for Bariatric Surgery. The patient is interested in laparoscopic sleeve  gastrectomy with Dr. Purcell. The surgery and post-op care was discussed in detail with the patient. All questions were answered.    The patient understands that bariatric surgery is a tool to aid in weight loss and that they need to be committed to the diet and exercise post-operatively for successful weight loss. Discussed with patient that bariatric surgery is not the easy way out and that it will take plenty of dedication on the patient's part to be successful. Also discussed the possibility of weight regain if the patient strays from the diet guidelines or exercise requirements. Patient verbalized understanding and wishes to proceed with the work-up.    Estimated Goal weight is 220-230 lbs.    ORDERS:  1. Stress Test, Chest X-Ray, and EKG  2. Psychological Consult and Bariatric Dietician Consult  3. Bariatric Labs: Per orders.    PCP: Regina Nava MD  RTC: As scheduled.

## 2022-09-02 NOTE — PATIENT INSTRUCTIONS
Prior to surgery you will need to complete:  - Dietitian consult and follow up appointments as needed  - Labs  - Chest X-ray  - EKG  - Psychological evaluation, Please call psychiatry [phone] to schedule  - stress test     In preparation for bariatric surgery, please complete the following:   Discuss your current medications with your primary care provider, remember your medications will need to be crushed, chewable, or in liquid form for the first 2-4 weeks after a gastric bypass or sleeve.  For a gastric band, your medications will need to be crushed indefinitely.    If you take a blood thinner such as: Coumadin (warfarin), Pradaxa (dabigatran), or Plavix (clopidogrel), you will need to speak with your prescribing provider on how or if this medication can be stopped before surgery.   If you take a medication for depression or anxiety, remember to discuss this with the psychologist or psychiatrist that you see.   If you take medication for arthritis on a daily basis that is considered a non-steroidal anti-inflammatory (NSAID), please discuss with your prescribing physician an alternative medication.  After having gastric bypass or gastric sleeve, this group of medications is not appropriate to take due to increased risk of bleeding stomach ulcers.      DEFINITIONS  Appointments: Pre-scheduled meetings or consultations with any physician, advanced practice provider, dietitian, or psychologist, and labs, imaging studies, sleep studies, etc.   Late cancellation: Cancelling an appointment 24-48 hours prior to scheduled time.  No-Show appointment:  is when   You do NOT arrive to your appointment at the time its scheduled.  You call to cancel or cancel via [facility] less than 24 hours in advance of your scheduled appointment  You show up 15 minutes AFTER your scheduled appointment time without any notification of being late.     POLICY  You are allowed up to 3 cancellations for appointments.   After 3  cancellations your case will be placed on hold for 2 months and after that time you can resume the program.   You are allowed only 1 no-show for an appointment.   You will be re-scheduled one time and if there is a 2nd no-show at any point, your case will be placed on hold for 3 months.  After 3 months you can resume the program.     Upon resuming the program after being placed on hold for either above mentioned reasons, if you have a late cancel or no show for any appointment, the bariatric team will review if youre an appropriate candidate for surgery at the monthly meeting.

## 2022-09-06 NOTE — TELEPHONE ENCOUNTER
Bariatric dashboard updated to workup in progress.  Patient completed consultation with [facility] 9/2/2022 and scheduled for dietician consult 9/7/2022.  Insurance notes found in guarantor notes and media copied into bariatric dashboard. 8/29/2022 phone note deactivated to remove pt from patient work queue list.    Received report from [facility]. Pt AAOx3, NAD, breathing non labored, on room air, HOB up. IV site clean, dry and intact. IVF going per order. Bilateral wrists restraints on. Bed at the lowest level on lock position, call bell w/i reach. Bed alarm on. Bedside and Verbal shift change report given to GUDELIA Taylor (oncoming nurse) by me (offgoing nurse).  Report included the following information SBAR, Kardex, Intake/Output, MAR, Recent Results and Cardiac Rhythm SR.

## 2022-09-07 ENCOUNTER — PATIENT MESSAGE (OUTPATIENT)
Dept: BARIATRICS | Facility: CLINIC | Age: 52
End: 2022-09-07

## 2022-09-07 ENCOUNTER — CLINICAL SUPPORT (OUTPATIENT)
Dept: BARIATRICS | Facility: CLINIC | Age: 52
End: 2022-09-07
Payer: OTHER GOVERNMENT

## 2022-09-07 DIAGNOSIS — E11.59 HYPERTENSION ASSOCIATED WITH DIABETES: ICD-10-CM

## 2022-09-07 DIAGNOSIS — I15.2 HYPERTENSION ASSOCIATED WITH DIABETES: ICD-10-CM

## 2022-09-07 DIAGNOSIS — Z71.3 DIETARY COUNSELING: ICD-10-CM

## 2022-09-07 DIAGNOSIS — E11.9 TYPE 2 DIABETES MELLITUS WITHOUT COMPLICATION, WITHOUT LONG-TERM CURRENT USE OF INSULIN: Primary | ICD-10-CM

## 2022-09-07 DIAGNOSIS — E66.01 MORBID OBESITY WITH BMI OF 40.0-44.9, ADULT: ICD-10-CM

## 2022-09-07 PROCEDURE — 99499 UNLISTED E&M SERVICE: CPT | Mod: 95,,, | Performed by: DIETITIAN, REGISTERED

## 2022-09-07 PROCEDURE — 99499 NO LOS: ICD-10-PCS | Mod: 95,,, | Performed by: DIETITIAN, REGISTERED

## 2022-09-07 PROCEDURE — 97802 PR MED NUTR THER, 1ST, INDIV, EA 15 MIN: ICD-10-PCS | Mod: 95,,, | Performed by: DIETITIAN, REGISTERED

## 2022-09-07 PROCEDURE — 97802 MEDICAL NUTRITION INDIV IN: CPT | Mod: 95,,, | Performed by: DIETITIAN, REGISTERED

## 2022-09-07 NOTE — PATIENT INSTRUCTIONS
NUTRITION  NUTRITION    Before & After  BARIATRIC SURGERY        [facility]  Surgical Weight Loss Program          Table of Contents    Preparing for Bariatric Surgery: Nutrition3    Bariatric Nutrition Core Points and Recommendations for Optimal Results....5    Pre-op Liquid Protein Diet...6    Pre-op Protein Powder Suggestions.....7    Dietary Progression After Surgery..8    Bariatric High Protein Liquid Diet..9    Bariatric High Protein Puree Diet..10    Bariatric High Protein Soft Diet.12    Bariatric Regular Diet.15    Protein Content of Foods Recommended after Weight Loss Surgery16    Lifelong Nutrition Guidelines after Weight Loss Surgery...17    Common Nutritional Problems and Prevention Tips...18    Physical Activity...19    Required Vitamin/Mineral Supplements...20    Resources for Bariatric Patients.21    Ten Tip for Healthy And Conscious Eating..22                  Preparing for Bariatric Surgery: Nutrition    Bariatric surgery is a great tool in helping you lose weight. However, we need your help to make your surgery successful by following the nutrition plans before and after surgery. You will meet with a dietitian who will go over pre and post-surgery nutrition plans and will work with you to change your nutrition lifestyle. After your decision to have bariatric surgery, we ask that you start a high-protein/low- fat & low carbohydrate diet.     Below is a guideline to get you started.   All meals should only include lean meats/proteins and non-starchy vegetables. Fruits can be used as snacks or desserts. Try to avoid high sugar canned fruit (most canned fruit in syrup).   Eliminate  empty calorie snacks (cake, candy, chips) and eat high-protein snacks instead (cheese sticks, rolled deli meat, cottage cheese and tomatoes)     Do not include any additional foods to your meal, such as: Breads or starchy vegetables (ex. corn, potatoes, sweet potatoes, green peas) Exercise at least 3 times a week for 30 minutes, it does not have to be 30 minutes all at once!   Switch to smaller plates to help you with portion control.  Begin limiting carbonated beverages   You may begin substituting one meal for a protein shake. Limit Caffeine        All liquids should be sugar-free and low calorie.  No Juices Practice taking small bites and sips. Practice not drinking while eating.     Meal Base Options  Flavor your food with lemon, vinegar, herbs and spices for big flavor without added calories.     Lean Meats/Proteins: How to prepare: bake, broil, boil, roast, grill, sauté in Lida or I can't believe it's not butter spray. Remove all visible fat before and after cooking.  NO BREADING or FRYING.    Poultry: skinless chicken or turkey (light/dark), ground (90% lean). Take off skin from poultry.  Fish/Shellfish: catfish, clams, crab, crawfish, lobster, salmon, shrimp, squid, tilapia, trout, tuna  Beef: tenderloin, roast (rib, mona, rump), steak (sirloin, round, cubed, T-bone, flank), ground (90% lean)  Pork: lean ham, [place] espinosa, tenderloin, center loin chop  Game: venison, rabbit, duck  Deli meats: turkey, roast beef, ham, chicken  Dairy: low fat or fat free (3gm fat per ounce), sliced or shredded cheese, string cheese, hard cheese, cottage cheese, Greek or low-fat yogurt (aim for less than 10g sugar)  Soy: tofu  Eggs: However you like them!  Beans and Legumes: red, white, black, lima, black-eyed-peas, chickpeas, lentils, edamame  Nuts and Seeds: unsalted, ¼ cup    Non-Starchy Vegetables: How to prepare: boil, bake, steam, roast, microwave, grill, sauté in cooking spray. Do not add cream or cheese  sauce.    Broccoli Cauliflower Carrots Onions Cabbage Radishes   Zucchini Okra Greens Peppers Spinach Turnips   Mushrooms Tomatoes Celery Lettuce Asparagus Eggplant   Green Onions  Kale  Green Beans Artichoke Squash  Cucumber Beets Brussel    Leeks Lettuce  Snow peas Sprouts     How To Choose A Protein Shake: Check label for no more than 4gm of total sugar.     Premade options:  Premier Protein and Premier Clear Muscle Milk     Clear Protein 2.0   Ensure Max Protein Pure Protein Shake   Slim fast: High Protein Quest Protein Shake   Equate High Performance  Total Lean from [facility]                                                      Zero Carb Isopure  [facility] Nutrition Plan or Core Power     Adding sugar-free flavor extracts and syrups can help add variety to your shakes.  You may create your own protein shake with protein powder; just make sure it fits into the rules.    How to Choose Fluids: All fluids before and after surgery should be sugar free, non-carbonated and low calorie (less than 15 calories per serving).    Options:  Plain Water (or Infused with lemon/lime/orange, berries, mint leaves, cucumber slices)  Flavored mason - Propel Zero, Nestle Pure Life Splash, Aquafina Flavor Splash, Hint Water        Coffee or tea - (limit to one caffeinated drink per day) [place] Diet Green Tea, Diet Snapple Tea, Sari diet green tea  Sugar free (SF) flavorings/Water enhancers - Crystal Light, Natanael, Wyler's Light, SF Mayur-aid, SF Hawaiian Punch, Dasani Drops, Great Value/Market Pantry SF drink mix  Diet lemonade  Powerade or Gatorade Zero   Fuze Slenderize (Low carb)  Diet ocean spray cranberry juices or Diet V-8 Splash  SF Jello and SF popsicles  Low sodium broth  Aim to drink a minimum of 64 oz fluid per day!      Bariatric Nutrition Core Points and Contract Agreement  AVOID THESE FOODS   High in Fat/Sugar:   High fat milk (whole, 2%)  Butter, margarine, oil instead use Lida sprays or I Can't Believe It's Not Butter  Spray   Mayonnaise, sour cream, cream cheese, salad dressing (may use low fat versions of these items)  Ice Cream  Cakes, cookies, pies, desserts  Candy  Luncheon meats (bologna, salami, chopped ham)  Sausage, [place]  Gravy  Breaded and Fried Foods  Sugary drinks  Alcohol     Starchy Carbohydrates.    White and wheat Bread, muffins, bagels, English muffins, biscuits, buns, rolls, cornbread,   Rice, Pasta   Cereals (including grits, oatmeal)   Crackers, Pretzels, Chips, Granola  Corn, Popcorn, Peas, Quinoa  White Potatoes, Sweet potatoes  Flour and corn tortillas   Practice NOT DRINKING   Carbonated drinks  Using a straw     c     Eat protein-source for breakfast (i.e. eggs, low-fat cottage cheese, Greek yogurt, sliced deli turkey, low-fat sliced cheese, protein drinks or bars with 4 gms of sugar or less)  c     Limit starchy carbohydrates (bread, rice, pasta, potatoes, corn, grits, oatmeal, etc)  c     Plan to eat 4-6 small meals per day. Protein drinks should be used for 1-2 of the small meals.  c     Measure portion sizes. Small plates, bowls and cups make smaller portions look bigger.  c     Limit eating out; make better choices when eating out (low fat/low carb)  c     Include fruits and vegetables in the diet DAILY  c     Avoid/Limit Desserts/candy   c     Low-fat diet (Baked, broiled, grilled, and boiled instead of fried, sautéed, creamed)  c     Increase activity (walking, swimming, exercise videos)  c     Limit sugary, caffeinated and carbonated beverages  c     Aim for 64 oz. water per day  Limit alcohol  Practice chewing foods thoroughly.  Practice sipping beverages--no chugging or gulping.  No Straws.  NO LIQUIDS 30 MIN. BEFORE, DURING, AND 30 MIN. AFTER MEALS.  Keep food logs and bring to each visit for review    I have been educated on the above lifestyle and nutrition changes regarding weight loss surgery.  I understand and agree that following these guidelines will help me to lose weight and maintain  "my health long-term.    Patient Signature ______________________________________   Date ____________________    Dietitian Signature ______________________________________         CONTACT INFORMATION  Gloria Arcoe, RD, LDN  Gricelda Reeves, MS, RD, LDN, CSOWM [facility], 2nd Floor [address] [place] [address] PHONE: [phone] FAX: [phone]     Send us a message anytime using your [facility] account.  Pre-op Liquid Protein Diet        Two weeks prior to your bariatric surgery, your surgeon and dietitian will have you start a liquid diet. (One week liquid diet if your BMI is under 40). You will lose weight by making these changes before surgery, which will shrink your liver and decrease the size of your abdomen.  This will help to decrease your risk of complications during surgery.  Your dietitian will help you to decide which protein drinks are right for you.      There are a number of places where protein drinks are available. They can be found at the local grocery, health or drug store and even online. Aim for 600-800 calories and  grams of protein per day while on the liquid diet. Please read the label - no more than 4 grams of sugar per serving. Listed below are some of the protein supplements [facility] recommends, along with where to buy them locally or online.     Ready-to-Drink (RTD) Suggestions    BOTTLED MILK SHAKES Where to Purchase Calories Protein (g) Sugar (g)   Premier Protein [employer], [employer]co, [employer] 160 30 1   Equate High Performance [employer] 160 30 1   Muscle Milk (lactose free) [employer], [facility]reens 160 20 0-3   Cur[employer] Nutrition Plan or Core Power (lactose free) [employer] 170 30 4   Total Lean 25 (lactose free) [facility] 170 25 2   Quest protein shakes [employer], [employer] 160 30 1   SMOOTHIES       "The Gladiator" 20oz (without fruit) Smoothie Justin 180 45 0   Protein Velvet Ice   PJ's Coffee 230 21 8   BOTTLED CLEAR DRINKS     "   Premier Clear Cherry [employer] 90 20 0   Protein. 2.O [employer], [employer] 60 15 0   Isopure Zero Carb  40 0    Clear Lamont's, [employer], Amazon 110 25 0              6    Protein Powder Suggestions    Don't forget to count the calories and protein from the liquids that you add to your protein powders in your food journal. Protein powders may be mixed with fat-free or 1% milk. Lactose-free options include: water, sugar-free flavored beverages such as Crystal Light, Fairlife fat-free milk (13g protein per 8oz!), unsweetened soy milk, or unsweetened almond milk. Do not add fruit, yogurt, honey, peanut butter, or vegetables to protein shakes. You may add flavor extracts or sugar-free syrups (shop online or at World [employer]) for flavor variety.    Product Where to Purchase Calories Protein (g) Sugar (g)   Body Fortress LinguaLeo 140 26 1   Pure Protein LinguaLeo  20-23 0-2   Unjury  [email or web address]  20 3-4    Christopher Douglas egg white (Stevia) (lactose free) [email or web address] 120 24 0-1   Nectar (lactose free)  (wide variety of flavors) Vitamin Shoppe, Amazon 100 24 0    powder  (fruity pepples/[place] freddy) Lamont's, [facility] 120 25 1   Isopure Zero Carb  GNC   105 25 0     Lactose-free options: Fairlife shakes, Muscle Milk, GNC Total Lean 25 (RTD and powder), Isopure zero carb (RTD and powder), Unjury PLANTED (powder), Christopher Douglas egg white (powder), Nectar (powder), Protein 2 O (RTD)    Unflavored options:  Unjury, Christopher Douglas egg white, Isopure Zero Carb    Protein Soup Suggestions    Product Flavors Where to purchase calories protein sugar   Celebrate Cream of Vegetable, Cream of Broccoli & Cheese, Tomato soup [email or web address]  15 < 1   Unjury Chicken soup, [place] [email or web address]   21-22 0-1           Dietary Progression after Bariatric Surgery    DIETARY  PHASE Time frame  POST-SURGERY FOODS AND BEVERAGES   1  LIQUID       First 2 weeks         Sugar-free decaffeinated  non-carbonated beverages  Protein shakes with 4 grams of sugar or less   Protein soups  NO FRUIT, FRUIT JUICES, VEGETABLES OR YOGURT ADDED TO PROTEIN SHAKES   2  PUREE       2-4 weeks after surgery All Phase 1 liquids  &  Pureed lean meats, seafood and beans  Soft scrambled egg  Low fat dairy    3  SOFT       1-2 months after surgery All Phase 2 food and beverages  &  Cooked fork tender lean meats, fish and seafood  Lean deli meats  Eggs-scrambled, boiled and poached  Fruits and cooked vegetables - no peel   4  SOLID     2-3 Months after surgery and continue lifelong All Phase 3 food and beverages.   &  Raw vegetables and lettuce   Fruit with peel  Nuts and seeds  Protein bars with 4 grams of sugar or less   Bariatric High Protein Liquid Diet: Weeks 1+2 after surgery    Begin Protein Liquid Diet when you get home from the hospital.  Day 1: Aim to finish at least 1 protein supplement (protein powder + water, Isopure clear in glass bottle from [facility], Premier Clear, Protein 2.0) and 1 bottle of water or Crystal Light. Drink more if you can.  Days 2-3: Increase protein shakes and fluids as tolerated. Sip on 1 oz every 15 minutes. May begin mixing protein powder with skim-1% milk or Lactaid/Soy milk as tolerated.   Days 4-6: If you haven't already, try to mix protein powder with skim-1% milk or Lactaid/Soy milk instead of water, to increase calorie and protein intake. May begin to sip on ready-to-drink protein shakes (such as Premier protein shakes), as tolerated.    Protein and Fluid Goals:  1 week after surgery: aim for at least 40 grams of protein per day and at least 24 ounces of water or Crystal Light.  2 weeks after surgery: aim for 80 grams of protein per day and 48 ounces of water or Crystal Light.    Tips:  Ready-to-drink protein shakes and smoothies may be too thick for the first week after surgery, making it difficult to reach your protein goals. Start with clear liquids and advance as tolerated.  Sip slowly and  continuously on protein shakes and water throughout the day. Start with 1oz liquids every 15 minutes; advance as tolerated.    Avoid sugary drinks. Limit caffeinated beverages to 8oz per day for the first 2-3 months. Avoid carbonated beverages and drinking through straws to reduce gas and bloating for at least 3 months.     Vitamins/Minerals:  Do not take fish oils, herbal supplements including green tea for 2 weeks before your surgery as a general precaution.  Do not take any vitamin supplements for 1 week prior to surgery  Start taking your vitamins when you get home from the hospital.   See vitamin section on page 20 in the Nutrition Guidebook.    Light Exercise -   If your doctor has cleared you for walking or bike riding before your surgery, you can continue this after surgery.  Try not to lift anything heavier than 10 lbs for the first 6 weeks after surgery.    When you come to the clinic for your 2 week follow up, the mid-level provider and dietitian will discuss advancing your diet. Please bring in a log of your daily protein and fluid intake. Please bring in your vitamins for review if you have not already. If you have any questions about your diet or vitamins call [phone] and ask for a Bariatric Dietitian. For other questions, please ask for the Bariatric Nurses.  Bariatric High Protein Pureed Diet    Two weeks after surgery, you may be ready to add smooth foods to your diet.  All food should be the consistency of baby food, or thinner.  Follow pureed diet for the next 2 weeks.    Protein - It is very important to pay attention to protein intake during this time.      Inadequate protein intake can cause:  Delayed Wound Healing  Hair Loss  Muscle Breakdown    Meal Plan - Eat 3-4 meals per day (2-4 tbsp each), with protein supplements in between to meet protein needs.  Meeting protein needs daily will help increase healing, decrease muscle loss, and increase weight loss.  Your goal is  grams  of protein a day.    Protein First - Always eat the foods with the highest protein first.  Foods high in protein include milk, yogurt, cheese, egg whites, beans. Lean meats and seafood can be pureed with a small amount of liquid, such as broth, in a  or  to baby food consistency.    Fluids - Keep track in your journal of how much you are drinking; you should try to drink at least 48oz of fluids every day.      Foods allowed: Portion size Protein (g)   Sugar-free clear liquids As desired 0   Skim or 1% milk 8oz 8   Greek yogurt 5 oz 15   Lean meats or shrimp, pureed 1 oz 7   Beans (red, white, black, lima, anna, fat free refried, hummus) and lentils ¼ cup 4   Low-fat/fat free cheese.(cottage cheese, mozzarella string cheese, ricotta cheese, Laughing Cow, Baby Bell, cheddar, etc) ¼ cup 7-8   Scrambled eggs or Egg Beaters 1 or ¼ cup 6   Edamame or Tofu, mashed ¼ cup 5   Unflavored protein powder (add to 1 scoop to  98% fat free soups or SF pudding) 3 Tbsp 9   *PB2: peanut powder (45 calories) 2 Tbsp 5     *PB2 powdered peanut butter: 45 calories vs. 190 calories in 2 tbsp of regular peanut butter. Purchase online at [employer] [place], or at various MUBI, Pro Player Connect, Micro[employer]ix Biosystems, tritrue and Wummelbox.        Bariatric Liquid/Pureed Sample Menu    3-4 small meals plus 2-3 protein drinks per day.    8-8:30am 1 egg or ¼ cup Egg Beaters   9-9:30am 1 cup water, or decaf coffee or tea   10-10:30am Protein drink, 30g protein   11-11:30am 2 tbsp low-fat cottage cheese, and 1 tbsp pureed peaches   12-12:30pm 1 cup water, or sugar-free lemonade    1-1:30pm 2 tbsp pureed chicken, and 1 tbsp pureed carrots    2-2:30pm 1 cup water, or sugar-free lemonade   3-3:30pm Protein drink, 30g protein   5-5:30pm 1 cup water    6-6:30pm 1 cup hi-protein creamy chicken soup 14g protein (see Recipe below)   7-7:30pm 1 cup water, or sugar-free fruit punch    8-8:30pm 1 cup water     This sample menu provides approx. 80g protein  and 48oz fluids.  Liquid protein supplements should contain 20-30g protein and less than 4 grams of sugar each.    Sip fluids continuously in between meals.  Drink at least ¼ cup every 15 minutes.  For fluids: ¼ cup = 2 oz = 4 tbsp       RECIPE IDEAS for Bariatric Pureed Diet:    Hi-Protein Creamy Chicken Soup: (10g protein per 1 cup serving)  Empty 1 can of 98% fat free cream of chicken soup into saucepan. Then blend 1 scoop of unflavored protein powder with 1 can of skim milk until smooth.  Add protein milk to saucepan and heat to warm. (Note: Do NOT boil. Protein powder may clump if heated too hot).     Hi-Protein Pudding: (14g protein per ½ cup serving)  Add 2 scoops protein powder to 2 cups cold skim milk and mix well.  Stir in dry Jell-O Sugar-Free Instant Pudding mix.  Chill and Enjoy!    Tuna Mousse (12g protein per ¼ cup serving) Page 135 in book Eating Well After Weight Loss Surgery.  In a  or , combine all ingredients and pulse until smooth.  2 6-ounce cans tuna packed in water, drained  2 tbsp low-fat mayonnaise  2 tbsp fat-free sour cream  2 tbsp fat-free cream cheese, softened  ½ cup shallots, finely chopped  1 tbsp lemon juice  ¼ tsp ground pepper  ½ tsp celery seed    Chocolate Peanut Butter Mousse  (28g protein total)  6oz plain Greek yogurt  4 tbsp chocolate PB2  Bariatric High Protein Soft Diet   Begins 4 weeks after surgery               Four weeks after surgery, your stomach may be healed enough to add soft foods to your diet.  Soft foods are those which can be easily mashed with a fork. This diet usually lasts for the next 1-2 months but can last longer depending on each individual.     Remember these principles:  No liquids with meals. Do no drink 30 minutes before meals and wait 30 minutes to 1 hour after meals to start drinking.  Sip on water, sugar-free beverages or non-fat milk throughout the day.  You will most likely need to continue drinking at least 1 protein drink  daily to meet protein needs.  Chew foods slowly; at least 30 times. One meal should take 20-30 minutes.  Eat 3-5 meals per day, without any additional snacking.  Stop eating as soon as you feel full.  Avoid using table sugar and foods made with refined sugar, which can trigger dumping syndrome and slow down weight loss.  Marinating meats with a low sugar marinade, adding low-fat salad dressing, or adding low calorie gravy (made from powder and water) can help meats to digest easier.     Adding Vegetables and Fruits:  As long as you are consuming >80g total protein daily from combination of foods and protein drinks, you may start adding small bites of fruits and vegetables to your meals. Cooked, tender vegetables and ripe fruits without the peel are tolerated best.    *SOFT Non-starchy vegetables include fork tender: green beans, beets, broccoli, brussels sprouts, cabbage, carrots, cauliflower, eggplant, greens, leeks, mushrooms, okra, onions, peppers, spinach, squash, tomatoes and salsa, turnips, low sodium V8, zucchini*          Bariatric High Protein Soft Diet  Begins 4 weeks after surgery    EAT THESE FOODS AVOID THESE FOODS   High in Protein: High in Fat/Sugar:   Canned tuna or chicken (packed in water)  Ground beef or turkey (at least 90% lean)  Turkey or chicken (no skin); cooked tender and cut in small pieces  Lean pork or beef (cook in crock pot until very tender; cut in small pieces  Scrambled, poached, or boiled eggs  Baked, broiled, grilled or boiled fish and seafood (not fried!)  Silken tofu, Edamame (soybeans)  Beans, hummus and lentils  Lean deli meats (turkey and chicken breast, ham, roast beef)  1% or Skim Milk, Lactaid, or Soymilk  Low-fat or fat-free cottage cheese, soft cheese, mozzarella string cheese, or ricotta  Light yogurt, Greek yogurt, SF pudding High fat milk (whole, 2%)  Butter, margarine, oil, mayonnaise  Sour cream, cream cheese, salad dressing  Ice Cream  Cakes, cookies, pies,  desserts  Candy  Luncheon meats (bologna, salami, chopped ham)  Sausage, [place]  Gravy  Breaded and Fried Foods  ___________________________________  Tough/Crunchy--------------------------------  Tough or dry meats  Corn   Granola/cereal with nuts  Shredded Coconut    May add after 2 months, as tolerated:  Raw veggies  Lettuce  Plain, Unsalted Nuts and Seeds  Protein bars with 0-4 grams of sugar   As long as you are getting >80g PRO: Starchy Carbohydrates. At goal weight, some may include whole grains in small amounts.   Cooked tender vegetables without peel  Ripe fruits without peel  Frozen fruits with no added sugar  Fruit canned in its own juice or in water  Fat free, sugar free, frozen yogurt White and wheat Bread, Rice, Pasta   Cereals (including grits, oatmeal)   Crackers, Pretzels, Chips, Granola  Corn, Popcorn, Peas, Quinoa  White Potatoes, Sweet potatoes  Flour and corn tortillas     Fluids: Always Avoid:   Skim/1% milk, Lactaid, Soymilk  Water and Sugar-free beverages  (decaf and non-carbonated)  Decaf coffee & decaf tea  Sugary drinks  Carbonated drinks  Alcohol  Drinking through straws     Sample Menu for Bariatric High Protein Soft Diet            3 meals + 2 protein drinks  Remember: No drinking with meals.    Time of Day Day 1 Day 2   7am:    1 egg (or ¼ cup Egg Beaters) ¼ cup low-fat cottage cheese, 1 tbsp berries   7:30-9:30am:  Water/SF beverage Water/SF beverage   10am:  Protein drink  Protein drink   10:30-11:30am:  Water/SF beverage Water/SF beverage   12pm:    1-2 oz grilled shrimp, ¼ cup green beans   1-2oz canned chicken, shredded cheese, 1 tbsp salsa   12:30-2:30pm:  Water/SF beverage Water/SF beverage   3pm:  Protein drink   Protein drink   3:30-5:30pm:  Water/SF beverage Water/SF beverage   6pm:  ½ cup low fat chili, 1oz low-fat cheese, ¼ cup broccoli 2 oz grilled fish,  ¼ cup lima beans   6:30-9pm:  Water/SF beverage Water/SF beverage     This sample menu provides approx. 80g protein  total, including about 40g protein from foods and at least 40g protein from protein drinks.  Drinking protein drinks daily helps decrease muscle loss, increase weight loss, and prevent hair loss.    Sip fluids continuously in between meals.    For fluids: 1 cup = 8 oz   No drinking from 30 minutes before meals to 30 minutes after meals.    For food: ¼ cup = 4 tablespoons   3oz meat is approx. the size of a deck of cards.    A food scale will help you determine portion size.                     Regular Bariatric Diet: 2-3 months and Beyond  Follow a high protein, low carb, low fat diet LIFELONG:    MAY ADD THE FOLLOWING: slowly, one by one, back in the diet. Please chew foods well and if you do not tolerate certain foods at first, wait a few more week and try again. Raw/crunchy vegetables (artichoke, asparagus, baby corn, bamboo shoots, bean sprouts, carrots, celery, cherry tomatoes, cucumber, green onions or scallions, lettuce, pea pods, radishes, water chestnuts), plain/unsalted nuts and seeds and Protein bars w/ 0-4 g of sugar per serving (such as: Think Thin, Atkins, Pure Protein, Quest, Cake Bites, Power Crunch, ONE).   CONTINUE TO GET IN BETWEEN  GRAMS OF PROTEIN PER DAY EVERY DAY using foods and low sugar protein shakes   FLUID INTAKE: AIM FOR 64 OUNCES TOTAL FOR THE DAY. This includes ice, sugar-free popsicles, sugar-free jello, low sodium broths and any sugar-free non-carbonated beverages. You may resume caffeinated beverages.  LIMIT FRUITS TO 2 SERVINGS PER DAY. One serving of fruit is 1 small piece of fruit, 1 - ½ cup container of canned fruit (in its own juices or water) or ½ cup cubed fruit  LIMIT UNSALTED PLAIN NUTS AND SEEDS TO ¼ CUP TOTAL PER DAY  Aim to stay between 800-1000 calories per day    SAMPLE MENU STARTING 2-3 MONTHS AFTER SURGERY  Time of Day Day 1 Day 2   7am:    Egg omelet made with 1 egg and 1 slice low-fat cheese Quest Cinnamon Roll Protein bar (warm in microwave for 10-15  seconds)   7:30am-9:30am:  Water/SF beverage water/SF beverage   10am:  1 ounce turkey with 1 light string cheese ¼ cup unsalted nuts + ½ banana   10:30-11:30am:  water/SF beverage water/SF beverage   12pm:    Grilled chicken (2 ounces) salad with 1 Tablespoon of low-fat Italian dressing and 1 apple  Taco Lettuce wraps: 1-2 oz of lean ground meat, sprinkle of low fat cheese, tomatoes, salsa wrapped in lettuce leaves   12:30pm-2:30pm:  water/SF beverage water/SF beverage   3pm:  Protein drink   Light yogurt   3:30-5:30pm:  water/SF beverage water/SF beverage   6pm:  Grilled shrimp kebobs (2 ounces shrimp, pineapple chunks, bell pepper and onions) with ¼ cup sautéed spinach and garlic (use Lida spray)   ½ cup Red Beans (no rice) served over ¼ cup cauliflower rice   6:30pm-9pm:  water/SF beverage water/SF beverage   9pm:  1 tbsp slivered almonds sprinkled over 6 oz Greek yogurt container Protein shake         Protein Content of Foods Recommended after  Weight Loss Surgery    Food Name Portion Calories Protein (gms)   Almonds (unsalted) 1/4 cup 160 6   Machias milk, unsweetened 1 cup 30  1   Beef, Roast 1 oz 46 8   Beef, Steak, sirloin, trimmed 1 oz 55 9   Catfish, broiled or baked 1 oz 30 5   Cheese, American FF 1 oz 40 6   Cheese, Cottage 1% fat ¼ cup 41 7   Cheese, Parmesan, grated 2 tsp 20 2   Cheese, Mozzarella, part skim 1 oz 78 8   Cheese, part skim Ricotta ¼ cup 90 8   Chicken, white breast w/o skin 1 oz 46 9   Chicken, leg w/o skin 1 oz 54 7   Crab, steamed ¼ cup  40 9   Crawfish tails, boiled ¼ cup 35 8   Edamame, shelled ¼ cup 50 4   Egg 1 78 6   Ham, lean 5% 1 oz 44 7   Hamburger, lean 1 oz 56 7   Hummus ¼ cup 100 5   Milk, skim or 1%  1 cup 90 8   Milk, Fairlife non-fat 1 cup 80 13   Pork Tenderloin 1 oz 46 7   Pudding, SF 1 serv 60 2   Red beans ¼ cup 56 4   Refried beans, fat free ¼ cup 65 4   Logan, baked 1 oz 52 7   Shrimp, steamed 1 oz 28 6   Soymilk, plain ½ cup 40 3   Tilapia, white fish, cooked 1 oz  36 8   Tofu ¼ cup 47 5   Trout 1 oz 48 7   Tuna, canned in water 1 oz 37 8   Turkey, white meat 1 oz 35 7   Veal Loin 1 oz 50 7   Yogurt, SF, frozen vanilla 6 oz 140 7   Yogurt, light 5 oz 80 5   Yogurt, Greek 5 oz  12-15     *Abbreviations: SF=sugar free, LF=low fat, FF= fat free, gms=grams  *3oz of cooked meat/protein = size of deck of cards or ladies palm   *1oz cheese = 1 inch cube or 1 slice American cheese  Lifelong Nutrition Guidelines after   Weight Loss Surgery    Weight Loss Surgery is designed to help people lose weight after previous attempts at weight loss have failed.  However, safe and successful weight loss with this procedure requires you to make a commitment.  A commitment to change current eating habits and behavior is essential to develop substantial weight loss.    As your stomach is greatly reduced following surgery (to that of a small egg), your nutritional intake is one of the most important aspects of your treatment.  Adequate nutrition helps in the healing of your incisions, preventing gastric discomfort, and in maintaining your nutritional health.    Why should you follow this diet?  You could develop nutrient deficiencies, which may consequently affect your health.  You may not achieve the maximal amount of weight loss, or the rate of weight loss may slow down.    The following guidelines have been developed to assist you in making these changes.      Eat Slowly.  Immediately after surgery the stomach is swollen and needs time to heal.  Eating too quickly may cause you to over fill your pouch and bring forth discomfort (i.e. nausea and vomiting).  Eat and Drink Small Amounts at a Time.  Learn to sip.  Try not to fill your entire mouth with food or fluid.  Use a baby spoon and a 2oz medicine cup to help determine a safe amount.  Stop Eating or Drinking When You Feel Full.  Learn to listen to your body.  If you are unable to recognize fullness, consume only the quantity of food  recommended.  Eating or drinking too much may eventually stretch your pouch and prevent you from achieving maximal weight loss.  Also, overeating may cause you to have nausea and vomiting.  Chew Food Thoroughly Before Swallowing.  Try to chew each bite 30 times before swallowing.  A big chunk of food could get caught and make you very uncomfortable.  Drink Adequate Fluids in between meals to Prevent Dehydration.  Consume at least 6 cups of liquids per day (>48 oz).  No drinking with meals!  Eat Protein Rich Foods First.  This is necessary to meet your protein needs.  Protein is necessary to promote adequate healing and to help you maintain lean body mass as you lose weight.  Aim for >80 grams of protein per day.  Keep Your Food Choices Sugar Free and Low in Fat.  Foods high in sugar and fat may cause diarrhea and abdominal discomfort, or the Dumping Syndrome.  Avoid Starchy Carbohydrates (bread, rice, pasta, corn, peas, potatoes, crackers, pretzels, chips, grits, oatmeal, dry cereals/granola, tortillas).         Common Nutritional Problems and Prevention Tips   Nausea and vomiting   Cause: overeating or eating too quickly   Prevention tip: eat slowly, chew your food very well, and stop eating as soon as you feel full   Chronic malnutrition problems   Cause: nutrients are absorbed differently following surgery   Symptoms: fatigue and aching muscles; tingling feet, calves or hands  Prevention tip: eat a healthy diet and always take your vitamin and mineral supplements   Lactose intolerance   Symptoms: gas, bloating, cramping and diarrhea after drinking milk   Prevention tips: Switch to lactose-free milk such as: Fairlife non-fat milk, unsweetened soy milk, unsweetened almond milk. Refer to lactose-free protein supplement suggestions on pg. 4.  Gas and Bloating  Cause: digestive tract changes after surgery  Prevention tips: eat slowly, avoid overeating, avoid carbonated beverages and drinking through straws. Try  switching to lactose-free protein drinks. Try using Gas-X chewables.  Temporary hair loss   Cause: rapid weight loss and/or lack of protein in the diet   Prevention tip: eat the amount of protein recommended by your Registered Dietitian   Dehydration   Cause: Not drinking enough fluids; or persistent vomiting  Symptoms: dark and strong smelling urine, dry mouth, headache and fatigue   Prevention tip: take frequent sips of liquid throughout the day   Dumping syndrome (Gastric Bypass)  Cause: food emptying too quickly from the stomach   Symptoms: diarrhea, nausea, cold sweats and light-headedness   Prevention tips: avoid consuming sugary foods or beverages, drinking fluids too soon after a meal, or eating high fat foods   Constipation   Cause: food and fiber intake are reduced following surgery   Prevention tips:   Drink at least 48 ounces water daily in addition to protein drinks  Exercise daily   Try Miralax (stool softener). No laxatives if possible.  Try a fiber supplement (Metamucil or Benefiber)                Physical Activity    Physical activity and exercise are essential to achieve and maintain your weight loss goals. Activity and exercise beginning right after your surgery will help you feel better, recover faster and minimize the likelihood of post-surgical complications. Staying active promotes mental well-being, relieves stress and reduces feelings of depression and anxiety. You feel good about your body when you exercise regularly, and therefore have a healthier body image.       Choose a form of activity or exercise that you enjoy.    Ask a friend or family member to participate in an activity or exercise with you. Think of it as a jose system.    Join an exercise club or class.   Listen to your favorite music as you exercise.    Try mall walking, aerobics, swimming or dancing.    Park 15 minutes from your destination and walk.   Use stairs instead of the elevator.    Prioritize activity and exercise  time into your schedule.    Keep a record or journal of your activity.      Required Vitamin/Mineral Supplements:    Sleeve Gastrectomy: No swallowing large whole vitamins/minerals for 2 weeks after surgery  Gastric Bypass: No swallowing large whole vitamins/minerals for 1 month after surgery    *NO gummy multivitamins due to poor quality and sugar content.  *Do NOT take calcium citrate and Iron within 2 hours of each other due to poor absorption.  *Pills may be swallowed if the size of a No. 2 pencil eraser (or smaller). They may be cut to this size with a pill cutter and swallowed if tolerated. Compare to this size:        Bariatric surgery patients will need the following Vit/Min   FOR LIFE:    Multivitamin with or without Iron, 2 per day  At least 18mg Iron, if not included in multivitamin  50mg Thiamine (Vit B1) per day  Calcium Citrate + Vit D  500mg 3 times per day OR 600mg twice per day  Vit B12 sublingual (dissolve under the tongue for 30 sec)   500mcg per day, 2500mcg per wk, or 5000mcg every 2 wks        Suggested vitamin regimen for first 2-4 weeks after surgery:  Flintstones Complete (chewables, not gummies)  EZ Melts 25mg Thiamine (Amazon)  Nature's Way liquid Calcium Citrate + Vit D OR GNC Calcium Citrate soft chews  B12 Sublingual       Bariatric Vitamins Online:  [email or web address].MediSapiens  [email or web address].MediSapiens      [facility], 1st Floor:  Leader's Children's Multivitamin with Iron  Leader's Calcium Citrate with Vitamin D petites OR Nature's Way Liquid Calcium Citrate  Leader's B-1, 100mg tablet. Take one every other day.  Leader's Sublingual B12          Resources for Bariatric Patients:    Before & After: Living & Eating Well After Weight Loss Surgery by Laurie Manriquez. [employer], 2004. www.bariatriceating.com   Eating Well After Weight Loss Surgery by Wen Mcghee & Srini Kumar. [employer] Press, 2004.  Weight Loss Surgery for Dummies by Cielo REDDY  "MD Zaki, FACS. Xenith Banks, 2005.  Exodus from Obesity: The Guide to Long-Term Success After Weight Loss Surgery by Bernice Qiu. BP Press, 2003.  Weight Loss Surgery: Finding the Thin Person Hiding Inside You by Carol Ann Tenorio. Word Association, 2003.  The Don't Diet Live It Workbook by Dimas Yadav & Joan Walker available from www.[place]   The Overeater's Journal by Jana Junior. Wand[employer] also available at www.Amazon.com   Moving Away from Diets available from www.PalindromXurishingconneLive Gamer   Overeater's Anonymous--online, in-person, and telephone meetings available; www.Last.fm.M5 Networks.  Online Food Database at wwwOne Exchange Street to find out the calories, protein, sugar, fat, fiber, etc of any certain food.  50 Ways to Soothe Yourself Without Food by Laurie Dupree PSY.D. [employer], 2009.     Helpful Apps for Bariatric Patients:    Baritastic. [facility] Code: 55894  Cranberry ChicPal  Diabetes logbook by Johanna (track blood sugar, upload meal pics)        TEN TIPS FOR HEALTHY AND CONSCIOUS EATING  Keep track of everything you eat and drink. Write it down as soon as you swallow so you don't forget! Include the type of food or beverage, amount, time, physical feeling of hunger vs. fullness, etc.   Base meals around LEAN PROTEIN and VEGETABLES, incorporating them into main dishes and as snacks.   Buy plenty of fresh or frozen FRUITS and VEGETABLES to keep on hand, wash and chop them (if applicable) ASAP, and snack on them ANYTIME! Eat at least 2 servings of fresh fruits and 3 servings of vegetables each day.   Eat throughout the day rather than "saving" your appetite for a huge meal. Your body can only use so much fuel at a time, so extra will more likely be stored as fat! Smaller, more frequent meals (every 3-5 hours) will help keep your energy level more consistent. Start listening to your body's signals regarding hunger and fullness!   Keep "junk food" and "trigger" foods " "out of the house. Make a special trip to the store when you MUST have it and savor it.   Include good sources of protein with your meals: chicken, fish, shellfish, legumes, eggs, dairy products, soy products, and lean meats.   Use low fat, fat free and lean dairy and animal products. High fat animal products tend to have a lot of saturated fat, which promotes high blood cholesterol levels.   Choose calories you can chew - that means drinking more water instead of juice, sports drinks, regular soda, alcohol, and specialty coffees.   Shut off the TV, put down the book or newspaper, and turn off the computer whenever you eat - this includes meals and snacks. People tend to eat larger portions when snacking in front of the tube, and the foods chosen are often high in fat, sugar and calories. What's more, when you associate eating with particular activities, you may automatically look for something to eat when engaging in those activities, regardless of hunger.   Plan ahead for meals and snacks, have foods on hand to prepare them, and pack them "to go" if necessary. If you wait until you're really hungry, there may not be many healthy choices around to choose from.            (Revised 4.2022)                             Before & After  BARIATRIC SURGERY        [facility]  Surgical Weight Loss Program          Table of Contents    Preparing for Bariatric Surgery: Nutrition3    Bariatric Nutrition Core Points and Recommendations for Optimal Results....5    Pre-op Liquid Protein Diet...6    Pre-op Protein Powder Suggestions.....7    Dietary Progression After Surgery..8    Bariatric High Protein Liquid Diet..9    Bariatric High Protein Puree Diet..10    Bariatric High Protein Soft Diet.12    Bariatric Regular Diet.15    Protein Content " of Foods Recommended after Weight Loss Surgery16    Lifelong Nutrition Guidelines after Weight Loss Surgery...17    Common Nutritional Problems and Prevention Tips...18    Physical Activity...19    Required Vitamin/Mineral Supplements...20    Resources for Bariatric Patients.21    Ten Tip for Healthy And Conscious Eating..22                  Preparing for Bariatric Surgery: Nutrition    Bariatric surgery is a great tool in helping you lose weight. However, we need your help to make your surgery successful by following the nutrition plans before and after surgery. You will meet with a dietitian who will go over pre and post-surgery nutrition plans and will work with you to change your nutrition lifestyle. After your decision to have bariatric surgery, we ask that you start a high-protein/low- fat & low carbohydrate diet.     Below is a guideline to get you started.   All meals should only include lean meats/proteins and non-starchy vegetables. Fruits can be used as snacks or desserts. Try to avoid high sugar canned fruit (most canned fruit in syrup).   Eliminate empty calorie snacks (cake, candy, chips) and eat high-protein snacks instead (cheese sticks, rolled deli meat, cottage cheese and tomatoes)     Do not include any additional foods to your meal, such as: Breads or starchy vegetables (ex. corn, potatoes, sweet potatoes, green peas) Exercise at least 3 times a week for 30 minutes, it does not have to be 30 minutes all at once!   Switch to smaller plates to help you with portion control.  Begin limiting carbonated beverages   You may begin substituting one meal for a protein shake. Limit Caffeine        All liquids should be sugar-free and low calorie.  No Juices Practice taking small bites and sips. Practice not drinking while eating.     Meal Base Options  Flavor your food with  lemon, vinegar, herbs and spices for big flavor without added calories.     Lean Meats/Proteins: How to prepare: bake, broil, boil, roast, grill, sauté in Lida or I can't believe it's not butter spray. Remove all visible fat before and after cooking.  NO BREADING or FRYING.    Poultry: skinless chicken or turkey (light/dark), ground (90% lean). Take off skin from poultry.  Fish/Shellfish: catfish, clams, crab, crawfish, lobster, salmon, shrimp, squid, tilapia, trout, tuna  Beef: tenderloin, roast (rib, mona, rump), steak (sirloin, round, cubed, T-bone, flank), ground (90% lean)  Pork: lean ham, Weld espinosa, tenderloin, center loin chop  Game: venison, rabbit, duck  Deli meats: turkey, roast beef, ham, chicken  Dairy: low fat or fat free (3gm fat per ounce), sliced or shredded cheese, string cheese, hard cheese, cottage cheese, Greek or low-fat yogurt (aim for less than 10g sugar)  Soy: tofu  Eggs: However you like them!  Beans and Legumes: red, white, black, lima, black-eyed-peas, chickpeas, lentils, edamame  Nuts and Seeds: unsalted, ¼ cup    Non-Starchy Vegetables: How to prepare: boil, bake, steam, roast, microwave, grill, sauté in cooking spray. Do not add cream or cheese sauce.    Broccoli Cauliflower Carrots Onions Cabbage Radishes   Zucchini Okra Greens Peppers Spinach Turnips   Mushrooms Tomatoes Celery Lettuce Asparagus Eggplant   Green Onions  Kale  Green Beans Artichoke Squash  Cucumber Beets Brussel    Leeks Lettuce  Snow peas Sprouts     How To Choose A Protein Shake: Check label for no more than 4gm of total sugar.     Premade options:  Premier Protein and Premier Clear Muscle Milk     Clear Protein 2.0   Ensure Max Protein Pure Protein Shake   Slim fast: High Protein Quest Protein Shake   Equate High Performance  Total Lean from [facility]                                                      Zero Carb Isopure  Fairlife Nutrition Plan or Core Power     Adding sugar-free flavor extracts and  syrups can help add variety to your shakes.  You may create your own protein shake with protein powder; just make sure it fits into the rules.    How to Choose Fluids: All fluids before and after surgery should be sugar free, non-carbonated and low calorie (less than 15 calories per serving).    Options:  Plain Water (or Infused with lemon/lime/orange, berries, mint leaves, cucumber slices)  Flavored mason - Propel Zero, Nestle Pure Life Splash, Aquafina Flavor Splash, Hint Water        Coffee or tea - (limit to one caffeinated drink per day) [place] Diet Green Tea, Diet Snapple Tea, Sari diet green tea  Sugar free (SF) flavorings/Water enhancers - Crystal Light, Natanael, Wyler's Light, SF Mayur-aid, SF Hawaiian Punch, Dasani Drops, Great Value/Market Pantry SF drink mix  Diet lemonade  Powerade or Gatorade Zero   Fuze Slenderize (Low carb)  Diet ocean spray cranberry juices or Diet V-8 Splash  SF Jello and SF popsicles  Low sodium broth  Aim to drink a minimum of 64 oz fluid per day!      Bariatric Nutrition Core Points and Contract Agreement  AVOID THESE FOODS   High in Fat/Sugar:   High fat milk (whole, 2%)  Butter, margarine, oil instead use Lida sprays or I Can't Believe It's Not Butter Spray   Mayonnaise, sour cream, cream cheese, salad dressing (may use low fat versions of these items)  Ice Cream  Cakes, cookies, pies, desserts  Candy  Luncheon meats (bologna, salami, chopped ham)  Sausage, [place]  Gravy  Breaded and Fried Foods  Sugary drinks  Alcohol     Starchy Carbohydrates.    White and wheat Bread, muffins, bagels, English muffins, biscuits, buns, rolls, cornbread,   Rice, Pasta   Cereals (including grits, oatmeal)   Crackers, Pretzels, Chips, Granola  Corn, Popcorn, Peas, Quinoa  White Potatoes, Sweet potatoes  Flour and corn tortillas   Practice NOT DRINKING   Carbonated drinks  Using a straw     c     Eat protein-source for breakfast (i.e. eggs, low-fat cottage cheese, Greek yogurt, sliced deli turkey,  low-fat sliced cheese, protein drinks or bars with 4 gms of sugar or less)  c     Limit starchy carbohydrates (bread, rice, pasta, potatoes, corn, grits, oatmeal, etc)  c     Plan to eat 4-6 small meals per day. Protein drinks should be used for 1-2 of the small meals.  c     Measure portion sizes. Small plates, bowls and cups make smaller portions look bigger.  c     Limit eating out; make better choices when eating out (low fat/low carb)  c     Include fruits and vegetables in the diet DAILY  c     Avoid/Limit Desserts/candy   c     Low-fat diet (Baked, broiled, grilled, and boiled instead of fried, sautéed, creamed)  c     Increase activity (walking, swimming, exercise videos)  c     Limit sugary, caffeinated and carbonated beverages  c     Aim for 64 oz. water per day  Limit alcohol  Practice chewing foods thoroughly.  Practice sipping beverages--no chugging or gulping.  No Straws.  NO LIQUIDS 30 MIN. BEFORE, DURING, AND 30 MIN. AFTER MEALS.  Keep food logs and bring to each visit for review    I have been educated on the above lifestyle and nutrition changes regarding weight loss surgery.  I understand and agree that following these guidelines will help me to lose weight and maintain my health long-term.    Patient Signature ______________________________________   Date ____________________    Dietitian Signature ______________________________________         CONTACT INFORMATION  Gloria Arceo, RD, LDN  Gricelda Reeves, MS, RD, LDN, CSOWM [facility], 2nd Floor [address] [place] [facility] [place] [address] PHONE: [phone] FAX: [phone]     Send us a message anytime using your [facility] account.  Pre-op Liquid Protein Diet        Two weeks prior to your bariatric surgery, your surgeon and dietitian will have you start a liquid diet. (One week liquid diet if your BMI is under 40). You will lose weight by making these changes before surgery, which will shrink your  "liver and decrease the size of your abdomen.  This will help to decrease your risk of complications during surgery.  Your dietitian will help you to decide which protein drinks are right for you.      There are a number of places where protein drinks are available. They can be found at the local grocery, health or drug store and even online. Aim for 600-800 calories and  grams of protein per day while on the liquid diet. Please read the label - no more than 4 grams of sugar per serving. Listed below are some of the protein supplements [facility] recommends, along with where to buy them locally or online.     Ready-to-Drink (RTD) Suggestions    BOTTLED MILK SHAKES Where to Purchase Calories Protein (g) Sugar (g)   Premier Protein [employer] 160 30 1   Equate High Performance [employer] 160 30 1   Muscle Milk (lactose free)  20 0-3   MOMENTFACE SRO Nutrition Plan or Core Power (lactose free) [employer] 170 30 4   Total Lean 25 (lactose free) [facility] 170 25 2   Quest protein shakes [place] 160 30 1   SMOOTHIES       "The Gladiator" 20oz (without fruit) Smoothie Justin 180 45 0   Protein Velvet Ice   [employer] Coffee 230 21 8   BOTTLED CLEAR DRINKS       Premier Clear [employer] 90 20 0   Protein. 2.O [employer] 60 15 0   Isopure Zero Carb  40 0    Clear [place], [email or web address] 110 25 0              6    Protein Powder Suggestions    Don't forget to count the calories and protein from the liquids that you add to your protein powders in your food journal. Protein powders may be mixed with fat-free or 1% milk. Lactose-free options include: water, sugar-free flavored beverages such as Crystal Light, Fairlife fat-free milk (13g protein per 8oz!), unsweetened soy milk, or unsweetened almond milk. Do not add fruit, yogurt, honey, peanut butter, or vegetables to protein shakes. You may add flavor extracts or sugar-free syrups (shop online or at [employer]) for " flavor variety.    Product Where to Purchase Calories Protein (g) Sugar (g)   [employer] 140 26 1   Pure Protein [employer]  20-23 0-2   Unjury  [email or web address]  20 3-4    Christopher Douglas egg white (Stevia) (lactose free) [email or web address] 120 24 0-1   Nectar (lactose free)  (wide variety of flavors) Vitamin Shoppe, Amazon 100 24 0    powder  (fruity pepples/[place]ena freddy) [employer] 120 25 1   Isopure Zero Carb  GNC   105 25 0     Lactose-free options: Fairlife shakes, Muscle Milk, GNC Total Lean 25 (RTD and powder), Isopure zero carb (RTD and powder), Unjury PLANTED (powder), Christopher Douglas egg white (powder), Nectar (powder), Protein 2 O (RTD)    Unflavored options:  Unjury, Christopher Douglas egg white, Isopure Zero Carb    Protein Soup Suggestions    Product Flavors Where to purchase calories protein sugar   Celebrate Cream of Vegetable, Cream of Broccoli & Cheese, Tomato soup [email or web address]  15 < 1   Unjury Chicken soup, [place] [email or web address]   21-22 0-1           Dietary Progression after Bariatric Surgery    DIETARY  PHASE Time frame  POST-SURGERY FOODS AND BEVERAGES   1  LIQUID       First 2 weeks         Sugar-free decaffeinated non-carbonated beverages  Protein shakes with 4 grams of sugar or less   Protein soups  NO FRUIT, FRUIT JUICES, VEGETABLES OR YOGURT ADDED TO PROTEIN SHAKES   2  PUREE       2-4 weeks after surgery All Phase 1 liquids  &  Pureed lean meats, seafood and beans  Soft scrambled egg  Low fat dairy    3  SOFT       1-2 months after surgery All Phase 2 food and beverages  &  Cooked fork tender lean meats, fish and seafood  Lean deli meats  Eggs-scrambled, boiled and poached  Fruits and cooked vegetables - no peel   4  SOLID     2-3 Months after surgery and continue lifelong All Phase 3 food and beverages.   &  Raw vegetables and lettuce   Fruit with peel  Nuts and seeds  Protein bars with 4 grams of sugar or less   Bariatric High Protein  Liquid Diet: Weeks 1+2 after surgery    Begin Protein Liquid Diet when you get home from the hospital.  Day 1: Aim to finish at least 1 protein supplement (protein powder + water, Isopure clear in glass bottle from [facility], Premier Clear, Protein 2.0) and 1 bottle of water or Crystal Light. Drink more if you can.  Days 2-3: Increase protein shakes and fluids as tolerated. Sip on 1 oz every 15 minutes. May begin mixing protein powder with skim-1% milk or Lactaid/Soy milk as tolerated.   Days 4-6: If you haven't already, try to mix protein powder with skim-1% milk or Lactaid/Soy milk instead of water, to increase calorie and protein intake. May begin to sip on ready-to-drink protein shakes (such as Premier protein shakes), as tolerated.    Protein and Fluid Goals:  1 week after surgery: aim for at least 40 grams of protein per day and at least 24 ounces of water or Crystal Light.  2 weeks after surgery: aim for 80 grams of protein per day and 48 ounces of water or Crystal Light.    Tips:  Ready-to-drink protein shakes and smoothies may be too thick for the first week after surgery, making it difficult to reach your protein goals. Start with clear liquids and advance as tolerated.  Sip slowly and continuously on protein shakes and water throughout the day. Start with 1oz liquids every 15 minutes; advance as tolerated.    Avoid sugary drinks. Limit caffeinated beverages to 8oz per day for the first 2-3 months. Avoid carbonated beverages and drinking through straws to reduce gas and bloating for at least 3 months.     Vitamins/Minerals:  Do not take fish oils, herbal supplements including green tea for 2 weeks before your surgery as a general precaution.  Do not take any vitamin supplements for 1 week prior to surgery  Start taking your vitamins when you get home from the hospital.   See vitamin section on page 20 in the Nutrition Guidebook.    Light Exercise -   If your doctor has cleared you for walking or bike riding  before your surgery, you can continue this after surgery.  Try not to lift anything heavier than 10 lbs for the first 6 weeks after surgery.    When you come to the clinic for your 2 week follow up, the mid-level provider and dietitian will discuss advancing your diet. Please bring in a log of your daily protein and fluid intake. Please bring in your vitamins for review if you have not already. If you have any questions about your diet or vitamins call [phone] and ask for a Bariatric Dietitian. For other questions, please ask for the Bariatric Nurses.  Bariatric High Protein Pureed Diet    Two weeks after surgery, you may be ready to add smooth foods to your diet.  All food should be the consistency of baby food, or thinner.  Follow pureed diet for the next 2 weeks.    Protein - It is very important to pay attention to protein intake during this time.      Inadequate protein intake can cause:  Delayed Wound Healing  Hair Loss  Muscle Breakdown    Meal Plan - Eat 3-4 meals per day (2-4 tbsp each), with protein supplements in between to meet protein needs.  Meeting protein needs daily will help increase healing, decrease muscle loss, and increase weight loss.  Your goal is  grams of protein a day.    Protein First - Always eat the foods with the highest protein first.  Foods high in protein include milk, yogurt, cheese, egg whites, beans. Lean meats and seafood can be pureed with a small amount of liquid, such as broth, in a  or  to baby food consistency.    Fluids - Keep track in your journal of how much you are drinking; you should try to drink at least 48oz of fluids every day.      Foods allowed: Portion size Protein (g)   Sugar-free clear liquids As desired 0   Skim or 1% milk 8oz 8   Greek yogurt 5 oz 15   Lean meats or shrimp, pureed 1 oz 7   Beans (red, white, black, lima, anna, fat free refried, hummus) and lentils ¼ cup 4   Low-fat/fat free cheese.(cottage cheese,  mozzarella string cheese, ricotta cheese, Laughing Cow, Baby Bell, cheddar, etc) ¼ cup 7-8   Scrambled eggs or Egg Beaters 1 or ¼ cup 6   Edamame or Tofu, mashed ¼ cup 5   Unflavored protein powder (add to 1 scoop to  98% fat free soups or SF pudding) 3 Tbsp 9   *PB2: peanut powder (45 calories) 2 Tbsp 5     *PB2 powdered peanut butter: 45 calories vs. 190 calories in 2 tbsp of regular peanut butter. Purchase online at [employer], or at [employer], [employer] and [employer].        Bariatric Liquid/Pureed Sample Menu    3-4 small meals plus 2-3 protein drinks per day.    8-8:30am 1 egg or ¼ cup Egg Beaters   9-9:30am 1 cup water, or decaf coffee or tea   10-10:30am Protein drink, 30g protein   11-11:30am 2 tbsp low-fat cottage cheese, and 1 tbsp pureed peaches   12-12:30pm 1 cup water, or sugar-free lemonade    1-1:30pm 2 tbsp pureed chicken, and 1 tbsp pureed carrots    2-2:30pm 1 cup water, or sugar-free lemonade   3-3:30pm Protein drink, 30g protein   5-5:30pm 1 cup water    6-6:30pm 1 cup hi-protein creamy chicken soup 14g protein (see Recipe below)   7-7:30pm 1 cup water, or sugar-free fruit punch    8-8:30pm 1 cup water     This sample menu provides approx. 80g protein and 48oz fluids.  Liquid protein supplements should contain 20-30g protein and less than 4 grams of sugar each.    Sip fluids continuously in between meals.  Drink at least ¼ cup every 15 minutes.  For fluids: ¼ cup = 2 oz = 4 tbsp       RECIPE IDEAS for Bariatric Pureed Diet:    Hi-Protein Creamy Chicken Soup: (10g protein per 1 cup serving)  Empty 1 can of 98% fat free cream of chicken soup into saucepan. Then blend 1 scoop of unflavored protein powder with 1 can of skim milk until smooth.  Add protein milk to saucepan and heat to warm. (Note: Do NOT boil. Protein powder may clump if heated too hot).     Hi-Protein Pudding: (14g protein per ½ cup serving)  Add 2 scoops protein powder to 2 cups cold skim milk and mix  well.  Stir in dry Jell-O Sugar-Free Instant Pudding mix.  Chill and Enjoy!    Tuna Mousse (12g protein per ¼ cup serving) Page 135 in book Eating Well After Weight Loss Surgery.  In a  or , combine all ingredients and pulse until smooth.  2 6-ounce cans tuna packed in water, drained  2 tbsp low-fat mayonnaise  2 tbsp fat-free sour cream  2 tbsp fat-free cream cheese, softened  ½ cup shallots, finely chopped  1 tbsp lemon juice  ¼ tsp ground pepper  ½ tsp celery seed    Chocolate Peanut Butter Mousse  (28g protein total)  6oz plain Greek yogurt  4 tbsp chocolate PB2  Bariatric High Protein Soft Diet   Begins 4 weeks after surgery               Four weeks after surgery, your stomach may be healed enough to add soft foods to your diet.  Soft foods are those which can be easily mashed with a fork. This diet usually lasts for the next 1-2 months but can last longer depending on each individual.     Remember these principles:  No liquids with meals. Do no drink 30 minutes before meals and wait 30 minutes to 1 hour after meals to start drinking.  Sip on water, sugar-free beverages or non-fat milk throughout the day.  You will most likely need to continue drinking at least 1 protein drink daily to meet protein needs.  Chew foods slowly; at least 30 times. One meal should take 20-30 minutes.  Eat 3-5 meals per day, without any additional snacking.  Stop eating as soon as you feel full.  Avoid using table sugar and foods made with refined sugar, which can trigger dumping syndrome and slow down weight loss.  Marinating meats with a low sugar marinade, adding low-fat salad dressing, or adding low calorie gravy (made from powder and water) can help meats to digest easier.     Adding Vegetables and Fruits:  As long as you are consuming >80g total protein daily from combination of foods and protein drinks, you may start adding small bites of fruits and vegetables to your meals. Cooked, tender vegetables and  ripe fruits without the peel are tolerated best.    *SOFT Non-starchy vegetables include fork tender: green beans, beets, broccoli, brussels sprouts, cabbage, carrots, cauliflower, eggplant, greens, leeks, mushrooms, okra, onions, peppers, spinach, squash, tomatoes and salsa, turnips, low sodium V8, zucchini*          Bariatric High Protein Soft Diet  Begins 4 weeks after surgery    EAT THESE FOODS AVOID THESE FOODS   High in Protein: High in Fat/Sugar:   Canned tuna or chicken (packed in water)  Ground beef or turkey (at least 90% lean)  Turkey or chicken (no skin); cooked tender and cut in small pieces  Lean pork or beef (cook in crock pot until very tender; cut in small pieces  Scrambled, poached, or boiled eggs  Baked, broiled, grilled or boiled fish and seafood (not fried!)  Silken tofu, Edamame (soybeans)  Beans, hummus and lentils  Lean deli meats (turkey and chicken breast, ham, roast beef)  1% or Skim Milk, Lactaid, or Soymilk  Low-fat or fat-free cottage cheese, soft cheese, mozzarella string cheese, or ricotta  Light yogurt, Greek yogurt, SF pudding High fat milk (whole, 2%)  Butter, margarine, oil, mayonnaise  Sour cream, cream cheese, salad dressing  Ice Cream  Cakes, cookies, pies, desserts  Candy  Luncheon meats (bologna, salami, chopped ham)  Sausage, [place]  Gravy  Breaded and Fried Foods  ___________________________________  Tough/Crunchy--------------------------------  Tough or dry meats  Corn   Granola/cereal with nuts  Shredded Coconut    May add after 2 months, as tolerated:  Raw veggies  Lettuce  Plain, Unsalted Nuts and Seeds  Protein bars with 0-4 grams of sugar   As long as you are getting >80g PRO: Starchy Carbohydrates. At goal weight, some may include whole grains in small amounts.   Cooked tender vegetables without peel  Ripe fruits without peel  Frozen fruits with no added sugar  Fruit canned in its own juice or in water  Fat free, sugar free, frozen yogurt White and wheat Bread, Rice,  Pasta   Cereals (including grits, oatmeal)   Crackers, Pretzels, Chips, Granola  Corn, Popcorn, Peas, Quinoa  White Potatoes, Sweet potatoes  Flour and corn tortillas     Fluids: Always Avoid:   Skim/1% milk, Lactaid, Soymilk  Water and Sugar-free beverages  (decaf and non-carbonated)  Decaf coffee & decaf tea  Sugary drinks  Carbonated drinks  Alcohol  Drinking through straws     Sample Menu for Bariatric High Protein Soft Diet            3 meals + 2 protein drinks  Remember: No drinking with meals.    Time of Day Day 1 Day 2   7am:    1 egg (or ¼ cup Egg Beaters) ¼ cup low-fat cottage cheese, 1 tbsp berries   7:30-9:30am:  Water/SF beverage Water/SF beverage   10am:  Protein drink  Protein drink   10:30-11:30am:  Water/SF beverage Water/SF beverage   12pm:    1-2 oz grilled shrimp, ¼ cup green beans   1-2oz canned chicken, shredded cheese, 1 tbsp salsa   12:30-2:30pm:  Water/SF beverage Water/SF beverage   3pm:  Protein drink   Protein drink   3:30-5:30pm:  Water/SF beverage Water/SF beverage   6pm:  ½ cup low fat chili, 1oz low-fat cheese, ¼ cup broccoli 2 oz grilled fish,  ¼ cup lima beans   6:30-9pm:  Water/SF beverage Water/SF beverage     This sample menu provides approx. 80g protein total, including about 40g protein from foods and at least 40g protein from protein drinks.  Drinking protein drinks daily helps decrease muscle loss, increase weight loss, and prevent hair loss.    Sip fluids continuously in between meals.    For fluids: 1 cup = 8 oz   No drinking from 30 minutes before meals to 30 minutes after meals.    For food: ¼ cup = 4 tablespoons   3oz meat is approx. the size of a deck of cards.    A food scale will help you determine portion size.                     Regular Bariatric Diet: 2-3 months and Beyond  Follow a high protein, low carb, low fat diet LIFELONG:    MAY ADD THE FOLLOWING: slowly, one by one, back in the diet. Please chew foods well and if you do not tolerate certain foods at first,  wait a few more week and try again. Raw/crunchy vegetables (artichoke, asparagus, baby corn, bamboo shoots, bean sprouts, carrots, celery, cherry tomatoes, cucumber, green onions or scallions, lettuce, pea pods, radishes, water chestnuts), plain/unsalted nuts and seeds and Protein bars w/ 0-4 g of sugar per serving (such as: Think Thin, Atkins, Pure Protein, Quest, Cake Bites, Power Crunch, ONE).   CONTINUE TO GET IN BETWEEN  GRAMS OF PROTEIN PER DAY EVERY DAY using foods and low sugar protein shakes   FLUID INTAKE: AIM FOR 64 OUNCES TOTAL FOR THE DAY. This includes ice, sugar-free popsicles, sugar-free jello, low sodium broths and any sugar-free non-carbonated beverages. You may resume caffeinated beverages.  LIMIT FRUITS TO 2 SERVINGS PER DAY. One serving of fruit is 1 small piece of fruit, 1 - ½ cup container of canned fruit (in its own juices or water) or ½ cup cubed fruit  LIMIT UNSALTED PLAIN NUTS AND SEEDS TO ¼ CUP TOTAL PER DAY  Aim to stay between 800-1000 calories per day    SAMPLE MENU STARTING 2-3 MONTHS AFTER SURGERY  Time of Day Day 1 Day 2   7am:    Egg omelet made with 1 egg and 1 slice low-fat cheese Quest Cinnamon Roll Protein bar (warm in microwave for 10-15 seconds)   7:30am-9:30am:  Water/SF beverage water/SF beverage   10am:  1 ounce turkey with 1 light string cheese ¼ cup unsalted nuts + ½ banana   10:30-11:30am:  water/SF beverage water/SF beverage   12pm:    Grilled chicken (2 ounces) salad with 1 Tablespoon of low-fat Italian dressing and 1 apple  Taco Lettuce wraps: 1-2 oz of lean ground meat, sprinkle of low fat cheese, tomatoes, salsa wrapped in lettuce leaves   12:30pm-2:30pm:  water/SF beverage water/SF beverage   3pm:  Protein drink   Light yogurt   3:30-5:30pm:  water/SF beverage water/SF beverage   6pm:  Grilled shrimp kebobs (2 ounces shrimp, pineapple chunks, bell pepper and onions) with ¼ cup sautéed spinach and garlic (use Lida spray)   ½ cup Red Beans (no rice) served over  ¼ cup cauliflower rice   6:30pm-9pm:  water/SF beverage water/SF beverage   9pm:  1 tbsp slivered almonds sprinkled over 6 oz Greek yogurt container Protein shake         Protein Content of Foods Recommended after  Weight Loss Surgery    Food Name Portion Calories Protein (gms)   Almonds (unsalted) 1/4 cup 160 6   [place] milk, unsweetened 1 cup 30  1   Beef, Roast 1 oz 46 8   Beef, Steak, sirloin, trimmed 1 oz 55 9   Catfish, broiled or baked 1 oz 30 5   Cheese, American FF 1 oz 40 6   Cheese, Cottage 1% fat ¼ cup 41 7   Cheese, Parmesan, grated 2 tsp 20 2   Cheese, Mozzarella, part skim 1 oz 78 8   Cheese, part skim Ricotta ¼ cup 90 8   Chicken, white breast w/o skin 1 oz 46 9   Chicken, leg w/o skin 1 oz 54 7   Crab, steamed ¼ cup  40 9   Crawfish tails, boiled ¼ cup 35 8   Edamame, shelled ¼ cup 50 4   Egg 1 78 6   Ham, lean 5% 1 oz 44 7   Hamburger, lean 1 oz 56 7   Hummus ¼ cup 100 5   Milk, skim or 1%  1 cup 90 8   Milk, Fairlife non-fat 1 cup 80 13   Pork Tenderloin 1 oz 46 7   Pudding, SF 1 serv 60 2   Red beans ¼ cup 56 4   Refried beans, fat free ¼ cup 65 4   [place], baked 1 oz 52 7   Shrimp, steamed 1 oz 28 6   Soymilk, plain ½ cup 40 3   Tilapia, white fish, cooked 1 oz 36 8   Tofu ¼ cup 47 5   Trout 1 oz 48 7   Tuna, canned in water 1 oz 37 8   Turkey, white meat 1 oz 35 7   Veal Loin 1 oz 50 7   Yogurt, SF, frozen vanilla 6 oz 140 7   Yogurt, light 5 oz 80 5   Yogurt, Greek 5 oz  12-15     *Abbreviations: SF=sugar free, LF=low fat, FF= fat free, gms=grams  *3oz of cooked meat/protein = size of deck of cards or ladies palm   *1oz cheese = 1 inch cube or 1 slice American cheese  Lifelong Nutrition Guidelines after   Weight Loss Surgery    Weight Loss Surgery is designed to help people lose weight after previous attempts at weight loss have failed.  However, safe and successful weight loss with this procedure requires you to make a commitment.  A commitment to change current eating habits and  behavior is essential to develop substantial weight loss.    As your stomach is greatly reduced following surgery (to that of a small egg), your nutritional intake is one of the most important aspects of your treatment.  Adequate nutrition helps in the healing of your incisions, preventing gastric discomfort, and in maintaining your nutritional health.    Why should you follow this diet?  You could develop nutrient deficiencies, which may consequently affect your health.  You may not achieve the maximal amount of weight loss, or the rate of weight loss may slow down.    The following guidelines have been developed to assist you in making these changes.      Eat Slowly.  Immediately after surgery the stomach is swollen and needs time to heal.  Eating too quickly may cause you to over fill your pouch and bring forth discomfort (i.e. nausea and vomiting).  Eat and Drink Small Amounts at a Time.  Learn to sip.  Try not to fill your entire mouth with food or fluid.  Use a baby spoon and a 2oz medicine cup to help determine a safe amount.  Stop Eating or Drinking When You Feel Full.  Learn to listen to your body.  If you are unable to recognize fullness, consume only the quantity of food recommended.  Eating or drinking too much may eventually stretch your pouch and prevent you from achieving maximal weight loss.  Also, overeating may cause you to have nausea and vomiting.  Chew Food Thoroughly Before Swallowing.  Try to chew each bite 30 times before swallowing.  A big chunk of food could get caught and make you very uncomfortable.  Drink Adequate Fluids in between meals to Prevent Dehydration.  Consume at least 6 cups of liquids per day (>48 oz).  No drinking with meals!  Eat Protein Rich Foods First.  This is necessary to meet your protein needs.  Protein is necessary to promote adequate healing and to help you maintain lean body mass as you lose weight.  Aim for >80 grams of protein per day.  Keep Your Food Choices  Sugar Free and Low in Fat.  Foods high in sugar and fat may cause diarrhea and abdominal discomfort, or the Dumping Syndrome.  Avoid Starchy Carbohydrates (bread, rice, pasta, corn, peas, potatoes, crackers, pretzels, chips, grits, oatmeal, dry cereals/granola, tortillas).         Common Nutritional Problems and Prevention Tips   Nausea and vomiting   Cause: overeating or eating too quickly   Prevention tip: eat slowly, chew your food very well, and stop eating as soon as you feel full   Chronic malnutrition problems   Cause: nutrients are absorbed differently following surgery   Symptoms: fatigue and aching muscles; tingling feet, calves or hands  Prevention tip: eat a healthy diet and always take your vitamin and mineral supplements   Lactose intolerance   Symptoms: gas, bloating, cramping and diarrhea after drinking milk   Prevention tips: Switch to lactose-free milk such as: Fairlife non-fat milk, unsweetened soy milk, unsweetened almond milk. Refer to lactose-free protein supplement suggestions on pg. 4.  Gas and Bloating  Cause: digestive tract changes after surgery  Prevention tips: eat slowly, avoid overeating, avoid carbonated beverages and drinking through straws. Try switching to lactose-free protein drinks. Try using Gas-X chewables.  Temporary hair loss   Cause: rapid weight loss and/or lack of protein in the diet   Prevention tip: eat the amount of protein recommended by your Registered Dietitian   Dehydration   Cause: Not drinking enough fluids; or persistent vomiting  Symptoms: dark and strong smelling urine, dry mouth, headache and fatigue   Prevention tip: take frequent sips of liquid throughout the day   Dumping syndrome (Gastric Bypass)  Cause: food emptying too quickly from the stomach   Symptoms: diarrhea, nausea, cold sweats and light-headedness   Prevention tips: avoid consuming sugary foods or beverages, drinking fluids too soon after a meal, or eating high fat foods   Constipation   Cause:  food and fiber intake are reduced following surgery   Prevention tips:   Drink at least 48 ounces water daily in addition to protein drinks  Exercise daily   Try Miralax (stool softener). No laxatives if possible.  Try a fiber supplement (Metamucil or Benefiber)                Physical Activity    Physical activity and exercise are essential to achieve and maintain your weight loss goals. Activity and exercise beginning right after your surgery will help you feel better, recover faster and minimize the likelihood of post-surgical complications. Staying active promotes mental well-being, relieves stress and reduces feelings of depression and anxiety. You feel good about your body when you exercise regularly, and therefore have a healthier body image.       Choose a form of activity or exercise that you enjoy.    Ask a friend or family member to participate in an activity or exercise with you. Think of it as a jose system.    Join an exercise club or class.   Listen to your favorite music as you exercise.    Try mall walking, aerobics, swimming or dancing.    Park 15 minutes from your destination and walk.   Use stairs instead of the elevator.    Prioritize activity and exercise time into your schedule.    Keep a record or journal of your activity.      Required Vitamin/Mineral Supplements:    Sleeve Gastrectomy: No swallowing large whole vitamins/minerals for 2 weeks after surgery  Gastric Bypass: No swallowing large whole vitamins/minerals for 1 month after surgery    *NO gummy multivitamins due to poor quality and sugar content.  *Do NOT take calcium citrate and Iron within 2 hours of each other due to poor absorption.  *Pills may be swallowed if the size of a No. 2 pencil eraser (or smaller). They may be cut to this size with a pill cutter and swallowed if tolerated. Compare to this size:        Bariatric surgery patients will need the following Vit/Min   FOR LIFE:    Multivitamin with or without Iron, 2 per  day  At least 18mg Iron, if not included in multivitamin  50mg Thiamine (Vit B1) per day  Calcium Citrate + Vit D  500mg 3 times per day OR 600mg twice per day  Vit B12 sublingual (dissolve under the tongue for 30 sec)   500mcg per day, 2500mcg per wk, or 5000mcg every 2 wks        Suggested vitamin regimen for first 2-4 weeks after surgery:  Flintstones Complete (chewables, not gummies)  EZ Melts 25mg Thiamine (Amazon)  Nature's Way liquid Calcium Citrate + Vit D OR GNC Calcium Citrate soft chews  B12 Sublingual       Bariatric Vitamins Online:  www.bariatricadvantage.Dinetouch  www.celebratevitamins.com      [facility], 1st Floor:  Leader's Children's Multivitamin with Iron  Leader's Calcium Citrate with Vitamin D petites OR Nature's Way Liquid Calcium Citrate  Leader's B-1, 100mg tablet. Take one every other day.  Leader's Sublingual B12          Resources for Bariatric Patients:    Before & After: Living & Eating Well After Weight Loss Surgery by Laurie Manriquez. [employer], 2004. www.bariatriceating.Dinetouch   Eating Well After Weight Loss Surgery by Wen Mcghee & Srini Kumar. [employer] Press, 2004.  Weight Loss Surgery for Dummies by Cielo Haines MD, FACS. [employer], 2005.  Exodus from Obesity: The Guide to Long-Term Success After Weight Loss Surgery by Bernice Qiu.  Press, 2003.  Weight Loss Surgery: Finding the Thin Person Hiding Inside You by Carol Ann Tenorio. Word Association, 2003.  The Don't Diet Live It Workbook by Dimas Yadav & Joan Walker available from www.Fonix   The Overeater's Journal by Jana Junior. [employer] also available at www.Amazon.com   Moving Away from Diets available from www.nourishingconnections.com   Overeater's Anonymous--online, in-person, and telephone meetings available; www.oa.org.  Online Food Database at wwwUanbai to find out the calories, protein, sugar, fat, fiber, etc of any certain food.  50 Ways to  "Soothe Yourself Without Food by Laurie Dupree PSY.D. [employer] Mommy, 2009.     Helpful Apps for Bariatric Patients:    [facility] Code: [id number]  [facility]  Diabetes logbook by Johanna (track blood sugar, upload meal pics)        TEN TIPS FOR HEALTHY AND CONSCIOUS EATING  Keep track of everything you eat and drink. Write it down as soon as you swallow so you don't forget! Include the type of food or beverage, amount, time, physical feeling of hunger vs. fullness, etc.   Base meals around LEAN PROTEIN and VEGETABLES, incorporating them into main dishes and as snacks.   Buy plenty of fresh or frozen FRUITS and VEGETABLES to keep on hand, wash and chop them (if applicable) ASAP, and snack on them ANYTIME! Eat at least 2 servings of fresh fruits and 3 servings of vegetables each day.   Eat throughout the day rather than "saving" your appetite for a huge meal. Your body can only use so much fuel at a time, so extra will more likely be stored as fat! Smaller, more frequent meals (every 3-5 hours) will help keep your energy level more consistent. Start listening to your body's signals regarding hunger and fullness!   Keep "junk food" and "trigger" foods out of the house. Make a special trip to the store when you MUST have it and savor it.   Include good sources of protein with your meals: chicken, fish, shellfish, legumes, eggs, dairy products, soy products, and lean meats.   Use low fat, fat free and lean dairy and animal products. High fat animal products tend to have a lot of saturated fat, which promotes high blood cholesterol levels.   Choose calories you can chew - that means drinking more water instead of juice, sports drinks, regular soda, alcohol, and specialty coffees.   Shut off the TV, put down the book or newspaper, and turn off the computer whenever you eat - this includes meals and snacks. People tend to eat larger portions when snacking in front of the tube, and the " "foods chosen are often high in fat, sugar and calories. What's more, when you associate eating with particular activities, you may automatically look for something to eat when engaging in those activities, regardless of hunger.   Plan ahead for meals and snacks, have foods on hand to prepare them, and pack them "to go" if necessary. If you wait until you're really hungry, there may not be many healthy choices around to choose from.            (Revised 4.2022)                               "

## 2022-09-07 NOTE — PROGRESS NOTES
The patient location is: home ([place])  The chief complaint leading to consultation is: morbid obesity    Visit type: audiovisual    Face to Face time with patient: 60 min  60 minutes of total time spent on the encounter, which includes face to face time and non-face to face time preparing to see the patient (eg, review of tests), Obtaining and/or reviewing separately obtained history, Documenting clinical information in the electronic or other health record, Independently interpreting results (not separately reported) and communicating results to the patient/family/caregiver, or Care coordination (not separately reported).         Each patient to whom he or she provides medical services by telemedicine is:  (1) informed of the relationship between the physician and patient and the respective role of any other health care provider with respect to management of the patient; and (2) notified that he or she may decline to receive medical services by telemedicine and may withdraw from such care at any time.        NUTRITIONAL CONSULT    Referring Physician: Telly Purcell M.D.   Reason for MNT Referral: Initial assessment for sleeve gastrectomy work-up    PAST MEDICAL HISTORY:   51 y.o. female  Weight history includes She has struggled with excess weight since childhood. Has been on diets since childhood. Her highest adult weight was 330 lbs at age 41, and her lowest adult weight was 250 lbs at age 17. The patient has tried medications for weight loss, Did well with Nutri system 30 years ago but gained the weight back once she started eating real food again. The patient was most successful in 2019 when she was not working she was doing hours of exercise per day ([place] and walking on treadmill) and calorie counting - very low calorie diet of 600-800 eusebia per day (tracking on Fit Bit) with a weight loss of 90 lbs over 4 months.  Her current exercise includes walking 5 times a week.     Past Medical History:    Diagnosis Date    Breast lump 5/17/2019    Diabetes mellitus     Hypertension     Thyroid disease        CLINICAL DATA:  51 y.o.-year-old White female.  Height: 5 ft. 9 in.  Weight: 300 lbs  IBW: 160 lbs  BMI: 44.3  The patient's goal weight (50% EBW): 230 lbs  Personal goal weight: 185-215 lbs    Goal for Bariatric Surgery: to improve health, to improve quality of life, to lose weight, and to prevent future medical conditions    DAILY NUTRITIONAL NEEDS: pre-op nutritional bariatric guidelines to promote weight loss  0209-4617 Calories    Grams Protein    NUTRITION & HEALTH HISTORY:  Greatest challenge: starchy CHO and portion control    Current diet recall:     B: 1/2 cup oats + tsp brown sugar  L: dinner leftovers OR sandwich OR  D: Subway foot long with egg whites, cheese, onions, gallegos, s&p. Large diet Sprite. 3 large cookies    Current Diet:  Meal pattern: 3 meals.  Protein supplements: Has used in the past as a meal replacement. Premier shakes.  Snacking: not a big snacker.  Vegetables: Likes a variety. Eats almost daily.  Fruits: Likes a variety. Eats never due to sugar content.  Beverages: water, diet soda, coffee without sugar, and 2% milk  Dining out: Weekly. Mostly restaurants and take-out. Chinese, Mexican.   Cooking at home: Weekly. Mostly baked, grilled, and smothered meat, fish, starchy CHO, and vegetables. Turkey burger on bun with cheese and raw cabbage. Baked/grilled chicken/shrimps. Frozen pizza. Chicken stir roblero with cauli-rice and mixed veggies. Battered and air fried fish/chicken with air fried potatoes.     Exercise:  Past exercise: Adequate    Current exercise: None  Restrictions to exercise: lack of motivation    Vitamins / Minerals / Herbs:   MV with Iron, biotin, Vit C, Vit D, fish oil, B12    Labs:   None available at time of visit    Food Allergies:   none    Social:  Works regular daytime shifts. Works from home - seated at computer for 8 hours.  Lives with her .  Grocery  shopping and food prep done by both.  Patient believes the household will be supportive after surgery.  Alcohol: None.  Smoking: None.    ASSESSMENT:  Patient reports attempts at weight loss, only to regain lost weight.  Patient demonstrated knowledge of healthy eating behaviors and exercise patterns; admits to not eating healthy and not exercising at this point.  Patient demonstrates willingness to change lifestyle and make behavior modifications as evidenced by dietary changes, increased vegetables, and healthier cooking at home.        Barriers to Education: none    Stage of change: determination    NUTRITION DIAGNOSIS:     Morbid Obesity related to Physical inactivity as evidence by BMI.    BARIATRIC DIET DISCUSSION/PLAN:  Discussed diet after surgery and related to patient's food record.  Reviewed nutrition guidelines for before and after surgery.  Answered all questions.  Continue to review Bariatric Nutrition Guidebook at home and call with any questions.  Work on Bariatric Nutrition Checklist.  Begin trying various protein supplements to determine preference.  1200-calorie diet.  5-6 meals per day.  Start including protein supplements in the diet plan daily.  Increase exercise.    RECOMMENDATIONS:  Patient is a potential candidate for bariatric surgery - Sleeve.    Follow up in one month.  Needs additional visit(s) with RD.    Patient verbalized understanding.    Expect fair  compliance after surgery at this time.    Communicated nutrition plan with bariatric team.    SESSION TIME:  60 minutes

## 2022-09-10 ENCOUNTER — PATIENT MESSAGE (OUTPATIENT)
Dept: BARIATRICS | Facility: CLINIC | Age: 52
End: 2022-09-10
Payer: OTHER GOVERNMENT

## 2022-09-18 ENCOUNTER — PATIENT MESSAGE (OUTPATIENT)
Dept: INTERNAL MEDICINE | Facility: CLINIC | Age: 52
End: 2022-09-18
Payer: OTHER GOVERNMENT

## 2022-09-18 DIAGNOSIS — E66.01 CLASS 3 SEVERE OBESITY DUE TO EXCESS CALORIES WITH SERIOUS COMORBIDITY AND BODY MASS INDEX (BMI) OF 40.0 TO 44.9 IN ADULT: Primary | ICD-10-CM

## 2022-09-19 ENCOUNTER — PATIENT MESSAGE (OUTPATIENT)
Dept: INTERNAL MEDICINE | Facility: CLINIC | Age: 52
End: 2022-09-19
Payer: OTHER GOVERNMENT

## 2022-09-21 DIAGNOSIS — E11.9 TYPE 2 DIABETES MELLITUS WITHOUT COMPLICATION, UNSPECIFIED WHETHER LONG TERM INSULIN USE: ICD-10-CM

## 2022-09-28 ENCOUNTER — PATIENT MESSAGE (OUTPATIENT)
Dept: ADMINISTRATIVE | Facility: HOSPITAL | Age: 52
End: 2022-09-28
Payer: OTHER GOVERNMENT

## 2022-09-29 ENCOUNTER — PATIENT MESSAGE (OUTPATIENT)
Dept: PSYCHIATRY | Facility: CLINIC | Age: 52
End: 2022-09-29
Payer: OTHER GOVERNMENT

## 2022-09-30 ENCOUNTER — OFFICE VISIT (OUTPATIENT)
Dept: PSYCHIATRY | Facility: CLINIC | Age: 52
End: 2022-09-30
Payer: OTHER GOVERNMENT

## 2022-09-30 DIAGNOSIS — E11.9 TYPE 2 DIABETES MELLITUS WITHOUT COMPLICATION, WITHOUT LONG-TERM CURRENT USE OF INSULIN: ICD-10-CM

## 2022-09-30 DIAGNOSIS — E03.9 ACQUIRED HYPOTHYROIDISM: ICD-10-CM

## 2022-09-30 DIAGNOSIS — E11.59 HYPERTENSION ASSOCIATED WITH DIABETES: ICD-10-CM

## 2022-09-30 DIAGNOSIS — E66.01 MORBID OBESITY DUE TO EXCESS CALORIES: ICD-10-CM

## 2022-09-30 DIAGNOSIS — I15.2 HYPERTENSION ASSOCIATED WITH DIABETES: ICD-10-CM

## 2022-09-30 DIAGNOSIS — Z01.818 PREOP EXAMINATION: Primary | ICD-10-CM

## 2022-09-30 DIAGNOSIS — Z86.59 HISTORY OF DEPRESSION: ICD-10-CM

## 2022-09-30 PROCEDURE — 90791 PSYCH DIAGNOSTIC EVALUATION: CPT | Mod: 95,,, | Performed by: PSYCHOLOGIST

## 2022-09-30 PROCEDURE — 90791 PR PSYCHIATRIC DIAGNOSTIC EVALUATION: ICD-10-PCS | Mod: 95,,, | Performed by: PSYCHOLOGIST

## 2022-09-30 NOTE — PROGRESS NOTES
Psychiatry Initial Visit (PhD/LCSW)   Diagnostic Interview - CPT 43066     Date: 9/30/2022    The patient location is: Patient's home in [place], [place].  The chief complaint leading to consultation is: pre-surgery evaluation  Visit type: audiovisual    Face to Face time with patient: 30 minutes  60 minutes of total time spent on the encounter, which includes face to face time and non-face to face time preparing to see the patient (eg, review of tests), Obtaining and/or reviewing separately obtained history, Documenting clinical information in the electronic or other health record, Independently interpreting results (not separately reported) and communicating results to the patient/family/caregiver, or Care coordination (not separately reported).     Each patient to whom he or she provides medical services by telemedicine is:  (1) informed of the relationship between the physician and patient and the respective role of any other health care provider with respect to management of the patient; and (2) notified that he or she may decline to receive medical services by telemedicine and may withdraw from such care at any time.    Referral source: Telly Purcell M.D.     Clinical status of patient: Outpatient     Ms. Douglass, a 52 y.o.  female, presented for initial evaluation visit. Before this evaluation was initiated, the purposes and process of the assessment and the limits of confidentiality were discussed with the patient who expressed understanding of these issues and orally consented to proceed with the evaluation.     Chief complaint/reason for encounter: Routine psychological evaluation prior to bariatric surgery.     Type of surgery sought:  sleeve gastrectomy    History of present illness:  Ms. Douglass is a 52 y.o.   female who is pursuing bariatric surgery to improve her health and quality of life.  She reported a history of depression beginning about 15 years ago after  her divorce.  She briefly engaged in therapy and started psychiatric medication.  She is currently prescribed Trintellix which she noted maintains the depression well.  She denied any history of suicidality or non-suicidal self-injury.  She has attempted making positive lifestyle changes in anticipation for surgery, with reported benefit.  The patient has a Body Mass Index of 44.3 kg/m² as documented by the referring provider.    Ms. Douglass has struggled with weight since childhood.  She noted, One of my full biological sisters looks just like me, just in the family.  Factors that have contributed to her weight gain over the years include:  diagnosed with Hashimoto's at 19 years old and genetics.  She noted she has lost and gained 100 pounds about 5 times in her life.  Ms. Douglass denied that she is an emotional eater.  She has tried many weight loss methods on her own (i.e., naltrexone-bupropion, rybelsus, Nutrisystem.  The last time she lost almost 100 pounds she was eating 600 calories a day and biking 10 miles a day.  She noted that she is working, this is not sustainable.  She believes that her biggest weight loss challenge is her genetics.  Her motivation for seeking surgery now is I'm old and I want to get older.  My daughter is trying to get pregnant, and I want to be around for another 30 years or so.  Her postsurgical goals include to become more involved, start doing more recreational things that I don't do because I'm heavy.    Ms. Douglass has met with bariatric nutritionist Gloria Wilson RD, and reports that she has made the following lifestyle changes since beginning the bariatric program:  increased protein intake, eliminated carbs, etc.  She must continue meeting with Ms. Wilson to demonstrate the implementation of lifestyle changes prior to clearance for bariatric surgery.  She chose the gastric sleeve because it is less invasive.  She understood that she needs to focus on protein  intake after surgery, which includes high protein meals and protein shakes.  She noted that she will need to stay away from carbs after surgery.  She hopes to lose 100 pounds at least and expects it will take not long.  She plans to take as much time as recommended to recover after surgery.  Her  will be available to help her during recovery.    Medical History:  diabetes mellitus type 2 non-insulin dependent, essential hypertension, depression, and anxiety    Current medications and drug reactions:  see medication list.    Pain:  None reported.    Psychiatric Symptoms:  Depression:  Denied.  She denied episodes of depressed mood or depression-related anhedonia, lack of motivation, lethargy, difficulty concentrating, feelings of worthlessness or guilt, hopelessness, appetite changes, or psychomotor changes.  She obtained a score of 4 on the PHQ-9, indicating minimal depression.  Eleanor/Hypomania:  Denied.  She denied periods of elevated mood or abnormally increased energy or goal-directed activity.  Anxiety:  Denied.  She denied experiencing excessive, exaggerated anxiety that was unmanageable.  She obtained a score of 2 on the TAMARA-7, indicating minimal anxiety.  Thoughts:  Denied delusions, hallucinations.  Suicidal thoughts/behaviors:  Denied.  Self-injury:  Denied.  Sleep:  She takes Xanax and amitriptyline every night (Xanax probably 10 years), which knocks me out.  Cognitive functioning:  Denied problems.    History of eating disorders:  History of bulimia:  She denied recurrent episodes of binge eating and inappropriate compensatory behaviors.    History of binge-eating episodes:  She denied eating excessive amounts of food within a discrete time period with a lack of control during eating.  She denied eating more rapidly than normal, eating until uncomfortably full, eating large amounts of food when not physically hungry, eating alone due to embarrassment, or negative emotions (i.e., disgusted,  guilty, depressed) afterwards.    Current psychosocial stressors:  She stated, not really; work is good; I got a new puppy. We're just a typical couple that works, have the evenings together and get up and do it the next day, Monday through Friday.  Report of coping skills:  She noted if she is stressed, it comes out as anger, frustration, and yelling.  To cope with this, she likes to be alone.  She noted this does not happened often now since working from home.  Support system:  my  and my daughters  Strengths and liabilities:  Strength: Patient accepts guidance/feedback, Strength: Patient is expressive/articulate., Strength: Patient is intelligent., Strength: Patient is motivated for change., Strength: Patient has positive support network., Strength: Patient has reasonable judgment., Strength: Patient is stable.    Current and past substance use:  Alcohol: Denied current use.  She started having stomach problems and stopped drinking any alcohol and coffee.  She denied history of abuse or dependency.   Drugs: Denied current use; denied history of abuse or dependency.  Tobacco: None.   Caffeine: She drinks diet ginger ale.    Current Psychiatric Treatment:  Medications:  Trintellix, Xanax for sleep, amitriptyline   Psychotherapy:  Denied.    Psychiatric History:  Previous diagnosis:  Depression  Previous hospitalizations:  None reported.  History of outpatient treatment:  She reported when she was  after 17 years of marriage, she tanked out.  She saw a therapist for about 5 sessions and started on medication about 15 years ago.  She currently takes Trintellix for depression, which she noted is well-managed.   Previous suicide attempt:  Denied.  Family history of psychiatric illness:  She met her biological family about 15 years ago.  She stated, I would say probably so because they are a functional, dysfunctional family.    Trauma history:  Denied.    Social history (marriage, employment, etc.):   Ms. Douglass was born in [place], was adopted and raised in [facility] by her adopted parents along with two adopted brothers.  She reunited with her biological family about 15 years ago.  Her father was on his death bed when she met him.  When she met her mother, she was glad she was adopted.  She also met her full sister and full brother and 4 half-sisters.  She has maintained contact with them all.  She described her childhood as I was a latchkey kid; both my parents were professionals; always well taken care of and provided for.  She denied childhood trauma, abuse, and neglect.  She struggled in high school, just wasn't a math person.  She did not graduate high school, but she obtained her GED and went to college.  She obtained a bachelor's degree in human resources management.  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  She is not on disability and finances are adequate.  She has been  to her second  for 3 years.  She moved to [place] 10 years ago for him.  She has two daughters (ages 27--nurse in the NICU and 23--student at [employer]).  She currently lives with her  in [place], [place].  She and her  love going to any kind of concerts and festivals.      Legal history: Denied.    Mental Status Exam:   General appearance:  appears stated age, neatly dressed, well groomed  Speech:  normal rate and tone  Level of cooperation:  cooperative  Thought processes:  logical, goal-directed  Mood:  euthymic  Thought content:  no illusions, no visual hallucinations, no auditory hallucinations, no delusions, no active or passive homicidal thoughts, no active or passive suicidal ideation, no obsessions, no compulsions, no violence  Affect:  appropriate  Orientation:  oriented to person, place, and date  Judgment and insight: fair  Language:  intact    Diagnostic Impression:    ICD-10-CM ICD-9-CM   1. Preop examination  Z01.818 V72.84   2. Morbid  obesity due to excess calories  E66.01 278.01   3. Acquired hypothyroidism  E03.9 244.9   4. Hypertension associated with diabetes  E11.59 250.80    I15.2 401.9   5. Type 2 diabetes mellitus without complication, without long-term current use of insulin  E11.9 250.00   6. BMI 40.0-44.9, adult  Z68.41 V85.41   7. History of depression  Z86.59 V11.8     Summary/Conclusion:   There are no overt psychological contraindications for proceeding with bariatric surgery.  The patient has a history of depression and sleep difficulties.  The current psychiatric medications she is prescribed has both of these problems well-managed, and she denied any significant psychiatric symptoms currently.  The patient has reasonable expectations for surgery, good social support, and has already begun making appropriate lifestyle changes in anticipation for surgery. The patient has verbalized appropriate awareness and commitment to the necessary behavioral changes associated with bariatric surgery and appears willing to comply with long-term lifestyle changes.  There are no recommendations for psychological treatment at this time.  The patient is aware of resources available should her needs change in the future.    Recommendations:    Clear     This patient is psychologically cleared to proceed with bariatric surgery.     Length of Session:  30 minutes

## 2022-10-03 ENCOUNTER — TELEPHONE (OUTPATIENT)
Dept: BARIATRICS | Facility: CLINIC | Age: 52
End: 2022-10-03
Payer: OTHER GOVERNMENT

## 2022-10-03 NOTE — TELEPHONE ENCOUNTER
----- Message from Pat Tyler NP sent at 9/30/2022  9:03 AM CDT -----    ----- Message -----  From: Nicky Edmondson, PhD  Sent: 9/30/2022   8:55 AM CDT  To: Pat Tyler NP    This patient is psychologically cleared for bariatric surgery.

## 2022-10-06 ENCOUNTER — PATIENT MESSAGE (OUTPATIENT)
Dept: BARIATRICS | Facility: CLINIC | Age: 52
End: 2022-10-06

## 2022-10-06 ENCOUNTER — CLINICAL SUPPORT (OUTPATIENT)
Dept: BARIATRICS | Facility: CLINIC | Age: 52
End: 2022-10-06
Payer: OTHER GOVERNMENT

## 2022-10-06 DIAGNOSIS — E66.01 MORBID OBESITY WITH BMI OF 40.0-44.9, ADULT: ICD-10-CM

## 2022-10-06 DIAGNOSIS — E11.9 TYPE 2 DIABETES MELLITUS WITHOUT COMPLICATION, WITHOUT LONG-TERM CURRENT USE OF INSULIN: Primary | ICD-10-CM

## 2022-10-06 DIAGNOSIS — Z71.3 DIETARY COUNSELING: ICD-10-CM

## 2022-10-06 DIAGNOSIS — E11.59 HYPERTENSION ASSOCIATED WITH DIABETES: ICD-10-CM

## 2022-10-06 DIAGNOSIS — I15.2 HYPERTENSION ASSOCIATED WITH DIABETES: ICD-10-CM

## 2022-10-06 PROCEDURE — 99499 NO LOS: ICD-10-PCS | Mod: 95,,, | Performed by: DIETITIAN, REGISTERED

## 2022-10-06 PROCEDURE — 99499 UNLISTED E&M SERVICE: CPT | Mod: 95,,, | Performed by: DIETITIAN, REGISTERED

## 2022-10-06 PROCEDURE — 97803 PR MED NUTR THER, SUBSQ, INDIV, EA 15 MIN: ICD-10-PCS | Mod: 95,,, | Performed by: DIETITIAN, REGISTERED

## 2022-10-06 PROCEDURE — 97803 MED NUTRITION INDIV SUBSEQ: CPT | Mod: 95,,, | Performed by: DIETITIAN, REGISTERED

## 2022-10-06 NOTE — PROGRESS NOTES
The patient location is: home ([place])  The chief complaint leading to consultation is: morbid obesity    Visit type: audiovisual    Face to Face time with patient: 30 min  30 minutes of total time spent on the encounter, which includes face to face time and non-face to face time preparing to see the patient (eg, review of tests), Obtaining and/or reviewing separately obtained history, Documenting clinical information in the electronic or other health record, Independently interpreting results (not separately reported) and communicating results to the patient/family/caregiver, or Care coordination (not separately reported).         Each patient to whom he or she provides medical services by telemedicine is:  (1) informed of the relationship between the physician and patient and the respective role of any other health care provider with respect to management of the patient; and (2) notified that he or she may decline to receive medical services by telemedicine and may withdraw from such care at any time.    NUTRITIONAL Note     Referring Physician: Telly Purcell M.D.   Reason for MNT Referral: Follow up assessment for sleeve gastrectomy work-up     52 y.o. female presents with 5 lbs weight loss over the past month by making numerous dietary and lifestyle changes in preparation for bariatric surgery.          Past Medical History:   Diagnosis Date    Breast lump 5/17/2019    Diabetes mellitus      Hypertension      Thyroid disease           CLINICAL DATA:  52 y.o.-year-old White female.  Height: 5 ft. 9 in.  Weight: 295 lbs  IBW: 160 lbs  BMI: 43.5 (from 44.3)     DAILY NUTRITIONAL NEEDS: pre-op nutritional bariatric guidelines to promote weight loss  [id number] Calories    Grams Protein     NUTRITION & HEALTH HISTORY:  Greatest challenge: starchy CHO and portion control     Current diet recall:      B: oikos triple zero  L: Prot shake  D: [employer] salad bowl with egg and veggies and light dressing OR turkey  burger john wrapped in lettuce leaf with cheese and raw cabbage OR eggs with turkey sausage patties     Current Diet:  Meal pattern: 2 meals + 1 protein supplement  Protein supplements: Premier shakes OR Homemade protein velvet ice (EAS prot powder + espresso powder + 2oz milk, 2oz water, crushed ice)   Snacking: not a big snacker.  Vegetables: Likes a variety. Eats daily.  Fruits: Likes a variety. Eats never due to sugar content.  Beverages: water, MM light lemonade watered down, diet soda, coffee without sugar and 2% milk  Dining out: Weekly. Mostly restaurants and take-out. Subway salad bowls with protein.  Cooking at home: Weekly. Mostly baked, grilled, and smothered meat, fish, and vegetables.      Exercise:  Walking the dog at lunch time about 15 min  Restrictions to exercise: time management - busy with work     Vitamins / Minerals / Herbs:   MV with Iron, biotin, Vit C, Vit D, fish oil, B12     Labs:   None available at time of visit     Food Allergies:   none     Social:  Works regular daytime shifts. Works from home - seated at [employer] for 8 hours.  Lives with her .  Grocery shopping and food prep done by both.  Patient believes the household will be supportive after surgery.  Alcohol: None.  Smoking: None.     ASSESSMENT:  Patient demonstrated knowledge of healthy eating behaviors and exercise patterns  Patient demonstrates willingness to change lifestyle and make behavior modifications as evidenced by weight loss, exercise, dietary changes, increased vegetables, and healthier cooking at home.           Barriers to Education: none     Stage of change: action     NUTRITION DIAGNOSIS:    Morbid Obesity related to Physical inactivity as evidence by BMI.  Status: Improved     BARIATRIC DIET DISCUSSION/PLAN:  Discussed diet after surgery and related to patient's food record.  Reviewed nutrition guidelines for before and after surgery.  Answered all questions.  Continue to review Bariatric Nutrition  Guidebook at home and call with any questions.  Increase exercise. Add another 15 min session before or after work. Walking or You Tube video.     RECOMMENDATIONS:  Patient is a good candidate for bariatric surgery - Sleeve.     Patient verbalized understanding.     Expect good compliance after surgery at this time.    Will f/u before/after surgery as needed.     Communicated nutrition plan with bariatric team.     SESSION TIME:  30 minutes

## 2022-10-10 ENCOUNTER — PATIENT MESSAGE (OUTPATIENT)
Dept: ADMINISTRATIVE | Facility: HOSPITAL | Age: 52
End: 2022-10-10
Payer: OTHER GOVERNMENT

## 2022-10-13 ENCOUNTER — PATIENT MESSAGE (OUTPATIENT)
Dept: BARIATRICS | Facility: CLINIC | Age: 52
End: 2022-10-13
Payer: OTHER GOVERNMENT

## 2022-10-14 ENCOUNTER — TELEPHONE (OUTPATIENT)
Dept: BARIATRICS | Facility: CLINIC | Age: 52
End: 2022-10-14
Payer: OTHER GOVERNMENT

## 2022-10-14 NOTE — TELEPHONE ENCOUNTER
Phoned patient and LVM to  to discuss scheduling her surgery as well as maintaining a bariatric diet and some exercise and that her A1C is right at the cut off for surgery and that she really needs to get her glucose under better control.

## 2022-10-17 ENCOUNTER — HOSPITAL ENCOUNTER (OUTPATIENT)
Dept: RADIOLOGY | Facility: HOSPITAL | Age: 52
Discharge: HOME | End: 2022-10-17
Attending: NURSE PRACTITIONER
Payer: OTHER GOVERNMENT

## 2022-10-17 ENCOUNTER — HOSPITAL ENCOUNTER (OUTPATIENT)
Dept: CARDIOLOGY | Facility: HOSPITAL | Age: 52
Discharge: HOME | End: 2022-10-17
Attending: NURSE PRACTITIONER
Payer: OTHER GOVERNMENT

## 2022-10-17 ENCOUNTER — OFFICE VISIT (OUTPATIENT)
Dept: INTERNAL MEDICINE | Facility: CLINIC | Age: 52
End: 2022-10-17
Payer: OTHER GOVERNMENT

## 2022-10-17 ENCOUNTER — TELEPHONE (OUTPATIENT)
Dept: BARIATRICS | Facility: CLINIC | Age: 52
End: 2022-10-17
Payer: OTHER GOVERNMENT

## 2022-10-17 ENCOUNTER — HOSPITAL ENCOUNTER (OUTPATIENT)
Dept: CARDIOLOGY | Facility: CLINIC | Age: 52
Discharge: HOME | End: 2022-10-17
Payer: OTHER GOVERNMENT

## 2022-10-17 VITALS
HEART RATE: 80 BPM | OXYGEN SATURATION: 96 % | WEIGHT: 293 LBS | SYSTOLIC BLOOD PRESSURE: 134 MMHG | DIASTOLIC BLOOD PRESSURE: 82 MMHG | TEMPERATURE: 99 F | BODY MASS INDEX: 43.46 KG/M2

## 2022-10-17 VITALS — BODY MASS INDEX: 43.4 KG/M2 | WEIGHT: 293 LBS | HEIGHT: 69 IN

## 2022-10-17 DIAGNOSIS — R73.03 PREDIABETES: ICD-10-CM

## 2022-10-17 DIAGNOSIS — Z23 NEED FOR INFLUENZA VACCINATION: Primary | ICD-10-CM

## 2022-10-17 DIAGNOSIS — E11.9 TYPE 2 DIABETES MELLITUS WITHOUT COMPLICATION, WITHOUT LONG-TERM CURRENT USE OF INSULIN: ICD-10-CM

## 2022-10-17 DIAGNOSIS — E03.9 ACQUIRED HYPOTHYROIDISM: ICD-10-CM

## 2022-10-17 DIAGNOSIS — E11.59 HYPERTENSION ASSOCIATED WITH DIABETES: ICD-10-CM

## 2022-10-17 DIAGNOSIS — E11.65 TYPE 2 DIABETES MELLITUS WITH HYPERGLYCEMIA, WITHOUT LONG-TERM CURRENT USE OF INSULIN: ICD-10-CM

## 2022-10-17 DIAGNOSIS — I15.2 HYPERTENSION ASSOCIATED WITH DIABETES: ICD-10-CM

## 2022-10-17 DIAGNOSIS — E66.01 CLASS 3 SEVERE OBESITY DUE TO EXCESS CALORIES WITH SERIOUS COMORBIDITY AND BODY MASS INDEX (BMI) OF 40.0 TO 44.9 IN ADULT: ICD-10-CM

## 2022-10-17 DIAGNOSIS — Z71.3 DIETARY COUNSELING: ICD-10-CM

## 2022-10-17 DIAGNOSIS — E66.01 MORBID OBESITY WITH BMI OF 40.0-44.9, ADULT: Primary | ICD-10-CM

## 2022-10-17 DIAGNOSIS — F32.A ANXIETY AND DEPRESSION: ICD-10-CM

## 2022-10-17 DIAGNOSIS — F41.9 ANXIETY AND DEPRESSION: ICD-10-CM

## 2022-10-17 DIAGNOSIS — E66.01 MORBID OBESITY: Primary | ICD-10-CM

## 2022-10-17 LAB
ASCENDING AORTA: 3.38 CM
BSA FOR ECHO PROCEDURE: 2.55 M2
CV ECHO LV RWT: 0.31 CM
CV STRESS BASE HR: 74 BPM
DIASTOLIC BLOOD PRESSURE: 100 MMHG
DOP CALC LVOT AREA: 3.6 CM2
DOP CALC LVOT DIAMETER: 2.13 CM
DOP CALC LVOT PEAK VEL: 1.09 M/S
DOP CALC LVOT STROKE VOLUME: 79.31 CM3
DOP CALCLVOT PEAK VEL VTI: 22.27 CM
E WAVE DECELERATION TIME: 143.7 MSEC
E/A RATIO: 0.98
E/E' RATIO: 9.75 M/S
ECHO LV POSTERIOR WALL: 0.86 CM (ref 0.6–1.1)
EJECTION FRACTION: 60 %
FRACTIONAL SHORTENING: 34 % (ref 28–44)
INTERVENTRICULAR SEPTUM: 0.83 CM (ref 0.6–1.1)
IVRT: 97.05 MSEC
LA MAJOR: 4.72 CM
LA MINOR: 4.55 CM
LA WIDTH: 3.32 CM
LEFT ATRIUM SIZE: 4.3 CM
LEFT ATRIUM VOLUME INDEX MOD: 14.3 ML/M2
LEFT ATRIUM VOLUME INDEX: 23 ML/M2
LEFT ATRIUM VOLUME MOD: 34.86 CM3
LEFT ATRIUM VOLUME: 56.22 CM3
LEFT INTERNAL DIMENSION IN SYSTOLE: 3.68 CM (ref 2.1–4)
LEFT VENTRICLE DIASTOLIC VOLUME INDEX: 61.76 ML/M2
LEFT VENTRICLE DIASTOLIC VOLUME: 150.7 ML
LEFT VENTRICLE MASS INDEX: 71 G/M2
LEFT VENTRICLE SYSTOLIC VOLUME INDEX: 23.5 ML/M2
LEFT VENTRICLE SYSTOLIC VOLUME: 57.27 ML
LEFT VENTRICULAR INTERNAL DIMENSION IN DIASTOLE: 5.55 CM (ref 3.5–6)
LEFT VENTRICULAR MASS: 174.11 G
LV LATERAL E/E' RATIO: 8.67 M/S
LV SEPTAL E/E' RATIO: 11.14 M/S
MV A" WAVE DURATION": 15.13 MSEC
MV PEAK A VEL: 0.8 M/S
MV PEAK E VEL: 0.78 M/S
MV STENOSIS PRESSURE HALF TIME: 41.67 MS
MV VALVE AREA P 1/2 METHOD: 5.28 CM2
OHS CV CPX 1 MINUTE RECOVERY HEART RATE: 114 BPM
OHS CV CPX 85 PERCENT MAX PREDICTED HEART RATE MALE: 135
OHS CV CPX ESTIMATED METS: 9
OHS CV CPX MAX PREDICTED HEART RATE: 159
OHS CV CPX PATIENT IS FEMALE: 1
OHS CV CPX PATIENT IS MALE: 0
OHS CV CPX PEAK DIASTOLIC BLOOD PRESSURE: 111 MMHG
OHS CV CPX PEAK HEAR RATE: 160 BPM
OHS CV CPX PEAK RATE PRESSURE PRODUCT: NORMAL
OHS CV CPX PEAK SYSTOLIC BLOOD PRESSURE: 188 MMHG
OHS CV CPX PERCENT MAX PREDICTED HEART RATE ACHIEVED: 100
OHS CV CPX RATE PRESSURE PRODUCT PRESENTING: NORMAL
PISA TR MAX VEL: 2.52 M/S
PULM VEIN S/D RATIO: 1.95
PV PEAK D VEL: 0.41 M/S
PV PEAK S VEL: 0.8 M/S
RA MAJOR: 4.46 CM
RA PRESSURE: 3 MMHG
RA WIDTH: 3.22 CM
RIGHT VENTRICULAR END-DIASTOLIC DIMENSION: 3.16 CM
RV TISSUE DOPPLER FREE WALL SYSTOLIC VELOCITY 1 (APICAL 4 CHAMBER VIEW): 17.16 CM/S
SINUS: 2.69 CM
STJ: 3.04 CM
STRESS ECHO POST EXERCISE DUR MIN: 5 MINUTES
STRESS ECHO POST EXERCISE DUR SEC: 39 SECONDS
SYSTOLIC BLOOD PRESSURE: 155 MMHG
TDI LATERAL: 0.09 M/S
TDI SEPTAL: 0.07 M/S
TDI: 0.08 M/S
TR MAX PG: 25 MMHG
TRICUSPID ANNULAR PLANE SYSTOLIC EXCURSION: 2.63 CM
TV REST PULMONARY ARTERY PRESSURE: 28 MMHG

## 2022-10-17 PROCEDURE — 93351 STRESS ECHO (CUPID ONLY): ICD-10-PCS | Mod: 26,,, | Performed by: INTERNAL MEDICINE

## 2022-10-17 PROCEDURE — 93351 STRESS TTE COMPLETE: CPT | Mod: 26,,, | Performed by: INTERNAL MEDICINE

## 2022-10-17 PROCEDURE — 71046 XR CHEST PA AND LATERAL: ICD-10-PCS | Mod: 26,,, | Performed by: RADIOLOGY

## 2022-10-17 PROCEDURE — 99214 OFFICE O/P EST MOD 30 MIN: CPT | Mod: PBBFAC,25 | Performed by: INTERNAL MEDICINE

## 2022-10-17 PROCEDURE — 71046 X-RAY EXAM CHEST 2 VIEWS: CPT | Mod: 26,,, | Performed by: RADIOLOGY

## 2022-10-17 PROCEDURE — 99214 OFFICE O/P EST MOD 30 MIN: CPT | Mod: S$PBB,,, | Performed by: INTERNAL MEDICINE

## 2022-10-17 PROCEDURE — 93351 STRESS TTE COMPLETE: CPT

## 2022-10-17 PROCEDURE — 93010 ELECTROCARDIOGRAM REPORT: CPT | Mod: S$PBB,,, | Performed by: INTERNAL MEDICINE

## 2022-10-17 PROCEDURE — 99999 PR PBB SHADOW E&M-EST. PATIENT-LVL IV: ICD-10-PCS | Mod: PBBFAC,,, | Performed by: INTERNAL MEDICINE

## 2022-10-17 PROCEDURE — 90686 IIV4 VACC NO PRSV 0.5 ML IM: CPT | Mod: PBBFAC

## 2022-10-17 PROCEDURE — 99214 PR OFFICE/OUTPT VISIT, EST, LEVL IV, 30-39 MIN: ICD-10-PCS | Mod: S$PBB,,, | Performed by: INTERNAL MEDICINE

## 2022-10-17 PROCEDURE — 71046 X-RAY EXAM CHEST 2 VIEWS: CPT | Mod: TC,FY

## 2022-10-17 PROCEDURE — 99999 PR PBB SHADOW E&M-EST. PATIENT-LVL IV: CPT | Mod: PBBFAC,,, | Performed by: INTERNAL MEDICINE

## 2022-10-17 PROCEDURE — 93005 ELECTROCARDIOGRAM TRACING: CPT | Mod: PBBFAC | Performed by: INTERNAL MEDICINE

## 2022-10-17 PROCEDURE — 93010 EKG 12-LEAD: ICD-10-PCS | Mod: S$PBB,,, | Performed by: INTERNAL MEDICINE

## 2022-10-17 RX ORDER — GLIMEPIRIDE 4 MG/1
TABLET ORAL
Qty: 270 TABLET | Refills: 3 | Status: SHIPPED | OUTPATIENT
Start: 2022-10-17 | End: 2023-02-15 | Stop reason: SDUPTHER

## 2022-10-17 NOTE — TELEPHONE ENCOUNTER
Phoned patient and advised of need for the cardiac PET stress test.  Scheduled the first available for 11/17/2022 at 0730 AM.  Scheduled OR with Dr Purcell for 11/25/2022 to save the spot and will call tomorrow to finalize scheduling.

## 2022-10-17 NOTE — PROGRESS NOTES
[facility] Note    Chief Complaint      Chief Complaint   Patient presents with    Follow-up     History of Present Illness      Bandar Douglass is a 52 y.o. female who presents today for DM2.  Patient comes to appointment alone.    Problem List Items Addressed This Visit       Hypothyroidism    Current Assessment & Plan     Stable on synthroid 225 mcg daily, no S/S of hypo/hyperthyroidism.         Hypertension associated with diabetes    Current Assessment & Plan     Was on amlodipine in past, lost a bunch of weight.  Watches sodium in diet.         Relevant Medications    glimepiride (AMARYL) 4 MG tablet    Type 2 diabetes mellitus with hyperglycemia, without long-term current use of insulin    Current Assessment & Plan     A1C 8.5 on 10/22 labs, stable on Rybelsus 7 mg (went back to this 1 week ago), glimepiride 8 mg.  Had diarrhea with Metformin ER. No issues with hypoglycemia.  No issues with feet, UTD on eye exam.          Relevant Medications    glimepiride (AMARYL) 4 MG tablet    Other Relevant Orders    Hemoglobin A1C    Anxiety and depression    Current Assessment & Plan     Stable on Trintellix with  xanax PRN. Has been on cymbalta, wellbutrin, Viibryd, Celexa, all of these didn't work. No Panic attacks, SI/HI.          Other Visit Diagnoses       Need for influenza vaccination    -  Primary    Relevant Orders    Influenza - Quadrivalent *Preferred* (6 months+) (PF)    Prediabetes                Health Maintenance   Topic Date Due    Eye Exam  Never done    Hemoglobin A1c  01/12/2023    Mammogram  02/08/2023    Lipid Panel  10/12/2023    Low Dose Statin  10/17/2023    Foot Exam  10/17/2023    TETANUS VACCINE  10/01/2028    Hepatitis C Screening  Completed       Past Medical History:   Diagnosis Date    Breast lump 5/17/2019    Diabetes mellitus     Hypertension     Thyroid disease        Past Surgical History:   Procedure Laterality Date    BREAST BIOPSY Left     CERVICAL FUSION  2017     C5-7 Anterior Fusion     SECTION      CHOLECYSTECTOMY      DECOMPRESSION OF ULNAR NERVE Left 2020    HERNIA REPAIR      HYSTERECTOMY      LUMBAR DISCECTOMY  2000    SPINE SURGERY      TONSILLECTOMY         family history is not on file.    Social History     Tobacco Use    Smoking status: Never    Smokeless tobacco: Never   Substance Use Topics    Alcohol use: Yes     Comment: socially    Drug use: No       Review of Systems   Constitutional:  Negative for chills and fever.   HENT:  Negative for sore throat.    Respiratory:  Negative for cough and shortness of breath.    Cardiovascular:  Negative for chest pain and palpitations.   Gastrointestinal:  Negative for constipation, diarrhea, nausea and vomiting.   Genitourinary:  Negative for dysuria and hematuria.   Musculoskeletal:  Negative for falls.   Neurological:  Negative for headaches.      Outpatient Encounter Medications as of 10/17/2022   Medication Sig Dispense Refill    ALPRAZolam (XANAX) 1 MG tablet TAKE 1 TABLET NIGHTLY AS NEEDED FOR ANXIETY 90 tablet 1    amitriptyline (ELAVIL) 10 MG tablet TAKE 1 TABLET NIGHTLY AS NEEDED FOR INSOMNIA 90 tablet 0    ascorbic acid, vitamin C, (VITAMIN C) 1000 MG tablet Take by mouth.      cyanocobalamin (VITAMIN B-12) 1000 MCG tablet Take 1,000 mcg by mouth.      ergocalciferol, vitamin D2, (VITAMIN D ORAL) Take 1,000 mg by mouth.      flaxseed oil 1,000 mg Cap Take 1 capsule by mouth once daily.      fluticasone-salmeterol diskus inhaler 250-50 mcg Inhale 1 puff into the lungs 2 (two) times daily. Controller 60 each 0    gluc sifuentes/chondro sifuentes A/vit C/Mn (GLUCOSAMINE 1500 COMPLEX ORAL) Take 1,500 mg by mouth.      levothyroxine (SYNTHROID) 200 MCG tablet TAKE 1 TABLET BEFORE BREAKFAST 90 tablet 3    levothyroxine (SYNTHROID) 25 MCG tablet Take 1 tablet (25 mcg total) by mouth once daily. 90 tablet 3    multivitamin with iron Tab Take by mouth.      pantoprazole (PROTONIX) 40 MG tablet Take 1 tablet (40 mg total)  by mouth once daily. 90 tablet 3    pravastatin (PRAVACHOL) 20 MG tablet Take 1 tablet (20 mg total) by mouth once daily. 90 tablet 3    PROAIR HFA 90 mcg/actuation inhaler USE 1 INHALATION EVERY 4 HOURS AS NEEDED FOR WHEEZING (RESCUE) 8.5 g 10    semaglutide (RYBELSUS) 14 mg tablet Take 1 tablet (14 mg total) by mouth once daily. (Patient taking differently: Take 7 mg by mouth once daily.) 90 tablet 3    TRINTELLIX 10 mg Tab TAKE 1 TABLET DAILY 90 tablet 3    [DISCONTINUED] glimepiride (AMARYL) 4 MG tablet Take 2 tablets (8 mg total) by mouth daily with breakfast. 180 tablet 3    glimepiride (AMARYL) 4 MG tablet Take 2 tablets PO in AM, 1 tablet in  tablet 3    [DISCONTINUED] lactulose (CEPHULAC) 20 gram Pack Take 1 packet (20 g total) by mouth 3 (three) times daily. 30 packet 11    [DISCONTINUED] nystatin-triamcinolone (MYCOLOG) ointment Apply topically 2 (two) times daily. (Patient not taking: Reported on 10/17/2022) 15 g 0    [DISCONTINUED] omega-3 fatty acids 1,000 mg Cap Take by mouth.      [DISCONTINUED] oxyCODONE-acetaminophen (PERCOCET) 5-325 mg per tablet Take 1 tablet by mouth every 6 (six) hours as needed for Pain. (Patient not taking: Reported on 10/17/2022) 28 tablet 0    [DISCONTINUED] predniSONE (DELTASONE) 20 MG tablet Take 2 tablets (40 mg total) by mouth once daily. (Patient not taking: No sig reported) 10 tablet 0    [DISCONTINUED] pregabalin (LYRICA) 75 MG capsule Take 1 capsule (75 mg total) by mouth 2 (two) times daily. (Patient not taking: Reported on 10/17/2022) 180 capsule 1    [DISCONTINUED] traMADoL (ULTRAM) 50 mg tablet Take 1 tablet by mouth every 6 (six) hours as needed for Pain for up to 14 days (Patient not taking: Reported on 10/17/2022) 60 tablet 3    [DISCONTINUED] TRULANCE 3 mg Tab Take 1 tablet by mouth every other day.       No facility-administered encounter medications on file as of 10/17/2022.        Review of patient's allergies indicates:   Allergen Reactions     Bactrim [sulfamethoxazole-trimethoprim] Swelling    Adhesive Rash     tapes       Physical Exam      Vital Signs  Temp: 98.5 °F (36.9 °C)  Pulse: 80  SpO2: 96 %  BP: 134/82  Pain Score: 0-No pain  Height and Weight  Weight: 133.5 kg (294 lb 5 oz)  BMI (Calculated): 43.4]    Physical Exam  Constitutional:       Appearance: She is well-developed.   HENT:      Head: Normocephalic and atraumatic.      Right Ear: External ear normal.      Left Ear: External ear normal.   Eyes:      General:         Right eye: No discharge.         Left eye: No discharge.   Cardiovascular:      Rate and Rhythm: Normal rate and regular rhythm.      Heart sounds: Normal heart sounds. No murmur heard.  Pulmonary:      Effort: Pulmonary effort is normal. No respiratory distress.      Breath sounds: Normal breath sounds.   Abdominal:      General: There is no distension.      Palpations: Abdomen is soft.      Tenderness: There is no abdominal tenderness. There is no guarding.   Musculoskeletal:         General: Normal range of motion.      Cervical back: Normal range of motion.   Skin:     General: Skin is warm and dry.   Neurological:      Mental Status: She is alert and oriented to person, place, and time.   Psychiatric:         Behavior: Behavior normal.        Laboratory:  CBC:  Recent Labs   Lab Result Units 10/12/22  0657   WBC K/uL 7.62   RBC M/uL 5.19   Hemoglobin g/dL 15.6   Hematocrit % 45.8   Platelets K/uL 305   MCV fL 88   MCH pg 30.1   MCHC g/dL 34.1     CMP:  Recent Labs   Lab Result Units 10/12/22  0657   Glucose mg/dL 240*  240*   Calcium mg/dL 9.5  9.5   Albumin g/dL 4.5  4.5   Total Protein g/dL 7.8  7.8   Sodium mmol/L 139  139   Potassium mmol/L 4.6  4.6   CO2 mmol/L 32*  32*   Chloride mmol/L 98  98   BUN mg/dL 14  14   Alkaline Phosphatase U/L 79  79   ALT U/L 26  26   AST U/L 52*  52*   Total Bilirubin mg/dL 0.4  0.4     URINALYSIS:  No results for input(s): COLORU, CLARITYU, SPECGRAV, PHUR, PROTEINUA,  GLUCOSEU, BILIRUBINCON, BLOODU, WBCU, RBCU, BACTERIA, MUCUS, NITRITE, LEUKOCYTESUR, UROBILINOGEN, HYALINECASTS in the last 2160 hours.   LIPIDS:  Recent Labs   Lab Result Units 10/12/22  0657   TSH uIU/mL <0.015*   HDL mg/dL 50   Cholesterol mg/dL 145   Triglycerides mg/dL 174*   LDL Cholesterol mg/dL 60.2*   HDL/Cholesterol Ratio % 34.5   Non-HDL Cholesterol mg/dL 95   Total Cholesterol/HDL Ratio  2.9     TSH:  Recent Labs   Lab Result Units 10/12/22  0657   TSH uIU/mL <0.015*     A1C:  Recent Labs   Lab Result Units 10/12/22  0657   Hemoglobin A1C % 8.5*       Radiology:  CXR    Result Date: 10/17/2022  EXAMINATION:  XR CHEST PA AND LATERAL    CLINICAL HISTORY:  Morbid (severe) obesity due to excess calories    TECHNIQUE:  PA and lateral views of the chest were performed.    COMPARISON:  December 7, 2021    FINDINGS:  Normal heart size.  Normal pulmonary vasculature.  No focal infiltrates.  No pleural fluid.  No pneumothorax.  Reconfirmed and stable postsurgical hardware cervical spine.  No acute bony abnormalities.        Stress Echo Which stress agent will be used? Treadmill Exercise; Color Flow Doppler? No    Result Date: 10/17/2022  · The patient exercised for 5 minutes and 39 seconds on a high ramp   protocol, corresponding to a functional capacity of 9 METS, achieving a   peak heart rate of 160 bpm, which is 100% of the age predicted maximum   heart rate.  · The test was stopped because the patient experienced fatigue.  · The patient's exercise capacity was below average.  · There were no arrhythmias during stress.  · The ECG portion of this study is negative for myocardial ischemia.  · The left ventricle is normal in size with normal systolic function.  · The estimated ejection fraction is 60%.  · Normal left ventricular diastolic function.  · Normal right ventricular size with normal right ventricular systolic   function.  · The estimated PA systolic pressure is 28 mmHg.  · Normal central venous pressure  (3 mmHg).  · The stress echo portion of this test reveals no clear evidence of   myocardial ischemia. However there was no significant increase in   contractility of the left ventricular segments.            Assessment/Plan     Bandar Douglass is a 52 y.o.female with:    1. Type 2 diabetes mellitus with hyperglycemia, without long-term current use of insulin  - glimepiride (AMARYL) 4 MG tablet; Take 2 tablets PO in AM, 1 tablet in PM  Dispense: 270 tablet; Refill: 3  - Hemoglobin A1C; Future    2. Hypertension associated with diabetes    3. Prediabetes    4. Acquired hypothyroidism    5. Anxiety and depression    6. Need for influenza vaccination  - Influenza - Quadrivalent *Preferred* (6 months+) (PF)    -flu shot today, prevnar 20 next visit  -repeat A1C in 3 months, bariatric surgery next month, will send me glucoses 2 weeks post surgery  -Continue current medications and maintain follow up with specialists.    -Follow up in about 3 months (around 1/17/2023) for follow up of medical problems.       Regina Nava MD  [facility]

## 2022-10-17 NOTE — ASSESSMENT & PLAN NOTE
A1C 8.5 on 10/22 labs, stable on Rybelsus 7 mg (went back to this 1 week ago), glimepiride 8 mg.  Had diarrhea with Metformin ER. No issues with hypoglycemia.  No issues with feet, UTD on eye exam.

## 2022-10-17 NOTE — ASSESSMENT & PLAN NOTE
Stable on Trintellix with  xanax PRN. Has been on cymbalta, wellbutrin, Viibryd, Celexa, all of these didn't work. No Panic attacks, SI/HI.

## 2022-10-17 NOTE — LETTER
"  [facility] 2nd Fl  [address]  [facility] [address]  Phone: [phone]  Fax: [phone] 2022       Attn: Pre-determination Dept.    RE:  Bandar Douglass          OC #: [id number]          : 1970    To Whom It May Concern:  I am sending this letter on behalf of Bandar Douglass (a 52 y.o. female) for pre-approval for Bariatric Surgery; specifically the laparoscopic sleeve gastrectomy. Bandar  has a past medical history of Breast lump (2019), Diabetes mellitus, Hypertension, and Thyroid disease. Bandar Douglass  has made numerous attempts at dieting and exercise programs, and has failed to maintain any sustained weight loss.  Weight/Height/BMI  Estimated body mass index is 43.46 kg/m² as calculated from the following:    Height as of an earlier encounter on 10/17/22: 5' 9" (1.753 m).    Weight as of an earlier encounter on 10/17/22: 133.5 kg (294 lb 5 oz).   At her initial consult on 2022 with Pat DENIS NP, her BMI was 44.30, weight 136.1 kg or 300 pounds.  Bandar Douglass has been evaluated in our bariatric program by myself, the , and the program dietician and is felt to be an excellent candidate for surgery.  In addition, she has undergone a psychological evaluation, from which a letter is enclosed.  Bandar Douglass has undergone extensive pre-operative education and understands all the risks, benefits, and possible complications of surgery.  She has also undergone dietary education and thorough nutritional evaluation via a registered dietician.  Our program provides long term nutritional counseling with unlimited consults with the dietician.    Our team is sending this letter to receive pre-approval for the indicated procedure.  Please let us know if you have any questions or require any further information.  Sincerely,      Yael Ramos RN   Bariatric Program Coordinator       "

## 2022-10-17 NOTE — TELEPHONE ENCOUNTER
----- Message from Darius Tremayne Brown sent at 10/17/2022  9:40 AM CDT -----  Regarding: pt appt  Contact: pt @ [phone]  Missed Callback    Pt Bandar Douglass is returning callback from missed call. Requesting to speak with Yael in Bariatrics in regards to scheduling surgery. Please call.

## 2022-10-18 ENCOUNTER — PATIENT MESSAGE (OUTPATIENT)
Dept: BARIATRICS | Facility: CLINIC | Age: 52
End: 2022-10-18
Payer: OTHER GOVERNMENT

## 2022-10-18 NOTE — TELEPHONE ENCOUNTER
"Spoke with patient and confirmed the surgical procedure of sleeve with Dr Purcell on 11/25/2022.  Scheduled preop appts/surgery date/2 week and 8 week post op appts. All dates and times agreed upon. Pt aware that if they are required to have PCP clearance, it must be within 6 months of surgery, unless their medical history has changed, it should be dated, signed and in chart for preop appointment. All medications have been reviewed regarding the necessity to be crushed or broken into pieces smaller that the tip of a pencil eraser for 2 weeks following gastric sleeve surgery and 4 weeks following gastric bypass surgery. Pt instructed to stop taking all NSAIDS 1 week before surgery and for life after surgery. Pt aware that protein liquid diet start date is 11/11/2022. Patient is doing well with their diet. Patient was instructed about the progression of the diet phases. The patient's current weight is 294, height is 5'9", and BMI is 43.46. Refer to medical letter of necessity from the RN coordinator. Discussed the importance of increased physical activity and dieting lifestyle changes to improve weight loss and meet goals. Screened patient for history of UTI per protocol. Discussed with patient to avoid antibiotics and elective procedures involving sedation/anesthesia within 30 days of surgery unless cleared by the bariatric department. Patient instructed to call the bariatric clinic post op for any s/s of UTI. Patient's mailing address confirmed and informed to expect a manilla envelop containing bariatric surgery pre op booklet, appointment reminders, protein and fluid log sheets, and liquid diet and vitamin information sheets. Pt aware that all appts can be seen in my [facility] patient portal at this time. Confirmed email address and informed patient that they will be enrolled in the Patient Reported Outcomes program to track their progress and successes. The first email will be sent 2-3 weeks before surgery and " then every year on your surgery anniversary date. Office phone and fax number given to patient for any future questions/concerns. Discussed the pre-surgery complex carbohydrate beverage to purchase in [facility] to drink 30 minutes before the surgery arrival time. Reviewed the policy of scheduling a covid test 72 hours prior to surgery if necessary.  Pre op covid test not necessary at this time.

## 2022-10-23 ENCOUNTER — PATIENT MESSAGE (OUTPATIENT)
Dept: BARIATRICS | Facility: CLINIC | Age: 52
End: 2022-10-23
Payer: OTHER GOVERNMENT

## 2022-11-04 ENCOUNTER — PATIENT MESSAGE (OUTPATIENT)
Dept: BARIATRICS | Facility: CLINIC | Age: 52
End: 2022-11-04
Payer: OTHER GOVERNMENT

## 2022-11-08 ENCOUNTER — OFFICE VISIT (OUTPATIENT)
Dept: BARIATRICS | Facility: CLINIC | Age: 52
End: 2022-11-08
Payer: OTHER GOVERNMENT

## 2022-11-08 VITALS
SYSTOLIC BLOOD PRESSURE: 153 MMHG | DIASTOLIC BLOOD PRESSURE: 72 MMHG | WEIGHT: 291 LBS | TEMPERATURE: 98 F | BODY MASS INDEX: 43.1 KG/M2 | HEART RATE: 97 BPM | HEIGHT: 69 IN | OXYGEN SATURATION: 97 %

## 2022-11-08 DIAGNOSIS — E66.01 CLASS 3 SEVERE OBESITY DUE TO EXCESS CALORIES WITH SERIOUS COMORBIDITY AND BODY MASS INDEX (BMI) OF 40.0 TO 44.9 IN ADULT: Primary | ICD-10-CM

## 2022-11-08 PROCEDURE — 99999 PR PBB SHADOW E&M-EST. PATIENT-LVL IV: CPT | Mod: PBBFAC,,, | Performed by: SURGERY

## 2022-11-08 PROCEDURE — 99204 OFFICE O/P NEW MOD 45 MIN: CPT | Mod: S$PBB,,, | Performed by: SURGERY

## 2022-11-08 PROCEDURE — 99214 OFFICE O/P EST MOD 30 MIN: CPT | Mod: PBBFAC | Performed by: SURGERY

## 2022-11-08 PROCEDURE — 99999 PR PBB SHADOW E&M-EST. PATIENT-LVL IV: ICD-10-PCS | Mod: PBBFAC,,, | Performed by: SURGERY

## 2022-11-08 PROCEDURE — 99204 PR OFFICE/OUTPT VISIT, NEW, LEVL IV, 45-59 MIN: ICD-10-PCS | Mod: S$PBB,,, | Performed by: SURGERY

## 2022-11-08 RX ORDER — MUPIROCIN 20 MG/G
OINTMENT TOPICAL
Status: CANCELLED | OUTPATIENT
Start: 2022-11-08

## 2022-11-08 RX ORDER — LIDOCAINE HYDROCHLORIDE 10 MG/ML
1 INJECTION, SOLUTION EPIDURAL; INFILTRATION; INTRACAUDAL; PERINEURAL ONCE
Status: CANCELLED | OUTPATIENT
Start: 2022-11-08 | End: 2022-11-08

## 2022-11-08 RX ORDER — OMEPRAZOLE 40 MG/1
40 CAPSULE, DELAYED RELEASE ORAL EVERY MORNING
Qty: 30 CAPSULE | Refills: 2 | Status: SHIPPED | OUTPATIENT
Start: 2022-11-08 | End: 2023-02-15

## 2022-11-08 RX ORDER — SODIUM CHLORIDE, SODIUM LACTATE, POTASSIUM CHLORIDE, CALCIUM CHLORIDE 600; 310; 30; 20 MG/100ML; MG/100ML; MG/100ML; MG/100ML
INJECTION, SOLUTION INTRAVENOUS CONTINUOUS
Status: CANCELLED | OUTPATIENT
Start: 2022-11-08

## 2022-11-08 RX ORDER — PANTOPRAZOLE SODIUM 40 MG/10ML
40 INJECTION, POWDER, LYOPHILIZED, FOR SOLUTION INTRAVENOUS 2 TIMES DAILY
Status: CANCELLED | OUTPATIENT
Start: 2022-11-08

## 2022-11-08 RX ORDER — ENOXAPARIN SODIUM 100 MG/ML
40 INJECTION SUBCUTANEOUS EVERY 12 HOURS
Status: CANCELLED | OUTPATIENT
Start: 2022-11-08

## 2022-11-08 RX ORDER — PROCHLORPERAZINE EDISYLATE 5 MG/ML
5 INJECTION INTRAMUSCULAR; INTRAVENOUS EVERY 6 HOURS PRN
Status: CANCELLED | OUTPATIENT
Start: 2022-11-08

## 2022-11-08 RX ORDER — HEPARIN SODIUM 5000 [USP'U]/ML
5000 INJECTION, SOLUTION INTRAVENOUS; SUBCUTANEOUS ONCE
Status: CANCELLED | OUTPATIENT
Start: 2022-11-08 | End: 2022-11-08

## 2022-11-08 RX ORDER — HYDROMORPHONE HYDROCHLORIDE 1 MG/ML
0.5 INJECTION, SOLUTION INTRAMUSCULAR; INTRAVENOUS; SUBCUTANEOUS
Status: CANCELLED | OUTPATIENT
Start: 2022-11-08

## 2022-11-08 RX ORDER — HYDROCODONE BITARTRATE AND ACETAMINOPHEN 7.5; 325 MG/15ML; MG/15ML
15 SOLUTION ORAL 4 TIMES DAILY PRN
Qty: 150 ML | Refills: 0 | Status: SHIPPED | OUTPATIENT
Start: 2022-11-08 | End: 2023-02-15

## 2022-11-08 RX ORDER — ACETAMINOPHEN 650 MG/20.3ML
500 LIQUID ORAL EVERY 8 HOURS
Status: CANCELLED | OUTPATIENT
Start: 2022-11-08 | End: 2022-11-09

## 2022-11-08 RX ORDER — FAMOTIDINE 10 MG/ML
20 INJECTION INTRAVENOUS ONCE
Status: CANCELLED | OUTPATIENT
Start: 2022-11-08 | End: 2022-11-08

## 2022-11-08 RX ORDER — ONDANSETRON 8 MG/1
8 TABLET, ORALLY DISINTEGRATING ORAL EVERY 6 HOURS PRN
Qty: 30 TABLET | Refills: 0 | Status: SHIPPED | OUTPATIENT
Start: 2022-11-08 | End: 2023-02-15

## 2022-11-08 RX ORDER — SODIUM CHLORIDE 9 MG/ML
INJECTION, SOLUTION INTRAVENOUS CONTINUOUS
Status: CANCELLED | OUTPATIENT
Start: 2022-11-08

## 2022-11-08 RX ORDER — DEXTROMETHORPHAN/PSEUDOEPHED 2.5-7.5/.8
40 DROPS ORAL 4 TIMES DAILY PRN
Status: CANCELLED | OUTPATIENT
Start: 2022-11-08

## 2022-11-08 RX ORDER — HYDROCODONE BITARTRATE AND ACETAMINOPHEN 7.5; 325 MG/15ML; MG/15ML
15 SOLUTION ORAL EVERY 4 HOURS PRN
Status: CANCELLED | OUTPATIENT
Start: 2022-11-09

## 2022-11-08 RX ORDER — ONDANSETRON 2 MG/ML
8 INJECTION INTRAMUSCULAR; INTRAVENOUS EVERY 6 HOURS PRN
Status: CANCELLED | OUTPATIENT
Start: 2022-11-08

## 2022-11-08 RX ORDER — GABAPENTIN 250 MG/5ML
300 SOLUTION ORAL 3 TIMES DAILY
Status: CANCELLED | OUTPATIENT
Start: 2022-11-08

## 2022-11-08 NOTE — H&P (VIEW-ONLY)
I have seen the patient, reviewed the Student's history and physical, assessment and plan. I have personally interviewed and examined the patient at bedside and: agree with the findings.     1. Morbid obesity, body mass index is 44.3 kg/m²/300lb. and inability to lose weight.  2. Co-morbidities: diabetes mellitus type 2 non insulin dependent out of control, hld, mulligan, essential hypertension, mild pulmonary htn, depression, and anxiety  3. S/p csec, hyst, anish, hernia    PE Abdomen soft, hernia scar in the lower abdomen, midline    Cbc, cmp, iron, vitamins, lipids a1c reviewed, Likely mulligan, out of control dm, hld  Ekg reviewed, normal  Stress echo reviewed:  The patient exercised for 5 minutes and 39 seconds on a high ramp protocol, corresponding to a functional capacity of 9 METS, achieving a peak heart rate of 160 bpm, which is 100% of the age predicted maximum heart rate.  The test was stopped because the patient experienced fatigue.  The patient's exercise capacity was below average.  There were no arrhythmias during stress.  The ECG portion of this study is negative for myocardial ischemia.  The left ventricle is normal in size with normal systolic function.  The estimated ejection fraction is 60%.  Normal left ventricular diastolic function.  Normal right ventricular size with normal right ventricular systolic function.  The estimated PA systolic pressure is 28 mmHg.  Normal central venous pressure (3 mmHg).  The stress echo portion of this test reveals no clear evidence of myocardial ischemia. However there was no significant increase in contractility of the left ventricular segments.     For sleeve.

## 2022-11-08 NOTE — PROGRESS NOTES
History & Physical    SUBJECTIVE:       HPI:  Bandar Douglass is a 52 y.o. morbidly obese female. Her current body mass index is 42.97 kg/m². She has multiple associated comorbidities including diabetes mellitus type 2 non insulin dependent , essential hypertension, hyperlipidemia, depression, anxiety, and osteoarthritis.  Her highest adult weight was 335lbs at age [age], and her lowest adult weight was 215lbs at age 48.  The patient has tried  nutrisystem and calorie counting .  The patient was most successful with exercising and restricting diet with a weight loss of 85lbs.  Her current exercise includes walking 7 times a week. She denies any history of eating disorder such as anorexia, bulimia, or taking laxatives for weight loss, and denies any addiction including illicit substances, alcohol, or gambling.  Patient states she has a excellent  support system.  She lives with her .  She is currently employed works for supervisor in health insurance .  She  denies a history of GERD.  The patient's goal is she wants to fit in an airplane seat & be more involved with her children & future grandchildren.       Chief Complaint   Patient presents with    Pre-op Exam     Gastric sleeve        Review of patient's allergies indicates:   Allergen Reactions    Bactrim [sulfamethoxazole-trimethoprim] Swelling    Adhesive Rash     tapes       Current Outpatient Medications   Medication Sig    ALPRAZolam (XANAX) 1 MG tablet TAKE 1 TABLET NIGHTLY AS NEEDED FOR ANXIETY    amitriptyline (ELAVIL) 10 MG tablet TAKE 1 TABLET NIGHTLY AS NEEDED FOR INSOMNIA    ascorbic acid, vitamin C, (VITAMIN C) 1000 MG tablet Take by mouth.    cyanocobalamin (VITAMIN B-12) 1000 MCG tablet Take 1,000 mcg by mouth.    ergocalciferol, vitamin D2, (VITAMIN D ORAL) Take 1,000 mg by mouth.    flaxseed oil 1,000 mg Cap Take 1 capsule by mouth once daily.    fluticasone-salmeterol diskus inhaler 250-50 mcg Inhale 1 puff into the lungs 2  "(two) times a day.    glimepiride (AMARYL) 4 MG tablet Take 2 tablets PO in AM, 1 tablet in PM    gluc sifuentes/chondro sifuentes A/vit C/Mn (GLUCOSAMINE 1500 COMPLEX ORAL) Take 1,500 mg by mouth.    levothyroxine (SYNTHROID) 200 MCG tablet TAKE 1 TABLET BEFORE BREAKFAST    levothyroxine (SYNTHROID) 25 MCG tablet Take 1 tablet (25 mcg total) by mouth once daily.    multivitamin with iron Tab Take by mouth.    pantoprazole (PROTONIX) 40 MG tablet Take 1 tablet (40 mg total) by mouth once daily.    pravastatin (PRAVACHOL) 20 MG tablet Take 1 tablet (20 mg total) by mouth once daily.    PROAIR HFA 90 mcg/actuation inhaler USE 1 INHALATION EVERY 4 HOURS AS NEEDED FOR WHEEZING (RESCUE)    semaglutide (RYBELSUS) 14 mg tablet Take 1 tablet (14 mg total) by mouth once daily. (Patient taking differently: Take 7 mg by mouth once daily.)    TRINTELLIX 10 mg Tab TAKE 1 TABLET DAILY     No current facility-administered medications for this visit.       Past Medical History:   Diagnosis Date    Breast lump 2019    Diabetes mellitus     Hypertension     Thyroid disease        Past Surgical History:   Procedure Laterality Date    BREAST BIOPSY Left     CERVICAL FUSION  2017    C5-7 Anterior Fusion     SECTION      CHOLECYSTECTOMY      DECOMPRESSION OF ULNAR NERVE Left 2020    HERNIA REPAIR      HYSTERECTOMY      LUMBAR DISCECTOMY      SPINE SURGERY      TONSILLECTOMY         Review of Systems   All other systems reviewed and are negative.       OBJECTIVE:     Vitals:    22 1454   BP: (!) 153/72   Pulse: 97   Temp: 98.2 °F (36.8 °C)   SpO2: 97%   Weight: 132 kg (291 lb 0.1 oz)   Height: 5' 9" (1.753 m)       Physical Exam  Constitutional:       Appearance: She is obese.   Pulmonary:      Effort: Pulmonary effort is normal. No respiratory distress.   Abdominal:      General: Abdomen is flat. Bowel sounds are normal. There is no distension.      Palpations: Abdomen is soft.      Tenderness: " There is no abdominal tenderness.      Comments: Hernia scar noted in lower abdomen   Neurological:      Mental Status: She is alert.   Psychiatric:         Mood and Affect: Mood normal.         Behavior: Behavior normal.        Laboratory  CBC: Reviewed      Diagnostic Results:  Labs: Reviewed       Dietitian: Patient has participated in pre-operative nutritional program with understanding of necessary lifelong dietary changes required with surgery.    Psych: No overt contraindications to bariatric surgery, patient has completed psychological evaluation and is able to give informed consent.    PCP: Medically cleared for surgery.     ASSESSMENT/PLAN:     Morbid obesity with failure of medical conservative therapy.    Co Morbid Conditions:   Patient Active Problem List   Diagnosis    Neck pain    Chronic pain of right ankle    Hypothyroidism    Hypertension associated with diabetes    Type 2 diabetes mellitus with hyperglycemia, without long-term current use of insulin    Cervical radiculopathy    Anxiety and depression    Chronic constipation    Class 3 severe obesity due to excess calories with serious comorbidity and body mass index (BMI) of 40.0 to 44.9 in adult    Other insomnia    Lesion of left ulnar nerve       Patient wishes to undergo sleeve gastrectomy.      The patient was informed that risks include, but are not limited to: death, leak, obstruction, bleeding, and sepsis. Any of these could require further surgery. Other risks include DVT, PE, pneumonia, wound dehiscence, hernia, wound infection, the need for dilatations and the inability to lose appropriate weight and keep it off.     We discussed that our goal is to ameliorate her medical problems and not to obtain a specific body mass index. she understands the risks and benefits and wishes to proceed with the procedure.  she has signed a consent form.

## 2022-11-08 NOTE — PROGRESS NOTES
HPI:  Bandar Douglass is a 52 y.o. morbidly obese female. Her current body mass index is 42.97 kg/m². She has multiple associated comorbidities including diabetes mellitus type 2 non insulin dependent , essential hypertension, hyperlipidemia, depression, anxiety, and osteoarthritis.  Her highest adult weight was 335lbs at age [age], and her lowest adult weight was 215lbs at age 48.  The patient has tried  nutrisystem and calorie counting .  The patient was most successful with exercising and restricting diet with a weight loss of 85lbs.  Her current exercise includes walking 7 times a week. She denies any history of eating disorder such as anorexia, bulimia, or taking laxatives for weight loss, and denies any addiction including illicit substances, alcohol, or gambling.  Patient states she has a excellent  support system.  She lives with her .  She is currently employed works for supervisor in health insurance .  She  denies a history of GERD.  The patient's goal is she wants to fit in an airplane seat & be more involved with her children & future grandchildren.

## 2022-11-10 ENCOUNTER — TELEPHONE (OUTPATIENT)
Dept: CARDIOLOGY | Facility: HOSPITAL | Age: 52
End: 2022-11-10
Payer: OTHER GOVERNMENT

## 2022-11-11 ENCOUNTER — PATIENT MESSAGE (OUTPATIENT)
Dept: INTERNAL MEDICINE | Facility: CLINIC | Age: 52
End: 2022-11-11
Payer: OTHER GOVERNMENT

## 2022-11-11 ENCOUNTER — TELEPHONE (OUTPATIENT)
Dept: BARIATRICS | Facility: CLINIC | Age: 52
End: 2022-11-11
Payer: OTHER GOVERNMENT

## 2022-11-11 ENCOUNTER — PATIENT MESSAGE (OUTPATIENT)
Dept: BARIATRICS | Facility: CLINIC | Age: 52
End: 2022-11-11
Payer: OTHER GOVERNMENT

## 2022-11-11 NOTE — TELEPHONE ENCOUNTER
NUTRITION NOTE    Bariatric Surgeon: Telly Purcell M.D.  Reason for MNT Referral: Pre-op liquid diet and nutrition instructions  Sleeve   Date of Surgery 11/25/2022      No answer. Left msg with details on preop liquid diet and vits/minerals.  Info sent via portal also.  SESSION TIME: 15 minutes ----- Message from Yael Ramos RN sent at 10/18/2022 11:24 AM CDT -----  Regarding: ld  2 week liquid diet starts 11/11/2022  sleeve surgery scheduled 11/25/2022  preop appt scheduled 11/8/2022  Schedule virtual pre op.  Please place Procare vitamin order.  Schedule 1 wk post op video visit for 12/2/2022

## 2022-11-14 ENCOUNTER — HOSPITAL ENCOUNTER (OUTPATIENT)
Dept: CARDIOLOGY | Facility: HOSPITAL | Age: 52
Discharge: HOME | End: 2022-11-14
Attending: NURSE PRACTITIONER
Payer: OTHER GOVERNMENT

## 2022-11-14 VITALS
SYSTOLIC BLOOD PRESSURE: 152 MMHG | WEIGHT: 291 LBS | HEIGHT: 69 IN | BODY MASS INDEX: 43.1 KG/M2 | DIASTOLIC BLOOD PRESSURE: 97 MMHG | HEART RATE: 84 BPM

## 2022-11-14 DIAGNOSIS — E66.01 MORBID OBESITY WITH BMI OF 40.0-44.9, ADULT: ICD-10-CM

## 2022-11-14 LAB
CFR FLOW - ANTERIOR: 2.04
CFR FLOW - INFERIOR: 1.97
CFR FLOW - LATERAL: 2.39
CFR FLOW - MAX: 3.38
CFR FLOW - MIN: 1.45
CFR FLOW - SEPTAL: 2.12
CFR FLOW - WHOLE HEART: 2.13
CV PHARM DOSE: 0.4 MG
CV STRESS BASE HR: 84 BPM
DIASTOLIC BLOOD PRESSURE: 97 MMHG
EJECTION FRACTION- HIGH: 59 %
END DIASTOLIC INDEX-HIGH: 155 ML/M2
END DIASTOLIC INDEX-LOW: 91 ML/M2
END SYSTOLIC INDEX-HIGH: 78 ML/M2
END SYSTOLIC INDEX-LOW: 40 ML/M2
NUC REST DIASTOLIC VOLUME INDEX: 94
NUC REST EJECTION FRACTION: 75
NUC REST SYSTOLIC VOLUME INDEX: 24
NUC STRESS DIASTOLIC VOLUME INDEX: 104
NUC STRESS EJECTION FRACTION: 79 %
NUC STRESS SYSTOLIC VOLUME INDEX: 22
OHS CV CPX 85 PERCENT MAX PREDICTED HEART RATE MALE: 135
OHS CV CPX MAX PREDICTED HEART RATE: 159
OHS CV CPX PATIENT IS FEMALE: 1
OHS CV CPX PATIENT IS MALE: 0
OHS CV CPX PEAK DIASTOLIC BLOOD PRESSURE: 58 MMHG
OHS CV CPX PEAK HEAR RATE: 82 BPM
OHS CV CPX PEAK RATE PRESSURE PRODUCT: 9840
OHS CV CPX PEAK SYSTOLIC BLOOD PRESSURE: 120 MMHG
OHS CV CPX PERCENT MAX PREDICTED HEART RATE ACHIEVED: 51
OHS CV CPX RATE PRESSURE PRODUCT PRESENTING: NORMAL
REST FLOW - ANTERIOR: 0.47 CC/MIN/G
REST FLOW - INFERIOR: 0.79 CC/MIN/G
REST FLOW - LATERAL: 0.6 CC/MIN/G
REST FLOW - MAX: 0.95 CC/MIN/G
REST FLOW - MIN: 0.31 CC/MIN/G
REST FLOW - SEPTAL: 0.5 CC/MIN/G
REST FLOW - WHOLE HEART: 0.59 CC/MIN/G
RETIRED EF AND QEF - SEE NOTES: 47 %
STRESS FLOW - ANTERIOR: 0.96 CC/MIN/G
STRESS FLOW - INFERIOR: 1.55 CC/MIN/G
STRESS FLOW - LATERAL: 1.36 CC/MIN/G
STRESS FLOW - MAX: 1.96 CC/MIN/G
STRESS FLOW - MIN: 0.48 CC/MIN/G
STRESS FLOW - SEPTAL: 1.08 CC/MIN/G
STRESS FLOW - WHOLE HEART: 1.24 CC/MIN/G
SYSTOLIC BLOOD PRESSURE: 152 MMHG

## 2022-11-14 PROCEDURE — 63600175 PHARM REV CODE 636 W HCPCS: Performed by: NURSE PRACTITIONER

## 2022-11-14 PROCEDURE — 93018 CV STRESS TEST I&R ONLY: CPT | Mod: ,,, | Performed by: INTERNAL MEDICINE

## 2022-11-14 PROCEDURE — 78431 MYOCRD IMG PET RST&STRS CT: CPT | Mod: 26,,, | Performed by: INTERNAL MEDICINE

## 2022-11-14 PROCEDURE — 93016 CV STRESS TEST SUPVJ ONLY: CPT | Mod: ,,, | Performed by: INTERNAL MEDICINE

## 2022-11-14 PROCEDURE — 78434 AQMBF PET REST & RX STRESS: CPT

## 2022-11-14 PROCEDURE — 78431 CARDIAC PET SCAN STRESS (CUPID ONLY): ICD-10-PCS | Mod: 26,,, | Performed by: INTERNAL MEDICINE

## 2022-11-14 PROCEDURE — 93018 CARDIAC PET SCAN STRESS (CUPID ONLY): ICD-10-PCS | Mod: ,,, | Performed by: INTERNAL MEDICINE

## 2022-11-14 PROCEDURE — 78434 AQMBF PET REST & RX STRESS: CPT | Mod: 26,,, | Performed by: INTERNAL MEDICINE

## 2022-11-14 PROCEDURE — 93016 CARDIAC PET SCAN STRESS (CUPID ONLY): ICD-10-PCS | Mod: ,,, | Performed by: INTERNAL MEDICINE

## 2022-11-14 PROCEDURE — 78434 CARDIAC PET SCAN STRESS (CUPID ONLY): ICD-10-PCS | Mod: 26,,, | Performed by: INTERNAL MEDICINE

## 2022-11-14 RX ORDER — REGADENOSON 0.08 MG/ML
0.4 INJECTION, SOLUTION INTRAVENOUS ONCE
Status: COMPLETED | OUTPATIENT
Start: 2022-11-14 | End: 2022-11-14

## 2022-11-14 RX ORDER — CAFFEINE CITRATE 20 MG/ML
60 SOLUTION INTRAVENOUS
Status: COMPLETED | OUTPATIENT
Start: 2022-11-14 | End: 2022-11-14

## 2022-11-14 RX ADMIN — REGADENOSON 0.4 MG: 0.08 INJECTION, SOLUTION INTRAVENOUS at 07:11

## 2022-11-14 RX ADMIN — CAFFEINE CITRATE 60 MG: 20 INJECTION INTRAVENOUS at 07:11

## 2022-11-22 NOTE — PRE-PROCEDURE INSTRUCTIONS
PreOp Instructions given:   - Verbal medication information (what to hold and what to take)   - NPO guidelines //given per Bariatric SX De[t  - Arrival place directions given; time to be given the day before procedure by the   Surgeon's Office   - Bathing with antibacterial soap   - Don't wear any jewelry or bring any valuables AM of surgery   - No makeup or moisturizer to face   - No perfume/cologne, powder, lotions or aftershave   Pt. verbalized understanding.   Pt denies any h/o Anesthesia/Sedation complications or side effects.

## 2022-11-23 ENCOUNTER — ANESTHESIA EVENT (OUTPATIENT)
Dept: SURGERY | Facility: HOSPITAL | Age: 52
DRG: 621 | End: 2022-11-23
Payer: OTHER GOVERNMENT

## 2022-11-23 ENCOUNTER — TELEPHONE (OUTPATIENT)
Dept: BARIATRICS | Facility: CLINIC | Age: 52
End: 2022-11-23
Payer: OTHER GOVERNMENT

## 2022-11-23 NOTE — TELEPHONE ENCOUNTER
Notified patient of arrival time to the [facility] at 0700 with expected surgery start time 0855 on 11/25/2022. Instructed patient regarding pre-op instructions including no protein shakes or sugar free clear liquids after midnight but can have a rare sip of water for comfort, showering and preop medications to hold/take per anesthesia/preop. Reminded pt to drink pre-surgery beverage or 8 oz water at 0630. Instructed patient on the s/s of dehydration and for patient to call at the first sign of dehydration. Informed patient that someone from bariatrics will call them 1 week post op to review diet/fluid intake and to ensure adequate hydration. Reminded patient not to take antibiotics for 30 days following surgery or schedule elective procedures involving anesthesia/sedation for 30 days following surgery unless checking with the bariatric clinic first. Pt verbalized understanding. Pt given office phone number for any additional questions/concerns.

## 2022-11-23 NOTE — ANESTHESIA PREPROCEDURE EVALUATION
[facility]  Anesthesia Pre-Operative Evaluation        Patient Name: Bandar Douglass  YOB: 1970  MRN: [id number]    SUBJECTIVE:     Pre-operative Evaluation for Procedure(s) (LRB):  GASTRECTOMY, SLEEVE, LAPAROSCOPIC, with intraop EGD (N/A)     11/23/2022    Bandar Douglass is a 52 y.o. female with a PMHx significant for anxiety/depression, HLD, HTN, GOINS, mild pulmonary HTN, T2DM, hypothyroidism, and morbid obesity.     She now presents for the above procedure(s).    Previous Airway (12/7/2020):   12/07/20; 0658; Direct laryngoscopy; Oral Standard; No; 7.5 mm; Cuffed; Auscultation, Capnometry, Palpation of cuff; Pink tape      Stress Echo  Results for orders placed during the hospital encounter of 10/17/22  Stress Echo Which stress agent will be used? Treadmill Exercise; Color Flow Doppler? No  Interpretation Summary  · The patient exercised for 5 minutes and 39 seconds on a high ramp protocol, corresponding to a functional capacity of 9 METS, achieving a peak heart rate of 160 bpm, which is 100% of the age predicted maximum heart rate.  · The test was stopped because the patient experienced fatigue.  · The patient's exercise capacity was below average.  · There were no arrhythmias during stress.  · The ECG portion of this study is negative for myocardial ischemia.  · The left ventricle is normal in size with normal systolic function.  · The estimated ejection fraction is 60%.  · Normal left ventricular diastolic function.  · Normal right ventricular size with normal right ventricular systolic function.  · The estimated PA systolic pressure is 28 mmHg.  · Normal central venous pressure (3 mmHg).  · The stress echo portion of this test reveals no clear evidence of myocardial ischemia. However there was no significant increase in contractility of the left ventricular segments.    Patient Active Problem List   Diagnosis    Neck pain    Chronic pain of right ankle    Hypothyroidism     Hypertension associated with diabetes    Type 2 diabetes mellitus with hyperglycemia, without long-term current use of insulin    Cervical radiculopathy    Anxiety and depression    Chronic constipation    Class 3 severe obesity due to excess calories with serious comorbidity and body mass index (BMI) of 40.0 to 44.9 in adult    Other insomnia    Lesion of left ulnar nerve       Review of patient's allergies indicates:   Allergen Reactions    Bactrim [sulfamethoxazole-trimethoprim] Swelling    Adhesive Rash     tapes       Current Outpatient Medications   Medication Instructions    ALPRAZolam (XANAX) 1 MG tablet TAKE 1 TABLET NIGHTLY AS NEEDED FOR ANXIETY    amitriptyline (ELAVIL) 10 MG tablet TAKE 1 TABLET NIGHTLY AS NEEDED FOR INSOMNIA    ascorbic acid, vitamin C, (VITAMIN C) 1000 MG tablet Oral    cyanocobalamin (VITAMIN B-12) 1,000 mcg, Oral    ergocalciferol, vitamin D2, (VITAMIN D ORAL) 1,000 mg, Oral    flaxseed oil 1,000 mg Cap 1 capsule, Oral, Daily    fluticasone-salmeterol diskus inhaler 250-50 mcg 1 puff, Inhalation, 2 times daily    glimepiride (AMARYL) 4 MG tablet Take 2 tablets PO in AM, 1 tablet in PM    gluc sifuentes/chondro sifuentes A/vit C/Mn (GLUCOSAMINE 1500 COMPLEX ORAL) 1,500 mg, Oral    hydrocodone-acetaminophen (HYCET) solution 7.5-325 mg/15mL 15 mLs, Oral, 4 times daily PRN    levothyroxine (SYNTHROID) 200 MCG tablet TAKE 1 TABLET BEFORE BREAKFAST    levothyroxine (SYNTHROID) 25 mcg, Oral, Daily    multivitamin with iron Tab Oral    omeprazole (PRILOSEC) 40 mg, Oral, Every morning, Open capsule and take with apple sauce    ondansetron (ZOFRAN-ODT) 8 mg, Oral, Every 6 hours PRN    pantoprazole (PROTONIX) 40 mg, Oral, Daily    pravastatin (PRAVACHOL) 20 mg, Oral, Daily    PROAIR HFA 90 mcg/actuation inhaler USE 1 INHALATION EVERY 4 HOURS AS NEEDED FOR WHEEZING (RESCUE)    semaglutide (RYBELSUS) 14 mg, Oral, Daily    TRINTELLIX 10 mg Tab TAKE 1 TABLET DAILY       Past  Surgical History:   Procedure Laterality Date    BREAST BIOPSY Left     CERVICAL FUSION  2017    C5-7 Anterior Fusion     SECTION      CHOLECYSTECTOMY      DECOMPRESSION OF ULNAR NERVE Left 2020    HERNIA REPAIR      HYSTERECTOMY      LUMBAR DISCECTOMY  2000    SPINE SURGERY      TONSILLECTOMY         Social History     Substance and Sexual Activity   Drug Use No     Alcohol Use: Not on file     Tobacco Use: Low Risk     Smoking Tobacco Use: Never    Smokeless Tobacco Use: Never    Passive Exposure: Not on file       OBJECTIVE:     Vital Signs Range (Last 24H):         Significant Labs    Heme Profile  Lab Results   Component Value Date    WBC 7.62 10/12/2022    HGB 15.6 10/12/2022    HCT 45.8 10/12/2022     10/12/2022       Coagulation Studies  Lab Results   Component Value Date    LABPROT 12.1 2017    INR 1.1 2017       BMP  Lab Results   Component Value Date     10/12/2022     10/12/2022    K 4.6 10/12/2022    K 4.6 10/12/2022    CL 98 10/12/2022    CL 98 10/12/2022    CO2 32 (H) 10/12/2022    CO2 32 (H) 10/12/2022    BUN 14 10/12/2022    BUN 14 10/12/2022    CREATININE 0.70 10/12/2022    CREATININE 0.70 10/12/2022    MG 1.7 10/12/2022    PHOS 4.0 10/12/2022       Liver Function Tests  Lab Results   Component Value Date    AST 52 (H) 10/12/2022    AST 52 (H) 10/12/2022    ALT 26 10/12/2022    ALT 26 10/12/2022    ALKPHOS 79 10/12/2022    ALKPHOS 79 10/12/2022    BILITOT 0.4 10/12/2022    BILITOT 0.4 10/12/2022    PROT 7.8 10/12/2022    PROT 7.8 10/12/2022    ALBUMIN 4.5 10/12/2022    ALBUMIN 4.5 10/12/2022       Lipid Profile  Lab Results   Component Value Date    CHOL 145 10/12/2022    HDL 50 10/12/2022    TRIG 174 (H) 10/12/2022       Endocrine Profile  Lab Results   Component Value Date    HGBA1C 8.5 (H) 10/12/2022    TSH <0.015 (L) 10/12/2022       Cardiac Studies    EKG:   Results for orders placed or performed during the hospital encounter of  10/17/22   EKG    Collection Time: 10/17/22  8:31 AM    Narrative    Test Reason : E66.01,Z68.41,E11.9,E11.59,I15.2,    Vent. Rate : 081 BPM     Atrial Rate : 081 BPM     P-R Int : 168 ms          QRS Dur : 096 ms      QT Int : 376 ms       P-R-T Axes : 059 078 048 degrees     QTc Int : 436 ms    Normal sinus rhythm  Normal ECG  No previous ECGs available  Confirmed by GIBRAN GUILLERMO MD ([id number]) on 10/17/2022 8:40:14 AM    Referred By: MOISE ADEN           Confirmed By:GIBRAN GUILLERMO MD       TTE  No results found for this or any previous visit.      ASSESSMENT/PLAN:      11/23/2022  Bandar Douglass is a 52 y.o., female.      Pre-op Assessment    I have reviewed the Patient Summary Reports.     I have reviewed the Nursing Notes. I have reviewed the NPO Status.   I have reviewed the Medications.     Review of Systems  Anesthesia Hx:  No problems with previous Anesthesia   Denies Personal Hx of Anesthesia complications.   Hematology/Oncology:  Hematology Normal   Oncology Normal     Cardiovascular:   Hypertension hyperlipidemia    Pulmonary:  Pulmonary Normal    Hepatic/GI:   GOINS   Musculoskeletal:   H/o ACDF   Endocrine:   Diabetes, poorly controlled, type 2 Hypothyroidism  Morbid Obesity / BMI > 40  Psych:   Psychiatric History          Physical Exam  General: Well nourished, Cooperative and Alert    Airway:  Mallampati: II   Mouth Opening: Normal  TM Distance: > 6 cm  Tongue: Normal  Neck ROM: Normal ROM    Dental:  Intact    Chest/Lungs:  Normal Respiratory Rate    Heart:  Rate: Normal        Anesthesia Plan  Type of Anesthesia, risks & benefits discussed:    Anesthesia Type: Gen ETT  Intra-op Monitoring Plan: Standard ASA Monitors  Post Op Pain Control Plan: multimodal analgesia and IV/PO Opioids PRN  Induction:  IV  Airway Plan: Direct, Post-Induction with ramp  Informed Consent: Informed consent signed with the Patient and all parties understand the risks and agree with anesthesia plan.  All questions  answered. Patient consented to blood products? Yes  ASA Score: 3  Day of Surgery Review of History & Physical: H&P Update referred to the surgeon/provider.    Ready For Surgery From Anesthesia Perspective.     .

## 2022-11-25 ENCOUNTER — ANESTHESIA (OUTPATIENT)
Dept: SURGERY | Facility: HOSPITAL | Age: 52
DRG: 621 | End: 2022-11-25
Payer: OTHER GOVERNMENT

## 2022-11-25 ENCOUNTER — HOSPITAL ENCOUNTER (INPATIENT)
Facility: HOSPITAL | Age: 52
LOS: 1 days | Discharge: HOME | DRG: 621 | End: 2022-11-26
Attending: SURGERY | Admitting: SURGERY
Payer: OTHER GOVERNMENT

## 2022-11-25 DIAGNOSIS — E66.01 CLASS 3 SEVERE OBESITY DUE TO EXCESS CALORIES WITH SERIOUS COMORBIDITY AND BODY MASS INDEX (BMI) OF 40.0 TO 44.9 IN ADULT: ICD-10-CM

## 2022-11-25 PROBLEM — E66.9 OBESITY: Status: ACTIVE | Noted: 2022-11-25

## 2022-11-25 LAB
ABO + RH BLD: NORMAL
BLD GP AB SCN CELLS X3 SERPL QL: NORMAL
POCT GLUCOSE: 239 MG/DL (ref 70–110)
POCT GLUCOSE: 296 MG/DL (ref 70–110)

## 2022-11-25 PROCEDURE — 36000711: Performed by: SURGERY

## 2022-11-25 PROCEDURE — 25000003 PHARM REV CODE 250: Performed by: SURGERY

## 2022-11-25 PROCEDURE — 63600175 PHARM REV CODE 636 W HCPCS

## 2022-11-25 PROCEDURE — 63600175 PHARM REV CODE 636 W HCPCS: Performed by: SURGERY

## 2022-11-25 PROCEDURE — 43775 PR LAP, GAST RESTRICT PROC, LONGITUDINAL GASTRECTOMY: ICD-10-PCS | Mod: ,,, | Performed by: SURGERY

## 2022-11-25 PROCEDURE — 25000003 PHARM REV CODE 250

## 2022-11-25 PROCEDURE — 27201423 OPTIME MED/SURG SUP & DEVICES STERILE SUPPLY: Performed by: SURGERY

## 2022-11-25 PROCEDURE — 25000003 PHARM REV CODE 250: Performed by: STUDENT IN AN ORGANIZED HEALTH CARE EDUCATION/TRAINING PROGRAM

## 2022-11-25 PROCEDURE — 88342 IMHCHEM/IMCYTCHM 1ST ANTB: CPT | Performed by: PATHOLOGY

## 2022-11-25 PROCEDURE — 36000710: Performed by: SURGERY

## 2022-11-25 PROCEDURE — D9220A PRA ANESTHESIA: Mod: ,,, | Performed by: ANESTHESIOLOGY

## 2022-11-25 PROCEDURE — 71000015 HC POSTOP RECOV 1ST HR: Performed by: SURGERY

## 2022-11-25 PROCEDURE — 71000016 HC POSTOP RECOV ADDL HR: Performed by: SURGERY

## 2022-11-25 PROCEDURE — 88307 TISSUE EXAM BY PATHOLOGIST: CPT | Performed by: PATHOLOGY

## 2022-11-25 PROCEDURE — 11000001 HC ACUTE MED/SURG PRIVATE ROOM

## 2022-11-25 PROCEDURE — 36415 COLL VENOUS BLD VENIPUNCTURE: CPT | Performed by: SURGERY

## 2022-11-25 PROCEDURE — 88307 TISSUE EXAM BY PATHOLOGIST: CPT | Mod: 26,,, | Performed by: PATHOLOGY

## 2022-11-25 PROCEDURE — 88342 IMHCHEM/IMCYTCHM 1ST ANTB: CPT | Mod: 26,,, | Performed by: PATHOLOGY

## 2022-11-25 PROCEDURE — 88307 PR  SURG PATH,LEVEL V: ICD-10-PCS | Mod: 26,,, | Performed by: PATHOLOGY

## 2022-11-25 PROCEDURE — C9113 INJ PANTOPRAZOLE SODIUM, VIA: HCPCS | Performed by: SURGERY

## 2022-11-25 PROCEDURE — 86901 BLOOD TYPING SEROLOGIC RH(D): CPT | Performed by: SURGERY

## 2022-11-25 PROCEDURE — 43775 LAP SLEEVE GASTRECTOMY: CPT | Mod: ,,, | Performed by: SURGERY

## 2022-11-25 PROCEDURE — 88342 CHG IMMUNOCYTOCHEMISTRY: ICD-10-PCS | Mod: 26,,, | Performed by: PATHOLOGY

## 2022-11-25 PROCEDURE — 71000033 HC RECOVERY, INTIAL HOUR: Performed by: SURGERY

## 2022-11-25 PROCEDURE — 37000008 HC ANESTHESIA 1ST 15 MINUTES: Performed by: SURGERY

## 2022-11-25 PROCEDURE — D9220A PRA ANESTHESIA: ICD-10-PCS | Mod: ,,, | Performed by: ANESTHESIOLOGY

## 2022-11-25 PROCEDURE — 82962 GLUCOSE BLOOD TEST: CPT | Performed by: SURGERY

## 2022-11-25 PROCEDURE — 37000009 HC ANESTHESIA EA ADD 15 MINS: Performed by: SURGERY

## 2022-11-25 PROCEDURE — A4217 STERILE WATER/SALINE, 500 ML: HCPCS | Performed by: SURGERY

## 2022-11-25 PROCEDURE — 25000003 PHARM REV CODE 250: Performed by: ANESTHESIOLOGY

## 2022-11-25 PROCEDURE — 94761 N-INVAS EAR/PLS OXIMETRY MLT: CPT

## 2022-11-25 RX ORDER — ONDANSETRON 2 MG/ML
8 INJECTION INTRAMUSCULAR; INTRAVENOUS EVERY 6 HOURS PRN
Status: DISCONTINUED | OUTPATIENT
Start: 2022-11-25 | End: 2022-11-26 | Stop reason: HOSPADM

## 2022-11-25 RX ORDER — FENTANYL CITRATE 50 UG/ML
INJECTION, SOLUTION INTRAMUSCULAR; INTRAVENOUS
Status: DISCONTINUED | OUTPATIENT
Start: 2022-11-25 | End: 2022-11-25

## 2022-11-25 RX ORDER — SODIUM CHLORIDE 9 MG/ML
INJECTION, SOLUTION INTRAVENOUS CONTINUOUS
Status: DISCONTINUED | OUTPATIENT
Start: 2022-11-25 | End: 2022-11-26 | Stop reason: HOSPADM

## 2022-11-25 RX ORDER — IPRATROPIUM BROMIDE AND ALBUTEROL SULFATE 2.5; .5 MG/3ML; MG/3ML
3 SOLUTION RESPIRATORY (INHALATION) EVERY 4 HOURS PRN
Status: DISCONTINUED | OUTPATIENT
Start: 2022-11-25 | End: 2022-11-26 | Stop reason: HOSPADM

## 2022-11-25 RX ORDER — KETAMINE HCL IN 0.9 % NACL 50 MG/5 ML
SYRINGE (ML) INTRAVENOUS
Status: DISCONTINUED | OUTPATIENT
Start: 2022-11-25 | End: 2022-11-25

## 2022-11-25 RX ORDER — ACETAMINOPHEN 650 MG/20.3ML
500 LIQUID ORAL EVERY 8 HOURS
Status: COMPLETED | OUTPATIENT
Start: 2022-11-25 | End: 2022-11-26

## 2022-11-25 RX ORDER — HALOPERIDOL 5 MG/ML
INJECTION INTRAMUSCULAR
Status: DISCONTINUED | OUTPATIENT
Start: 2022-11-25 | End: 2022-11-25

## 2022-11-25 RX ORDER — ALPRAZOLAM 0.5 MG/1
1 TABLET ORAL NIGHTLY PRN
Status: DISCONTINUED | OUTPATIENT
Start: 2022-11-25 | End: 2022-11-26 | Stop reason: HOSPADM

## 2022-11-25 RX ORDER — WATER 1 ML/ML
IRRIGANT IRRIGATION
Status: DISCONTINUED | OUTPATIENT
Start: 2022-11-25 | End: 2022-11-25 | Stop reason: HOSPADM

## 2022-11-25 RX ORDER — SODIUM CHLORIDE, SODIUM LACTATE, POTASSIUM CHLORIDE, CALCIUM CHLORIDE 600; 310; 30; 20 MG/100ML; MG/100ML; MG/100ML; MG/100ML
INJECTION, SOLUTION INTRAVENOUS CONTINUOUS
Status: DISCONTINUED | OUTPATIENT
Start: 2022-11-25 | End: 2022-11-26 | Stop reason: HOSPADM

## 2022-11-25 RX ORDER — GABAPENTIN 250 MG/5ML
300 SOLUTION ORAL 3 TIMES DAILY
Status: DISCONTINUED | OUTPATIENT
Start: 2022-11-25 | End: 2022-11-26 | Stop reason: HOSPADM

## 2022-11-25 RX ORDER — PHENYLEPHRINE HCL IN 0.9% NACL 1 MG/10 ML
SYRINGE (ML) INTRAVENOUS
Status: DISCONTINUED | OUTPATIENT
Start: 2022-11-25 | End: 2022-11-25

## 2022-11-25 RX ORDER — FENTANYL CITRATE 50 UG/ML
INJECTION, SOLUTION INTRAMUSCULAR; INTRAVENOUS
Status: COMPLETED
Start: 2022-11-25 | End: 2022-11-25

## 2022-11-25 RX ORDER — LABETALOL HYDROCHLORIDE 5 MG/ML
INJECTION, SOLUTION INTRAVENOUS
Status: DISCONTINUED | OUTPATIENT
Start: 2022-11-25 | End: 2022-11-25

## 2022-11-25 RX ORDER — FENTANYL CITRATE 50 UG/ML
25 INJECTION, SOLUTION INTRAMUSCULAR; INTRAVENOUS EVERY 5 MIN PRN
Status: COMPLETED | OUTPATIENT
Start: 2022-11-25 | End: 2022-11-25

## 2022-11-25 RX ORDER — MIDAZOLAM HYDROCHLORIDE 1 MG/ML
INJECTION, SOLUTION INTRAMUSCULAR; INTRAVENOUS
Status: DISCONTINUED | OUTPATIENT
Start: 2022-11-25 | End: 2022-11-25

## 2022-11-25 RX ORDER — ENOXAPARIN SODIUM 100 MG/ML
40 INJECTION SUBCUTANEOUS EVERY 12 HOURS
Status: DISCONTINUED | OUTPATIENT
Start: 2022-11-25 | End: 2022-11-26 | Stop reason: HOSPADM

## 2022-11-25 RX ORDER — HYDROMORPHONE HYDROCHLORIDE 1 MG/ML
0.5 INJECTION, SOLUTION INTRAMUSCULAR; INTRAVENOUS; SUBCUTANEOUS
Status: DISCONTINUED | OUTPATIENT
Start: 2022-11-25 | End: 2022-11-26 | Stop reason: HOSPADM

## 2022-11-25 RX ORDER — AMITRIPTYLINE HYDROCHLORIDE 10 MG/1
10 TABLET, FILM COATED ORAL NIGHTLY
Status: DISCONTINUED | OUTPATIENT
Start: 2022-11-25 | End: 2022-11-26 | Stop reason: HOSPADM

## 2022-11-25 RX ORDER — LIDOCAINE HYDROCHLORIDE 10 MG/ML
1 INJECTION, SOLUTION EPIDURAL; INFILTRATION; INTRACAUDAL; PERINEURAL ONCE
Status: DISCONTINUED | OUTPATIENT
Start: 2022-11-25 | End: 2022-11-25 | Stop reason: HOSPADM

## 2022-11-25 RX ORDER — HYDROMORPHONE HYDROCHLORIDE 1 MG/ML
0.2 INJECTION, SOLUTION INTRAMUSCULAR; INTRAVENOUS; SUBCUTANEOUS EVERY 5 MIN PRN
Status: DISCONTINUED | OUTPATIENT
Start: 2022-11-25 | End: 2022-11-25 | Stop reason: HOSPADM

## 2022-11-25 RX ORDER — FLUTICASONE FUROATE AND VILANTEROL 100; 25 UG/1; UG/1
1 POWDER RESPIRATORY (INHALATION) DAILY
Status: DISCONTINUED | OUTPATIENT
Start: 2022-11-26 | End: 2022-11-26 | Stop reason: HOSPADM

## 2022-11-25 RX ORDER — LIDOCAINE HYDROCHLORIDE 10 MG/ML
1 INJECTION, SOLUTION EPIDURAL; INFILTRATION; INTRACAUDAL; PERINEURAL ONCE
Status: DISCONTINUED | OUTPATIENT
Start: 2022-11-25 | End: 2022-11-25

## 2022-11-25 RX ORDER — SODIUM CHLORIDE 0.9 % (FLUSH) 0.9 %
10 SYRINGE (ML) INJECTION
Status: DISCONTINUED | OUTPATIENT
Start: 2022-11-25 | End: 2022-11-25 | Stop reason: HOSPADM

## 2022-11-25 RX ORDER — PANTOPRAZOLE SODIUM 40 MG/10ML
40 INJECTION, POWDER, LYOPHILIZED, FOR SOLUTION INTRAVENOUS 2 TIMES DAILY
Status: DISCONTINUED | OUTPATIENT
Start: 2022-11-25 | End: 2022-11-26 | Stop reason: HOSPADM

## 2022-11-25 RX ORDER — FAMOTIDINE 10 MG/ML
20 INJECTION INTRAVENOUS ONCE
Status: COMPLETED | OUTPATIENT
Start: 2022-11-25 | End: 2022-11-25

## 2022-11-25 RX ORDER — CEFAZOLIN SODIUM/WATER 2 G/20 ML
SYRINGE (ML) INTRAVENOUS
Status: DISCONTINUED | OUTPATIENT
Start: 2022-11-25 | End: 2022-11-25

## 2022-11-25 RX ORDER — PROCHLORPERAZINE EDISYLATE 5 MG/ML
5 INJECTION INTRAMUSCULAR; INTRAVENOUS EVERY 6 HOURS PRN
Status: DISCONTINUED | OUTPATIENT
Start: 2022-11-25 | End: 2022-11-26 | Stop reason: HOSPADM

## 2022-11-25 RX ORDER — ACETAMINOPHEN 500 MG
1000 TABLET ORAL
Status: COMPLETED | OUTPATIENT
Start: 2022-11-25 | End: 2022-11-25

## 2022-11-25 RX ORDER — PROCHLORPERAZINE EDISYLATE 5 MG/ML
5 INJECTION INTRAMUSCULAR; INTRAVENOUS EVERY 30 MIN PRN
Status: DISCONTINUED | OUTPATIENT
Start: 2022-11-25 | End: 2022-11-25 | Stop reason: HOSPADM

## 2022-11-25 RX ORDER — HYDROCODONE BITARTRATE AND ACETAMINOPHEN 7.5; 325 MG/15ML; MG/15ML
15 SOLUTION ORAL EVERY 4 HOURS PRN
Status: DISCONTINUED | OUTPATIENT
Start: 2022-11-25 | End: 2022-11-26 | Stop reason: HOSPADM

## 2022-11-25 RX ORDER — ONDANSETRON 2 MG/ML
INJECTION INTRAMUSCULAR; INTRAVENOUS
Status: DISCONTINUED | OUTPATIENT
Start: 2022-11-25 | End: 2022-11-25

## 2022-11-25 RX ORDER — DIPHENHYDRAMINE HYDROCHLORIDE 50 MG/ML
25 INJECTION INTRAMUSCULAR; INTRAVENOUS EVERY 6 HOURS PRN
Status: DISCONTINUED | OUTPATIENT
Start: 2022-11-25 | End: 2022-11-25 | Stop reason: HOSPADM

## 2022-11-25 RX ORDER — LIDOCAINE HYDROCHLORIDE 20 MG/ML
INJECTION, SOLUTION EPIDURAL; INFILTRATION; INTRACAUDAL; PERINEURAL
Status: DISCONTINUED | OUTPATIENT
Start: 2022-11-25 | End: 2022-11-25

## 2022-11-25 RX ORDER — HEPARIN SODIUM 5000 [USP'U]/ML
5000 INJECTION, SOLUTION INTRAVENOUS; SUBCUTANEOUS ONCE
Status: COMPLETED | OUTPATIENT
Start: 2022-11-25 | End: 2022-11-25

## 2022-11-25 RX ORDER — PROPOFOL 10 MG/ML
VIAL (ML) INTRAVENOUS
Status: DISCONTINUED | OUTPATIENT
Start: 2022-11-25 | End: 2022-11-25

## 2022-11-25 RX ORDER — ROCURONIUM BROMIDE 10 MG/ML
INJECTION, SOLUTION INTRAVENOUS
Status: DISCONTINUED | OUTPATIENT
Start: 2022-11-25 | End: 2022-11-25

## 2022-11-25 RX ORDER — MUPIROCIN 20 MG/G
OINTMENT TOPICAL
Status: DISCONTINUED | OUTPATIENT
Start: 2022-11-25 | End: 2022-11-25 | Stop reason: HOSPADM

## 2022-11-25 RX ORDER — BUPIVACAINE HYDROCHLORIDE 2.5 MG/ML
INJECTION, SOLUTION EPIDURAL; INFILTRATION; INTRACAUDAL
Status: DISCONTINUED | OUTPATIENT
Start: 2022-11-25 | End: 2022-11-25 | Stop reason: HOSPADM

## 2022-11-25 RX ORDER — DEXMEDETOMIDINE HYDROCHLORIDE 100 UG/ML
INJECTION, SOLUTION INTRAVENOUS
Status: DISCONTINUED | OUTPATIENT
Start: 2022-11-25 | End: 2022-11-25

## 2022-11-25 RX ADMIN — FAMOTIDINE 20 MG: 10 INJECTION INTRAVENOUS at 08:11

## 2022-11-25 RX ADMIN — GABAPENTIN 300 MG: 250 SOLUTION ORAL at 02:11

## 2022-11-25 RX ADMIN — PROPOFOL 200 MG: 10 INJECTION, EMULSION INTRAVENOUS at 09:11

## 2022-11-25 RX ADMIN — HYDROMORPHONE HYDROCHLORIDE 0.2 MG: 1 INJECTION, SOLUTION INTRAMUSCULAR; INTRAVENOUS; SUBCUTANEOUS at 12:11

## 2022-11-25 RX ADMIN — FENTANYL CITRATE 25 MCG: 50 INJECTION INTRAMUSCULAR; INTRAVENOUS at 11:11

## 2022-11-25 RX ADMIN — ONDANSETRON 8 MG: 2 INJECTION INTRAMUSCULAR; INTRAVENOUS at 01:11

## 2022-11-25 RX ADMIN — SODIUM CHLORIDE, SODIUM GLUCONATE, SODIUM ACETATE, POTASSIUM CHLORIDE, MAGNESIUM CHLORIDE, SODIUM PHOSPHATE, DIBASIC, AND POTASSIUM PHOSPHATE: .53; .5; .37; .037; .03; .012; .00082 INJECTION, SOLUTION INTRAVENOUS at 10:11

## 2022-11-25 RX ADMIN — Medication 50 MCG: at 10:11

## 2022-11-25 RX ADMIN — HYDROCODONE BITARTRATE AND ACETAMINOPHEN 15 ML: 7.5; 325 SOLUTION ORAL at 11:11

## 2022-11-25 RX ADMIN — ACETAMINOPHEN 499.51 MG: 650 SOLUTION ORAL at 09:11

## 2022-11-25 RX ADMIN — FENTANYL CITRATE 100 MCG: 50 INJECTION, SOLUTION INTRAMUSCULAR; INTRAVENOUS at 09:11

## 2022-11-25 RX ADMIN — FENTANYL CITRATE 25 MCG: 50 INJECTION, SOLUTION INTRAMUSCULAR; INTRAVENOUS at 10:11

## 2022-11-25 RX ADMIN — MIDAZOLAM HYDROCHLORIDE 2 MG: 1 INJECTION, SOLUTION INTRAMUSCULAR; INTRAVENOUS at 08:11

## 2022-11-25 RX ADMIN — PROPOFOL 30 MG: 10 INJECTION, EMULSION INTRAVENOUS at 10:11

## 2022-11-25 RX ADMIN — ROCURONIUM BROMIDE 20 MG: 10 INJECTION INTRAVENOUS at 09:11

## 2022-11-25 RX ADMIN — PROCHLORPERAZINE EDISYLATE 5 MG: 5 INJECTION INTRAMUSCULAR; INTRAVENOUS at 07:11

## 2022-11-25 RX ADMIN — HYDROCODONE BITARTRATE AND ACETAMINOPHEN 15 ML: 7.5; 325 SOLUTION ORAL at 06:11

## 2022-11-25 RX ADMIN — Medication 3 G: at 09:11

## 2022-11-25 RX ADMIN — ENOXAPARIN SODIUM 40 MG: 40 INJECTION SUBCUTANEOUS at 09:11

## 2022-11-25 RX ADMIN — FENTANYL CITRATE 25 MCG: 50 INJECTION, SOLUTION INTRAMUSCULAR; INTRAVENOUS at 09:11

## 2022-11-25 RX ADMIN — DEXMEDETOMIDINE HYDROCHLORIDE 8 MCG: 100 INJECTION, SOLUTION INTRAVENOUS at 09:11

## 2022-11-25 RX ADMIN — ROCURONIUM BROMIDE 50 MG: 10 INJECTION INTRAVENOUS at 09:11

## 2022-11-25 RX ADMIN — LABETALOL HYDROCHLORIDE 10 MG: 5 INJECTION, SOLUTION INTRAVENOUS at 09:11

## 2022-11-25 RX ADMIN — SODIUM CHLORIDE, SODIUM LACTATE, POTASSIUM CHLORIDE, AND CALCIUM CHLORIDE: .6; .31; .03; .02 INJECTION, SOLUTION INTRAVENOUS at 10:11

## 2022-11-25 RX ADMIN — HALOPERIDOL LACTATE 5 MG: 5 INJECTION, SOLUTION INTRAMUSCULAR at 09:11

## 2022-11-25 RX ADMIN — Medication 5 MG: at 10:11

## 2022-11-25 RX ADMIN — AMITRIPTYLINE HYDROCHLORIDE 10 MG: 10 TABLET, FILM COATED ORAL at 09:11

## 2022-11-25 RX ADMIN — ACETAMINOPHEN 1000 MG: 500 TABLET ORAL at 08:11

## 2022-11-25 RX ADMIN — ONDANSETRON 4 MG: 2 INJECTION INTRAMUSCULAR; INTRAVENOUS at 10:11

## 2022-11-25 RX ADMIN — SUGAMMADEX 400 MG: 100 INJECTION, SOLUTION INTRAVENOUS at 10:11

## 2022-11-25 RX ADMIN — PANTOPRAZOLE SODIUM 40 MG: 40 INJECTION, POWDER, FOR SOLUTION INTRAVENOUS at 10:11

## 2022-11-25 RX ADMIN — SIMETHICONE 40 MG: 20 SUSPENSION/ DROPS ORAL at 06:11

## 2022-11-25 RX ADMIN — HEPARIN SODIUM 5000 UNITS: 5000 INJECTION INTRAVENOUS; SUBCUTANEOUS at 08:11

## 2022-11-25 RX ADMIN — ACETAMINOPHEN 499.51 MG: 650 SOLUTION ORAL at 02:11

## 2022-11-25 RX ADMIN — MUPIROCIN: 20 OINTMENT TOPICAL at 08:11

## 2022-11-25 RX ADMIN — Medication 100 MCG: at 09:11

## 2022-11-25 RX ADMIN — DEXMEDETOMIDINE HYDROCHLORIDE 16 MCG: 100 INJECTION, SOLUTION INTRAVENOUS at 09:11

## 2022-11-25 RX ADMIN — SODIUM CHLORIDE: 0.9 INJECTION, SOLUTION INTRAVENOUS at 08:11

## 2022-11-25 RX ADMIN — LIDOCAINE HYDROCHLORIDE 100 MG: 20 INJECTION, SOLUTION EPIDURAL; INFILTRATION; INTRACAUDAL; PERINEURAL at 09:11

## 2022-11-25 RX ADMIN — PANTOPRAZOLE SODIUM 40 MG: 40 INJECTION, POWDER, FOR SOLUTION INTRAVENOUS at 09:11

## 2022-11-25 RX ADMIN — GABAPENTIN 300 MG: 250 SOLUTION ORAL at 09:11

## 2022-11-25 RX ADMIN — Medication 25 MG: at 09:11

## 2022-11-25 NOTE — TRANSFER OF CARE
"Anesthesia Transfer of Care Note    Patient: Bandar Douglass    Procedure(s) Performed: Procedure(s) (LRB):  GASTRECTOMY, SLEEVE, LAPAROSCOPIC, with intraop EGD (N/A)    Patient location: PACU    Anesthesia Type: general    Transport from OR: Transported from OR on 6-10 L/min O2 by face mask with adequate spontaneous ventilation    Post pain: adequate analgesia    Post assessment: no apparent anesthetic complications    Post vital signs: stable    Level of consciousness: responds to stimulation    Nausea/Vomiting: no nausea/vomiting    Complications: none    Transfer of care protocol was followed      Last vitals:   Visit Vitals  BP (!) 146/68 (BP Location: Right arm, Patient Position: Lying)   Pulse 79   Temp 36.7 °C (98 °F) (Oral)   Resp 18   Ht 5' 9" (1.753 m)   Wt 124.7 kg (274 lb 14.6 oz)   SpO2 97%   Breastfeeding No   BMI 40.60 kg/m²     "

## 2022-11-25 NOTE — NURSING TRANSFER
Nursing Transfer Note      11/25/2022     Reason patient is being transferred: Meets criteria    Transfer To: 545    Transfer via stretcher    Transfer with IV pole    Transported by PCT    Medicines sent: None    Any special needs or follow-up needed: None    Chart send with patient: Yes    Notified: spouse    Patient reassessed at: 11/25

## 2022-11-25 NOTE — ANESTHESIA POSTPROCEDURE EVALUATION
Anesthesia Post Evaluation    Patient: Bandar Douglass    Procedure(s) Performed: Procedure(s) (LRB):  GASTRECTOMY, SLEEVE, LAPAROSCOPIC, with intraop EGD (N/A)    Final Anesthesia Type: general      Patient location during evaluation: PACU  Patient participation: Yes- Able to Participate  Level of consciousness: awake and alert  Post-procedure vital signs: reviewed and stable  Pain management: adequate  Airway patency: patent    PONV status at discharge: No PONV  Anesthetic complications: no      Cardiovascular status: hemodynamically stable  Respiratory status: spontaneous ventilation  Follow-up not needed.          Vitals Value Taken Time   /89 11/25/22 1132   Temp 36.4 °C (97.5 °F) 11/25/22 1045   Pulse 56 11/25/22 1145   Resp 17 11/25/22 1145   SpO2 96 % 11/25/22 1145   Vitals shown include unvalidated device data.      Event Time   Out of Recovery 11:15:00         Pain/Tomasz Score: Pain Rating Prior to Med Admin: 8 (11/25/2022 11:19 AM)  Pain Rating Post Med Admin: 3 (11/25/2022 11:24 AM)  Tomasz Score: 9 (11/25/2022 11:15 AM)

## 2022-11-25 NOTE — BRIEF OP NOTE
Operative Note       Surgery Date: 11/25/2022     Surgeon(s) and Role:     * Sandro Purcell MD - Primary     * Pauline Caballero MD - Resident - Assisting    Pre-op Diagnosis:  Hypertension associated with diabetes [E11.59, I15.2]  Type 2 diabetes mellitus without complication, without long-term current use of insulin [E11.9]  BMI 40.0-44.9, adult [Z68.41]  Dietary counseling [Z71.3]  Morbid obesity [E66.01]    Post-op Diagnosis:  Hypertension associated with diabetes [E11.59, I15.2]  Type 2 diabetes mellitus without complication, without long-term current use of insulin [E11.9]  BMI 40.0-44.9, adult [Z68.41]  Dietary counseling [Z71.3]  Morbid obesity [E66.01]    Procedure(s) (LRB):  GASTRECTOMY, SLEEVE, LAPAROSCOPIC, with intraop EGD (N/A)    Anesthesia: General    Procedure in Detail/Findings:  No hiatal hernia.  Sleeve without apparent complication.    Estimated Blood Loss: Minimal           Specimens (From admission, onward)       Start     Ordered    11/25/22 0947  Specimen to Pathology, Surgery General Surgery  Once        Comments: Pre-op Diagnosis: Hypertension associated with diabetes [E11.59, I15.2]Type 2 diabetes mellitus without complication, without long-term current use of insulin [E11.9]BMI 40.0-44.9, adult [Z68.41]Dietary counseling [Z71.3]Morbid obesity [E66.01]Procedure(s):GASTRECTOMY, SLEEVE, LAPAROSCOPIC, with intraop EGD Number of specimens: 1Name of specimens: 1. Partial Gastrectomy     References:    Click here for ordering Quick Tip   Question Answer Comment   Procedure Type: General Surgery    Specimen Class: Routine/Screening    Which provider would you like to cc? SANDRO PURCELL    Release to patient Immediate        11/25/22 1020                  Implants: * No implants in log *           Disposition: PACU - hemodynamically stable.           Condition: Good    Attestation:  I was present and scrubbed for the entire procedure.

## 2022-11-25 NOTE — OP NOTE
DATE OF PROCEDURE: 11/25/2022    PRE OP DIAGNOSIS: Hypertension associated with diabetes [E11.59, I15.2]  Type 2 diabetes mellitus without complication, without long-term current use of insulin [E11.9]  BMI 40.0-44.9, adult [Z68.41]  Dietary counseling [Z71.3]  Morbid obesity [E66.01]    POST OP DIAGNOSIS: Hypertension associated with diabetes [E11.59, I15.2]  Type 2 diabetes mellitus without complication, without long-term current use of insulin [E11.9]  BMI 40.0-44.9, adult [Z68.41]  Dietary counseling [Z71.3]  Morbid obesity [E66.01]    PROCEDURE: Procedure(s) (LRB):  GASTRECTOMY, SLEEVE, LAPAROSCOPIC, with intraop EGD (N/A)    Surgeon(s) and Role:     * Telly Purcell MD - Primary     * Pauline Caballero MD - Resident - AssistingANESTHESIA: General.   INDICATIONS: A 52 y.o. with 1. Morbid obesity, body mass index is 44.3 kg/m²/300lb. and inability to lose weight.  2. Co-morbidities: diabetes mellitus type 2 non insulin dependent out of control, hld, mulligan, essential hypertension, mild pulmonary htn, depression, and anxiety  3. S/p csec, hyst, anish, hernia     DESCRIPTION OF PROCEDURE: The patient was placed under general anesthesia. The   abdomen was prepped and draped in usual manner. Access to peritoneum was   gained through the umbilicus just above the hernia patch using Optiview trocar under direct vision.   Pneumoperitoneum to 15 mmHg with CO2 gas was obtained. Four 5 mm trocars were   placed medially, subcostally at the midclavicular and anterior axial lines   bilaterally. A 10 mm trocar was placed 1 handbreadth caudad to the right   midclavicular trocar. The liver retractor was placed. The greater curve was   taken down starting 6 cm from the pylorus going all the way to the base of the   left calvin taking all posterior gastric attachments with the Harmonic scalpel. A   42-Mongolian bougie was passed towards the pylorus and the stomach was resected  along the bougie starting 6 cm from the  pylorus and coming out just a   little bit at the angle of His. The staple line was oversewn with a running   Quill stitch. The bougie was removed. Endoscopy was   performed. The sleeve appeared appropriate size and configuration and there   were no air leaks seen. The air was aspirated from the endoscope and the   endoscope was withdrawn. Teto was placed over the surgical areas.  Pneumoperitoneum was released for one minute and re-inflated to check for areas of bleeding which were controlled. The liver retractor was removed. The gastrectomy was   removed through the primary trochar site. That incision was then closed with 0 Vicryl. The trocars removed under direct vision. Prior to   removing the last trocar, the pneumoperitoneum was allowed to escape. The skin   incisions were infiltrated with Marcaine solution, closed with 4-0 plain catgut,   and reinforced with Mastisol, Steri-Strips, and Band-Aids. The patient   tolerated procedure well and was brought to Recovery Room in stable condition.   Sponge and needle counts were correct at the end of the case.    Blood loss was min, complications are none, consent was obtained and pathology was gastrectomy.

## 2022-11-25 NOTE — PLAN OF CARE
Pt IV fluids infusing. Pt reports pain relief following administration of medication. Pt lap sites to abdomen x6 CDI

## 2022-11-26 VITALS
HEIGHT: 69 IN | DIASTOLIC BLOOD PRESSURE: 98 MMHG | BODY MASS INDEX: 40.72 KG/M2 | HEART RATE: 71 BPM | WEIGHT: 274.94 LBS | RESPIRATION RATE: 20 BRPM | TEMPERATURE: 99 F | OXYGEN SATURATION: 92 % | SYSTOLIC BLOOD PRESSURE: 156 MMHG

## 2022-11-26 LAB
ANION GAP SERPL CALC-SCNC: 11 MMOL/L (ref 8–16)
BASOPHILS # BLD AUTO: 0.05 K/UL (ref 0–0.2)
BASOPHILS NFR BLD: 0.4 % (ref 0–1.9)
BUN SERPL-MCNC: 7 MG/DL (ref 6–20)
CALCIUM SERPL-MCNC: 9.8 MG/DL (ref 8.7–10.5)
CHLORIDE SERPL-SCNC: 102 MMOL/L (ref 95–110)
CO2 SERPL-SCNC: 25 MMOL/L (ref 23–29)
CREAT SERPL-MCNC: 0.7 MG/DL (ref 0.5–1.4)
DIFFERENTIAL METHOD: ABNORMAL
EOSINOPHIL # BLD AUTO: 0 K/UL (ref 0–0.5)
EOSINOPHIL NFR BLD: 0 % (ref 0–8)
ERYTHROCYTE [DISTWIDTH] IN BLOOD BY AUTOMATED COUNT: 11.9 % (ref 11.5–14.5)
EST. GFR  (NO RACE VARIABLE): >60 ML/MIN/1.73 M^2
GLUCOSE SERPL-MCNC: 174 MG/DL (ref 70–110)
HCT VFR BLD AUTO: 43.9 % (ref 37–48.5)
HGB BLD-MCNC: 14.7 G/DL (ref 12–16)
IMM GRANULOCYTES # BLD AUTO: 0.04 K/UL (ref 0–0.04)
IMM GRANULOCYTES NFR BLD AUTO: 0.3 % (ref 0–0.5)
LYMPHOCYTES # BLD AUTO: 1.2 K/UL (ref 1–4.8)
LYMPHOCYTES NFR BLD: 9 % (ref 18–48)
MAGNESIUM SERPL-MCNC: 1.5 MG/DL (ref 1.6–2.6)
MCH RBC QN AUTO: 30 PG (ref 27–31)
MCHC RBC AUTO-ENTMCNC: 33.5 G/DL (ref 32–36)
MCV RBC AUTO: 90 FL (ref 82–98)
MONOCYTES # BLD AUTO: 0.9 K/UL (ref 0.3–1)
MONOCYTES NFR BLD: 6.8 % (ref 4–15)
NEUTROPHILS # BLD AUTO: 11.2 K/UL (ref 1.8–7.7)
NEUTROPHILS NFR BLD: 83.5 % (ref 38–73)
NRBC BLD-RTO: 0 /100 WBC
PHOSPHATE SERPL-MCNC: 2 MG/DL (ref 2.7–4.5)
PLATELET # BLD AUTO: 284 K/UL (ref 150–450)
PMV BLD AUTO: 10.9 FL (ref 9.2–12.9)
POTASSIUM SERPL-SCNC: 3.8 MMOL/L (ref 3.5–5.1)
RBC # BLD AUTO: 4.9 M/UL (ref 4–5.4)
SODIUM SERPL-SCNC: 138 MMOL/L (ref 136–145)
WBC # BLD AUTO: 13.44 K/UL (ref 3.9–12.7)

## 2022-11-26 PROCEDURE — 84100 ASSAY OF PHOSPHORUS: CPT | Performed by: SURGERY

## 2022-11-26 PROCEDURE — 63600175 PHARM REV CODE 636 W HCPCS: Performed by: STUDENT IN AN ORGANIZED HEALTH CARE EDUCATION/TRAINING PROGRAM

## 2022-11-26 PROCEDURE — 36415 COLL VENOUS BLD VENIPUNCTURE: CPT | Performed by: SURGERY

## 2022-11-26 PROCEDURE — 80048 BASIC METABOLIC PNL TOTAL CA: CPT | Performed by: SURGERY

## 2022-11-26 PROCEDURE — 25000003 PHARM REV CODE 250: Performed by: SURGERY

## 2022-11-26 PROCEDURE — 63600175 PHARM REV CODE 636 W HCPCS: Performed by: SURGERY

## 2022-11-26 PROCEDURE — 85025 COMPLETE CBC W/AUTO DIFF WBC: CPT | Performed by: SURGERY

## 2022-11-26 PROCEDURE — C9113 INJ PANTOPRAZOLE SODIUM, VIA: HCPCS | Performed by: SURGERY

## 2022-11-26 PROCEDURE — 83735 ASSAY OF MAGNESIUM: CPT | Performed by: SURGERY

## 2022-11-26 RX ORDER — MAGNESIUM SULFATE HEPTAHYDRATE 40 MG/ML
2 INJECTION, SOLUTION INTRAVENOUS ONCE
Status: COMPLETED | OUTPATIENT
Start: 2022-11-26 | End: 2022-11-26

## 2022-11-26 RX ADMIN — PANTOPRAZOLE SODIUM 40 MG: 40 INJECTION, POWDER, FOR SOLUTION INTRAVENOUS at 09:11

## 2022-11-26 RX ADMIN — MAGNESIUM SULFATE 2 G: 2 INJECTION INTRAVENOUS at 09:11

## 2022-11-26 RX ADMIN — ENOXAPARIN SODIUM 40 MG: 40 INJECTION SUBCUTANEOUS at 09:11

## 2022-11-26 RX ADMIN — ACETAMINOPHEN 499.51 MG: 650 SOLUTION ORAL at 05:11

## 2022-11-26 RX ADMIN — GABAPENTIN 300 MG: 250 SOLUTION ORAL at 09:11

## 2022-11-26 RX ADMIN — SIMETHICONE 40 MG: 20 SUSPENSION/ DROPS ORAL at 05:11

## 2022-11-26 NOTE — DISCHARGE SUMMARY
[facility] - [facility]  DISCHARGE SUMMARY  General Surgery      Admit Date:  11/25/2022    Discharge Date and Time:  11/26/2022  12:00 PM    Attending Physician:  Telly Purcell MD     Discharge Provider:  Pauline Caballero MD     Reason for Admission:  Obesity     Procedures Performed:  Procedure(s) (LRB):  GASTRECTOMY, SLEEVE, LAPAROSCOPIC, with intraop EGD (N/A)    Hospital Course:  Please see the preoperative H&P and other available documentation for full details related to history prior to this admission.  Briefly, Bandar Douglass is a 52 y.o. female who was admitted following scheduled elective surgery for Obesity    Following a complete preoperative discussion of the risks and benefits of surgery with signed informed consent, the patient was taken to the operating room on 11/25/2022 and underwent the above stated procedures.  The patient tolerated surgery well and there were no complications.  Please see the operative report for full intraoperative findings and details.  Postoperatively, the patient did well and was transferred from the PACU to the floor in stable condition where they had a stable and uncomplicated hospital course.  Labs and vital signs remained stable and appropriate throughout course.  Diet was advanced as tolerated and the patient's pain was controlled on oral pain medications without problem.  Currently, the patient is doing well at 1 Day Post-Op and is stable and appropriate for discharge home today. Replacing her potassium and magnesium, but will be ready for discharge after that.     Physical Exam  Constitutional:       Appearance: She is well-developed.   Neck:      Vascular: No JVD.      Trachea: No tracheal deviation.   Cardiovascular:      Rate and Rhythm: Normal rate and regular rhythm.   Pulmonary:      Effort: No respiratory distress.      Breath sounds: Normal breath sounds.   Abdominal:      General: There is no distension.      Palpations: Abdomen is soft. There is no  mass.      Tenderness: There is no abdominal tenderness. There is no guarding or rebound.      Comments: Obese  Abdominal port sites c/d/I with dermabond dressing  Appropriately tender to palpation   Skin:     General: Skin is warm and dry.   Neurological:      Mental Status: She is alert and oriented to person, place, and time.         Consults:  None.    Significant Diagnostic Studies:   Recent Labs   Lab 11/26/22  0504   WBC 13.44*   HGB 14.7   HCT 43.9        Recent Labs   Lab 11/26/22  0504      K 3.8      CO2 25   BUN 7   CREATININE 0.7   *   CALCIUM 9.8   MG 1.5*   PHOS 2.0*   No results for input(s): INR, PTT, LABHEPA, LACTATE, TROPONINI, CPK, CPKMB, MB, BNP in the last 72 hours.No results for input(s): PH, PCO2, PO2, HCO3 in the last 72 hours.      Final Diagnoses:   Principal Problem:    Obesity   Secondary Diagnoses:    Active Hospital Problems    Diagnosis  POA    *Obesity [E66.9]  Yes      Resolved Hospital Problems   No resolved problems to display.       Discharged Condition:  Good    Disposition:  Home or Self Care    Follow Up/Patient Instructions:     Medications:  Reconciled Home Medications:    Current Discharge Medication List        CONTINUE these medications which have NOT CHANGED    Details   ALPRAZolam (XANAX) 1 MG tablet TAKE 1 TABLET NIGHTLY AS NEEDED FOR ANXIETY  Qty: 90 tablet, Refills: 1    Associated Diagnoses: Anxiety and depression      amitriptyline (ELAVIL) 10 MG tablet TAKE 1 TABLET NIGHTLY AS NEEDED FOR INSOMNIA  Qty: 90 tablet, Refills: 3    Associated Diagnoses: Other insomnia      fluticasone-salmeterol diskus inhaler 250-50 mcg Inhale 1 puff into the lungs 2 (two) times a day.  Qty: 180 each, Refills: 3    Associated Diagnoses: Cough      glimepiride (AMARYL) 4 MG tablet Take 2 tablets PO in AM, 1 tablet in PM  Qty: 270 tablet, Refills: 3    Associated Diagnoses: Type 2 diabetes mellitus with hyperglycemia, without long-term current use of insulin       gluc sifuentes/chondro sifuentes A/vit C/Mn (GLUCOSAMINE 1500 COMPLEX ORAL) Take 1,500 mg by mouth.      !! levothyroxine (SYNTHROID) 200 MCG tablet TAKE 1 TABLET BEFORE BREAKFAST  Qty: 90 tablet, Refills: 3    Associated Diagnoses: Hypothyroidism, unspecified type      !! levothyroxine (SYNTHROID) 25 MCG tablet Take 1 tablet (25 mcg total) by mouth once daily.  Qty: 90 tablet, Refills: 3    Associated Diagnoses: Hypothyroidism, unspecified type      pravastatin (PRAVACHOL) 20 MG tablet Take 1 tablet (20 mg total) by mouth once daily.  Qty: 90 tablet, Refills: 3      semaglutide (RYBELSUS) 14 mg tablet Take 1 tablet (14 mg total) by mouth once daily.  Qty: 90 tablet, Refills: 3    Associated Diagnoses: Type 2 diabetes mellitus without complication, without long-term current use of insulin      TRINTELLIX 10 mg Tab TAKE 1 TABLET DAILY  Qty: 90 tablet, Refills: 3    Associated Diagnoses: Anxiety and depression      ascorbic acid, vitamin C, (VITAMIN C) 1000 MG tablet Take by mouth.      cyanocobalamin (VITAMIN B-12) 1000 MCG tablet Take 1,000 mcg by mouth.      ergocalciferol, vitamin D2, (VITAMIN D ORAL) Take 1,000 mg by mouth.      flaxseed oil 1,000 mg Cap Take 1 capsule by mouth once daily.      hydrocodone-acetaminophen (HYCET) solution 7.5-325 mg/15mL Take 15 mLs by mouth 4 (four) times daily as needed for Pain.  Qty: 150 mL, Refills: 0    Comments: Quantity prescribed more than 7 day supply? No    For [facility] procedure      multivitamin with iron Tab Take by mouth.      omeprazole (PRILOSEC) 40 MG capsule Take 1 capsule (40 mg total) by mouth every morning. Open capsule and take with apple sauce  Qty: 30 capsule, Refills: 2      ondansetron (ZOFRAN-ODT) 8 MG TbDL Take 1 tablet (8 mg total) by mouth every 6 (six) hours as needed.  Qty: 30 tablet, Refills: 0      pantoprazole (PROTONIX) 40 MG tablet Take 1 tablet (40 mg total) by mouth once daily.  Qty: 90 tablet, Refills: 3      PROAIR HFA 90 mcg/actuation inhaler USE 1  INHALATION EVERY 4 HOURS AS NEEDED FOR WHEEZING (RESCUE)  Qty: 8.5 g, Refills: 10       !! - Potential duplicate medications found. Please discuss with provider.        No discharge procedures on file.      Pauline Caballero MD

## 2022-11-26 NOTE — PLAN OF CARE
Problem: Adult Inpatient Plan of Care  Goal: Plan of Care Review  Outcome: Ongoing, Progressing  Goal: Patient-Specific Goal (Individualized)  Outcome: Ongoing, Progressing  Goal: Absence of Hospital-Acquired Illness or Injury  Outcome: Ongoing, Progressing  Intervention: Identify and Manage Fall Risk  Flowsheets (Taken 11/26/2022 0544)  Safety Promotion/Fall Prevention:   assistive device/personal item within reach   Fall Risk reviewed with patient/family   family to remain at bedside   high risk medications identified   in recliner, wheels locked   medications reviewed   nonskid shoes/socks when out of bed   side rails raised x 2  Intervention: Prevent Skin Injury  Flowsheets (Taken 11/26/2022 0544)  Body Position:   position changed independently   upper extremity elevated  Skin Protection: adhesive use limited  Intervention: Prevent and Manage VTE (Venous Thromboembolism) Risk  Flowsheets (Taken 11/26/2022 0544)  Activity Management:   Ambulated to bathroom - L4   Ambulated in room - L4   Ambulated in crowe - L4  VTE Prevention/Management:   remove, assess skin, and reapply sequential compression device   intravenous hydration  Range of Motion: active ROM (range of motion) encouraged  Goal: Optimal Comfort and Wellbeing  Outcome: Ongoing, Progressing

## 2022-12-02 ENCOUNTER — CLINICAL SUPPORT (OUTPATIENT)
Dept: BARIATRICS | Facility: CLINIC | Age: 52
End: 2022-12-02
Payer: OTHER GOVERNMENT

## 2022-12-02 DIAGNOSIS — E66.01 MORBID OBESITY WITH BMI OF 40.0-44.9, ADULT: ICD-10-CM

## 2022-12-02 DIAGNOSIS — E11.9 TYPE 2 DIABETES MELLITUS WITHOUT COMPLICATION, WITHOUT LONG-TERM CURRENT USE OF INSULIN: Primary | ICD-10-CM

## 2022-12-02 DIAGNOSIS — Z71.3 DIETARY COUNSELING: ICD-10-CM

## 2022-12-02 DIAGNOSIS — Z98.84 S/P LAPAROSCOPIC SLEEVE GASTRECTOMY: ICD-10-CM

## 2022-12-02 LAB
FINAL PATHOLOGIC DIAGNOSIS: NORMAL
Lab: NORMAL

## 2022-12-02 PROCEDURE — 99499 UNLISTED E&M SERVICE: CPT | Mod: 95,,, | Performed by: DIETITIAN, REGISTERED

## 2022-12-02 PROCEDURE — 99499 NO LOS: ICD-10-PCS | Mod: 95,,, | Performed by: DIETITIAN, REGISTERED

## 2022-12-02 NOTE — PROGRESS NOTES
Established Patient - Audio Only Telehealth Visit     The patient location is: home  The chief complaint leading to consultation is:1 week post op nutrition check in  Visit type: Virtual visit with audio only (telephone)  Total time spent with patient: 15 mins       The reason for the audio only service rather than synchronous audio and video virtual visit was related to technical difficulties or patient preference/necessity.     Each patient to whom I provide medical services by telemedicine is:  (1) informed of the relationship between the physician and patient and the respective role of any other health care provider with respect to management of the patient; and (2) notified that they may decline to receive medical services by telemedicine and may withdraw from such care at any time. Patient verbally consented to receive this service via voice-only telephone call.    NUTRITION NOTE    Referring Physician: Telly Purcell M.D.   Reason for MNT Referral: Follow-up 1 Week s/p Gastric Sleeve    CURRENT DIET:  Bariatric Liquid Diet    Water protocol began at 7 am and completed while in hospital/ medicine cups given to you by nursing to take home (y/n):  y  Dehydration assessment:  Urine output/color:light   Chest pain:n  Persistent increased heart rate:n  Fatigue:n  N/V: none currently  Dizzy/weak: n sometimes dizziness when getting up  BM:wed using miralax  Protein and fluid intake assessment: (food diary)  75 grams of protein/day; 48 oz of fluids/day  Fluid intake: sf popsicles, crystal light, low sodium broth  Protein supplements: premier protein   Protein intake yesterday: 2.5    Vitamins  -What vitamins are you taking?  Multivitamin with 18 mg iron; taking 2 per day flintstone has 10 gm of iron  Super B complex with 50 mg taking once each day or every other day barimetls  Calcium Citrate  mg with vitamin D  barimelts not tolerating   Vitamin B-12 3000  mcg taking weekly  -Are you tolerating them well?not  taking calcium due to nausea      Medications  Omeprazole:protonix  Hycet:none  How are you tolerating pain at this time? (rate on a scale from 1 to 10; >7 notify PA/MD)3  Are you having any problems? (f/u with PCP, cardiologist, endocrinologist)no   How is your support system at home?good    Exercise reminder (light exercise at this time, no lifting above 10 lbs)reminded     Questions for nurse/MA/PA:        BARIATRIC DIET DISCUSSION:  Reinforced post-op nutrition guidelines.  Continue to work on fluid and protein intake.    PLAN/RECOMMONDATIONS:    Increase protein intake.  Increase fluid intake.  Continue light exercise.  Continue appropriate vitamins & minerals.  Adjust vitamins & minerals by: take new brand of calcium citrate as listed on pg 20 of nutrition booklet         Confirmed date and time for 2 week po labs and clinic visit         SESSION TIME: 15 minutes                         This service was not originating from a related E/M service provided within the previous 7 days nor will  to an E/M service or procedure within the next 24 hours or my soonest available appointment.  Prevailing standard of care was able to be met in this audio-only visit.

## 2022-12-07 ENCOUNTER — PATIENT MESSAGE (OUTPATIENT)
Dept: BARIATRICS | Facility: CLINIC | Age: 52
End: 2022-12-07
Payer: OTHER GOVERNMENT

## 2022-12-08 ENCOUNTER — OFFICE VISIT (OUTPATIENT)
Dept: BARIATRICS | Facility: CLINIC | Age: 52
End: 2022-12-08
Payer: OTHER GOVERNMENT

## 2022-12-08 ENCOUNTER — CLINICAL SUPPORT (OUTPATIENT)
Dept: BARIATRICS | Facility: CLINIC | Age: 52
End: 2022-12-08
Payer: OTHER GOVERNMENT

## 2022-12-08 ENCOUNTER — LAB VISIT (OUTPATIENT)
Dept: LAB | Facility: HOSPITAL | Age: 52
End: 2022-12-08
Payer: OTHER GOVERNMENT

## 2022-12-08 VITALS
SYSTOLIC BLOOD PRESSURE: 155 MMHG | DIASTOLIC BLOOD PRESSURE: 82 MMHG | WEIGHT: 273.69 LBS | HEART RATE: 84 BPM | WEIGHT: 273.56 LBS | BODY MASS INDEX: 40.4 KG/M2 | BODY MASS INDEX: 40.42 KG/M2 | OXYGEN SATURATION: 95 %

## 2022-12-08 DIAGNOSIS — Z71.3 DIETARY COUNSELING: ICD-10-CM

## 2022-12-08 DIAGNOSIS — E11.9 TYPE 2 DIABETES MELLITUS WITHOUT COMPLICATION, WITHOUT LONG-TERM CURRENT USE OF INSULIN: Primary | ICD-10-CM

## 2022-12-08 DIAGNOSIS — Z98.84 S/P LAPAROSCOPIC SLEEVE GASTRECTOMY: Primary | ICD-10-CM

## 2022-12-08 DIAGNOSIS — E66.01 MORBID OBESITY: ICD-10-CM

## 2022-12-08 DIAGNOSIS — E11.9 TYPE 2 DIABETES MELLITUS WITHOUT COMPLICATION, WITHOUT LONG-TERM CURRENT USE OF INSULIN: ICD-10-CM

## 2022-12-08 DIAGNOSIS — Z98.84 S/P LAPAROSCOPIC SLEEVE GASTRECTOMY: ICD-10-CM

## 2022-12-08 DIAGNOSIS — E66.01 MORBID OBESITY WITH BMI OF 40.0-44.9, ADULT: ICD-10-CM

## 2022-12-08 DIAGNOSIS — E11.59 HYPERTENSION ASSOCIATED WITH DIABETES: ICD-10-CM

## 2022-12-08 DIAGNOSIS — I15.2 HYPERTENSION ASSOCIATED WITH DIABETES: ICD-10-CM

## 2022-12-08 LAB
ALBUMIN SERPL BCP-MCNC: 3.7 G/DL (ref 3.5–5.2)
ALP SERPL-CCNC: 61 U/L (ref 55–135)
ALT SERPL W/O P-5'-P-CCNC: 22 U/L (ref 10–44)
ANION GAP SERPL CALC-SCNC: 10 MMOL/L (ref 8–16)
AST SERPL-CCNC: 37 U/L (ref 10–40)
BASOPHILS # BLD AUTO: 0.06 K/UL (ref 0–0.2)
BASOPHILS NFR BLD: 1 % (ref 0–1.9)
BILIRUB SERPL-MCNC: 0.5 MG/DL (ref 0.1–1)
BUN SERPL-MCNC: 11 MG/DL (ref 6–20)
CALCIUM SERPL-MCNC: 10.9 MG/DL (ref 8.7–10.5)
CHLORIDE SERPL-SCNC: 102 MMOL/L (ref 95–110)
CO2 SERPL-SCNC: 28 MMOL/L (ref 23–29)
CREAT SERPL-MCNC: 0.7 MG/DL (ref 0.5–1.4)
DIFFERENTIAL METHOD: ABNORMAL
EOSINOPHIL # BLD AUTO: 0.5 K/UL (ref 0–0.5)
EOSINOPHIL NFR BLD: 8.3 % (ref 0–8)
ERYTHROCYTE [DISTWIDTH] IN BLOOD BY AUTOMATED COUNT: 11.9 % (ref 11.5–14.5)
EST. GFR  (NO RACE VARIABLE): >60 ML/MIN/1.73 M^2
GLUCOSE SERPL-MCNC: 123 MG/DL (ref 70–110)
HCT VFR BLD AUTO: 47.3 % (ref 37–48.5)
HGB BLD-MCNC: 15.4 G/DL (ref 12–16)
IMM GRANULOCYTES # BLD AUTO: 0.03 K/UL (ref 0–0.04)
IMM GRANULOCYTES NFR BLD AUTO: 0.5 % (ref 0–0.5)
LYMPHOCYTES # BLD AUTO: 1.7 K/UL (ref 1–4.8)
LYMPHOCYTES NFR BLD: 28.5 % (ref 18–48)
MCH RBC QN AUTO: 29.3 PG (ref 27–31)
MCHC RBC AUTO-ENTMCNC: 32.6 G/DL (ref 32–36)
MCV RBC AUTO: 90 FL (ref 82–98)
MONOCYTES # BLD AUTO: 0.6 K/UL (ref 0.3–1)
MONOCYTES NFR BLD: 9.2 % (ref 4–15)
NEUTROPHILS # BLD AUTO: 3.2 K/UL (ref 1.8–7.7)
NEUTROPHILS NFR BLD: 52.5 % (ref 38–73)
NRBC BLD-RTO: 0 /100 WBC
PLATELET # BLD AUTO: 301 K/UL (ref 150–450)
PMV BLD AUTO: 10.6 FL (ref 9.2–12.9)
POTASSIUM SERPL-SCNC: 4.7 MMOL/L (ref 3.5–5.1)
PROT SERPL-MCNC: 7.2 G/DL (ref 6–8.4)
RBC # BLD AUTO: 5.26 M/UL (ref 4–5.4)
SODIUM SERPL-SCNC: 140 MMOL/L (ref 136–145)
VIT B12 SERPL-MCNC: 642 PG/ML (ref 210–950)
WBC # BLD AUTO: 6.06 K/UL (ref 3.9–12.7)

## 2022-12-08 PROCEDURE — 85025 COMPLETE CBC W/AUTO DIFF WBC: CPT | Performed by: NURSE PRACTITIONER

## 2022-12-08 PROCEDURE — 99215 OFFICE O/P EST HI 40 MIN: CPT | Mod: PBBFAC,27 | Performed by: NURSE PRACTITIONER

## 2022-12-08 PROCEDURE — 84425 ASSAY OF VITAMIN B-1: CPT | Performed by: NURSE PRACTITIONER

## 2022-12-08 PROCEDURE — 99499 NO LOS: ICD-10-PCS | Mod: S$PBB,,, | Performed by: DIETITIAN, REGISTERED

## 2022-12-08 PROCEDURE — 36415 COLL VENOUS BLD VENIPUNCTURE: CPT | Performed by: NURSE PRACTITIONER

## 2022-12-08 PROCEDURE — 99999 PR PBB SHADOW E&M-EST. PATIENT-LVL I: CPT | Mod: PBBFAC,,, | Performed by: DIETITIAN, REGISTERED

## 2022-12-08 PROCEDURE — 99024 PR POST-OP FOLLOW-UP VISIT: ICD-10-PCS | Mod: ,,, | Performed by: NURSE PRACTITIONER

## 2022-12-08 PROCEDURE — 80053 COMPREHEN METABOLIC PANEL: CPT | Performed by: NURSE PRACTITIONER

## 2022-12-08 PROCEDURE — 99499 UNLISTED E&M SERVICE: CPT | Mod: S$PBB,,, | Performed by: DIETITIAN, REGISTERED

## 2022-12-08 PROCEDURE — 99999 PR PBB SHADOW E&M-EST. PATIENT-LVL I: ICD-10-PCS | Mod: PBBFAC,,, | Performed by: DIETITIAN, REGISTERED

## 2022-12-08 PROCEDURE — 82607 VITAMIN B-12: CPT | Performed by: NURSE PRACTITIONER

## 2022-12-08 PROCEDURE — 99211 OFF/OP EST MAY X REQ PHY/QHP: CPT | Mod: PBBFAC | Performed by: DIETITIAN, REGISTERED

## 2022-12-08 PROCEDURE — 99999 PR PBB SHADOW E&M-EST. PATIENT-LVL V: CPT | Mod: PBBFAC,,, | Performed by: NURSE PRACTITIONER

## 2022-12-08 PROCEDURE — 99999 PR PBB SHADOW E&M-EST. PATIENT-LVL V: ICD-10-PCS | Mod: PBBFAC,,, | Performed by: NURSE PRACTITIONER

## 2022-12-08 PROCEDURE — 99024 POSTOP FOLLOW-UP VISIT: CPT | Mod: ,,, | Performed by: NURSE PRACTITIONER

## 2022-12-08 NOTE — PROGRESS NOTES
BARIATRIC POST-OPERATIVE VISIT:    HPI:  Bandar Douglass is a 52 y.o. year old female presents for 2 week post op visit following sleeve .  she is doing well and tolerating the diet without difficulty.  she has no complaints.    Denies: nausea, vomiting, abdominal pain, changes in bowel movement pattern, fever, chills, dysphagia, chest pain, and shortness of breath.    Decrease in DM and HTN meds    Review of Systems   Constitutional:  Negative for activity change and fatigue.   Respiratory:  Negative for cough and shortness of breath.    Cardiovascular:  Negative for chest pain, palpitations and leg swelling.   Gastrointestinal:  Negative for abdominal pain, nausea and vomiting.   Endocrine: Negative for polydipsia, polyphagia and polyuria.   Genitourinary:  Negative for dysuria.   Musculoskeletal:  Negative for gait problem.   Skin:  Negative for rash.   Allergic/Immunologic: Negative for immunocompromised state.   Neurological:  Negative for dizziness, syncope and weakness.   Hematological:  Does not bruise/bleed easily.   Psychiatric/Behavioral:  Negative for behavioral problems.      EXERCISE & VITAMINS:  See Bariatric Assessment    MEDICATIONS/ALLERGIES:  Have been reviewed.    DIET: Liquid Bariatric Diet.  3 protein shakes daily, ~90 grams protein.  64+ fl oz SF clear beverage.      See Dietician note from today for a more detailed assessment.      Physical Exam  Vitals and nursing note reviewed.   Constitutional:       Appearance: She is well-developed. She is morbidly obese.   HENT:      Head: Normocephalic.      Nose: Nose normal.      Mouth/Throat:      Mouth: Mucous membranes are moist.   Eyes:      Extraocular Movements: Extraocular movements intact.   Cardiovascular:      Rate and Rhythm: Normal rate.   Pulmonary:      Effort: Pulmonary effort is normal.   Abdominal:      General: Bowel sounds are normal.      Palpations: Abdomen is soft.   Musculoskeletal:         General: Normal range of motion.       Cervical back: Normal range of motion.   Skin:     General: Skin is warm and dry.      Capillary Refill: Capillary refill takes less than 2 seconds.   Neurological:      Mental Status: She is alert and oriented to person, place, and time.   Psychiatric:         Mood and Affect: Mood normal.       ASSESSMENT:  - Morbid obesity s/p sleeve gastrectomy on 11/25/22.  - Co-morbidities: diabetes mellitus, dyslipidemia, and hypertension  - Good Weight loss, 18 #'s and 13% EWL  - Not formal Exercise routine  - Good Diet  - Good Vitamin regimen    PLAN:  - Anti-Acid medication,  daily for 3 months  - No lifting more than 10 lbs for 6 weeks  - Miralax daily for constipation  - Emphasized the importance of regular exercise and adherence to bariatric diet to achieve maximum weight loss.  - Encouraged patient to start regular exercise.  - Follow-up with dietician to advance diet.  - Continue daily vitamins and medications.  - RTC in 6 weeks or sooner if needed.  - Call the office for any issues.  - Check labs today.

## 2022-12-08 NOTE — PATIENT INSTRUCTIONS
High Protein Pureed Diet    2 weeks after gastric bypass and sleeve you may be ready to add pureed food to your diet.  All food should be the consistency of baby food, or thinner.  Follow pureed diet for the next 2 weeks.    Protein - It is very important to pay attention to protein intake during this time.      Inadequate protein intake can cause:  Delayed Wound Healing  Hair Loss  Muscle Breakdown    Meal Plan - Eat 3-4 meals per day (2-4 tbsp each), with protein supplements in between to meet protein needs.  Meeting protein needs daily will help increase healing, decrease muscle loss, and increase weight loss.  Your goal is  grams of protein a day.    Protein First - Always eat the foods with the highest protein first.  Foods high in protein include milk, yogurt, cheese, egg whites, and blenderized meat, seafood, and beans.    Fluids - Keep track in your journal of how much you are drinking; you should try to drink at least 64oz of fluids every day.      Foods allowed: Portion size Protein (g)   Sugar-free clear liquids As desired 0   Skim or 1% milk ½ cup 4   Sugar free pudding, light yogurt, custard (use skim or 1% milk in preparation) 3 oz 2.5   Strained baby food meats, or home-made pureed lean meats and shrimp 1 oz 7   Beans (red, white, black, lima, anna, fat free refried, hummus) and lentils ¼ cup 4   Low-fat/fat free cheese.(cottage cheese, mozzarella string cheese, ricotta cheese, Laughing Cow, Baby Bell, cheddar, etc) ¼ cup 7-8   Scrambled eggs or Egg Beaters 1 or ¼ cup 6   Edamame or Tofu, mashed ¼ cup 5   Unflavored protein powder (add to 1 scoop to  98% fat free soups or SF pudding) 3 Tbsp 9   *PB2: peanut powder (45 calories) 2 Tbsp 5     *PB2 powdered peanut butter: 45 calories vs. 190 calories in 2 tbsp of regular peanut butter. Purchase online at [employer], or  at various [employer], [employer], [employer], [employer] and [employer].        Bariatric Liquid/Pureed Sample Menu    3-4 small meals  plus 2-3 protein drinks per day.    8am 1 egg or ¼ cup Egg Beaters   9am 1 cup water, or decaf coffee or tea   10am Protein drink, 30g protein   11am 2 tbsp low-fat cottage cheese, and 1 tbsp pureed peaches   12pm 1 cup water, or sugar-free lemonade    1pm 2 tbsp pureed chicken, and 1 tbsp pureed carrots    2pm 1 cup water, or sugar-free lemonade   3pm Protein drink, 30g protein   5pm 1 cup water    6pm 1 cup hi-protein creamy chicken soup 14g protein (see Recipe below)   7pm 1 cup water, or sugar-free fruit punch    8pm 1 cup water     This sample menu provides approx. 80g protein and 64oz fluids.  Liquid protein supplements should contain 20-30g protein and less than 4 grams of sugar each.    Sip fluids continuously in between meals.  Drink at least ¼ cup every 15 minutes.  For fluids: ¼ cup = 2 oz = 4 tbsp       RECIPE IDEAS for Bariatric Pureed Diet:    Hi-Protein Creamy Chicken Soup: (10g protein per 1 cup serving)  Empty 1 can of 98% fat free cream of chicken soup into saucepan. Then  blend 1 scoop of unflavored protein powder with 1 can of skim milk until smooth.  Add protein milk to saucepan and heat to warm. (Note: Do NOT boil. Protein powder may clump if heated too hot).     Hi-Protein Pudding: (14g protein per ½ cup serving)  Add 2 scoops protein powder to 2 cups cold skim milk and mix well.  Stir in dry Jell-O Sugar-Free Instant Pudding mix.  Chill and Enjoy!    Tuna Mousse (12g protein per ¼ cup serving) Page 135 in book Eating Well After Weight Loss Surgery.  In a  or , combine all ingredients and pulse until smooth.  2 6-ounce cans tuna packed in water, drained  2 tbsp low-fat mayonnaise  2 tbsp fat-free sour cream  2 tbsp fat-free cream cheese, softened  ½ cup shallots, finely chopped  1 tbsp lemon juice  ¼ tsp ground pepper  ½ tsp celery seed    Chocolate Peanut Butter Mousse  (28g protein total)  6oz plain Greek yogurt  4 tbsp chocolate PB2

## 2022-12-08 NOTE — PROGRESS NOTES
NUTRITION NOTE    Referring Physician: Telly Purcell M.D.   Reason for MNT Referral: Follow-up 2 Weeks s/p Gastric Sleeve    PAST MEDICAL HISTORY:  Denies constipation and diarrhea.  Reports problems, including n/v on Tuesday one episode -resolved .    Past Medical History:   Diagnosis Date    Breast lump 5/17/2019    Diabetes mellitus     Hypertension     Thyroid disease          CLINICAL DATA:  52 y.o.-year-old White female.    Current Weight: 273 lbs  BMI: Body mass index is 40.4 kg/m².   Total Weight Loss: Total Weight Loss: 18 lbs   Excess Weight Loss: EWL: 0.13 lbs     LABS:  None available at time of visit    CURRENT DIET:  Bariatric Liquid Diet    Diet Recall:  grams of protein/day; 48+ oz of fluids/day    Diet Includes:CL, water, diet lemonade   Protein Supplements: Premier protein shakes 3-4 per rigoberto    EXERCISE:  Exercise History  Exercises Regularly: No      VITAMINS/MINERALS:  Multivitamins: Vitamins  Multi with Iron: Twice a day (flinstone)  Calcium Citrate with Vitamin D: Taking  Type: Tablets (2 tablets once a day)  Vitamin B12: Sublingual  Sublingual Method: 2500 mcg weekly (3000 mcg)  B Complex: Tablet once daily (1 tablet day)  B Complex: Tablet once daily (1 tablet day)  Calcium Citrate with Vitamin D: Taking  Multi with Iron: Twice a day (flinstone)  Vitamin B12: Sublingual      ASSESSMENT:  Doing well overall.  Adequate protein intake.  Adequate fluid intake.    BARIATRIC DIET DISCUSSION:  Instructed and provided written materials on bariatric puree diet plan   Bariatric soft diet plan to start in 2 weeks as danuta  Pt may swallow tablets and pills at 2 week post op for sleeve surgery or at 4 week post op for bypass surgery  Reinforced post-op nutrition guidelines.    PLAN/RECOMMONDATIONS:  Advance to bariatric puree diet  Maintain protein intake.  Maintain fluid intake.  Begin light exercise.  Continue appropriate vitamins & minerals.  Adjust vitamins & minerals by: take B1 50 mg per  day and calcium citrate 500 mg 3 times per day or 600 mg twice daily    Return to clinic in 6 weeks.    SESSION TIME: 15 minutes

## 2022-12-12 ENCOUNTER — PATIENT MESSAGE (OUTPATIENT)
Dept: BARIATRICS | Facility: CLINIC | Age: 52
End: 2022-12-12
Payer: OTHER GOVERNMENT

## 2022-12-14 LAB — VIT B1 BLD-MCNC: 86 UG/L (ref 38–122)

## 2023-01-04 ENCOUNTER — PATIENT MESSAGE (OUTPATIENT)
Dept: PRIMARY CARE CLINIC | Facility: CLINIC | Age: 53
End: 2023-01-04
Payer: OTHER GOVERNMENT

## 2023-01-09 ENCOUNTER — PATIENT MESSAGE (OUTPATIENT)
Dept: BARIATRICS | Facility: CLINIC | Age: 53
End: 2023-01-09
Payer: OTHER GOVERNMENT

## 2023-01-19 ENCOUNTER — LAB VISIT (OUTPATIENT)
Dept: LAB | Facility: HOSPITAL | Age: 53
End: 2023-01-19
Payer: OTHER GOVERNMENT

## 2023-01-19 ENCOUNTER — OFFICE VISIT (OUTPATIENT)
Dept: BARIATRICS | Facility: CLINIC | Age: 53
End: 2023-01-19
Payer: OTHER GOVERNMENT

## 2023-01-19 ENCOUNTER — CLINICAL SUPPORT (OUTPATIENT)
Dept: BARIATRICS | Facility: CLINIC | Age: 53
End: 2023-01-19
Payer: OTHER GOVERNMENT

## 2023-01-19 VITALS
WEIGHT: 251.31 LBS | HEIGHT: 69 IN | BODY MASS INDEX: 37.22 KG/M2 | SYSTOLIC BLOOD PRESSURE: 133 MMHG | HEART RATE: 92 BPM | OXYGEN SATURATION: 96 % | DIASTOLIC BLOOD PRESSURE: 75 MMHG | TEMPERATURE: 98 F

## 2023-01-19 DIAGNOSIS — I15.2 HYPERTENSION ASSOCIATED WITH DIABETES: ICD-10-CM

## 2023-01-19 DIAGNOSIS — E11.9 TYPE 2 DIABETES MELLITUS WITHOUT COMPLICATION, WITHOUT LONG-TERM CURRENT USE OF INSULIN: ICD-10-CM

## 2023-01-19 DIAGNOSIS — Z71.3 DIETARY COUNSELING: ICD-10-CM

## 2023-01-19 DIAGNOSIS — Z98.84 S/P LAPAROSCOPIC SLEEVE GASTRECTOMY: ICD-10-CM

## 2023-01-19 DIAGNOSIS — E66.01 MORBID OBESITY: ICD-10-CM

## 2023-01-19 DIAGNOSIS — E11.65 TYPE 2 DIABETES MELLITUS WITH HYPERGLYCEMIA, WITHOUT LONG-TERM CURRENT USE OF INSULIN: ICD-10-CM

## 2023-01-19 DIAGNOSIS — E11.59 HYPERTENSION ASSOCIATED WITH DIABETES: ICD-10-CM

## 2023-01-19 DIAGNOSIS — E11.9 TYPE 2 DIABETES MELLITUS WITHOUT COMPLICATION, WITHOUT LONG-TERM CURRENT USE OF INSULIN: Primary | ICD-10-CM

## 2023-01-19 DIAGNOSIS — E66.9 OBESITY (BMI 30-39.9): ICD-10-CM

## 2023-01-19 DIAGNOSIS — Z98.84 S/P LAPAROSCOPIC SLEEVE GASTRECTOMY: Primary | ICD-10-CM

## 2023-01-19 LAB
25(OH)D3+25(OH)D2 SERPL-MCNC: 59 NG/ML (ref 30–96)
ALBUMIN SERPL BCP-MCNC: 4 G/DL (ref 3.5–5.2)
ALP SERPL-CCNC: 64 U/L (ref 55–135)
ALT SERPL W/O P-5'-P-CCNC: 20 U/L (ref 10–44)
ANION GAP SERPL CALC-SCNC: 11 MMOL/L (ref 8–16)
AST SERPL-CCNC: 32 U/L (ref 10–40)
BASOPHILS # BLD AUTO: 0.04 K/UL (ref 0–0.2)
BASOPHILS NFR BLD: 0.7 % (ref 0–1.9)
BILIRUB SERPL-MCNC: 0.6 MG/DL (ref 0.1–1)
BUN SERPL-MCNC: 11 MG/DL (ref 6–20)
CALCIUM SERPL-MCNC: 10.5 MG/DL (ref 8.7–10.5)
CHLORIDE SERPL-SCNC: 103 MMOL/L (ref 95–110)
CHOLEST SERPL-MCNC: 145 MG/DL (ref 120–199)
CHOLEST/HDLC SERPL: 2.8 {RATIO} (ref 2–5)
CO2 SERPL-SCNC: 28 MMOL/L (ref 23–29)
CREAT SERPL-MCNC: 0.7 MG/DL (ref 0.5–1.4)
DIFFERENTIAL METHOD: NORMAL
EOSINOPHIL # BLD AUTO: 0.5 K/UL (ref 0–0.5)
EOSINOPHIL NFR BLD: 8 % (ref 0–8)
ERYTHROCYTE [DISTWIDTH] IN BLOOD BY AUTOMATED COUNT: 12.3 % (ref 11.5–14.5)
EST. GFR  (NO RACE VARIABLE): >60 ML/MIN/1.73 M^2
ESTIMATED AVG GLUCOSE: 131 MG/DL (ref 68–131)
GLUCOSE SERPL-MCNC: 158 MG/DL (ref 70–110)
HBA1C MFR BLD: 6.2 % (ref 4–5.6)
HCT VFR BLD AUTO: 46.2 % (ref 37–48.5)
HDLC SERPL-MCNC: 51 MG/DL (ref 40–75)
HDLC SERPL: 35.2 % (ref 20–50)
HGB BLD-MCNC: 15.1 G/DL (ref 12–16)
IMM GRANULOCYTES # BLD AUTO: 0.02 K/UL (ref 0–0.04)
IMM GRANULOCYTES NFR BLD AUTO: 0.3 % (ref 0–0.5)
IRON SERPL-MCNC: 89 UG/DL (ref 30–160)
LDLC SERPL CALC-MCNC: 69.2 MG/DL (ref 63–159)
LYMPHOCYTES # BLD AUTO: 2.1 K/UL (ref 1–4.8)
LYMPHOCYTES NFR BLD: 35.8 % (ref 18–48)
MCH RBC QN AUTO: 28.9 PG (ref 27–31)
MCHC RBC AUTO-ENTMCNC: 32.7 G/DL (ref 32–36)
MCV RBC AUTO: 88 FL (ref 82–98)
MONOCYTES # BLD AUTO: 0.5 K/UL (ref 0.3–1)
MONOCYTES NFR BLD: 7.9 % (ref 4–15)
NEUTROPHILS # BLD AUTO: 2.8 K/UL (ref 1.8–7.7)
NEUTROPHILS NFR BLD: 47.3 % (ref 38–73)
NONHDLC SERPL-MCNC: 94 MG/DL
NRBC BLD-RTO: 0 /100 WBC
PLATELET # BLD AUTO: 230 K/UL (ref 150–450)
PMV BLD AUTO: 10 FL (ref 9.2–12.9)
POTASSIUM SERPL-SCNC: 4.4 MMOL/L (ref 3.5–5.1)
PROT SERPL-MCNC: 7.7 G/DL (ref 6–8.4)
RBC # BLD AUTO: 5.23 M/UL (ref 4–5.4)
SATURATED IRON: 28 % (ref 20–50)
SODIUM SERPL-SCNC: 142 MMOL/L (ref 136–145)
TOTAL IRON BINDING CAPACITY: 321 UG/DL (ref 250–450)
TRANSFERRIN SERPL-MCNC: 217 MG/DL (ref 200–375)
TRIGL SERPL-MCNC: 124 MG/DL (ref 30–150)
VIT B12 SERPL-MCNC: 465 PG/ML (ref 210–950)
WBC # BLD AUTO: 5.84 K/UL (ref 3.9–12.7)

## 2023-01-19 PROCEDURE — 99999 PR PBB SHADOW E&M-EST. PATIENT-LVL V: ICD-10-PCS | Mod: PBBFAC,,, | Performed by: NURSE PRACTITIONER

## 2023-01-19 PROCEDURE — 80061 LIPID PANEL: CPT | Performed by: NURSE PRACTITIONER

## 2023-01-19 PROCEDURE — 99999 PR PBB SHADOW E&M-EST. PATIENT-LVL V: CPT | Mod: PBBFAC,,, | Performed by: NURSE PRACTITIONER

## 2023-01-19 PROCEDURE — 85025 COMPLETE CBC W/AUTO DIFF WBC: CPT | Performed by: NURSE PRACTITIONER

## 2023-01-19 PROCEDURE — 99999 PR PBB SHADOW E&M-EST. PATIENT-LVL I: ICD-10-PCS | Mod: PBBFAC,,, | Performed by: DIETITIAN, REGISTERED

## 2023-01-19 PROCEDURE — 99215 OFFICE O/P EST HI 40 MIN: CPT | Mod: PBBFAC | Performed by: NURSE PRACTITIONER

## 2023-01-19 PROCEDURE — 82306 VITAMIN D 25 HYDROXY: CPT | Performed by: NURSE PRACTITIONER

## 2023-01-19 PROCEDURE — 99024 POSTOP FOLLOW-UP VISIT: CPT | Mod: ,,, | Performed by: NURSE PRACTITIONER

## 2023-01-19 PROCEDURE — 82607 VITAMIN B-12: CPT | Performed by: NURSE PRACTITIONER

## 2023-01-19 PROCEDURE — 99024 PR POST-OP FOLLOW-UP VISIT: ICD-10-PCS | Mod: ,,, | Performed by: NURSE PRACTITIONER

## 2023-01-19 PROCEDURE — 84425 ASSAY OF VITAMIN B-1: CPT | Performed by: NURSE PRACTITIONER

## 2023-01-19 PROCEDURE — 99211 OFF/OP EST MAY X REQ PHY/QHP: CPT | Mod: PBBFAC,27 | Performed by: DIETITIAN, REGISTERED

## 2023-01-19 PROCEDURE — 84466 ASSAY OF TRANSFERRIN: CPT | Performed by: NURSE PRACTITIONER

## 2023-01-19 PROCEDURE — 99999 PR PBB SHADOW E&M-EST. PATIENT-LVL I: CPT | Mod: PBBFAC,,, | Performed by: DIETITIAN, REGISTERED

## 2023-01-19 PROCEDURE — 80053 COMPREHEN METABOLIC PANEL: CPT | Performed by: NURSE PRACTITIONER

## 2023-01-19 PROCEDURE — 99499 NO LOS: ICD-10-PCS | Mod: S$PBB,,, | Performed by: DIETITIAN, REGISTERED

## 2023-01-19 PROCEDURE — 36415 COLL VENOUS BLD VENIPUNCTURE: CPT | Performed by: NURSE PRACTITIONER

## 2023-01-19 PROCEDURE — 99499 UNLISTED E&M SERVICE: CPT | Mod: S$PBB,,, | Performed by: DIETITIAN, REGISTERED

## 2023-01-19 PROCEDURE — 83036 HEMOGLOBIN GLYCOSYLATED A1C: CPT | Performed by: INTERNAL MEDICINE

## 2023-01-19 NOTE — PATIENT INSTRUCTIONS
Meal Ideas for Regular Bariatric Diet  *Recipes and products available at [email or web address]      Breakfast: (15-20g protein)    - Egg white omelet: 2 egg whites or ½ cup Egg Beaters. (Optional proteins: cheese, shrimp, black beans, chicken, sliced turkey) (Optional veggies: tomatoes, salsa, spinach, mushrooms, onions, green peppers, or small slice avocado)     - Egg and sausage: 1 egg or ¼ cup Egg Beaters (any variety), with 1 john or 2 links of Turkey sausage or Veggie breakfast sausage ([employer] or Pomogatel)    - Crust-less breakfast quiche: To make a glass pie dish, mix 4oz part skim Ricotta, 1 cup skim milk, and 2 eggs as your base. Add protein: shredded cheese, sliced lean ham or turkey, turkey espinosa/sausage. Add veggies: tomato, onion, green onion, mushroom, green pepper, spinach, etc.    - Yogurt parfait: Mix 1 - 6oz container Dannon Light N Fit vanilla yogurt, with ¼ cup crushed unsalted nuts    - Cottage cheese and fruit: ½ cup part-skim cottage cheese or ricotta cheese topped with fresh fruit or sugar free preserves     - Laurie Purdy's Vanilla Egg custard* (add 2 Tbsp instant coffee granules to make Cappuccino Custard*)    - Hi-Protein café latte (skim milk, decaf coffee, 1 scoop protein powder). Optional to add Sugar free syrup or extract flavoring.    - Breakfast Lox: spread fat free cream cheese on slices of smoked salmon. Serve over scrambled or egg over easy (sauteed with nonstick cookspray) OR on a cucumber slice    - Eggwhich: Scramble or cook 1 large egg over easy using nonstick cookspray. Place between 2 slices of Finnish espinosa and low fat cheese.     Lunch: (20-30g protein)    - ½ cup Black bean soup (Homemade or Progresso), with ¼ cup shredded low-fat cheese. Top with chopped tomato or fresh salsa.     - Lean deli turkey breast and low-fat sliced cheese, mustard or light gallegos to moisten, rolled up together, or wrapped in a Aidan lettuce leaf    - Chicken salad made from dinner  leftovers, moisten with low-fat salad dressing or light gallegos. Also try leftover salmon, shrimp, tuna or boiled eggs. Serve ½ cup over dark green salad    - Fat-free canned refried beans, topped with ¼ cup shredded low-fat cheese. Top with chopped tomato or fresh salsa.     - Greek salad: Top mixed greens with 1-2oz grilled chicken, tomatoes, red onions, 2-3 kalamata olives, and sprinkle lightly with feta cheese. Spritz with Balsamic vinegar to taste.     - Crust-less lunch quiche: To make a glass pie dish, mix 4oz part skim Ricotta, 1 cup skim milk, and 2 eggs as your base. Add protein: shredded cheese, sliced lean ham or turkey, shrimp, chicken. Add veggies: tomato, onion, green onion, mushroom, green pepper, spinach, artichoke, broccoli, etc.    - Pizza bake: spread a  reji za mushroom with tomato sauce, low-fat shredded mozzarella and turkey pepperoni or Effort espinosa. Add any veggies. Roast for 10-15 minutes, until cheese melted.     - Cucumber crab bites: Spread ¼ cup crab dip (lump crabmeat + light cream cheese and green onions) over sliced cucumber.     - Chicken with light spinach and artichoke dip*: Puree in : 6oz cooked and drained spinach, 2 cloves garlic, 1 can cannelloni beans, ½ cup chopped green onions, 1 can drained artichoke hearts (not marinated in oil), lemon juice and basil. Mix in 2oz chopped up chicken.    Supper: (20-30g protein)    - Serve grilled fish over dark green salad tossed with low-fat dressing, served with grilled asparagus denise     - Rotisserie chicken salad: served with sliced strawberries, walnuts, fat-free feta cheese crumbles and 1 tbsp Tejadas Own Light Raspberry Becker Vinaigrette    - Shrimp cocktail: Dip cold boiled shrimp in homemade low-sugar cocktail sauce (1/2 cup Kenton One Carb ketchup, 2 tbsp horseradish, 1/4 tsp hot sauce, 1 tsp Worcestershire sauce, 1 tbsp freshly-squeezed lemon juice). Serve with dark green salad, walnuts, and crumbled blue  cheese drizzled with olive oil and Balsamic vinegar    - Tuna Melt: Spread tuna salad onto 2 thick slices of tomato. Top with low-fat cheese and broil until cheese is melted. May also be made with chicken salad of shrimp salad. [place] with different types of cheeses.    - Chicken or beef fajitas (no tortilla, rice, beans, chips). Top meat and veggies w/ fresh salsa, fat free sour cream.     - Homemade low-fat Chili using extra lean ground beef or ground turkey. Top with shredded cheese and salsa as desired. May add dollop fat-free sour cream if desired    - Chicken parmesan: Top chicken breast w/ low sugar marinara sauce, mozzarella cheese and bake until chicken reaches 165*.  Serve w/ spaghetti SQUASH or Malawian cut green beans    - Dinner Omelet with shrimp or chicken and onion, green peppers and chives.    - No noodle lasagna: Use sliced zucchini or eggplant in place of noodles.  Layer with part skim ricotta cheese and low sugar meat sauce (use very lean ground beef or ground turkey).    - Mexican chicken bake: Bake chunks of chicken breast or thigh with taco seasoning, Pace brand enchilada sauce, green onions and low-fat cheese. Serve with ¼ cup black beans or fat free refried beans topped with chopped tomatoes or salsa.    - Amber frozen meatballs, simmered in Classico Marinara sauce. Different flavors of salsa or spaghetti sauce create different dishes! Sprinkle with parmesan cheese. Serve with grilled or steamed veggies, or a dark green salad.    - Simmer boneless skinless chicken thigh chunks in Classico Marinara sauce or roasted salsa until tender with chopped onion, bell pepper, garlic, mushrooms, spinach, etc.     - Hamburger or veggie burger, without the bun, dressed the way you like. Served with grilled or steamed veggies.    - Eggplant parmesan: Bake slices of eggplant at 350 degrees for 15 minutes. Layer tomato sauce, sliced eggplant and low-fat mozzarella cheese in a baking dish and cover with  foil. Bake 30-40 more minutes or until bubbly. Uncover and bake at 400 degrees for about 15 more minutes, or until top is slightly crisp.    - Fish tacos: grilled/baked white fish, wrapped in Aidan lettuce leaf, topped with salsa, shredded low-fat cheese, and light coleslaw.    - Chicken laurent: Sprinkle chicken w/ 1 tsp of hidden [place] ranch dip mix. Then grill chicken and top with black beans, salsa and 1 tsp fat free sour cream.     - Cauliflower pizza crust: Use cauliflower as crust (see recipe on pinterest, no flour!). Top w/ low fat cheese, turkey pepperoni and veggies and bake again    - chicken or turkey crust pizza: use ground chicken or turkey instead of cauliflower, spread in [place] and bake at 350 for about 20-30 minutes(may want to add garlic, black pepper, oregano and other herbs to ground meat mixture).  Remove and top w/ low fat cheese, turkey pepperoni and veggies and bake again for another 10 minutes or until cheese is browned.     Snacks: (100-200 calories; >5g protein)    - 1 low-fat cheese stick with 8 cherry tomatoes or 1 serving fresh fruit  - 4 thin slices fat-free turkey breast and 1 slice low-fat cheese  - 4 thin slices fat-free honey ham with wedge of melon  - 6-8 edamame pods (equivalent to about 1/4 cup edamame without pods).   - 1/4 cup unsalted nuts with ½ cup fruit  - 6-oz container Dannon Light n Fit vanilla yogurt, topped with 1oz unsalted nuts         - apple, celery or baby carrots spread with 2 Tbsp PB2  - apple slices with 1 oz slice low-fat cheese  - Apple slices dipped in 2 Tbsp of PB2  - celery, cucumber, bell pepper or baby carrots dipped in ¼ cup hummus bean spread or light spinach and artichoke dip (*recipe in lunch section)  - celery, cucumber, baby carrots dipped in high protein greek yogurt (Mix 16 oz plain greek yogurt + 1 packet of hidden [place] ranch dip mix)  - Marco Links Beef Steak - 14g protein! (similar to beef jerky)  - 2 wedges Laughing Cow - Light Herb  & Garlic Cheese with sliced cucumber or green bell pepper  - 1/2 cup low-fat cottage cheese with ¼ cup fruit or ¼ cup salsa  - RTD Protein drinks: Atkins, Low Carb Slim Fast, EAS light, Muscle Milk Light, etc.  - Homemade Protein drinks: [employer] Soy95, Isopure, Nectar, UNJURY, Whey Gourmet, etc. Mix 1 scoop powder with 8oz skim/1% milk or light soymilk.  - Protein bars: Atkins, EAS, Pure Protein, Think Thin, Detour, etc. Must have 0-4 grams sugar - Read the label.    Takeout Options: No more than twice/week  Deli - Salads (no pasta or rice), meats, cheeses. Roasted chicken. Lox (salmon)    Mexican - Platters which don't include tortillas, chips, or rice. Go easy on the beans. Example: Fajitas without the tortillas. Ask the  not to bring chips to the table if they are too tempting.    Greek - Meat or fish and vegetable, but no bread or rice. Including hummus, baba ganoush, etc, is OK. Most sit-down Greek restaurants can provide you with cucumber slices for dipping instead of florencia bread.    Fast Food (Avoid as much as possible) - Salads (no croutons and limit salad dressing to 2 tbsp), grilled chicken sandwich without the bun and ask for no gallegos. Amariliss low fat chili or Taco Bell pintos and cheese.    BBQ - The meats are fine if you ask for sauces on the side, but most of the traditional side dishes are loaded with carbs. Tylor slaw, baked beans and BBQ sauce are typically made with sugar.    Chinese - Nothing deep-fried, no rice or noodles. Many Chinese sauces have starch and sugar in them, so you'll have to use your judgement. If you find that these sauces trigger cravings, or cause Dumping, you can ask for the sauce to be made without sugar or just use soy sauce.    How to taper off of Omeprazole:  - Take 1 Tablet every other day for 2 weeks.  If you do not experience any heartburn, indigestion, nausea symptoms, the following week, take 1 tablet every 3 days.  Again if you remain without the above symptoms, you  may completely discontinue the medication.  If symptoms return at any point, please restart the medication.

## 2023-01-19 NOTE — PROGRESS NOTES
NUTRITION NOTE  Referring Physician: Jonah Hsieh M.D.   Reason for MNT Referral: Follow-up 8 Weeks s/p Gastric Sleeve    PAST MEDICAL HISTORY:  Denies nausea, vomiting, and diarrhea.  Reports doing well, some constipation. Taking MiraLAX daily    Past Medical History:   Diagnosis Date    Breast lump 5/17/2019    Diabetes mellitus     Hypertension     Thyroid disease          CLINICAL DATA:  52 y.o.-year-old White female.    Current Weight: 251 lbs  BMI: 37.11  Pre-op weight: 291 lbs  Excess body weight: 138 lbs  Total Weight Loss: 40    Excess Weight Loss: 29%      LABS:  None available at time of visit    CURRENT DIET:  Bariatric Soft Diet    Diet Recall: 80-90 grams of protein/day; 32-48 oz of fluids/day    B: Protein shake  L: Greek yogurt  D: Eggs, chicken, cheese, mixed vegetables, or protein shake  S: Cheese stick    Diet Includes: Premier Protein  Meal Pattern: 2 meal(s) + 1 snack(s) + 2 protein supplement(s)  Adequate protein supplement intake.  Adequate dairy intake.  Adequate vegetable intake.  Inadequate fruit intake.  Starchy CHO: None  Beverages: Plain water, coffee w/half & half, milk    EXERCISE:  Walking, 3-4 times/week, 25 mins     Restrictions to Exercise: None.    VITAMINS/MINERALS:  Flintstones 2/day  Calcium citrate 2 times/day  B-12 2500 mg1/week  B Complex w/100 mg thiamin every 2 days    ASSESSMENT:  Doing well overall.  Adequate protein intake.  Inadequate fluid intake.  Advancing diet appropriately.  Exercising.  Adequate vitamins & minerals.    BARIATRIC DIET DISCUSSION:  Instructed and provided written materials on bariatric regular diet plan.  Reinforced post-op nutrition guidelines.  Advised having protein at every eating occasion  Advised PT discontinue half & half and use protein shake to flavor coffee  Reviewed bariatric plate method    PLAN / RECOMMENDATIONS:  Advance to bariatric regular diet.  Maintain protein intake.  Increase fluid intake.  Begin including fruit in  diet  Continue light exercise.  Continue appropriate vitamins & minerals.    Return to clinic in 4 months.    SESSION TIME: 15 minutes

## 2023-01-19 NOTE — PROGRESS NOTES
BARIATRIC POST-OPERATIVE VISIT:    HPI:  Bandar Douglass is a 52 y.o. year old female presents for 8 week post op visit following sleeve .  she is doing well and tolerating the diet without difficulty.  she has no complaints.    Denies: nausea, vomiting, abdominal pain, changes in bowel movement pattern, fever, chills, dysphagia, chest pain, and shortness of breath.    Decrease in DM and no HTN meds    Review of Systems   Constitutional:  Negative for activity change and fatigue.   Respiratory:  Negative for cough and shortness of breath.    Cardiovascular:  Negative for chest pain, palpitations and leg swelling.   Gastrointestinal:  Negative for abdominal pain, nausea and vomiting.   Endocrine: Negative for polydipsia, polyphagia and polyuria.   Genitourinary:  Negative for dysuria.   Musculoskeletal:  Negative for gait problem.   Skin:  Negative for rash.   Allergic/Immunologic: Negative for immunocompromised state.   Neurological:  Negative for dizziness, syncope and weakness.   Hematological:  Does not bruise/bleed easily.   Psychiatric/Behavioral:  Negative for behavioral problems.      EXERCISE & VITAMINS:  See Bariatric Assessment    MEDICATIONS/ALLERGIES:  Have been reviewed.    DIET: Regular/Soft Bariatric Diet.    1-2 protein shakes daily, ~90 grams protein.  48+ fl oz SF clear beverage.      Tolerating- Chicken fish eggs beans cheese veggies      See Dietician note from today for a more detailed assessment.      Physical Exam  Vitals and nursing note reviewed.   Constitutional:       Appearance: She is well-developed. She is morbidly obese.   HENT:      Head: Normocephalic.      Nose: Nose normal.      Mouth/Throat:      Mouth: Mucous membranes are moist.   Eyes:      Extraocular Movements: Extraocular movements intact.   Cardiovascular:      Rate and Rhythm: Normal rate.   Pulmonary:      Effort: Pulmonary effort is normal.   Abdominal:      General: Bowel sounds are normal.      Palpations: Abdomen is  soft.   Musculoskeletal:         General: Normal range of motion.      Cervical back: Normal range of motion.   Skin:     General: Skin is warm and dry.      Capillary Refill: Capillary refill takes less than 2 seconds.   Neurological:      Mental Status: She is alert and oriented to person, place, and time.   Psychiatric:         Mood and Affect: Mood normal.       ASSESSMENT:  - Morbid obesity s/p sleeve gastrectomy on 11/25/22.  - Co-morbidities: diabetes mellitus, dyslipidemia, and hypertension  - Good Weight loss, 40#'s and 29% EWL  - Exercise routine walking x 3-4 days   - Good Diet  - Good Vitamin regimen    PLAN:  - Anti-Acid medication,  daily for 3 months, slow taper  - Miralax daily for constipation  - Emphasized the importance of regular exercise and adherence to bariatric diet to achieve maximum weight loss.  - Encouraged patient to start regular exercise.  - Follow-up with dietician to advance diet.  - Continue daily vitamins and medications.  - RTC in 4 months or sooner if needed.  - Call the office for any issues.  - Check labs today.

## 2023-01-25 LAB — VIT B1 BLD-MCNC: 107 UG/L (ref 38–122)

## 2023-02-09 ENCOUNTER — PATIENT MESSAGE (OUTPATIENT)
Dept: BARIATRICS | Facility: CLINIC | Age: 53
End: 2023-02-09
Payer: OTHER GOVERNMENT

## 2023-02-14 ENCOUNTER — PATIENT MESSAGE (OUTPATIENT)
Dept: BARIATRICS | Facility: CLINIC | Age: 53
End: 2023-02-14
Payer: OTHER GOVERNMENT

## 2023-02-15 ENCOUNTER — OFFICE VISIT (OUTPATIENT)
Dept: PRIMARY CARE CLINIC | Facility: CLINIC | Age: 53
End: 2023-02-15
Payer: OTHER GOVERNMENT

## 2023-02-15 VITALS
BODY MASS INDEX: 37.06 KG/M2 | SYSTOLIC BLOOD PRESSURE: 134 MMHG | HEIGHT: 69 IN | HEART RATE: 74 BPM | WEIGHT: 250.25 LBS | OXYGEN SATURATION: 96 % | DIASTOLIC BLOOD PRESSURE: 80 MMHG

## 2023-02-15 DIAGNOSIS — Z23 PNEUMOCOCCAL VACCINE ADMINISTERED: ICD-10-CM

## 2023-02-15 DIAGNOSIS — E11.65 TYPE 2 DIABETES MELLITUS WITH HYPERGLYCEMIA, WITHOUT LONG-TERM CURRENT USE OF INSULIN: ICD-10-CM

## 2023-02-15 DIAGNOSIS — E66.01 CLASS 2 SEVERE OBESITY DUE TO EXCESS CALORIES WITH SERIOUS COMORBIDITY AND BODY MASS INDEX (BMI) OF 36.0 TO 36.9 IN ADULT: ICD-10-CM

## 2023-02-15 DIAGNOSIS — I15.2 HYPERTENSION ASSOCIATED WITH DIABETES: ICD-10-CM

## 2023-02-15 DIAGNOSIS — K59.09 CHRONIC CONSTIPATION: ICD-10-CM

## 2023-02-15 DIAGNOSIS — F41.9 ANXIETY AND DEPRESSION: ICD-10-CM

## 2023-02-15 DIAGNOSIS — E03.9 ACQUIRED HYPOTHYROIDISM: ICD-10-CM

## 2023-02-15 DIAGNOSIS — F32.A ANXIETY AND DEPRESSION: ICD-10-CM

## 2023-02-15 DIAGNOSIS — E11.59 HYPERTENSION ASSOCIATED WITH DIABETES: ICD-10-CM

## 2023-02-15 DIAGNOSIS — Z12.31 ENCOUNTER FOR SCREENING MAMMOGRAM FOR MALIGNANT NEOPLASM OF BREAST: Primary | ICD-10-CM

## 2023-02-15 PROBLEM — E66.812 CLASS 2 SEVERE OBESITY DUE TO EXCESS CALORIES WITH SERIOUS COMORBIDITY AND BODY MASS INDEX (BMI) OF 37.0 TO 37.9 IN ADULT: Status: ACTIVE | Noted: 2019-05-18

## 2023-02-15 PROCEDURE — 99999 PR PBB SHADOW E&M-EST. PATIENT-LVL V: ICD-10-PCS | Mod: PBBFAC,,, | Performed by: INTERNAL MEDICINE

## 2023-02-15 PROCEDURE — 99999 PR PBB SHADOW E&M-EST. PATIENT-LVL V: CPT | Mod: PBBFAC,,, | Performed by: INTERNAL MEDICINE

## 2023-02-15 PROCEDURE — 90677 PCV20 VACCINE IM: CPT | Mod: PBBFAC

## 2023-02-15 PROCEDURE — 99215 OFFICE O/P EST HI 40 MIN: CPT | Mod: PBBFAC | Performed by: INTERNAL MEDICINE

## 2023-02-15 PROCEDURE — 99214 OFFICE O/P EST MOD 30 MIN: CPT | Mod: S$PBB,,, | Performed by: INTERNAL MEDICINE

## 2023-02-15 PROCEDURE — 99214 PR OFFICE/OUTPT VISIT, EST, LEVL IV, 30-39 MIN: ICD-10-PCS | Mod: S$PBB,,, | Performed by: INTERNAL MEDICINE

## 2023-02-15 RX ORDER — PRAVASTATIN SODIUM 20 MG/1
20 TABLET ORAL DAILY
Qty: 90 TABLET | Refills: 3 | Status: SHIPPED | OUTPATIENT
Start: 2023-02-15

## 2023-02-15 RX ORDER — GLIMEPIRIDE 4 MG/1
4 TABLET ORAL
Qty: 90 TABLET | Refills: 3
Start: 2023-02-15 | End: 2023-03-21 | Stop reason: SDUPTHER

## 2023-02-15 RX ORDER — PANTOPRAZOLE SODIUM 40 MG/1
40 TABLET, DELAYED RELEASE ORAL DAILY
Qty: 90 TABLET | Refills: 3 | Status: SHIPPED | OUTPATIENT
Start: 2023-02-15 | End: 2024-02-15

## 2023-02-15 NOTE — ASSESSMENT & PLAN NOTE
S/p bariatric surgery, has lost 60 pounds.  Has been eating small meals and drink protein shakes.  Walking her dog 4 times per week.

## 2023-02-15 NOTE — ASSESSMENT & PLAN NOTE
A1C 6.2 in 1/2023 labs, stable on Rybelsus 7 mg , glimepiride 8/4 mg BID.  Had diarrhea with Metformin ER. No issues with hypoglycemia.  No issues with feet, UTD on eye exam.

## 2023-02-15 NOTE — ASSESSMENT & PLAN NOTE
Now on Trulance.  Still doing stool softener. Miralax did not work in past. Drinks a lot of water.

## 2023-02-15 NOTE — PROGRESS NOTES
[facility] Note    Chief Complaint      Chief Complaint   Patient presents with    Follow-up     History of Present Illness      Bandar Douglass is a 52 y.o. female who presents today for DM2.  Patient comes to appointment alone.    Problem List Items Addressed This Visit       Hypothyroidism    Current Assessment & Plan     Stable on synthroid 225 mcg daily, no S/S of hypo/hyperthyroidism.         Relevant Orders    TSH    T4, FREE    Hypertension associated with diabetes    Current Assessment & Plan     Was on amlodipine in past, BP controlled today.  Watches sodium in diet.         Relevant Medications    glimepiride (AMARYL) 4 MG tablet    Type 2 diabetes mellitus with hyperglycemia, without long-term current use of insulin    Current Assessment & Plan     A1C 6.2 in 1/2023 labs, stable on Rybelsus 7 mg , glimepiride 8/4 mg BID.  Had diarrhea with Metformin ER. No issues with hypoglycemia.  No issues with feet, UTD on eye exam.          Relevant Medications    glimepiride (AMARYL) 4 MG tablet    pravastatin (PRAVACHOL) 20 MG tablet    Other Relevant Orders    Hemoglobin A1C    Ambulatory referral/consult to Ophthalmology    Anxiety and depression    Current Assessment & Plan     Stable on Trintellix with  xanax PRN. Has been on cymbalta, wellbutrin, Viibryd, Celexa, all of these didn't work. No Panic attacks, SI/HI.         Chronic constipation    Current Assessment & Plan     Now on Trulance.  Still doing stool softener. Miralax did not work in past. Drinks a lot of water.         Class 2 severe obesity due to excess calories with serious comorbidity and body mass index (BMI) of 36.0 to 36.9 in adult    Current Assessment & Plan     S/p bariatric surgery, has lost 60 pounds.  Has been eating small meals and drink protein shakes.  Walking her dog 4 times per week.          Other Visit Diagnoses       Encounter for screening mammogram for malignant neoplasm of breast    -  Primary    Relevant  Orders    Mammo Digital Screening Bilat w/ [facility]    Pneumococcal vaccine administered        Relevant Orders    Pneumococcal Conjugate Vaccine (20 Valent) (IM)              Health Maintenance   Topic Date Due    Eye Exam  Never done    Mammogram  2023    Hemoglobin A1c  2023    Foot Exam  10/17/2023    Low Dose Statin  2024    Lipid Panel  2024    TETANUS VACCINE  10/01/2028    Hepatitis C Screening  Completed       Past Medical History:   Diagnosis Date    Breast lump 2019    Diabetes mellitus     Hypertension     Thyroid disease        Past Surgical History:   Procedure Laterality Date    BREAST BIOPSY Left     BREAST SURGERY  2017    CERVICAL FUSION  2017    C5-7 Anterior Fusion     SECTION      CHOLECYSTECTOMY      DECOMPRESSION OF ULNAR NERVE Left 2020    HERNIA REPAIR      HYSTERECTOMY      LAPAROSCOPIC SLEEVE GASTRECTOMY N/A 2022    Procedure: GASTRECTOMY, SLEEVE, LAPAROSCOPIC, with intraop EGD;  Surgeon: Telly Purcell MD;  Location: [facility] OR [address] [place] [address];  Service: General;  Laterality: N/A;    LUMBAR DISCECTOMY  2000    SPINE SURGERY      TONSILLECTOMY         family history includes Diabetes in her paternal grandmother.    Social History     Tobacco Use    Smoking status: Former     Packs/day: 0.50     Years: 5.00     Pack years: 2.50     Types: Cigarettes     Start date: 1985     Quit date: 1994     Years since quittin.4    Smokeless tobacco: Never    Tobacco comments:     Light smoker   Substance Use Topics    Alcohol use: Not Currently     Comment: Bariatric surgery restricts intake    Drug use: No       Review of Systems   Constitutional:  Negative for chills and fever.   HENT:  Negative for hearing loss and sore throat.    Eyes:  Negative for discharge.   Respiratory:  Negative for cough, shortness of breath and wheezing.    Cardiovascular:  Negative for chest pain and palpitations.   Gastrointestinal:  Negative for blood in stool,  constipation, diarrhea, nausea and vomiting.   Genitourinary:  Negative for dysuria and hematuria.   Musculoskeletal:  Positive for neck pain. Negative for falls.   Neurological:  Negative for weakness and headaches.   Endo/Heme/Allergies:  Negative for polydipsia.      Outpatient Encounter Medications as of 2/15/2023   Medication Sig Dispense Refill    ALPRAZolam (XANAX) 1 MG tablet TAKE 1 TABLET NIGHTLY AS NEEDED FOR ANXIETY 90 tablet 1    amitriptyline (ELAVIL) 10 MG tablet TAKE 1 TABLET NIGHTLY AS NEEDED FOR INSOMNIA 90 tablet 3    fluticasone-salmeterol diskus inhaler 250-50 mcg Inhale 1 puff into the lungs 2 (two) times a day. 180 each 3    gluc sifuentes/chondro sifuentes A/vit C/Mn (GLUCOSAMINE 1500 COMPLEX ORAL) Take 1,500 mg by mouth.      levothyroxine (SYNTHROID) 200 MCG tablet TAKE 1 TABLET BEFORE BREAKFAST 90 tablet 3    levothyroxine (SYNTHROID) 25 MCG tablet Take 1 tablet (25 mcg total) by mouth once daily. 90 tablet 3    multivitamin with iron Tab Take by mouth.      PROAIR HFA 90 mcg/actuation inhaler USE 1 INHALATION EVERY 4 HOURS AS NEEDED FOR WHEEZING (RESCUE) 8.5 g 10    RYBELSUS 7 mg tablet TAKE 1 TABLET DAILY 90 tablet 3    TRINTELLIX 10 mg Tab TAKE 1 TABLET DAILY 90 tablet 3    [DISCONTINUED] glimepiride (AMARYL) 4 MG tablet Take 2 tablets PO in AM, 1 tablet in PM (Patient taking differently: Take 4 mg by mouth 2 (two) times a day. Take 2 tablets PO in AM, 1 tablet in PM) 270 tablet 3    [DISCONTINUED] pantoprazole (PROTONIX) 40 MG tablet Take 1 tablet (40 mg total) by mouth once daily. 90 tablet 3    [DISCONTINUED] pravastatin (PRAVACHOL) 20 MG tablet Take 1 tablet (20 mg total) by mouth once daily. (Patient taking differently: Take 20 mg by mouth every evening.) 90 tablet 3    glimepiride (AMARYL) 4 MG tablet Take 1 tablet (4 mg total) by mouth daily with breakfast. Take 2 tablets PO in AM, 1 tablet in PM 90 tablet 3    pantoprazole (PROTONIX) 40 MG tablet Take 1 tablet (40 mg total) by mouth once  "daily. 90 tablet 3    pravastatin (PRAVACHOL) 20 MG tablet Take 1 tablet (20 mg total) by mouth once daily. 90 tablet 3    [DISCONTINUED] ascorbic acid, vitamin C, (VITAMIN C) 1000 MG tablet Take by mouth.      [DISCONTINUED] cyanocobalamin (VITAMIN B-12) 1000 MCG tablet Take 1,000 mcg by mouth.      [DISCONTINUED] ergocalciferol, vitamin D2, (VITAMIN D ORAL) Take 1,000 mg by mouth.      [DISCONTINUED] flaxseed oil 1,000 mg Cap Take 1 capsule by mouth once daily.      [DISCONTINUED] hydrocodone-acetaminophen (HYCET) solution 7.5-325 mg/15mL Take 15 mLs by mouth 4 (four) times daily as needed for Pain. (Patient not taking: Reported on 2/15/2023) 150 mL 0    [DISCONTINUED] omeprazole (PRILOSEC) 40 MG capsule Take 1 capsule (40 mg total) by mouth every morning. Open capsule and take with apple sauce (Patient not taking: Reported on 2/15/2023) 30 capsule 2    [DISCONTINUED] ondansetron (ZOFRAN-ODT) 8 MG TbDL Take 1 tablet (8 mg total) by mouth every 6 (six) hours as needed. (Patient not taking: Reported on 2/15/2023) 30 tablet 0    [DISCONTINUED] semaglutide (RYBELSUS) 14 mg tablet Take 1 tablet (14 mg total) by mouth once daily. (Patient not taking: Reported on 2/15/2023) 90 tablet 3     No facility-administered encounter medications on file as of 2/15/2023.        Review of patient's allergies indicates:   Allergen Reactions    Bactrim [sulfamethoxazole-trimethoprim] Swelling    Adhesive Rash     tapes       Physical Exam      Vital Signs  Pulse: 74  SpO2: 96 %  BP: 134/80  Pain Score: 0-No pain  Height and Weight  Height: 5' 9" (175.3 cm)  Weight: 113.5 kg (250 lb 3.6 oz)  BSA (Calculated - sq m): 2.35 sq meters  BMI (Calculated): 36.9  Weight in (lb) to have BMI = 25: 168.9]    Physical Exam  Constitutional:       Appearance: She is well-developed.   HENT:      Head: Normocephalic and atraumatic.      Right Ear: External ear normal.      Left Ear: External ear normal.   Eyes:      General:         Right eye: No " discharge.         Left eye: No discharge.   Cardiovascular:      Rate and Rhythm: Normal rate and regular rhythm.      Heart sounds: Normal heart sounds. No murmur heard.  Pulmonary:      Effort: Pulmonary effort is normal. No respiratory distress.      Breath sounds: Normal breath sounds.   Abdominal:      General: There is no distension.      Palpations: Abdomen is soft.      Tenderness: There is no abdominal tenderness. There is no guarding.   Musculoskeletal:         General: Normal range of motion.      Cervical back: Normal range of motion.   Skin:     General: Skin is warm and dry.   Neurological:      Mental Status: She is alert and oriented to person, place, and time.   Psychiatric:         Behavior: Behavior normal.        Laboratory:  CBC:  Recent Labs   Lab Result Units 11/26/22  0504 12/08/22  0758 01/19/23  0759   WBC K/uL 13.44* 6.06 5.84   RBC M/uL 4.90 5.26 5.23   Hemoglobin g/dL 14.7 15.4 15.1   Hematocrit % 43.9 47.3 46.2   Platelets K/uL 284 301 230   MCV fL 90 90 88   MCH pg 30.0 29.3 28.9   MCHC g/dL 33.5 32.6 32.7     CMP:  Recent Labs   Lab Result Units 12/08/22  0758 01/19/23  0759   Glucose mg/dL 123* 158*   Calcium mg/dL 10.9* 10.5   Albumin g/dL 3.7 4.0   Total Protein g/dL 7.2 7.7   Sodium mmol/L 140 142   Potassium mmol/L 4.7 4.4   CO2 mmol/L 28 28   Chloride mmol/L 102 103   BUN mg/dL 11 11   Alkaline Phosphatase U/L 61 64   ALT U/L 22 20   AST U/L 37 32   Total Bilirubin mg/dL 0.5 0.6     URINALYSIS:  No results for input(s): COLORU, CLARITYU, SPECGRAV, PHUR, PROTEINUA, GLUCOSEU, BILIRUBINCON, BLOODU, WBCU, RBCU, BACTERIA, MUCUS, NITRITE, LEUKOCYTESUR, UROBILINOGEN, HYALINECASTS in the last 2160 hours.   LIPIDS:  Recent Labs   Lab Result Units 01/19/23  0759   HDL mg/dL 51   Cholesterol mg/dL 145   Triglycerides mg/dL 124   LDL Cholesterol mg/dL 69.2   HDL/Cholesterol Ratio % 35.2   Non-HDL Cholesterol mg/dL 94   Total Cholesterol/HDL Ratio  2.8     TSH:  No results for input(s): TSH  in the last 2160 hours.    A1C:  Recent Labs   Lab Result Units 01/19/23  0759   Hemoglobin A1C % 6.2*       Radiology:  CXR    Result Date: 10/17/2022  EXAMINATION:  XR CHEST PA AND LATERAL    CLINICAL HISTORY:  Morbid (severe) obesity due to excess calories    TECHNIQUE:  PA and lateral views of the chest were performed.    COMPARISON:  December 7, 2021    FINDINGS:  Normal heart size.  Normal pulmonary vasculature.  No focal infiltrates.  No pleural fluid.  No pneumothorax.  Reconfirmed and stable postsurgical hardware cervical spine.  No acute bony abnormalities.        Stress Echo Which stress agent will be used? Treadmill Exercise; Color Flow Doppler? No    Result Date: 10/17/2022  · The patient exercised for 5 minutes and 39 seconds on a high ramp   protocol, corresponding to a functional capacity of 9 METS, achieving a   peak heart rate of 160 bpm, which is 100% of the age predicted maximum   heart rate.  · The test was stopped because the patient experienced fatigue.  · The patient's exercise capacity was below average.  · There were no arrhythmias during stress.  · The ECG portion of this study is negative for myocardial ischemia.  · The left ventricle is normal in size with normal systolic function.  · The estimated ejection fraction is 60%.  · Normal left ventricular diastolic function.  · Normal right ventricular size with normal right ventricular systolic   function.  · The estimated PA systolic pressure is 28 mmHg.  · Normal central venous pressure (3 mmHg).  · The stress echo portion of this test reveals no clear evidence of   myocardial ischemia. However there was no significant increase in   contractility of the left ventricular segments.            Assessment/Plan     Bandar Douglass is a 52 y.o.female with:    1. Encounter for screening mammogram for malignant neoplasm of breast  - Mammo Digital Screening Bilat w/ Ralph; Future    2. Type 2 diabetes mellitus with hyperglycemia, without long-term  current use of insulin  - glimepiride (AMARYL) 4 MG tablet; Take 1 tablet (4 mg total) by mouth daily with breakfast. Take 2 tablets PO in AM, 1 tablet in PM  Dispense: 90 tablet; Refill: 3  - Hemoglobin A1C; Future  - pravastatin (PRAVACHOL) 20 MG tablet; Take 1 tablet (20 mg total) by mouth once daily.  Dispense: 90 tablet; Refill: 3  - Ambulatory referral/consult to Ophthalmology; Future    3. Acquired hypothyroidism  - TSH; Future  - T4, FREE; Future    4. Chronic constipation    5. Class 2 severe obesity due to excess calories with serious comorbidity and body mass index (BMI) of 36.0 to 36.9 in adult    6. Hypertension associated with diabetes    7. Anxiety and depression    8. Pneumococcal vaccine administered  - Pneumococcal Conjugate Vaccine (20 Valent) (IM)      -prevnar 20 today  -decrease Glimepiride to 4 mg in AM  -MMG ordered  -referred for Eye exam  -Continue current medications and maintain follow up with specialists.    -Follow up in about 6 months (around 8/15/2023) for follow up of medical problems.       Regina Nava MD  [facility]                    Answers submitted by the patient for this visit:  Review of Systems Questionnaire (Submitted on 2/14/2023)  activity change: Yes  unexpected weight change: No  rhinorrhea: No  trouble swallowing: No  visual disturbance: No  chest tightness: No  polyuria: No  difficulty urinating: No  menstrual problem: No  joint swelling: No  arthralgias: No  confusion: No  dysphoric mood: No

## 2023-02-22 ENCOUNTER — PATIENT MESSAGE (OUTPATIENT)
Dept: BARIATRICS | Facility: CLINIC | Age: 53
End: 2023-02-22
Payer: OTHER GOVERNMENT

## 2023-03-08 ENCOUNTER — PATIENT MESSAGE (OUTPATIENT)
Dept: BARIATRICS | Facility: CLINIC | Age: 53
End: 2023-03-08
Payer: OTHER GOVERNMENT

## 2023-03-12 DIAGNOSIS — E11.65 TYPE 2 DIABETES MELLITUS WITH HYPERGLYCEMIA, WITHOUT LONG-TERM CURRENT USE OF INSULIN: ICD-10-CM

## 2023-03-13 RX ORDER — GLIMEPIRIDE 4 MG/1
TABLET ORAL
Qty: 180 TABLET | Refills: 3 | OUTPATIENT
Start: 2023-03-13

## 2023-03-13 NOTE — TELEPHONE ENCOUNTER
No new care gaps identified.  [facility] Embedded Care Gaps. Reference number: [id number]. 3/12/2023   10:21:20 PM CDT

## 2023-03-13 NOTE — TELEPHONE ENCOUNTER
Refill Decision Note   Bandar Evonne  is requesting a refill authorization.  Brief Assessment and Rationale for Refill:  Quick Discontinue     Medication Therapy Plan:  Glimepiride increase to 3 tabs daily (2/15/23); Quick dc    Medication Reconciliation Completed: No   Comments:     No Care Gaps recommended.     Note composed:1:11 PM 03/13/2023

## 2023-03-14 ENCOUNTER — PATIENT MESSAGE (OUTPATIENT)
Dept: BARIATRICS | Facility: CLINIC | Age: 53
End: 2023-03-14
Payer: OTHER GOVERNMENT

## 2023-04-05 ENCOUNTER — PATIENT MESSAGE (OUTPATIENT)
Dept: BARIATRICS | Facility: CLINIC | Age: 53
End: 2023-04-05
Payer: OTHER GOVERNMENT

## 2023-04-11 ENCOUNTER — PATIENT MESSAGE (OUTPATIENT)
Dept: BARIATRICS | Facility: CLINIC | Age: 53
End: 2023-04-11
Payer: OTHER GOVERNMENT

## 2023-04-22 ENCOUNTER — TELEPHONE (OUTPATIENT)
Dept: URGENT CARE | Facility: PHYSICIAN GROUP | Age: 53
End: 2023-04-22
Payer: OTHER GOVERNMENT

## 2023-05-03 ENCOUNTER — PATIENT MESSAGE (OUTPATIENT)
Dept: BARIATRICS | Facility: CLINIC | Age: 53
End: 2023-05-03
Payer: OTHER GOVERNMENT

## 2023-05-09 ENCOUNTER — PATIENT MESSAGE (OUTPATIENT)
Dept: BARIATRICS | Facility: CLINIC | Age: 53
End: 2023-05-09
Payer: OTHER GOVERNMENT

## 2023-05-15 ENCOUNTER — OFFICE VISIT (OUTPATIENT)
Dept: MEDICAL GROUP | Facility: PHYSICIAN GROUP | Age: 53
End: 2023-05-15
Payer: OTHER GOVERNMENT

## 2023-05-15 VITALS
BODY MASS INDEX: 33.77 KG/M2 | DIASTOLIC BLOOD PRESSURE: 78 MMHG | HEART RATE: 68 BPM | TEMPERATURE: 97 F | OXYGEN SATURATION: 98 % | HEIGHT: 69 IN | WEIGHT: 228 LBS | SYSTOLIC BLOOD PRESSURE: 138 MMHG | RESPIRATION RATE: 14 BRPM

## 2023-05-15 DIAGNOSIS — Z79.4 TYPE 2 DIABETES MELLITUS WITHOUT COMPLICATION, WITH LONG-TERM CURRENT USE OF INSULIN (HCC): ICD-10-CM

## 2023-05-15 DIAGNOSIS — E55.9 VITAMIN D DEFICIENCY: ICD-10-CM

## 2023-05-15 DIAGNOSIS — Z11.59 NEED FOR HEPATITIS C SCREENING TEST: ICD-10-CM

## 2023-05-15 DIAGNOSIS — F51.01 PRIMARY INSOMNIA: ICD-10-CM

## 2023-05-15 DIAGNOSIS — J45.20 MILD INTERMITTENT ASTHMA WITHOUT COMPLICATION: ICD-10-CM

## 2023-05-15 DIAGNOSIS — E06.3 HASHIMOTO'S THYROIDITIS: ICD-10-CM

## 2023-05-15 DIAGNOSIS — F33.0 MILD EPISODE OF RECURRENT MAJOR DEPRESSIVE DISORDER (HCC): ICD-10-CM

## 2023-05-15 DIAGNOSIS — Z00.00 ROUTINE HEALTH MAINTENANCE: ICD-10-CM

## 2023-05-15 DIAGNOSIS — E11.9 TYPE 2 DIABETES MELLITUS WITHOUT COMPLICATION, WITH LONG-TERM CURRENT USE OF INSULIN (HCC): ICD-10-CM

## 2023-05-15 DIAGNOSIS — E78.5 DYSLIPIDEMIA: ICD-10-CM

## 2023-05-15 DIAGNOSIS — Z79.899 CHRONIC USE OF BENZODIAZEPINE FOR THERAPEUTIC PURPOSE: ICD-10-CM

## 2023-05-15 DIAGNOSIS — Z98.84 HISTORY OF BARIATRIC SURGERY: ICD-10-CM

## 2023-05-15 DIAGNOSIS — Z76.89 ESTABLISHING CARE WITH NEW DOCTOR, ENCOUNTER FOR: ICD-10-CM

## 2023-05-15 DIAGNOSIS — E66.09 CLASS 1 OBESITY DUE TO EXCESS CALORIES WITH SERIOUS COMORBIDITY AND BODY MASS INDEX (BMI) OF 33.0 TO 33.9 IN ADULT: ICD-10-CM

## 2023-05-15 PROBLEM — E66.811 CLASS 1 OBESITY DUE TO EXCESS CALORIES WITH SERIOUS COMORBIDITY AND BODY MASS INDEX (BMI) OF 33.0 TO 33.9 IN ADULT: Status: ACTIVE | Noted: 2023-05-15

## 2023-05-15 PROCEDURE — 3075F SYST BP GE 130 - 139MM HG: CPT | Performed by: NURSE PRACTITIONER

## 2023-05-15 PROCEDURE — 99204 OFFICE O/P NEW MOD 45 MIN: CPT | Performed by: NURSE PRACTITIONER

## 2023-05-15 PROCEDURE — 3078F DIAST BP <80 MM HG: CPT | Performed by: NURSE PRACTITIONER

## 2023-05-15 RX ORDER — ALPRAZOLAM 1 MG/1
TABLET ORAL
COMMUNITY
Start: 2023-03-08

## 2023-05-15 RX ORDER — ALBUTEROL SULFATE 90 UG/1
AEROSOL, METERED RESPIRATORY (INHALATION)
COMMUNITY
Start: 2023-04-05 | End: 2023-12-28 | Stop reason: SDUPTHER

## 2023-05-15 RX ORDER — FLUTICASONE PROPIONATE AND SALMETEROL 250; 50 UG/1; UG/1
1 POWDER RESPIRATORY (INHALATION) EVERY 12 HOURS
COMMUNITY
End: 2023-12-28 | Stop reason: SDUPTHER

## 2023-05-15 RX ORDER — AMITRIPTYLINE HYDROCHLORIDE 10 MG/1
TABLET, FILM COATED ORAL
COMMUNITY
Start: 2023-04-05 | End: 2023-12-28 | Stop reason: SDUPTHER

## 2023-05-15 RX ORDER — FLUTICASONE PROPIONATE AND SALMETEROL 250; 50 UG/1; UG/1
1 POWDER RESPIRATORY (INHALATION) EVERY 12 HOURS
COMMUNITY
End: 2023-12-28

## 2023-05-15 RX ORDER — LEVOTHYROXINE SODIUM 200 MCG
TABLET ORAL
COMMUNITY
Start: 2023-03-17 | End: 2023-06-15

## 2023-05-15 RX ORDER — ORAL SEMAGLUTIDE 7 MG/1
TABLET ORAL
COMMUNITY
Start: 2023-03-17 | End: 2023-12-28 | Stop reason: SDUPTHER

## 2023-05-15 RX ORDER — GLIMEPIRIDE 4 MG/1
TABLET ORAL
COMMUNITY
Start: 2023-03-22 | End: 2023-12-28 | Stop reason: SDUPTHER

## 2023-05-15 RX ORDER — PANTOPRAZOLE SODIUM 40 MG/1
TABLET, DELAYED RELEASE ORAL
COMMUNITY
Start: 2023-02-15 | End: 2023-05-15

## 2023-05-15 RX ORDER — PRAVASTATIN SODIUM 20 MG
TABLET ORAL
COMMUNITY
Start: 2023-03-30 | End: 2023-12-28 | Stop reason: SDUPTHER

## 2023-05-15 RX ORDER — LEVOTHYROXINE SODIUM 0.03 MG/1
TABLET ORAL
COMMUNITY
Start: 2023-04-05 | End: 2023-06-15

## 2023-05-15 RX ORDER — VORTIOXETINE 10 MG/1
TABLET, FILM COATED ORAL
COMMUNITY
End: 2023-12-28 | Stop reason: SDUPTHER

## 2023-05-15 ASSESSMENT — PATIENT HEALTH QUESTIONNAIRE - PHQ9
2. FEELING DOWN, DEPRESSED, IRRITABLE, OR HOPELESS: NOT AT ALL
CLINICAL INTERPRETATION OF PHQ2 SCORE: 0
SUM OF ALL RESPONSES TO PHQ QUESTIONS 1-9: 0
1. LITTLE INTEREST OR PLEASURE IN DOING THINGS: NOT AT ALL
3. TROUBLE FALLING OR STAYING ASLEEP OR SLEEPING TOO MUCH: NOT AT ALL
4. FEELING TIRED OR HAVING LITTLE ENERGY: NOT AT ALL
5. POOR APPETITE OR OVEREATING: NOT AT ALL
8. MOVING OR SPEAKING SO SLOWLY THAT OTHER PEOPLE COULD HAVE NOTICED. OR THE OPPOSITE, BEING SO FIGETY OR RESTLESS THAT YOU HAVE BEEN MOVING AROUND A LOT MORE THAN USUAL: NOT AT ALL
9. THOUGHTS THAT YOU WOULD BE BETTER OFF DEAD, OR OF HURTING YOURSELF: NOT AT ALL
7. TROUBLE CONCENTRATING ON THINGS, SUCH AS READING THE NEWSPAPER OR WATCHING TELEVISION: NOT AT ALL
6. FEELING BAD ABOUT YOURSELF - OR THAT YOU ARE A FAILURE OR HAVE LET YOURSELF OR YOUR FAMILY DOWN: NOT AL ALL
SUM OF ALL RESPONSES TO PHQ9 QUESTIONS 1 AND 2: 0

## 2023-05-15 NOTE — ASSESSMENT & PLAN NOTE
New to examiner, chronic for patient.  Patient states she has had issues with insomnia for at least 10 years.  She has tried over-the-counter melatonin and ZzzQuil which do not help.  She has been prescribed other sleeping medications such as Ambien and Lunesta which caused nightmares.  She is currently taking alprazolam 1 mg nightly and amitriptyline 25 mg nightly.  She does admit to also using marijuana.

## 2023-05-15 NOTE — PROGRESS NOTES
CC: Diagnoses of Establishing care with new doctor, encounter for, Mild episode of recurrent major depressive disorder (HCC), Type 2 diabetes mellitus without complication, with long-term current use of insulin (HCC), Class 1 obesity due to excess calories with serious comorbidity and body mass index (BMI) of 33.0 to 33.9 in adult, Dyslipidemia, Primary insomnia, Chronic use of benzodiazepine for therapeutic purpose, Hashimoto's thyroiditis, Vitamin D deficiency, Mild intermittent asthma without complication, History of bariatric surgery, Routine health maintenance, and Need for hepatitis C screening test were pertinent to this visit.                                                                                                                                       HPI:    presents today with the following concerns:    Mild episode of recurrent major depressive disorder (HCC)  New to examiner, chronic for patient.  Continues Trintellix 10 mg daily, denies side effects of medication.  Reports that the medication works very well for symptom management.    Hashimoto's thyroiditis  New to examiner, chronic for patient.  Reports that she was diagnosed around 19 years old.  She continues levothyroxine 225 mcg daily.  Due for updated labs.    Type 2 diabetes mellitus without complication, with long-term current use of insulin (HCC)  New to examiner, chronic for patient.  Continues glimepiride 4 mg daily and Rybelsus 7 mg daily, states 14 mg daily caused vomiting.  She is S/P gastrectomy and is down 80 pounds.  Does not monitor blood sugars at home.    Dyslipidemia  New to examiner, chronic for patient.  Continues pravastatin 20 mg daily, does not need a refill at this time.  Due for updated lipid profile.    Mild intermittent asthma without complication  New to examiner, chronic for patient.  Continues Wixela only as needed and albuterol as needed, states that when she is wheezing she will use the medication.  Does  not need a refill.    Vitamin D deficiency  New to examiner, chronic for patient.  Continues vitamin D and calcium supplement daily.  Due for updated labs.    Primary insomnia  New to examiner, chronic for patient.  Patient states she has had issues with insomnia for at least 10 years.  She has tried over-the-counter melatonin and ZzzQuil which do not help.  She has been prescribed other sleeping medications such as Ambien and Lunesta which caused nightmares.  She is currently taking alprazolam 1 mg nightly and amitriptyline 25 mg nightly.  She does admit to also using marijuana.    Patient Active Problem List    Diagnosis Date Noted    Chronic use of benzodiazepine for therapeutic purpose 05/15/2023    Class 1 obesity due to excess calories with serious comorbidity and body mass index (BMI) of 33.0 to 33.9 in adult 05/15/2023    Primary insomnia 05/15/2023    Mild episode of recurrent major depressive disorder (HCC) 05/15/2023    Hashimoto's thyroiditis 05/15/2023    Type 2 diabetes mellitus without complication, with long-term current use of insulin (HCC) 05/15/2023    Dyslipidemia 05/15/2023    Mild intermittent asthma without complication 05/15/2023    Vitamin D deficiency 05/15/2023     Current Outpatient Medications   Medication Sig Dispense Refill    albuterol 108 (90 Base) MCG/ACT Aero Soln inhalation aerosol       ALPRAZolam (XANAX) 1 MG Tab       amitriptyline (ELAVIL) 10 MG Tab       glimepiride (AMARYL) 4 MG Tab       levothyroxine (SYNTHROID) 25 MCG Tab       SYNTHROID 200 MCG Tab       pravastatin (PRAVACHOL) 20 MG Tab       RYBELSUS 7 MG Tab       Vortioxetine HBr (TRINTELLIX) 10 MG Tab Take  by mouth.      NON SPECIFIED Flinstone's Vitamin with Iron      BIOTIN PO Take  by mouth.      Calcium Carb-Cholecalciferol (CALCIUM 600 + D PO) Take  by mouth.      SUPER B COMPLEX/C PO Take  by mouth.      ASHWAGANDHA PO Take  by mouth.      GLUCOSAMINE-CHONDROITIN PO Take  by mouth.      fluticasone-salmeterol  "(ADVAIR) 250-50 MCG/ACT AEROSOL POWDER, BREATH ACTIVATED Inhale 1 Puff every 12 hours.      fluticasone-salmeterol (ADVAIR) 250-50 MCG/ACT AEROSOL POWDER, BREATH ACTIVATED Inhale 1 Puff every 12 hours.      ALBUTEROL INH Inhale.       No current facility-administered medications for this visit.     Allergies as of 05/15/2023 - Reviewed 05/15/2023   Allergen Reaction Noted    Bactrim ds Hives 05/15/2023      ROS:  Constitutional: No fevers, chills, malaise/fatigue.  Eyes: No eye pain.  ENT: No sore throat, congestion.   Resp: No cough, shortness of breath.  CV: No chest pain, leg swelling, palpitations.  GI: No nausea/vomiting, abdominal pain, constipation, diarrhea.  : No dysuria, hematuria.  MSK: No weakness.  Skin: No rashes.  Neuro: No dizziness, weakness, headaches.  Psych: No suicidal ideations.    All remaining systems reviewed and found to be negative, except as stated above.      /78 (BP Location: Left arm, Patient Position: Sitting, BP Cuff Size: Large adult)   Pulse 68   Temp 36.1 °C (97 °F) (Temporal)   Resp 14   Ht 1.753 m (5' 9\")   Wt 103 kg (228 lb)   SpO2 98%   BMI 33.67 kg/m²     Physical Exam:  General: Well nourished, well developed female in NAD, awake and conversant.  Eyes: Normal conjunctiva, anicteric.  Round symmetrical pupils.  ENT: Hearing grossly intact.  No nasal discharge.  Neck: Neck is supple.  No masses or thyromegaly.  CV: No lower extremity edema.  Respiratory: Respirations are nonlabored.  No wheezing.  Abdomen: Non-Distended.  Skin: Warm.  No rashes or ulcers.  MSK: Normal ambulation.  No clubbing or cyanosis.  Neuro: Sensation and CN II-XII grossly normal.  Psych: Alert and oriented.  Cooperative, appropriate mood and affect, normal judgment.      Assessment and Plan.   52 y.o. female with the following issues:  1. Establishing care with new doctor, encounter for    2. Mild episode of recurrent major depressive disorder (HCC)  New to examiner, chronic for patient.  " Continue Wixela 2 50-50 twice daily as needed and albuterol as needed.  Due for updated annual labs prior to follow-up.  - TSH WITH REFLEX TO FT4; Future    3. Type 2 diabetes mellitus without complication, with long-term current use of insulin (HCC)  New to examiner, chronic for patient.  Continue Rybelsus 7 mg daily and glimepiride 4 mg daily. Due for updated annual labs prior to follow-up.  - HEMOGLOBIN A1C; Future  - Comp Metabolic Panel; Future  - Lipid Profile; Future  - MICROALBUMIN CREAT RATIO URINE; Future    4. Class 1 obesity due to excess calories with serious comorbidity and body mass index (BMI) of 33.0 to 33.9 in adult  Encourage diet high in fruits, vegetables, and fiber. And a diet low in salt, refined carbohydrates, cholesterol, saturated fat, and trans fatty acids.    Encourage a minimum of 30 minutes of moderate intensity aerobic exercise (eg, brisk walking) is recommended on five days each week. Or 30 minutes of vigorous-intensity aerobic exercise (eg, jogging) on three days each week.   Patient's body mass index is 33.67 kg/m². Exercise and nutrition counseling were performed at this visit.  Due for updated annual labs prior to follow-up.  - Referral to Bariatric Surgery  - HEMOGLOBIN A1C; Future  - CBC WITH DIFFERENTIAL; Future  - Comp Metabolic Panel; Future  - Lipid Profile; Future  - TSH WITH REFLEX TO FT4; Future    5. Dyslipidemia  Chronic, ongoing.  Continue pravastatin 20 mg daily. Due for updated annual labs prior to follow-up.  - Comp Metabolic Panel; Future  - Lipid Profile; Future  - TSH WITH REFLEX TO FT4; Future    6. Primary insomnia  7. Chronic use of benzodiazepine for therapeutic purpose  Chronic, ongoing.  Patient has tried multiple prescription as well as over-the-counter sleep aids, although side effects of nausea and/or nightmares.  She does not have the side effects with alprazolam 1 mg nightly.  Patient does admit that she smokes marijuana daily, advised patient that she  cannot receive a controlled substance prescription while using marijuana, patient understands.  Patient does not need a refill at this time, recently received it from her previous PCP. Due for updated annual labs prior to follow-up.  - Referral to Psychiatry  - CBC WITH DIFFERENTIAL; Future  - TSH WITH REFLEX TO FT4; Future  - VITAMIN D,25 HYDROXY (DEFICIENCY); Future  - VITAMIN B12; Future  - Referral to Psychiatry    8. Hashimoto's thyroiditis  New to examiner, chronic for patient.  Continue levothyroxine 225 mcg daily, does not need refill at this time. Due for updated annual labs prior to follow-up.  - TSH WITH REFLEX TO FT4; Future    9. Vitamin D deficiency  Chronic, ongoing.  Continue vitamin D and calcium daily.  Encourage a minimum of 150 minutes of weight bearing exercises weekly. Due for updated annual labs prior to follow-up.  - VITAMIN D,25 HYDROXY (DEFICIENCY); Future    10. Mild intermittent asthma without complication  Due for updated annual labs prior to follow-up.  - CBC WITH DIFFERENTIAL; Future    11. History of bariatric surgery  Due for updated annual labs prior to follow-up.  - Referral to Bariatric Surgery  - CBC WITH DIFFERENTIAL; Future  - Comp Metabolic Panel; Future  - TSH WITH REFLEX TO FT4; Future  - VITAMIN D,25 HYDROXY (DEFICIENCY); Future  - VITAMIN B12; Future    12. Routine health maintenance  Due for updated annual labs prior to follow-up.  - HEMOGLOBIN A1C; Future  - CBC WITH DIFFERENTIAL; Future  - Comp Metabolic Panel; Future  - HEP C VIRUS ANTIBODY; Future  - Lipid Profile; Future  - MICROALBUMIN CREAT RATIO URINE; Future  - TSH WITH REFLEX TO FT4; Future  - VITAMIN D,25 HYDROXY (DEFICIENCY); Future  - VITAMIN B12; Future    13. Need for hepatitis C screening test  Due for updated annual labs prior to follow-up.  - HEP C VIRUS ANTIBODY; Future     Return in about 1 month (around 6/15/2023) for Preventative Annual, Follow up Labs.     Please note that this dictation was created  using voice recognition software. I have worked with consultants from the vendor as well as technical experts from [facility] to optimize the interface. I have made every reasonable attempt to correct obvious errors, but I expect that there are errors of grammar and possibly content that I did not discover before finalizing the note.

## 2023-05-15 NOTE — ASSESSMENT & PLAN NOTE
New to examiner, chronic for patient.  Reports that she was diagnosed around 19 years old.  She continues levothyroxine 225 mcg daily.  Due for updated labs.

## 2023-05-15 NOTE — ASSESSMENT & PLAN NOTE
New to examiner, chronic for patient.  Continues glimepiride 4 mg daily and Rybelsus 7 mg daily, states 14 mg daily caused vomiting.  She is S/P gastrectomy and is down 80 pounds.  Does not monitor blood sugars at home.

## 2023-05-15 NOTE — ASSESSMENT & PLAN NOTE
New to examiner, chronic for patient.  Continues vitamin D and calcium supplement daily.  Due for updated labs.

## 2023-05-15 NOTE — ASSESSMENT & PLAN NOTE
New to examiner, chronic for patient.  Continues Wixela only as needed and albuterol as needed, states that when she is wheezing she will use the medication.  Does not need a refill.

## 2023-05-15 NOTE — ASSESSMENT & PLAN NOTE
New to examiner, chronic for patient.  Continues Trintellix 10 mg daily, denies side effects of medication.  Reports that the medication works very well for symptom management.

## 2023-05-15 NOTE — ASSESSMENT & PLAN NOTE
New to examiner, chronic for patient.  Continues pravastatin 20 mg daily, does not need a refill at this time.  Due for updated lipid profile.

## 2023-06-03 ENCOUNTER — HOSPITAL ENCOUNTER (OUTPATIENT)
Dept: LAB | Facility: MEDICAL CENTER | Age: 53
End: 2023-06-03
Attending: NURSE PRACTITIONER
Payer: OTHER GOVERNMENT

## 2023-06-03 DIAGNOSIS — E66.09 CLASS 1 OBESITY DUE TO EXCESS CALORIES WITH SERIOUS COMORBIDITY AND BODY MASS INDEX (BMI) OF 33.0 TO 33.9 IN ADULT: ICD-10-CM

## 2023-06-03 DIAGNOSIS — Z98.84 HISTORY OF BARIATRIC SURGERY: ICD-10-CM

## 2023-06-03 DIAGNOSIS — E11.9 TYPE 2 DIABETES MELLITUS WITHOUT COMPLICATION, WITH LONG-TERM CURRENT USE OF INSULIN (HCC): ICD-10-CM

## 2023-06-03 DIAGNOSIS — E55.9 VITAMIN D DEFICIENCY: ICD-10-CM

## 2023-06-03 DIAGNOSIS — E06.3 HASHIMOTO'S THYROIDITIS: ICD-10-CM

## 2023-06-03 DIAGNOSIS — E78.5 DYSLIPIDEMIA: ICD-10-CM

## 2023-06-03 DIAGNOSIS — Z00.00 ROUTINE HEALTH MAINTENANCE: ICD-10-CM

## 2023-06-03 DIAGNOSIS — Z79.4 TYPE 2 DIABETES MELLITUS WITHOUT COMPLICATION, WITH LONG-TERM CURRENT USE OF INSULIN (HCC): ICD-10-CM

## 2023-06-03 DIAGNOSIS — F33.0 MILD EPISODE OF RECURRENT MAJOR DEPRESSIVE DISORDER (HCC): ICD-10-CM

## 2023-06-03 DIAGNOSIS — J45.20 MILD INTERMITTENT ASTHMA WITHOUT COMPLICATION: ICD-10-CM

## 2023-06-03 DIAGNOSIS — Z11.59 NEED FOR HEPATITIS C SCREENING TEST: ICD-10-CM

## 2023-06-03 DIAGNOSIS — F51.01 PRIMARY INSOMNIA: ICD-10-CM

## 2023-06-03 LAB
25(OH)D3 SERPL-MCNC: 66 NG/ML (ref 30–100)
ALBUMIN SERPL BCP-MCNC: 4.3 G/DL (ref 3.2–4.9)
ALBUMIN/GLOB SERPL: 1.3 G/DL
ALP SERPL-CCNC: 74 U/L (ref 30–99)
ALT SERPL-CCNC: 13 U/L (ref 2–50)
ANION GAP SERPL CALC-SCNC: 16 MMOL/L (ref 7–16)
AST SERPL-CCNC: 33 U/L (ref 12–45)
BASOPHILS # BLD AUTO: 1 % (ref 0–1.8)
BASOPHILS # BLD: 0.05 K/UL (ref 0–0.12)
BILIRUB SERPL-MCNC: 0.5 MG/DL (ref 0.1–1.5)
BUN SERPL-MCNC: 15 MG/DL (ref 8–22)
CALCIUM ALBUM COR SERPL-MCNC: 10.5 MG/DL (ref 8.5–10.5)
CALCIUM SERPL-MCNC: 10.7 MG/DL (ref 8.5–10.5)
CHLORIDE SERPL-SCNC: 103 MMOL/L (ref 96–112)
CHOLEST SERPL-MCNC: 162 MG/DL (ref 100–199)
CO2 SERPL-SCNC: 26 MMOL/L (ref 20–33)
CREAT SERPL-MCNC: 0.64 MG/DL (ref 0.5–1.4)
CREAT UR-MCNC: 119.58 MG/DL
EOSINOPHIL # BLD AUTO: 0.43 K/UL (ref 0–0.51)
EOSINOPHIL NFR BLD: 8.6 % (ref 0–6.9)
ERYTHROCYTE [DISTWIDTH] IN BLOOD BY AUTOMATED COUNT: 39.8 FL (ref 35.9–50)
EST. AVERAGE GLUCOSE BLD GHB EST-MCNC: 123 MG/DL
FASTING STATUS PATIENT QL REPORTED: NORMAL
GFR SERPLBLD CREATININE-BSD FMLA CKD-EPI: 105 ML/MIN/1.73 M 2
GLOBULIN SER CALC-MCNC: 3.3 G/DL (ref 1.9–3.5)
GLUCOSE SERPL-MCNC: 105 MG/DL (ref 65–99)
HBA1C MFR BLD: 5.9 % (ref 4–5.6)
HCT VFR BLD AUTO: 50.7 % (ref 37–47)
HCV AB SER QL: NORMAL
HDLC SERPL-MCNC: 60 MG/DL
HGB BLD-MCNC: 17.1 G/DL (ref 12–16)
IMM GRANULOCYTES # BLD AUTO: 0.02 K/UL (ref 0–0.11)
IMM GRANULOCYTES NFR BLD AUTO: 0.4 % (ref 0–0.9)
LDLC SERPL CALC-MCNC: 86 MG/DL
LYMPHOCYTES # BLD AUTO: 2.05 K/UL (ref 1–4.8)
LYMPHOCYTES NFR BLD: 41.1 % (ref 22–41)
MCH RBC QN AUTO: 30 PG (ref 27–33)
MCHC RBC AUTO-ENTMCNC: 33.7 G/DL (ref 32.2–35.5)
MCV RBC AUTO: 88.9 FL (ref 81.4–97.8)
MICROALBUMIN UR-MCNC: <1.2 MG/DL
MICROALBUMIN/CREAT UR: NORMAL MG/G (ref 0–30)
MONOCYTES # BLD AUTO: 0.48 K/UL (ref 0–0.85)
MONOCYTES NFR BLD AUTO: 9.6 % (ref 0–13.4)
NEUTROPHILS # BLD AUTO: 1.96 K/UL (ref 1.82–7.42)
NEUTROPHILS NFR BLD: 39.3 % (ref 44–72)
NRBC # BLD AUTO: 0 K/UL
NRBC BLD-RTO: 0 /100 WBC (ref 0–0.2)
PLATELET # BLD AUTO: 251 K/UL (ref 164–446)
PMV BLD AUTO: 10.6 FL (ref 9–12.9)
POTASSIUM SERPL-SCNC: 4.6 MMOL/L (ref 3.6–5.5)
PROT SERPL-MCNC: 7.6 G/DL (ref 6–8.2)
RBC # BLD AUTO: 5.7 M/UL (ref 4.2–5.4)
SODIUM SERPL-SCNC: 145 MMOL/L (ref 135–145)
T4 FREE SERPL-MCNC: 2.38 NG/DL (ref 0.93–1.7)
TRIGL SERPL-MCNC: 80 MG/DL (ref 0–149)
TSH SERPL DL<=0.005 MIU/L-ACNC: <0.005 UIU/ML (ref 0.38–5.33)
VIT B12 SERPL-MCNC: 349 PG/ML (ref 211–911)
WBC # BLD AUTO: 5 K/UL (ref 4.8–10.8)

## 2023-06-03 PROCEDURE — 84443 ASSAY THYROID STIM HORMONE: CPT

## 2023-06-03 PROCEDURE — 86803 HEPATITIS C AB TEST: CPT

## 2023-06-03 PROCEDURE — 82570 ASSAY OF URINE CREATININE: CPT

## 2023-06-03 PROCEDURE — 36415 COLL VENOUS BLD VENIPUNCTURE: CPT

## 2023-06-03 PROCEDURE — 82306 VITAMIN D 25 HYDROXY: CPT

## 2023-06-03 PROCEDURE — 82607 VITAMIN B-12: CPT

## 2023-06-03 PROCEDURE — 80053 COMPREHEN METABOLIC PANEL: CPT

## 2023-06-03 PROCEDURE — 82043 UR ALBUMIN QUANTITATIVE: CPT

## 2023-06-03 PROCEDURE — 80061 LIPID PANEL: CPT

## 2023-06-03 PROCEDURE — 84439 ASSAY OF FREE THYROXINE: CPT

## 2023-06-03 PROCEDURE — 83036 HEMOGLOBIN GLYCOSYLATED A1C: CPT

## 2023-06-03 PROCEDURE — 85025 COMPLETE CBC W/AUTO DIFF WBC: CPT

## 2023-06-07 ENCOUNTER — PATIENT MESSAGE (OUTPATIENT)
Dept: BARIATRICS | Facility: CLINIC | Age: 53
End: 2023-06-07
Payer: OTHER GOVERNMENT

## 2023-06-12 SDOH — HEALTH STABILITY: PHYSICAL HEALTH: ON AVERAGE, HOW MANY DAYS PER WEEK DO YOU ENGAGE IN MODERATE TO STRENUOUS EXERCISE (LIKE A BRISK WALK)?: 4 DAYS

## 2023-06-12 SDOH — HEALTH STABILITY: MENTAL HEALTH
STRESS IS WHEN SOMEONE FEELS TENSE, NERVOUS, ANXIOUS, OR CAN'T SLEEP AT NIGHT BECAUSE THEIR MIND IS TROUBLED. HOW STRESSED ARE YOU?: TO SOME EXTENT

## 2023-06-12 SDOH — ECONOMIC STABILITY: HOUSING INSECURITY: IN THE LAST 12 MONTHS, HOW MANY PLACES HAVE YOU LIVED?: 2

## 2023-06-12 SDOH — ECONOMIC STABILITY: INCOME INSECURITY: IN THE LAST 12 MONTHS, WAS THERE A TIME WHEN YOU WERE NOT ABLE TO PAY THE MORTGAGE OR RENT ON TIME?: NO

## 2023-06-12 SDOH — ECONOMIC STABILITY: FOOD INSECURITY: WITHIN THE PAST 12 MONTHS, YOU WORRIED THAT YOUR FOOD WOULD RUN OUT BEFORE YOU GOT MONEY TO BUY MORE.: NEVER TRUE

## 2023-06-12 SDOH — ECONOMIC STABILITY: INCOME INSECURITY: HOW HARD IS IT FOR YOU TO PAY FOR THE VERY BASICS LIKE FOOD, HOUSING, MEDICAL CARE, AND HEATING?: NOT HARD AT ALL

## 2023-06-12 SDOH — ECONOMIC STABILITY: HOUSING INSECURITY
IN THE LAST 12 MONTHS, WAS THERE A TIME WHEN YOU DID NOT HAVE A STEADY PLACE TO SLEEP OR SLEPT IN A SHELTER (INCLUDING NOW)?: NO

## 2023-06-12 SDOH — ECONOMIC STABILITY: TRANSPORTATION INSECURITY
IN THE PAST 12 MONTHS, HAS LACK OF RELIABLE TRANSPORTATION KEPT YOU FROM MEDICAL APPOINTMENTS, MEETINGS, WORK OR FROM GETTING THINGS NEEDED FOR DAILY LIVING?: NO

## 2023-06-12 SDOH — ECONOMIC STABILITY: TRANSPORTATION INSECURITY
IN THE PAST 12 MONTHS, HAS LACK OF TRANSPORTATION KEPT YOU FROM MEETINGS, WORK, OR FROM GETTING THINGS NEEDED FOR DAILY LIVING?: NO

## 2023-06-12 SDOH — ECONOMIC STABILITY: FOOD INSECURITY: WITHIN THE PAST 12 MONTHS, THE FOOD YOU BOUGHT JUST DIDN'T LAST AND YOU DIDN'T HAVE MONEY TO GET MORE.: NEVER TRUE

## 2023-06-12 SDOH — ECONOMIC STABILITY: TRANSPORTATION INSECURITY
IN THE PAST 12 MONTHS, HAS THE LACK OF TRANSPORTATION KEPT YOU FROM MEDICAL APPOINTMENTS OR FROM GETTING MEDICATIONS?: NO

## 2023-06-12 SDOH — HEALTH STABILITY: PHYSICAL HEALTH: ON AVERAGE, HOW MANY MINUTES DO YOU ENGAGE IN EXERCISE AT THIS LEVEL?: 20 MIN

## 2023-06-12 ASSESSMENT — SOCIAL DETERMINANTS OF HEALTH (SDOH)
HOW HARD IS IT FOR YOU TO PAY FOR THE VERY BASICS LIKE FOOD, HOUSING, MEDICAL CARE, AND HEATING?: NOT HARD AT ALL
HOW OFTEN DO YOU GET TOGETHER WITH FRIENDS OR RELATIVES?: ONCE A WEEK
HOW OFTEN DO YOU ATTENT MEETINGS OF THE CLUB OR ORGANIZATION YOU BELONG TO?: PATIENT DECLINED
HOW OFTEN DO YOU ATTEND CHURCH OR RELIGIOUS SERVICES?: PATIENT DECLINED
IN A TYPICAL WEEK, HOW MANY TIMES DO YOU TALK ON THE PHONE WITH FAMILY, FRIENDS, OR NEIGHBORS?: THREE TIMES A WEEK
WITHIN THE PAST 12 MONTHS, YOU WORRIED THAT YOUR FOOD WOULD RUN OUT BEFORE YOU GOT THE MONEY TO BUY MORE: NEVER TRUE
DO YOU BELONG TO ANY CLUBS OR ORGANIZATIONS SUCH AS CHURCH GROUPS UNIONS, FRATERNAL OR ATHLETIC GROUPS, OR SCHOOL GROUPS?: NO
HOW OFTEN DO YOU HAVE SIX OR MORE DRINKS ON ONE OCCASION: NEVER
HOW OFTEN DO YOU ATTEND CHURCH OR RELIGIOUS SERVICES?: PATIENT DECLINED
HOW OFTEN DO YOU HAVE A DRINK CONTAINING ALCOHOL: NEVER
HOW OFTEN DO YOU ATTENT MEETINGS OF THE CLUB OR ORGANIZATION YOU BELONG TO?: PATIENT DECLINED
HOW OFTEN DO YOU GET TOGETHER WITH FRIENDS OR RELATIVES?: ONCE A WEEK
IN A TYPICAL WEEK, HOW MANY TIMES DO YOU TALK ON THE PHONE WITH FAMILY, FRIENDS, OR NEIGHBORS?: THREE TIMES A WEEK
HOW MANY DRINKS CONTAINING ALCOHOL DO YOU HAVE ON A TYPICAL DAY WHEN YOU ARE DRINKING: PATIENT DOES NOT DRINK
DO YOU BELONG TO ANY CLUBS OR ORGANIZATIONS SUCH AS CHURCH GROUPS UNIONS, FRATERNAL OR ATHLETIC GROUPS, OR SCHOOL GROUPS?: NO

## 2023-06-12 ASSESSMENT — LIFESTYLE VARIABLES
HOW OFTEN DO YOU HAVE SIX OR MORE DRINKS ON ONE OCCASION: NEVER
HOW OFTEN DO YOU HAVE A DRINK CONTAINING ALCOHOL: NEVER
AUDIT-C TOTAL SCORE: 0
SKIP TO QUESTIONS 9-10: 1
HOW MANY STANDARD DRINKS CONTAINING ALCOHOL DO YOU HAVE ON A TYPICAL DAY: PATIENT DOES NOT DRINK

## 2023-06-13 ENCOUNTER — PATIENT MESSAGE (OUTPATIENT)
Dept: BARIATRICS | Facility: CLINIC | Age: 53
End: 2023-06-13
Payer: OTHER GOVERNMENT

## 2023-06-15 ENCOUNTER — OFFICE VISIT (OUTPATIENT)
Dept: MEDICAL GROUP | Facility: PHYSICIAN GROUP | Age: 53
End: 2023-06-15
Payer: OTHER GOVERNMENT

## 2023-06-15 DIAGNOSIS — Z00.00 ENCOUNTER FOR WELL ADULT EXAM WITHOUT ABNORMAL FINDINGS: ICD-10-CM

## 2023-06-15 DIAGNOSIS — E06.3 HASHIMOTO'S THYROIDITIS: ICD-10-CM

## 2023-06-15 DIAGNOSIS — Z79.4 TYPE 2 DIABETES MELLITUS WITHOUT COMPLICATION, WITH LONG-TERM CURRENT USE OF INSULIN (HCC): ICD-10-CM

## 2023-06-15 DIAGNOSIS — E11.9 TYPE 2 DIABETES MELLITUS WITHOUT COMPLICATION, WITH LONG-TERM CURRENT USE OF INSULIN (HCC): ICD-10-CM

## 2023-06-15 PROCEDURE — 99396 PREV VISIT EST AGE 40-64: CPT | Performed by: NURSE PRACTITIONER

## 2023-06-15 PROCEDURE — 3074F SYST BP LT 130 MM HG: CPT | Performed by: NURSE PRACTITIONER

## 2023-06-15 PROCEDURE — 3078F DIAST BP <80 MM HG: CPT | Performed by: NURSE PRACTITIONER

## 2023-06-15 RX ORDER — LEVOTHYROXINE SODIUM 175 UG/1
175 TABLET ORAL
Qty: 90 TABLET | Refills: 0 | Status: SHIPPED | OUTPATIENT
Start: 2023-06-15 | End: 2023-09-06

## 2023-06-15 ASSESSMENT — FIBROSIS 4 INDEX: FIB4 SCORE: 1.93

## 2023-06-15 NOTE — LETTER
[facility]  KIRTI MontanoRALICIA.  [address]  [place] [address]  Fax: [phone]   Authorization for Release/Disclosure of   Protected Health Information   Name: JEAN LANDEROS : 1970 SSN: [id number]   Address: [facility] [place] [address] Phone:    [phone] (home)    I authorize the entity listed below to release/disclose the PHI below to:   [facility]/COREY Montano and JANY Montano.   Provider or Entity Name:  [facility]   City, State, [facility]   Phone:      Fax:     Reason for request: continuity of care   Information to be released:    [X] LAST COLONOSCOPY,  including any PATH REPORT and follow-up  [  ] LAST FIT/COLOGUARD RESULT [  ] LAST DEXA  [  ] LAST MAMMOGRAM  [  ] LAST PAP  [  ] LAST LABS [  ] RETINA EXAM REPORT  [  ] IMMUNIZATION RECORDS  [  ] Release all info      [  ] Check here and initial the line next to each item to release ALL health information INCLUDING  _____ Care and treatment for drug and / or alcohol abuse  _____ HIV testing, infection status, or AIDS  _____ Genetic Testing    DATES OF SERVICE OR TIME PERIOD TO BE DISCLOSED: _____________  I understand and acknowledge that:  * This Authorization may be revoked at any time by you in writing, except if your health information has already been used or disclosed.  * Your health information that will be used or disclosed as a result of you signing this authorization could be re-disclosed by the recipient. If this occurs, your re-disclosed health information may no longer be protected by State or Federal laws.  * You may refuse to sign this Authorization. Your refusal will not affect your ability to obtain treatment.  * This Authorization becomes effective upon signing and will  on (date) __________.      If no date is indicated, this Authorization will  one (1) year from the signature date.    Name: Jean Landeros  Signature: Date:    6/15/2023     PLEASE FAX REQUESTED RECORDS BACK TO: [phone]

## 2023-06-15 NOTE — ASSESSMENT & PLAN NOTE
Chronic, ongoing. A1c 8.5% in November 2022, recent 5.9%. Continues glimepiride 4 mg/day and rybelsus 7 mg/day.

## 2023-06-15 NOTE — ASSESSMENT & PLAN NOTE
Chronic, uncontrolled. Currently taking levothyroxine 225 mcg/day, has been stable on this dose for years. Recently had gastric surgery with starting weight of 307 and current weight 223 lbs.

## 2023-06-15 NOTE — LETTER
[facility]  KIRTI MontanoRALICIA.  [address]  [place] [address]  Fax: [phone]   Authorization for Release/Disclosure of   Protected Health Information   Name: JEAN ASHBY : 1970 SSN: [id number]   Address: [facility] [place] [facility] [address] Phone:    [phone] (home)    I authorize the entity listed below to release/disclose the PHI below to:   [facility]/KIRTI MontanoRESPERANZA and JANY Montano.   Provider or Entity Name:  [facility]   Address   City, State, [facility]  Phone:      Fax:     Reason for request: continuity of care   Information to be released:    [  ] LAST COLONOSCOPY,  including any PATH REPORT and follow-up  [  ] LAST FIT/COLOGUARD RESULT [  ] LAST DEXA  [  ] LAST MAMMOGRAM  [  ] LAST PAP  [  ] LAST LABS [X] RETINA EXAM REPORT  [  ] IMMUNIZATION RECORDS  [  ] Release all info      [  ] Check here and initial the line next to each item to release ALL health information INCLUDING  _____ Care and treatment for drug and / or alcohol abuse  _____ HIV testing, infection status, or AIDS  _____ Genetic Testing    DATES OF SERVICE OR TIME PERIOD TO BE DISCLOSED: _____________  I understand and acknowledge that:  * This Authorization may be revoked at any time by you in writing, except if your health information has already been used or disclosed.  * Your health information that will be used or disclosed as a result of you signing this authorization could be re-disclosed by the recipient. If this occurs, your re-disclosed health information may no longer be protected by State or Federal laws.  * You may refuse to sign this Authorization. Your refusal will not affect your ability to obtain treatment.  * This Authorization becomes effective upon signing and will  on (date) __________.      If no date is indicated, this Authorization will  one (1) year from the signature date.    Name: Jean Ashby  Signature: Date:    6/15/2023     PLEASE FAX REQUESTED RECORDS BACK TO: [phone]

## 2023-06-15 NOTE — PROGRESS NOTES
Subjective:   CC:   Chief Complaint   Patient presents with    Annual Exam     HPI:   Jean Landeros is a 52 y.o. female who presents for annual exam:    Type 2 diabetes mellitus without complication, with long-term current use of insulin (HCC)  Chronic, ongoing. A1c 8.5% in 2022, recent 5.9%. Continues glimepiride 4 mg/day and rybelsus 7 mg/day.    Hashimoto's thyroiditis  Chronic, uncontrolled. Currently taking levothyroxine 225 mcg/day, has been stable on this dose for years. Recently had gastric surgery with starting weight of 307 and current weight 223 lbs.     Anticipatory Guidance:  Cholesterol screenin/3/23   LDL controlled: 86  Diabetes screenin/3/23   A1c controlled: 5.9%   UALB/CR: 6/3/23, WNL  Retinal exam: declines  Diet: Recommend more lean meats, fruits, vegetables, whole grains. Eats a lot of protein post bariatric surgery.  Exercise: Encourage regular exercise. Walking and hiking.   Substance abuse: No  Safe in relationship: Yes  Seatbelts, bike/motorcycle helmet: Yes  Sun protection: Will start  Dentist: Up to date  Eye doctor: Up to date    Cancer Screening:  Colorectal cancer screening: Within last 10 years - [facility] [place], [place]  Cervical cancer screening: Hysterectomy   Breast cancer screenin2022, due 2024    Infectious Disease Screening/Immunizations:  STI screening: Declines  Chlamydia/Gonorrhea screening: Declines  Hep C screenin/3/2023  HIV screen: Negative 2020  Practices safe sex: Yes    Immunizations:   Influenza: NA   Tetanus: 10/1/2018   Pneumonia: 2/15/2023   Received HPV series: Aged out    Preventative Care Screening:   Osteoporosis Screening: NA, due at 64  Tobacco Screening: Never smoker  AAA Screening: NA    Patient's last menstrual period was 2017 (within months).   She has not utilized hormone replacement therapy.  Reports hot flashes  No significant bloating/fluid retention, pelvic pain, or dyspareunia. No abnormal vaginal  discharge.   No breast tenderness, mass, nipple discharge or changes in size or contour.    OB History    Para Term  AB Living   3 2 2 0 1 2   SAB IAB Ectopic Molar Multiple Live Births   0 0 0 0 0 2     She  reports being sexually active and has had partner(s) who are male. She reports using the following method of birth control/protection: Surgical.  She  has a past medical history of Allergy, Anxiety, BRCA1 negative, Cellulitis, face (2017), COVID-19 (2020), Depression, Diverticulitis (2019), Pneumonia (), Sepsis ([facility]) (2018), and Thyroid disease.  She  has a past surgical history that includes gastric banding laparoscopic (2022); primary c section (1995); lumbar disc replacement (); cholecystectomy (); lipoma excision (); lumpectomy (2015); tonsillectomy (); abdominal hysterectomy total (); cervical fusion posterior; elbow arthrotomy (Left, 2020); hernia repair; and brca1&2 gen full seq dup/del.    Family History   Problem Relation Age of Onset    No Known Problems Mother     Obesity Sister     Thyroid Sister     No Known Problems Sister     No Known Problems Brother     Obesity Paternal Aunt     Diabetes Paternal Aunt     No Known Problems Maternal Grandmother     No Known Problems Maternal Grandfather     No Known Problems Paternal Grandmother     No Known Problems Paternal Grandfather     No Known Problems Daughter     No Known Problems Daughter      Social History     Tobacco Use    Smoking status: Never    Smokeless tobacco: Never   Vaping Use    Vaping Use: Never used   Substance Use Topics    Alcohol use: Not Currently    Drug use: Yes     Types: Marijuana     Comment: sometimes     Patient Active Problem List    Diagnosis Date Noted    Chronic use of benzodiazepine for therapeutic purpose 05/15/2023    Class 1 obesity due to excess calories with serious comorbidity and body mass index (BMI) of 33.0 to 33.9 in adult  05/15/2023    Primary insomnia 05/15/2023    Mild episode of recurrent major depressive disorder (HCC) 05/15/2023    Hashimoto's thyroiditis 05/15/2023    Type 2 diabetes mellitus without complication, with long-term current use of insulin (HCC) 05/15/2023    Dyslipidemia 05/15/2023    Mild intermittent asthma without complication 05/15/2023    Vitamin D deficiency 05/15/2023     Current Outpatient Medications   Medication Sig Dispense Refill    levothyroxine (SYNTHROID) 175 MCG Tab Take 1 Tablet by mouth every morning on an empty stomach. 90 Tablet 0    albuterol 108 (90 Base) MCG/ACT Aero Soln inhalation aerosol       ALPRAZolam (XANAX) 1 MG Tab       amitriptyline (ELAVIL) 10 MG Tab       glimepiride (AMARYL) 4 MG Tab       pravastatin (PRAVACHOL) 20 MG Tab       RYBELSUS 7 MG Tab       Vortioxetine HBr (TRINTELLIX) 10 MG Tab Take  by mouth.      NON SPECIFIED Flinstone's Vitamin with Iron      BIOTIN PO Take  by mouth.      Calcium Carb-Cholecalciferol (CALCIUM 600 + D PO) Take  by mouth.      SUPER B COMPLEX/C PO Take  by mouth.      ASHWAGANDHA PO Take  by mouth.      GLUCOSAMINE-CHONDROITIN PO Take  by mouth.      fluticasone-salmeterol (ADVAIR) 250-50 MCG/ACT AEROSOL POWDER, BREATH ACTIVATED Inhale 1 Puff every 12 hours.      fluticasone-salmeterol (ADVAIR) 250-50 MCG/ACT AEROSOL POWDER, BREATH ACTIVATED Inhale 1 Puff every 12 hours.      ALBUTEROL INH Inhale.       No current facility-administered medications for this visit.     Allergies   Allergen Reactions    Bactrim Ds Hives     Review of Systems   Constitutional: Negative for fever, chills and malaise/fatigue.   HENT: Negative for congestion.    Eyes: Negative for pain.   Respiratory: Negative for cough and shortness of breath.    Cardiovascular: Negative for chest pain and leg swelling.   Gastrointestinal: Negative for nausea, vomiting, abdominal pain and diarrhea.   Genitourinary: Negative for dysuria and hematuria.   Skin: Negative for rash.  "  Neurological: Negative for dizziness, focal weakness and headaches.   Endo/Heme/Allergies: Does not bruise/bleed easily.   Psychiatric/Behavioral: Negative for depression.  The patient is not nervous/anxious.      Objective:   /76 (BP Location: Left arm, Patient Position: Sitting, BP Cuff Size: Large adult)   Pulse 66   Temp 36.2 °C (97.1 °F) (Temporal)   Ht 1.727 m (5' 8\")   Wt 101 kg (223 lb)   LMP 01/01/2017 (Within Months)   SpO2 96%   Breastfeeding No   BMI 33.91 kg/m²     Wt Readings from Last 4 Encounters:   06/15/23 101 kg (223 lb)   05/15/23 103 kg (228 lb)     Physical Exam:  Constitutional: Well-developed and well-nourished. Not diaphoretic. No distress.   Skin: Skin is warm and dry. No rash noted.  Head: Atraumatic without lesions.  Eyes: Conjunctivae and extraocular motions are normal. Pupils are equal, round, and reactive to light. No scleral icterus.   Ears:  External ears unremarkable. Tympanic membranes clear and intact.  Nose: Nares patent. Septum midline. Turbinates without erythema nor edema. No discharge.   Mouth/Throat: Tongue normal. Oropharynx is clear and moist. Posterior pharynx without erythema or exudates.  Neck: Supple, trachea midline. Normal range of motion. No thyromegaly present. No lymphadenopathy--cervical or supraclavicular.  Cardiovascular: Regular rate and rhythm, S1 and S2 without murmur, rubs, or gallops.    Respiratory: Effort normal. Clear to auscultation throughout. No adventitious sounds.   Breast:  Breast exam deferred. Discussed monthly self exams and what to look for, including peau d'orange or nipple retraction, discharge, breasts moving freely and equally without restriction, axillary/supraclavicular adenopathy, or palpable masses/nodules.  Abdomen: Soft, non tender, and without distention. Active bowel sounds in all four quadrants. No rebound, guarding.  Extremities: No cyanosis, clubbing, erythema, nor edema. Radial pulses intact and symmetric. "   Musculoskeletal: All major joints AROM full in all directions without pain.  Neurological: Alert and oriented x 3. Grossly non-focal. Strength and sensation grossly intact.   Psychiatric:  Behavior, mood, and affect are appropriate.    Monofilament testing with a 10 gram force: sensation intact: intact bilaterally  Visual Inspection: Feet without maceration, ulcers, fissures.  Pedal pulses: intact bilaterally    Assessment and Plan:   1. Encounter for well adult exam without abnormal findings    2. Type 2 diabetes mellitus without complication, with long-term current use of insulin (HCC)  Chronic, improving. Continue glimepiride 4 mg/day and rybelsus 7 mg/day. Monofilament completed today.  - Diabetic Monofilament LE Exam    3. Hashimoto's thyroiditis  Chronic, uncontrolled. Plan to decrease levothyroxine from 225 mcg/day to 175 mcg/day and repeat labs in 2 months, follow up in clinic to review.  - levothyroxine (SYNTHROID) 175 MCG Tab; Take 1 Tablet by mouth every morning on an empty stomach.  Dispense: 90 Tablet; Refill: 0  - TSH WITH REFLEX TO FT4; Future     Health maintenance: Up to date   Labs per orders  Immunizations: up to date  Patient counseled about skin care, diet, supplements, and exercise.  Discussed  breast self exam, mammography screening, HIV risk factors and prevention, use and side effects of HRT, menopause, osteoporosis, adequate intake of calcium and vitamin D, diet and exercise, colorectal cancer screening     Follow-up: Return in about 2 months (around 8/15/2023) for Thyroid, Follow up Labs, Follow up Medications.     I have placed the below orders and discussed them with an approved delegating provider. The MA is performing the below orders under the direction of Dr. Riley.      Please note that this dictation was created using voice recognition software. I have worked with consultants from the vendor as well as technical experts from [employer] to optimize the interface. I have made  every reasonable attempt to correct obvious errors, but I expect that there are errors of grammar and possibly content that I did not discover before finalizing the note.

## 2023-06-18 VITALS
TEMPERATURE: 97.1 F | OXYGEN SATURATION: 96 % | HEART RATE: 66 BPM | SYSTOLIC BLOOD PRESSURE: 122 MMHG | WEIGHT: 223 LBS | HEIGHT: 68 IN | BODY MASS INDEX: 33.8 KG/M2 | DIASTOLIC BLOOD PRESSURE: 76 MMHG

## 2023-07-24 RX ORDER — ALBUTEROL SULFATE 90 UG/1
AEROSOL, METERED RESPIRATORY (INHALATION)
Qty: 8.5 G | Refills: 10 | Status: SHIPPED | OUTPATIENT
Start: 2023-07-24

## 2023-08-02 ENCOUNTER — PATIENT MESSAGE (OUTPATIENT)
Dept: BARIATRICS | Facility: CLINIC | Age: 53
End: 2023-08-02
Payer: OTHER GOVERNMENT

## 2023-08-16 DIAGNOSIS — F32.A ANXIETY AND DEPRESSION: ICD-10-CM

## 2023-08-16 DIAGNOSIS — F41.9 ANXIETY AND DEPRESSION: ICD-10-CM

## 2023-08-17 RX ORDER — ALPRAZOLAM 1 MG/1
TABLET ORAL
Qty: 90 TABLET | Refills: 1 | Status: SHIPPED | OUTPATIENT
Start: 2023-08-17

## 2023-08-17 NOTE — TELEPHONE ENCOUNTER
Care Due:                  Date            Visit Type   Department     Provider  --------------------------------------------------------------------------------                                MYCHART                              FOLLOWUP/OF  Last Visit: 02-      FICE VISIT   None Found     Regina Nava  Next Visit: None Scheduled  None         None Found                                                            Last  Test          Frequency    Reason                     Performed    Due Date  --------------------------------------------------------------------------------    HBA1C.......  6 months...  jacqueline DALE....  01- 07-    [facility] Embedded Care Due Messages. Reference number: [id number].   8/16/2023 9:40:19 PM CDT

## 2023-08-30 NOTE — HPI
History of Present Illness:  Bandar Douglass is a 47 y.o. female who  has a past medical history of Diabetes mellitus; Hypertension; and Thyroid disease. She presented to [facility] on 9/30/2017 with a primary complaint of Mouth Lesions     She presented to the ED on 9/30/17 for lip infection for two days. She woke up Thursday morning with swelling in her upper lip with tenderness and presented to the ER where she was diagnosed with facial cellulitis and was discharged on Clindamycin. Patient states she went home and took clinda as prescribed for a total of 7 doses without any improvement in symptoms. Patient states she felt like the infection was spreading inferiorly to involve her cheek so she returned to ED today. She was started on IV vanc and zosyn. She states that the swelling has improved to the right cheek region but still notes redness and swelling above her right upper lip.  She denies febrile symptoms, chills. She denies trauma, vision changes, or recent dental work. She denies other complaints at this time.    [id number]

## 2023-09-06 ENCOUNTER — PATIENT MESSAGE (OUTPATIENT)
Dept: BARIATRICS | Facility: CLINIC | Age: 53
End: 2023-09-06
Payer: OTHER GOVERNMENT

## 2023-09-06 DIAGNOSIS — E06.3 HASHIMOTO'S THYROIDITIS: ICD-10-CM

## 2023-09-06 RX ORDER — LEVOTHYROXINE SODIUM 175 UG/1
175 TABLET ORAL
Qty: 90 TABLET | Refills: 0 | Status: SHIPPED | OUTPATIENT
Start: 2023-09-06 | End: 2023-09-24

## 2023-09-06 NOTE — TELEPHONE ENCOUNTER
Requested Prescriptions     Signed Prescriptions Disp Refills    levothyroxine (SYNTHROID) 175 MCG Tab 90 Tablet 0     Sig: TAKE 1 TABLET EVERY MORNING ON AN EMPTY STOMACH     Authorizing Provider: HEATH PHIPPS A.P.R.N.

## 2023-09-12 ENCOUNTER — PATIENT MESSAGE (OUTPATIENT)
Dept: BARIATRICS | Facility: CLINIC | Age: 53
End: 2023-09-12
Payer: OTHER GOVERNMENT

## 2023-09-12 DIAGNOSIS — E03.9 ACQUIRED HYPOTHYROIDISM: ICD-10-CM

## 2023-09-12 DIAGNOSIS — Z00.00 ANNUAL PHYSICAL EXAM: Primary | ICD-10-CM

## 2023-09-12 DIAGNOSIS — E11.65 TYPE 2 DIABETES MELLITUS WITH HYPERGLYCEMIA, WITHOUT LONG-TERM CURRENT USE OF INSULIN: ICD-10-CM

## 2023-09-12 NOTE — PROGRESS NOTES
Needs labs then appt with me, last seen 2/2023, canceled 8/2023 appt.    Eye camera ordered to do at that visit.

## 2023-09-18 NOTE — TELEPHONE ENCOUNTER
Notified patient of the date & time of financial phone call appt.  Pt aware appt is a telephone call.  Dashboard updated  
No

## 2023-09-20 DIAGNOSIS — G47.09 OTHER INSOMNIA: ICD-10-CM

## 2023-09-20 RX ORDER — AMITRIPTYLINE HYDROCHLORIDE 10 MG/1
TABLET, FILM COATED ORAL
Qty: 90 TABLET | Refills: 1 | Status: SHIPPED | OUTPATIENT
Start: 2023-09-20

## 2023-09-20 NOTE — TELEPHONE ENCOUNTER
Refill Decision Note   Bandar Douglass  is requesting a refill authorization.  Brief Assessment and Rationale for Refill:  Approve     Medication Therapy Plan:         Comments:     Note composed:8:37 AM 09/20/2023

## 2023-09-22 ENCOUNTER — PATIENT MESSAGE (OUTPATIENT)
Dept: BARIATRICS | Facility: CLINIC | Age: 53
End: 2023-09-22
Payer: OTHER GOVERNMENT

## 2023-09-22 ENCOUNTER — HOSPITAL ENCOUNTER (OUTPATIENT)
Dept: LAB | Facility: MEDICAL CENTER | Age: 53
End: 2023-09-22
Attending: NURSE PRACTITIONER
Payer: OTHER GOVERNMENT

## 2023-09-22 DIAGNOSIS — E06.3 HASHIMOTO'S THYROIDITIS: ICD-10-CM

## 2023-09-22 LAB
T4 FREE SERPL-MCNC: 1.87 NG/DL (ref 0.93–1.7)
TSH SERPL DL<=0.005 MIU/L-ACNC: <0.005 UIU/ML (ref 0.38–5.33)

## 2023-09-22 PROCEDURE — 84443 ASSAY THYROID STIM HORMONE: CPT

## 2023-09-22 PROCEDURE — 36415 COLL VENOUS BLD VENIPUNCTURE: CPT

## 2023-09-22 PROCEDURE — 84439 ASSAY OF FREE THYROXINE: CPT

## 2023-09-24 DIAGNOSIS — E06.3 HASHIMOTO'S THYROIDITIS: ICD-10-CM

## 2023-09-24 RX ORDER — LEVOTHYROXINE SODIUM 0.12 MG/1
125 TABLET ORAL
Qty: 90 TABLET | Refills: 0 | Status: SHIPPED | OUTPATIENT
Start: 2023-09-24 | End: 2023-11-20 | Stop reason: SDUPTHER

## 2023-09-24 NOTE — PROGRESS NOTES
1. Hashimoto's thyroiditis  Plan to decrease levothyroxine from 175 mcg/day to 125 mcg/day and repeat labs prior to appointment on 11/20/2023.  - levothyroxine (SYNTHROID) 125 MCG Tab; Take 1 Tablet by mouth every morning on an empty stomach.  Dispense: 90 Tablet; Refill: 0  - T3 FREE; Future  - FREE THYROXINE; Future  - TSH; Future   
Yes...

## 2023-10-04 ENCOUNTER — PATIENT MESSAGE (OUTPATIENT)
Dept: BARIATRICS | Facility: CLINIC | Age: 53
End: 2023-10-04
Payer: OTHER GOVERNMENT

## 2023-10-10 ENCOUNTER — PATIENT MESSAGE (OUTPATIENT)
Dept: BARIATRICS | Facility: CLINIC | Age: 53
End: 2023-10-10
Payer: OTHER GOVERNMENT

## 2023-10-17 ENCOUNTER — PATIENT MESSAGE (OUTPATIENT)
Dept: ADMINISTRATIVE | Facility: HOSPITAL | Age: 53
End: 2023-10-17
Payer: OTHER GOVERNMENT

## 2023-10-31 DIAGNOSIS — D58.2 ELEVATED HEMOGLOBIN (HCC): ICD-10-CM

## 2023-10-31 DIAGNOSIS — R71.8 ELEVATED HEMATOCRIT: ICD-10-CM

## 2023-10-31 NOTE — PROGRESS NOTES
1. Elevated hematocrit  - CBC WITH DIFFERENTIAL; Future    2. Elevated hemoglobin (HCC)  - CBC WITH DIFFERENTIAL; Future

## 2023-11-13 ENCOUNTER — HOSPITAL ENCOUNTER (OUTPATIENT)
Dept: LAB | Facility: MEDICAL CENTER | Age: 53
End: 2023-11-13
Attending: NURSE PRACTITIONER
Payer: OTHER GOVERNMENT

## 2023-11-13 DIAGNOSIS — D58.2 ELEVATED HEMOGLOBIN (HCC): ICD-10-CM

## 2023-11-13 DIAGNOSIS — E06.3 HASHIMOTO'S THYROIDITIS: ICD-10-CM

## 2023-11-13 DIAGNOSIS — R71.8 ELEVATED HEMATOCRIT: ICD-10-CM

## 2023-11-13 LAB
BASOPHILS # BLD AUTO: 0.8 % (ref 0–1.8)
BASOPHILS # BLD: 0.06 K/UL (ref 0–0.12)
EOSINOPHIL # BLD AUTO: 0.83 K/UL (ref 0–0.51)
EOSINOPHIL NFR BLD: 11.3 % (ref 0–6.9)
ERYTHROCYTE [DISTWIDTH] IN BLOOD BY AUTOMATED COUNT: 38.9 FL (ref 35.9–50)
HCT VFR BLD AUTO: 48.7 % (ref 37–47)
HGB BLD-MCNC: 16.6 G/DL (ref 12–16)
IMM GRANULOCYTES # BLD AUTO: 0.02 K/UL (ref 0–0.11)
IMM GRANULOCYTES NFR BLD AUTO: 0.3 % (ref 0–0.9)
LYMPHOCYTES # BLD AUTO: 2.68 K/UL (ref 1–4.8)
LYMPHOCYTES NFR BLD: 36.4 % (ref 22–41)
MCH RBC QN AUTO: 30.9 PG (ref 27–33)
MCHC RBC AUTO-ENTMCNC: 34.1 G/DL (ref 32.2–35.5)
MCV RBC AUTO: 90.5 FL (ref 81.4–97.8)
MONOCYTES # BLD AUTO: 0.44 K/UL (ref 0–0.85)
MONOCYTES NFR BLD AUTO: 6 % (ref 0–13.4)
NEUTROPHILS # BLD AUTO: 3.33 K/UL (ref 1.82–7.42)
NEUTROPHILS NFR BLD: 45.2 % (ref 44–72)
NRBC # BLD AUTO: 0 K/UL
NRBC BLD-RTO: 0 /100 WBC (ref 0–0.2)
PLATELET # BLD AUTO: 267 K/UL (ref 164–446)
PMV BLD AUTO: 9.8 FL (ref 9–12.9)
RBC # BLD AUTO: 5.38 M/UL (ref 4.2–5.4)
WBC # BLD AUTO: 7.4 K/UL (ref 4.8–10.8)

## 2023-11-13 PROCEDURE — 84481 FREE ASSAY (FT-3): CPT

## 2023-11-13 PROCEDURE — 84439 ASSAY OF FREE THYROXINE: CPT

## 2023-11-13 PROCEDURE — 85025 COMPLETE CBC W/AUTO DIFF WBC: CPT

## 2023-11-13 PROCEDURE — 84443 ASSAY THYROID STIM HORMONE: CPT

## 2023-11-13 PROCEDURE — 36415 COLL VENOUS BLD VENIPUNCTURE: CPT

## 2023-11-14 ENCOUNTER — PATIENT MESSAGE (OUTPATIENT)
Dept: BARIATRICS | Facility: CLINIC | Age: 53
End: 2023-11-14
Payer: OTHER GOVERNMENT

## 2023-11-14 LAB
T3FREE SERPL-MCNC: 2.56 PG/ML (ref 2–4.4)
T4 FREE SERPL-MCNC: 1.56 NG/DL (ref 0.93–1.7)
TSH SERPL DL<=0.005 MIU/L-ACNC: 0.03 UIU/ML (ref 0.38–5.33)

## 2023-11-20 ENCOUNTER — HOSPITAL ENCOUNTER (OUTPATIENT)
Dept: LAB | Facility: MEDICAL CENTER | Age: 53
End: 2023-11-20
Attending: NURSE PRACTITIONER
Payer: OTHER GOVERNMENT

## 2023-11-20 ENCOUNTER — OFFICE VISIT (OUTPATIENT)
Dept: MEDICAL GROUP | Facility: PHYSICIAN GROUP | Age: 53
End: 2023-11-20
Payer: OTHER GOVERNMENT

## 2023-11-20 VITALS
SYSTOLIC BLOOD PRESSURE: 138 MMHG | OXYGEN SATURATION: 94 % | RESPIRATION RATE: 16 BRPM | DIASTOLIC BLOOD PRESSURE: 76 MMHG | HEART RATE: 79 BPM | BODY MASS INDEX: 34.86 KG/M2 | TEMPERATURE: 98.2 F | WEIGHT: 230 LBS | HEIGHT: 68 IN

## 2023-11-20 DIAGNOSIS — D58.2 ELEVATED HEMOGLOBIN (HCC): ICD-10-CM

## 2023-11-20 DIAGNOSIS — L98.9 SKIN LESION: ICD-10-CM

## 2023-11-20 DIAGNOSIS — Z11.4 SCREENING FOR HIV WITHOUT PRESENCE OF RISK FACTORS: ICD-10-CM

## 2023-11-20 DIAGNOSIS — R71.8 ELEVATED HEMATOCRIT: ICD-10-CM

## 2023-11-20 DIAGNOSIS — E06.3 HASHIMOTO'S THYROIDITIS: ICD-10-CM

## 2023-11-20 PROCEDURE — 81256 HFE GENE: CPT

## 2023-11-20 PROCEDURE — 36415 COLL VENOUS BLD VENIPUNCTURE: CPT

## 2023-11-20 PROCEDURE — 3075F SYST BP GE 130 - 139MM HG: CPT | Performed by: NURSE PRACTITIONER

## 2023-11-20 PROCEDURE — 3078F DIAST BP <80 MM HG: CPT | Performed by: NURSE PRACTITIONER

## 2023-11-20 PROCEDURE — 99214 OFFICE O/P EST MOD 30 MIN: CPT | Performed by: NURSE PRACTITIONER

## 2023-11-20 RX ORDER — LEVOTHYROXINE SODIUM 0.1 MG/1
100 TABLET ORAL
Qty: 90 TABLET | Refills: 0 | Status: SHIPPED | OUTPATIENT
Start: 2023-11-20 | End: 2023-12-28 | Stop reason: SDUPTHER

## 2023-11-20 ASSESSMENT — FIBROSIS 4 INDEX
FIB4 SCORE: 1.85
FIB4 SCORE: 1.85

## 2023-11-21 NOTE — ASSESSMENT & PLAN NOTE
Chronic, ongoing. Recent lab work shows continued elevated H/H. Patient reports that she does not smoke or snore, does not think that she has sleep apnea and does not want to be tested for it. She had a total abdominal hysterectomy in 2017, 1 ovary remains.    06/03/23 08:24 11/13/23 15:46   RBC 5.70 (H) 5.38   Hemoglobin 17.1 (H) 16.6 (H)   Hematocrit 50.7 (H) 48.7 (H)

## 2023-11-21 NOTE — ASSESSMENT & PLAN NOTE
Chronic, improving but still uncontrolled. The patient continues levothyroxine, was previously at 175 mcg/day, decreased to 125 mcg/day with improvement. Reports that she is feeling better with a lower dose and is agreeable to continue decreasing the dose until her lab values are in normal range.    06/03/23 08:24 09/22/23 15:40 11/13/23 15:46   TSH <0.005 (L) <0.005 (L) 0.030 (L)   Free T-4 2.38 (H) 1.87 (H) 1.56   T3,Free   2.56

## 2023-11-21 NOTE — PROGRESS NOTES
CC: Diagnoses of Elevated hemoglobin (HCC), Elevated hematocrit, Hashimoto's thyroiditis, Skin lesion, and Screening for HIV without presence of risk factors were pertinent to this visit.                                                                                                                                       HPI:    presents today with the following concerns:    Elevated hematocrit  Elevated hemoglobin (HCC)  Chronic, ongoing. Recent lab work shows continued elevated H/H. Patient reports that she does not smoke or snore, does not think that she has sleep apnea and does not want to be tested for it. She had a total abdominal hysterectomy in 2017, 1 ovary remains.    06/03/23 08:24 11/13/23 15:46   RBC 5.70 (H) 5.38   Hemoglobin 17.1 (H) 16.6 (H)   Hematocrit 50.7 (H) 48.7 (H)     Hashimoto's thyroiditis  Chronic, improving but still uncontrolled. The patient continues levothyroxine, was previously at 175 mcg/day, decreased to 125 mcg/day with improvement. Reports that she is feeling better with a lower dose and is agreeable to continue decreasing the dose until her lab values are in normal range.    06/03/23 08:24 09/22/23 15:40 11/13/23 15:46   TSH <0.005 (L) <0.005 (L) 0.030 (L)   Free T-4 2.38 (H) 1.87 (H) 1.56   T3,Free   2.56     Patient Active Problem List    Diagnosis Date Noted    Elevated hemoglobin (HCC) 11/20/2023    Elevated hematocrit 11/20/2023    Chronic use of benzodiazepine for therapeutic purpose 05/15/2023    Class 1 obesity due to excess calories with serious comorbidity and body mass index (BMI) of 33.0 to 33.9 in adult 05/15/2023    Primary insomnia 05/15/2023    Mild episode of recurrent major depressive disorder (HCC) 05/15/2023    Hashimoto's thyroiditis 05/15/2023    Type 2 diabetes mellitus without complication, with long-term current use of insulin (HCC) 05/15/2023    Dyslipidemia 05/15/2023    Mild intermittent asthma without complication 05/15/2023    Vitamin D deficiency  "05/15/2023     Current Outpatient Medications   Medication Sig Dispense Refill    levothyroxine (SYNTHROID) 100 MCG Tab Take 1 Tablet by mouth every morning on an empty stomach. 90 Tablet 0    albuterol 108 (90 Base) MCG/ACT Aero Soln inhalation aerosol       ALPRAZolam (XANAX) 1 MG Tab       amitriptyline (ELAVIL) 10 MG Tab       glimepiride (AMARYL) 4 MG Tab       pravastatin (PRAVACHOL) 20 MG Tab       RYBELSUS 7 MG Tab       Vortioxetine HBr (TRINTELLIX) 10 MG Tab Take  by mouth.      NON SPECIFIED Flinstone's Vitamin with Iron      BIOTIN PO Take  by mouth.      Calcium Carb-Cholecalciferol (CALCIUM 600 + D PO) Take  by mouth.      SUPER B COMPLEX/C PO Take  by mouth.      ASHWAGANDHA PO Take  by mouth.      GLUCOSAMINE-CHONDROITIN PO Take  by mouth.      fluticasone-salmeterol (ADVAIR) 250-50 MCG/ACT AEROSOL POWDER, BREATH ACTIVATED Inhale 1 Puff every 12 hours.      fluticasone-salmeterol (ADVAIR) 250-50 MCG/ACT AEROSOL POWDER, BREATH ACTIVATED Inhale 1 Puff every 12 hours.      ALBUTEROL INH Inhale.       No current facility-administered medications for this visit.     Allergies as of 11/20/2023 - Reviewed 11/20/2023   Allergen Reaction Noted    Bactrim ds Hives 05/15/2023      ROS:  Constitutional: No fevers, chills, malaise/fatigue.  Eyes: No eye pain.  ENT: No sore throat, congestion.   Resp: No cough, shortness of breath.  CV: No chest pain, leg swelling, palpitations.  GI: No nausea/vomiting, abdominal pain, constipation, diarrhea.  : No dysuria, hematuria.  MSK: No weakness.  Skin: No rashes.  Neuro: No dizziness, weakness, headaches.  Psych: No suicidal ideations.    All remaining systems reviewed and found to be negative, except as stated above.      /76 (BP Location: Left arm, Patient Position: Sitting, BP Cuff Size: Large adult)   Pulse 79   Temp 36.8 °C (98.2 °F) (Temporal)   Resp 16   Ht 1.727 m (5' 8\")   Wt 104 kg (230 lb)   LMP 01/01/2017 (Within Months)   SpO2 94%   BMI 34.97 " kg/m²     Physical Exam:  General: Well nourished, well developed female in NAD, awake and conversant.  Eyes: Normal conjunctiva, anicteric.  Round symmetrical pupils.  ENT: Hearing grossly intact.  No nasal discharge.  Neck: Neck is supple.  No masses or thyromegaly.  CV: No lower extremity edema.  Respiratory: Respirations are nonlabored.  No wheezing.  Abdomen: Non-Distended.  Skin: Warm.  No rashes or ulcers.  MSK: Normal ambulation.  No clubbing or cyanosis.  Neuro: Sensation and CN II-XII grossly normal.  Psych: Alert and oriented.  Cooperative, appropriate mood and affect, normal judgment.      Assessment and Plan.   53 y.o. female with the following issues:  1. Elevated hemoglobin (HCC)  2. Elevated hematocrit  Chronic, ongoing.  Patient declines overnight sleep study.  Plan for her to complete hereditary hemochromatosis lab, we will notify her of results through [facility] when received.  - HEREDITARY HEMOCHROMOTOSIS; Future    3. Hashimoto's thyroiditis  Chronic, uncontrolled but improving.  Discontinue levothyroxine 125 mcg daily and start levothyroxine 100 mcg daily.  Plan to repeat thyroid labs in February 2024 prior to follow-up.  - levothyroxine (SYNTHROID) 100 MCG Tab; Take 1 Tablet by mouth every morning on an empty stomach.  Dispense: 90 Tablet; Refill: 0  - T3 FREE; Future  - FREE THYROXINE; Future  - TSH; Future    4. Skin lesion  Patient requesting referral to dermatology for skin evaluation.  - Referral to Dermatology    Return in about 3 months (around 2/20/2024) for Follow up Labs.     Please note that this dictation was created using voice recognition software. I have worked with consultants from the vendor as well as technical experts from [employer] to optimize the interface. I have made every reasonable attempt to correct obvious errors, but I expect that there are errors of grammar and possibly content that I did not discover before finalizing the note.

## 2023-11-26 LAB
HFE GENE MUT ANL BLD/T: NORMAL
HFE P.C282Y BLD/T QL: NORMAL
HFE P.H63D BLD/T QL: NORMAL
HFE P.S65C BLD/T QL: NEGATIVE

## 2023-11-30 DIAGNOSIS — R71.8 ELEVATED HEMATOCRIT: ICD-10-CM

## 2023-11-30 DIAGNOSIS — E83.110 HEREDITARY HEMOCHROMATOSIS (HCC): ICD-10-CM

## 2023-11-30 DIAGNOSIS — D58.2 ELEVATED HEMOGLOBIN (HCC): ICD-10-CM

## 2023-12-01 NOTE — PROGRESS NOTES
1. Hereditary hemochromatosis (HCC)  - Referral to Hematology Oncology    2. Elevated hemoglobin (HCC)  - Referral to Hematology Oncology    3. Elevated hematocrit  - Referral to Hematology Oncology

## 2023-12-12 ENCOUNTER — PATIENT MESSAGE (OUTPATIENT)
Dept: BARIATRICS | Facility: CLINIC | Age: 53
End: 2023-12-12
Payer: OTHER GOVERNMENT

## 2023-12-13 ENCOUNTER — PATIENT MESSAGE (OUTPATIENT)
Dept: BARIATRICS | Facility: CLINIC | Age: 53
End: 2023-12-13
Payer: OTHER GOVERNMENT

## 2023-12-21 DIAGNOSIS — R05.9 COUGH: ICD-10-CM

## 2023-12-21 DIAGNOSIS — E11.9 TYPE 2 DIABETES MELLITUS WITHOUT COMPLICATION, WITHOUT LONG-TERM CURRENT USE OF INSULIN: ICD-10-CM

## 2023-12-22 RX ORDER — ORAL SEMAGLUTIDE 7 MG/1
TABLET ORAL
Qty: 90 TABLET | Refills: 0 | Status: SHIPPED | OUTPATIENT
Start: 2023-12-22

## 2023-12-22 RX ORDER — FLUTICASONE PROPIONATE AND SALMETEROL 250; 50 UG/1; UG/1
POWDER RESPIRATORY (INHALATION)
Qty: 180 EACH | Refills: 0 | Status: SHIPPED | OUTPATIENT
Start: 2023-12-22

## 2023-12-22 NOTE — TELEPHONE ENCOUNTER
Care Due:                  Date            Visit Type   Department     Provider  --------------------------------------------------------------------------------                                MYCHART                              FOLLOWUP/OF  [facility]  Last Visit: 02-      FICE VISIT   RODRIGUEZ Nava  Next Visit: None Scheduled  None         None Found                                                            Last  Test          Frequency    Reason                     Performed    Due Date  --------------------------------------------------------------------------------    CMP.........  12 months..  glimepiride, pravastatin.  01- 01-    HBA1C.......  6 months...  RYBELSUS, glimepiride....  01- 07-    Lipid Panel.  12 months..  pravastatin..............  01- 01-    [facility] Embedded Care Due Messages. Reference number: [id number].   12/21/2023 11:06:24 PM CST

## 2023-12-22 NOTE — TELEPHONE ENCOUNTER
Refill Routing Note   Medication(s) are not appropriate for processing by [facility] for the following reason(s):        Required labs outdated    [facility] action(s):  Defer     Requires labs : Yes             Appointments  past 12m or future 3m with PCP    Date Provider   Last Visit   2/15/2023 Regina Nava MD   Next Visit   Visit date not found Regina Nava MD   ED visits in past 90 days: 0        Note composed:7:11 AM 12/22/2023

## 2023-12-28 DIAGNOSIS — E11.9 TYPE 2 DIABETES MELLITUS WITHOUT COMPLICATION, WITH LONG-TERM CURRENT USE OF INSULIN (HCC): ICD-10-CM

## 2023-12-28 DIAGNOSIS — E06.3 HASHIMOTO'S THYROIDITIS: ICD-10-CM

## 2023-12-28 DIAGNOSIS — F51.01 PRIMARY INSOMNIA: ICD-10-CM

## 2023-12-28 DIAGNOSIS — Z79.4 TYPE 2 DIABETES MELLITUS WITHOUT COMPLICATION, WITH LONG-TERM CURRENT USE OF INSULIN (HCC): ICD-10-CM

## 2023-12-28 DIAGNOSIS — E78.5 DYSLIPIDEMIA: ICD-10-CM

## 2023-12-28 DIAGNOSIS — F33.0 MILD EPISODE OF RECURRENT MAJOR DEPRESSIVE DISORDER (HCC): ICD-10-CM

## 2023-12-28 DIAGNOSIS — J45.20 MILD INTERMITTENT ASTHMA WITHOUT COMPLICATION: ICD-10-CM

## 2023-12-28 RX ORDER — AMITRIPTYLINE HYDROCHLORIDE 10 MG/1
10 TABLET, FILM COATED ORAL NIGHTLY
Qty: 90 TABLET | Refills: 3 | Status: SHIPPED | OUTPATIENT
Start: 2023-12-28

## 2023-12-28 RX ORDER — PRAVASTATIN SODIUM 20 MG
20 TABLET ORAL NIGHTLY
Qty: 90 TABLET | Refills: 3 | Status: SHIPPED | OUTPATIENT
Start: 2023-12-28

## 2023-12-28 RX ORDER — ORAL SEMAGLUTIDE 7 MG/1
7 TABLET ORAL DAILY
Qty: 90 TABLET | Refills: 3 | Status: SHIPPED | OUTPATIENT
Start: 2023-12-28

## 2023-12-28 RX ORDER — GLIMEPIRIDE 4 MG/1
4 TABLET ORAL EVERY MORNING
Qty: 90 TABLET | Refills: 3 | Status: SHIPPED | OUTPATIENT
Start: 2023-12-28

## 2023-12-28 RX ORDER — FLUTICASONE PROPIONATE AND SALMETEROL 250; 50 UG/1; UG/1
1 POWDER RESPIRATORY (INHALATION) EVERY 12 HOURS
Qty: 180 EACH | Refills: 3 | Status: SHIPPED | OUTPATIENT
Start: 2023-12-28

## 2023-12-28 RX ORDER — LEVOTHYROXINE SODIUM 0.1 MG/1
100 TABLET ORAL
Qty: 90 TABLET | Refills: 0 | Status: SHIPPED | OUTPATIENT
Start: 2023-12-28 | End: 2024-01-19 | Stop reason: SDUPTHER

## 2023-12-28 RX ORDER — ALBUTEROL SULFATE 90 UG/1
1-2 AEROSOL, METERED RESPIRATORY (INHALATION) EVERY 6 HOURS PRN
Qty: 18 G | Refills: 3 | Status: SHIPPED | OUTPATIENT
Start: 2023-12-28

## 2023-12-29 NOTE — PROGRESS NOTES
Requested Prescriptions     Signed Prescriptions Disp Refills    albuterol 108 (90 Base) MCG/ACT Aero Soln inhalation aerosol 18 g 3     Sig: Inhale 1-2 Puffs every 6 hours as needed for Shortness of Breath.    amitriptyline (ELAVIL) 10 MG Tab 90 Tablet 3     Sig: Take 1 Tablet by mouth every evening.    fluticasone-salmeterol (ADVAIR) 250-50 MCG/ACT AEROSOL POWDER, BREATH ACTIVATED 180 Each 3     Sig: Inhale 1 Puff every 12 hours.    glimepiride (AMARYL) 4 MG Tab 90 Tablet 3     Sig: Take 1 Tablet by mouth every morning.    levothyroxine (SYNTHROID) 100 MCG Tab 90 Tablet 0     Sig: Take 1 Tablet by mouth every morning on an empty stomach.    pravastatin (PRAVACHOL) 20 MG Tab 90 Tablet 3     Sig: Take 1 Tablet by mouth every evening.    RYBELSUS 7 MG Tab 90 Tablet 3     Sig: Take 7 mg by mouth every day.    vortioxetine (TRINTELLIX) 10 MG tablet 90 Tablet 3     Sig: Take 1 Tablet by mouth every day.       JANY Montano.

## 2024-01-05 ENCOUNTER — HOSPITAL ENCOUNTER (OUTPATIENT)
Dept: RADIOLOGY | Facility: MEDICAL CENTER | Age: 54
End: 2024-01-05
Attending: NURSE PRACTITIONER
Payer: OTHER GOVERNMENT

## 2024-01-05 ENCOUNTER — OFFICE VISIT (OUTPATIENT)
Dept: MEDICAL GROUP | Facility: PHYSICIAN GROUP | Age: 54
End: 2024-01-05
Payer: OTHER GOVERNMENT

## 2024-01-05 VITALS
HEART RATE: 92 BPM | HEIGHT: 67 IN | BODY MASS INDEX: 35.47 KG/M2 | SYSTOLIC BLOOD PRESSURE: 122 MMHG | WEIGHT: 226 LBS | TEMPERATURE: 98.1 F | DIASTOLIC BLOOD PRESSURE: 84 MMHG | OXYGEN SATURATION: 95 % | RESPIRATION RATE: 18 BRPM

## 2024-01-05 DIAGNOSIS — B33.8 RSV INFECTION: ICD-10-CM

## 2024-01-05 DIAGNOSIS — J45.41 MODERATE PERSISTENT ASTHMA WITH EXACERBATION: ICD-10-CM

## 2024-01-05 DIAGNOSIS — R05.1 ACUTE COUGH: ICD-10-CM

## 2024-01-05 LAB
FLUAV RNA SPEC QL NAA+PROBE: NEGATIVE
FLUBV RNA SPEC QL NAA+PROBE: NEGATIVE
RSV RNA SPEC QL NAA+PROBE: POSITIVE
S PYO DNA SPEC NAA+PROBE: NOT DETECTED
SARS-COV-2 RNA RESP QL NAA+PROBE: NEGATIVE

## 2024-01-05 PROCEDURE — 71046 X-RAY EXAM CHEST 2 VIEWS: CPT

## 2024-01-05 PROCEDURE — 99214 OFFICE O/P EST MOD 30 MIN: CPT | Mod: 25 | Performed by: NURSE PRACTITIONER

## 2024-01-05 PROCEDURE — 3074F SYST BP LT 130 MM HG: CPT | Performed by: NURSE PRACTITIONER

## 2024-01-05 PROCEDURE — 87651 STREP A DNA AMP PROBE: CPT | Performed by: NURSE PRACTITIONER

## 2024-01-05 PROCEDURE — 0241U POCT CEPHEID COV-2, FLU A/B, RSV - PCR: CPT | Performed by: NURSE PRACTITIONER

## 2024-01-05 PROCEDURE — 3079F DIAST BP 80-89 MM HG: CPT | Performed by: NURSE PRACTITIONER

## 2024-01-05 PROCEDURE — 94640 AIRWAY INHALATION TREATMENT: CPT | Performed by: NURSE PRACTITIONER

## 2024-01-05 RX ORDER — IPRATROPIUM BROMIDE AND ALBUTEROL SULFATE 2.5; .5 MG/3ML; MG/3ML
3 SOLUTION RESPIRATORY (INHALATION) ONCE
Status: COMPLETED | OUTPATIENT
Start: 2024-01-05 | End: 2024-01-05

## 2024-01-05 RX ORDER — TRIAMCINOLONE ACETONIDE 40 MG/ML
40 INJECTION, SUSPENSION INTRA-ARTICULAR; INTRAMUSCULAR ONCE
Status: COMPLETED | OUTPATIENT
Start: 2024-01-05 | End: 2024-01-05

## 2024-01-05 RX ORDER — OSELTAMIVIR PHOSPHATE 75 MG/1
75 CAPSULE ORAL 2 TIMES DAILY
Qty: 10 CAPSULE | Refills: 0 | Status: CANCELLED | OUTPATIENT
Start: 2024-01-05

## 2024-01-05 RX ORDER — METHYLPREDNISOLONE 4 MG/1
TABLET ORAL
Qty: 21 TABLET | Refills: 0 | Status: SHIPPED | OUTPATIENT
Start: 2024-01-05

## 2024-01-05 RX ADMIN — IPRATROPIUM BROMIDE AND ALBUTEROL SULFATE 3 ML: 2.5; .5 SOLUTION RESPIRATORY (INHALATION) at 16:51

## 2024-01-05 RX ADMIN — TRIAMCINOLONE ACETONIDE 40 MG: 40 INJECTION, SUSPENSION INTRA-ARTICULAR; INTRAMUSCULAR at 16:52

## 2024-01-05 ASSESSMENT — FIBROSIS 4 INDEX: FIB4 SCORE: 1.85

## 2024-01-06 NOTE — PROGRESS NOTES
Chief Complaint   Patient presents with    Cough     Body aches, congestion, headache x5d       HISTORY OF PRESENT ILLNESS: Jean Landeros is a 53 y.o. female established patient of [facility] who presents today to discuss:  - body aches, productive yellow cough, nasal congestion, headache x 5days  - hx hemochromatosis, asthma. On Advair 250-50 BID, prn albuterol    Current Outpatient Medications on File Prior to Visit   Medication Sig Dispense Refill    albuterol 108 (90 Base) MCG/ACT Aero Soln inhalation aerosol Inhale 1-2 Puffs every 6 hours as needed for Shortness of Breath. 18 g 3    amitriptyline (ELAVIL) 10 MG Tab Take 1 Tablet by mouth every evening. 90 Tablet 3    fluticasone-salmeterol (ADVAIR) 250-50 MCG/ACT AEROSOL POWDER, BREATH ACTIVATED Inhale 1 Puff every 12 hours. 180 Each 3    glimepiride (AMARYL) 4 MG Tab Take 1 Tablet by mouth every morning. 90 Tablet 3    levothyroxine (SYNTHROID) 100 MCG Tab Take 1 Tablet by mouth every morning on an empty stomach. 90 Tablet 0    pravastatin (PRAVACHOL) 20 MG Tab Take 1 Tablet by mouth every evening. 90 Tablet 3    RYBELSUS 7 MG Tab Take 7 mg by mouth every day. 90 Tablet 3    vortioxetine (TRINTELLIX) 10 MG tablet Take 1 Tablet by mouth every day. 90 Tablet 3    ALPRAZolam (XANAX) 1 MG Tab       NON SPECIFIED Flinstone's Vitamin with Iron      BIOTIN PO Take  by mouth.      Calcium Carb-Cholecalciferol (CALCIUM 600 + D PO) Take  by mouth.      SUPER B COMPLEX/C PO Take  by mouth.      ASHWAGANDHA PO Take  by mouth.      GLUCOSAMINE-CHONDROITIN PO Take  by mouth.       No current facility-administered medications on file prior to visit.       has a past medical history of Allergy, Anxiety, BRCA1 negative, Cellulitis, face (09/28/2017), COVID-19 (11/20/2020), Depression, Diverticulitis (05/21/2019), Pneumonia (2009), Sepsis ([facility]) (11/17/2018), and Thyroid disease.     Patient Active Problem List   Diagnosis    Chronic use of benzodiazepine for therapeutic purpose  "   Class 1 obesity due to excess calories with serious comorbidity and body mass index (BMI) of 33.0 to 33.9 in adult    Primary insomnia    Mild episode of recurrent major depressive disorder (HCC)    Hashimoto's thyroiditis    Type 2 diabetes mellitus without complication, with long-term current use of insulin (HCC)    Dyslipidemia    Mild intermittent asthma without complication    Vitamin D deficiency    Elevated hemoglobin (HCC)    Elevated hematocrit    Hereditary hemochromatosis (HCC)        Allergies:Bactrim ds    Health Maintenance: deferred  Review of Systems -included above  Exam:   /84   Pulse 92   Temp 36.7 °C (98.1 °F) (Temporal)   Resp 18   Ht 1.702 m (5' 7\")   Wt 103 kg (226 lb)   SpO2 95%   Body mass index is 35.4 kg/m².   Physical Exam  Constitutional:       Appearance: Normal appearance.   HENT:      Head: Normocephalic and atraumatic.      Right Ear: Hearing, tympanic membrane, ear canal and external ear normal.      Left Ear: Hearing, tympanic membrane, ear canal and external ear normal.      Nose: Congestion and rhinorrhea present.      Mouth/Throat:      Lips: Pink.      Mouth: Mucous membranes are moist.      Pharynx: Posterior oropharyngeal erythema present. No oropharyngeal exudate.   Cardiovascular:      Rate and Rhythm: Normal rate and regular rhythm.   Pulmonary:      Effort: Pulmonary effort is normal.      Breath sounds: Examination of the right-middle field reveals wheezing. Examination of the right-lower field reveals wheezing. Wheezing and rhonchi present.   Neurological:      Mental Status: She is alert.         Cxray 1/5/2024  FINDINGS:     LUNGS: Clear. No effusions.  PNEUMOTHORAX: None.  LINES AND TUBES: None.  MEDIASTINUM: No cardiomegaly.  MUSCULOSKELETAL STRUCTURES: No acute displaced fracture. ACDF.  Cholecystectomy.     IMPRESSION:  No acute cardiopulmonary abnormality.      Assessment/Plan:  1. Moderate persistent asthma with exacerbation  2. Acute cough  53 " y/o F wih hx asthma, hashimoto thyroiditis, diabetes mellitus type 2, dyslipidemia, non-smoker here with complaints of body aches, productive yellow cough, nasal congestion, headache x 5days. Currently using Advair-250 BID for maintenance and albuterol prn (used once today). Scattered rhonchi over bilateral lungs fields with expiratory wheezing in right base. Treated with steroid IJ & duo neb in clinic, some improvement in breathing. Cxray did not show any acute cardiopulmonary abnormalities. Rapid COVID/FLU, strep were negative. RSV was positive and we will extend steroid therapy with medrol dose pack (hx allergy to bactrim DS with prednisone sensitivity). Can continue other symptom management with prn mucinex, nasal irrigation with saline, tylenol, rest, hydration    - POCT CoV-2, Flu A/B, RSV by PCR  - POCT CEPHEID GROUP A STREP - PCR  - DX-CHEST-2 VIEWS; Future  - ipratropium-albuterol (DUONEB) nebulizer solution  - triamcinolone acetonide (Kenalog-40) injection 40 mg    Follow up:  Return in about 2 weeks (around 1/19/2024) for asthma exacerbation.    Educated in proper administration of medication(s) ordered today including safety, possible SE, risks, benefits, rationale and alternatives to therapy.       Please note that this dictation was created using voice recognition software. I have made every reasonable attempt to correct obvious errors, but I expect that there are errors of grammar and possibly content that I did not discover before finalizing the note.

## 2024-01-09 ENCOUNTER — APPOINTMENT (RX ONLY)
Dept: URBAN - METROPOLITAN AREA CLINIC 22 | Facility: CLINIC | Age: 54
Setting detail: DERMATOLOGY
End: 2024-01-09

## 2024-01-09 ENCOUNTER — PATIENT MESSAGE (OUTPATIENT)
Dept: BARIATRICS | Facility: CLINIC | Age: 54
End: 2024-01-09
Payer: OTHER GOVERNMENT

## 2024-01-09 DIAGNOSIS — L43.8 OTHER LICHEN PLANUS: ICD-10-CM

## 2024-01-09 DIAGNOSIS — L82.1 OTHER SEBORRHEIC KERATOSIS: ICD-10-CM

## 2024-01-09 DIAGNOSIS — Z71.89 OTHER SPECIFIED COUNSELING: ICD-10-CM

## 2024-01-09 PROBLEM — L30.9 DERMATITIS, UNSPECIFIED: Status: ACTIVE | Noted: 2024-01-09

## 2024-01-09 PROCEDURE — 17110 DESTRUCTION B9 LES UP TO 14: CPT

## 2024-01-09 PROCEDURE — 99202 OFFICE O/P NEW SF 15 MIN: CPT | Mod: 25

## 2024-01-09 PROCEDURE — ? MEDICATION COUNSELING

## 2024-01-09 PROCEDURE — 11102 TANGNTL BX SKIN SINGLE LES: CPT | Mod: 59

## 2024-01-09 PROCEDURE — ? COUNSELING

## 2024-01-09 PROCEDURE — ? PRESCRIPTION

## 2024-01-09 PROCEDURE — ? LIQUID NITROGEN

## 2024-01-09 PROCEDURE — ? BIOPSY BY SHAVE METHOD

## 2024-01-09 RX ORDER — TRIAMCINOLONE ACETONIDE 1 MG/G
1 CREAM TOPICAL BID
Qty: 30 | Refills: 1 | COMMUNITY
Start: 2024-01-09

## 2024-01-09 RX ADMIN — TRIAMCINOLONE ACETONIDE 1: 1 CREAM TOPICAL at 00:00

## 2024-01-09 ASSESSMENT — LOCATION ZONE DERM
LOCATION ZONE: LEG
LOCATION ZONE: HAND

## 2024-01-09 ASSESSMENT — LOCATION SIMPLE DESCRIPTION DERM
LOCATION SIMPLE: RIGHT THIGH
LOCATION SIMPLE: RIGHT HAND
LOCATION SIMPLE: LEFT HAND

## 2024-01-09 ASSESSMENT — LOCATION DETAILED DESCRIPTION DERM
LOCATION DETAILED: RIGHT RADIAL DORSAL HAND
LOCATION DETAILED: RIGHT POSTERIOR LATERAL DISTAL THIGH
LOCATION DETAILED: LEFT DORSAL THUMB METACARPOPHALANGEAL JOINT

## 2024-01-09 NOTE — PROCEDURE: MEDICATION COUNSELING
Dutasteride Male Counseling: Dustasteride Counseling:  I discussed with the patient the risks of use of dutasteride including but not limited to decreased libido, decreased ejaculate volume, and gynecomastia. Women who can become pregnant should not handle medication.  All of the patient's questions and concerns were addressed.
Benzoyl Peroxide Counseling: Patient counseled that medicine may cause skin irritation and bleach clothing.  In the event of skin irritation, the patient was advised to reduce the amount of the drug applied or use it less frequently.   The patient verbalized understanding of the proper use and possible adverse effects of benzoyl peroxide.  All of the patient's questions and concerns were addressed.
Topical Steroids Applications Pregnancy And Lactation Text: Most topical steroids are considered safe to use during pregnancy and lactation.  Any topical steroid applied to the breast or nipple should be washed off before breastfeeding.
Cosentyx Counseling:  I discussed with the patient the risks of Cosentyx including but not limited to worsening of Crohn's disease, immunosuppression, allergic reactions and infections.  The patient understands that monitoring is required including a PPD at baseline and must alert us or the primary physician if symptoms of infection or other concerning signs are noted.
Use Enhanced Medication Counseling?: No
Albendazole Pregnancy And Lactation Text: This medication is Pregnancy Category C and it isn't known if it is safe during pregnancy. It is also excreted in breast milk.
Nsaids Pregnancy And Lactation Text: These medications are considered safe up to 30 weeks gestation. It is excreted in breast milk.
Isotretinoin Counseling: Patient should get monthly blood tests, not donate blood, not drive at night if vision affected, not share medication, and not undergo elective surgery for 6 months after tx completed. Side effects reviewed, pt to contact office should one occur.
Tazorac Counseling:  Patient advised that medication is irritating and drying.  Patient may need to apply sparingly and wash off after an hour before eventually leaving it on overnight.  The patient verbalized understanding of the proper use and possible adverse effects of tazorac.  All of the patient's questions and concerns were addressed.
Clofazimine Pregnancy And Lactation Text: This medication is Pregnancy Category C and isn't considered safe during pregnancy. It is excreted in breast milk.
Minoxidil Pregnancy And Lactation Text: This medication has not been assigned a Pregnancy Risk Category but animal studies failed to show danger with the topical medication. It is unknown if the medication is excreted in breast milk.
Terbinafine Pregnancy And Lactation Text: This medication is Pregnancy Category B and is considered safe during pregnancy. It is also excreted in breast milk and breast feeding isn't recommended.
Azathioprine Pregnancy And Lactation Text: This medication is Pregnancy Category D and isn't considered safe during pregnancy. It is unknown if this medication is excreted in breast milk.
Bactrim Pregnancy And Lactation Text: This medication is Pregnancy Category D and is known to cause fetal risk.  It is also excreted in breast milk.
Zoryve Pregnancy And Lactation Text: It is unknown if this medication can cause problems during pregnancy and breastfeeding.
Propranolol Pregnancy And Lactation Text: This medication is Pregnancy Category C and it isn't known if it is safe during pregnancy. It is excreted in breast milk.
Elidel Counseling: Patient may experience a mild burning sensation during topical application. Elidel is not approved in children less than 2 years of age. There have been case reports of hematologic and skin malignancies in patients using topical calcineurin inhibitors although causality is questionable.
Fluconazole Counseling:  Patient counseled regarding adverse effects of fluconazole including but not limited to headache, diarrhea, nausea, upset stomach, liver function test abnormalities, taste disturbance, and stomach pain.  There is a rare possibility of liver failure that can occur when taking fluconazole.  The patient understands that monitoring of LFTs and kidney function test may be required, especially at baseline. The patient verbalized understanding of the proper use and possible adverse effects of fluconazole.  All of the patient's questions and concerns were addressed.
Qbrexza Pregnancy And Lactation Text: There is no available data on Qbrexza use in pregnant women.  There is no available data on Qbrexza use in lactation.
Sotyktu Counseling:  I discussed the most common side effects of Sotyktu including: common cold, sore throat, sinus infections, cold sores, canker sores, folliculitis, and acne.? I also discussed more serious side effects of Sotyktu including but not limited to: serious allergic reactions; increased risk for infections such as TB; cancers such as lymphomas; rhabdomyolysis and elevated CPK; and elevated triglycerides and liver enzymes.?
Infliximab Counseling:  I discussed with the patient the risks of infliximab including but not limited to myelosuppression, immunosuppression, autoimmune hepatitis, demyelinating diseases, lymphoma, and serious infections.  The patient understands that monitoring is required including a PPD at baseline and must alert us or the primary physician if symptoms of infection or other concerning signs are noted.
Erivedge Counseling- I discussed with the patient the risks of Erivedge including but not limited to nausea, vomiting, diarrhea, constipation, weight loss, changes in the sense of taste, decreased appetite, muscle spasms, and hair loss.  The patient verbalized understanding of the proper use and possible adverse effects of Erivedge.  All of the patient's questions and concerns were addressed.
Metronidazole Pregnancy And Lactation Text: This medication is Pregnancy Category B and considered safe during pregnancy.  It is also excreted in breast milk.
Skyrizi Pregnancy And Lactation Text: The risk during pregnancy and breastfeeding is uncertain with this medication.
Ivermectin Counseling:  Patient instructed to take medication on an empty stomach with a full glass of water.  Patient informed of potential adverse effects including but not limited to nausea, diarrhea, dizziness, itching, and swelling of the extremities or lymph nodes.  The patient verbalized understanding of the proper use and possible adverse effects of ivermectin.  All of the patient's questions and concerns were addressed.
Prednisone Pregnancy And Lactation Text: This medication is Pregnancy Category C and it isn't know if it is safe during pregnancy. This medication is excreted in breast milk.
Isotretinoin Pregnancy And Lactation Text: This medication is Pregnancy Category X and is considered extremely dangerous during pregnancy. It is unknown if it is excreted in breast milk.
Colchicine Counseling:  Patient counseled regarding adverse effects including but not limited to stomach upset (nausea, vomiting, stomach pain, or diarrhea).  Patient instructed to limit alcohol consumption while taking this medication.  Colchicine may reduce blood counts especially with prolonged use.  The patient understands that monitoring of kidney function and blood counts may be required, especially at baseline. The patient verbalized understanding of the proper use and possible adverse effects of colchicine.  All of the patient's questions and concerns were addressed.
Cellcept Counseling:  I discussed with the patient the risks of mycophenolate mofetil including but not limited to infection/immunosuppression, GI upset, hypokalemia, hypercholesterolemia, bone marrow suppression, lymphoproliferative disorders, malignancy, GI ulceration/bleed/perforation, colitis, interstitial lung disease, kidney failure, progressive multifocal leukoencephalopathy, and birth defects.  The patient understands that monitoring is required including a baseline creatinine and regular CBC testing. In addition, patient must alert us immediately if symptoms of infection or other concerning signs are noted.
Mirvaso Counseling: Mirvaso is a topical medication which can decrease superficial blood flow where applied. Side effects are uncommon and include stinging, redness and allergic reactions.
Olanzapine Counseling- I discussed with the patient the common side effects of olanzapine including but are not limited to: lack of energy, dry mouth, increased appetite, sleepiness, tremor, constipation, dizziness, changes in behavior, or restlessness.  Explained that teenagers are more likely to experience headaches, abdominal pain, pain in the arms or legs, tiredness, and sleepiness.  Serious side effects include but are not limited: increased risk of death in elderly patients who are confused, have memory loss, or dementia-related psychosis; hyperglycemia; increased cholesterol and triglycerides; and weight gain.
Dutasteride Pregnancy And Lactation Text: This medication is absolutely contraindicated in women, especially during pregnancy and breast feeding. Feminization of male fetuses is possible if taking while pregnant.
Cosentyx Pregnancy And Lactation Text: This medication is Pregnancy Category B and is considered safe during pregnancy. It is unknown if this medication is excreted in breast milk.
Topical Sulfur Applications Counseling: Topical Sulfur Counseling: Patient counseled that this medication may cause skin irritation or allergic reactions.  In the event of skin irritation, the patient was advised to reduce the amount of the drug applied or use it less frequently.   The patient verbalized understanding of the proper use and possible adverse effects of topical sulfur application.  All of the patient's questions and concerns were addressed.
Cephalexin Counseling: I counseled the patient regarding use of cephalexin as an antibiotic for prophylactic and/or therapeutic purposes. Cephalexin (commonly prescribed under brand name Keflex) is a cephalosporin antibiotic which is active against numerous classes of bacteria, including most skin bacteria. Side effects may include nausea, diarrhea, gastrointestinal upset, rash, hives, yeast infections, and in rare cases, hepatitis, kidney disease, seizures, fever, confusion, neurologic symptoms, and others. Patients with severe allergies to penicillin medications are cautioned that there is about a 10% incidence of cross-reactivity with cephalosporins. When possible, patients with penicillin allergies should use alternatives to cephalosporins for antibiotic therapy.
Benzoyl Peroxide Pregnancy And Lactation Text: This medication is Pregnancy Category C. It is unknown if benzoyl peroxide is excreted in breast milk.
Fluconazole Pregnancy And Lactation Text: This medication is Pregnancy Category C and it isn't know if it is safe during pregnancy. It is also excreted in breast milk.
Tazorac Pregnancy And Lactation Text: This medication is not safe during pregnancy. It is unknown if this medication is excreted in breast milk.
Erivedge Pregnancy And Lactation Text: This medication is Pregnancy Category X and is absolutely contraindicated during pregnancy. It is unknown if it is excreted in breast milk.
Stelara Counseling:  I discussed with the patient the risks of ustekinumab including but not limited to immunosuppression, malignancy, posterior leukoencephalopathy syndrome, and serious infections.  The patient understands that monitoring is required including a PPD at baseline and must alert us or the primary physician if symptoms of infection or other concerning signs are noted.
Cimetidine Counseling:  I discussed with the patient the risks of Cimetidine including but not limited to gynecomastia, headache, diarrhea, nausea, drowsiness, arrhythmias, pancreatitis, skin rashes, psychosis, bone marrow suppression and kidney toxicity.
Sotyktu Pregnancy And Lactation Text: There is insufficient data to evaluate whether or not Sotyktu is safe to use during pregnancy.? ?It is not known if Sotyktu passes into breast milk and whether or not it is safe to use when breastfeeding.??
Rhofade Counseling: Rhofade is a topical medication which can decrease superficial blood flow where applied. Side effects are uncommon and include stinging, redness and allergic reactions.
SSKI Counseling:  I discussed with the patient the risks of SSKI including but not limited to thyroid abnormalities, metallic taste, GI upset, fever, headache, acne, arthralgias, paraesthesias, lymphadenopathy, easy bleeding, arrhythmias, and allergic reaction.
Carac Counseling:  I discussed with the patient the risks of Carac including but not limited to erythema, scaling, itching, weeping, crusting, and pain.
Zyclara Counseling:  I discussed with the patient the risks of imiquimod including but not limited to erythema, scaling, itching, weeping, crusting, and pain.  Patient understands that the inflammatory response to imiquimod is variable from person to person and was educated regarded proper titration schedule.  If flu-like symptoms develop, patient knows to discontinue the medication and contact us.
Cibinqo Counseling: I discussed with the patient the risks of Cibinqo therapy including but not limited to common cold, nausea, headache, cold sores, increased blood CPK levels, dizziness, UTIs, fatigue, acne, and vomitting. Live vaccines should be avoided.  This medication has been linked to serious infections; higher rate of mortality; malignancy and lymphoproliferative disorders; major adverse cardiovascular events; thrombosis; thrombocytopenia and lymphopenia; lipid elevations; and retinal detachment.
Elidel Pregnancy And Lactation Text: This medication is Pregnancy Category C. It is unknown if this medication is excreted in breast milk.
Topical Sulfur Applications Pregnancy And Lactation Text: This medication is Pregnancy Category C and has an unknown safety profile during pregnancy. It is unknown if this topical medication is excreted in breast milk.
Olanzapine Pregnancy And Lactation Text: This medication is pregnancy category C.   There are no adequate and well controlled trials with olanzapine in pregnant females.  Olanzapine should be used during pregnancy only if the potential benefit justifies the potential risk to the fetus.   In a study in lactating healthy women, olanzapine was excreted in breast milk.  It is recommended that women taking olanzapine should not breast feed.
Mirvaso Pregnancy And Lactation Text: This medication has not been assigned a Pregnancy Risk Category. It is unknown if the medication is excreted in breast milk.
Dupixent Counseling: I discussed with the patient the risks of dupilumab including but not limited to eye infection and irritation, cold sores, injection site reactions, worsening of asthma, allergic reactions and increased risk of parasitic infection.  Live vaccines should be avoided while taking dupilumab. Dupilumab will also interact with certain medications such as warfarin and cyclosporine. The patient understands that monitoring is required and they must alert us or the primary physician if symptoms of infection or other concerning signs are noted.
Finasteride Male Counseling: Finasteride Counseling:  I discussed with the patient the risks of use of finasteride including but not limited to decreased libido, decreased ejaculate volume, gynecomastia, and depression. Women should not handle medication.  All of the patient's questions and concerns were addressed.
Minocycline Counseling: Patient advised regarding possible photosensitivity and discoloration of the teeth, skin, lips, tongue and gums.  Patient instructed to avoid sunlight, if possible.  When exposed to sunlight, patients should wear protective clothing, sunglasses, and sunscreen.  The patient was instructed to call the office immediately if the following severe adverse effects occur:  hearing changes, easy bruising/bleeding, severe headache, or vision changes.  The patient verbalized understanding of the proper use and possible adverse effects of minocycline.  All of the patient's questions and concerns were addressed.
Topical Clindamycin Counseling: Patient counseled that this medication may cause skin irritation or allergic reactions.  In the event of skin irritation, the patient was advised to reduce the amount of the drug applied or use it less frequently.   The patient verbalized understanding of the proper use and possible adverse effects of clindamycin.  All of the patient's questions and concerns were addressed.
Hydroxychloroquine Counseling:  I discussed with the patient that a baseline ophthalmologic exam is needed at the start of therapy and every year thereafter while on therapy. A CBC may also be warranted for monitoring.  The side effects of this medication were discussed with the patient, including but not limited to agranulocytosis, aplastic anemia, seizures, rashes, retinopathy, and liver toxicity. Patient instructed to call the office should any adverse effect occur.  The patient verbalized understanding of the proper use and possible adverse effects of Plaquenil.  All the patient's questions and concerns were addressed.
Cephalexin Pregnancy And Lactation Text: This medication is Pregnancy Category B and considered safe during pregnancy.  It is also excreted in breast milk but can be used safely for shorter doses.
High Dose Vitamin A Counseling: Side effects reviewed, pt to contact office should one occur.
Sski Pregnancy And Lactation Text: This medication is Pregnancy Category D and isn't considered safe during pregnancy. It is excreted in breast milk.
Griseofulvin Counseling:  I discussed with the patient the risks of griseofulvin including but not limited to photosensitivity, cytopenia, liver damage, nausea/vomiting and severe allergy.  The patient understands that this medication is best absorbed when taken with a fatty meal (e.g., ice cream or french fries).
Hydroquinone Counseling:  Patient advised that medication may result in skin irritation, lightening (hypopigmentation), dryness, and burning.  In the event of skin irritation, the patient was advised to reduce the amount of the drug applied or use it less frequently.  Rarely, spots that are treated with hydroquinone can become darker (pseudoochronosis).  Should this occur, patient instructed to stop medication and call the office. The patient verbalized understanding of the proper use and possible adverse effects of hydroquinone.  All of the patient's questions and concerns were addressed.
Detail Level: Generalized
Carac Pregnancy And Lactation Text: This medication is Pregnancy Category X and contraindicated in pregnancy and in women who may become pregnant. It is unknown if this medication is excreted in breast milk.
Wartpeel Counseling:  I discussed with the patient the risks of Wartpeel including but not limited to erythema, scaling, itching, weeping, crusting, and pain.
Oral Minoxidil Counseling- I discussed with the patient the risks of oral minoxidil including but not limited to shortness of breath, swelling of the feet or ankles, dizziness, lightheadedness, unwanted hair growth and allergic reaction.  The patient verbalized understanding of the proper use and possible adverse effects of oral minoxidil.  All of the patient's questions and concerns were addressed.
Finasteride Pregnancy And Lactation Text: This medication is absolutely contraindicated during pregnancy. It is unknown if it is excreted in breast milk.
Opzelura Counseling:  I discussed with the patient the risks of Opzelura including but not limited to nasopharngitis, bronchitis, ear infection, eosinophila, hives, diarrhea, folliculitis, tonsillitis, and rhinorrhea.  Taken orally, this medication has been linked to serious infections; higher rate of mortality; malignancy and lymphoproliferative disorders; major adverse cardiovascular events; thrombosis; thrombocytopenia, anemia, and neutropenia; and lipid elevations.
Clindamycin Counseling: I counseled the patient regarding use of clindamycin as an antibiotic for prophylactic and/or therapeutic purposes. Clindamycin is active against numerous classes of bacteria, including skin bacteria. Side effects may include nausea, diarrhea, gastrointestinal upset, rash, hives, yeast infections, and in rare cases, colitis.
High Dose Vitamin A Pregnancy And Lactation Text: High dose vitamin A therapy is contraindicated during pregnancy and breast feeding.
Rituxan Counseling:  I discussed with the patient the risks of Rituxan infusions. Side effects can include infusion reactions, severe drug rashes including mucocutaneous reactions, reactivation of latent hepatitis and other infections and rarely progressive multifocal leukoencephalopathy.  All of the patient's questions and concerns were addressed.
Taltz Counseling: I discussed with the patient the risks of ixekizumab including but not limited to immunosuppression, serious infections, worsening of inflammatory bowel disease and drug reactions.  The patient understands that monitoring is required including a PPD at baseline and must alert us or the primary physician if symptoms of infection or other concerning signs are noted.
Minocycline Pregnancy And Lactation Text: This medication is Pregnancy Category D and not consider safe during pregnancy. It is also excreted in breast milk.
Eucrisa Counseling: Patient may experience a mild burning sensation during topical application. Eucrisa is not approved in children less than 2 years of age.
Dupixent Pregnancy And Lactation Text: This medication likely crosses the placenta but the risk for the fetus is uncertain. This medication is excreted in breast milk.
Xeljanz Counseling: I discussed with the patient the risks of Xeljanz therapy including increased risk of infection, liver issues, headache, diarrhea, or cold symptoms. Live vaccines should be avoided. They were instructed to call if they have any problems.
Cibinqo Pregnancy And Lactation Text: It is unknown if this medication will adversely affect pregnancy or breast feeding.  You should not take this medication if you are currently pregnant or planning a pregnancy or while breastfeeding.
Cyclophosphamide Counseling:  I discussed with the patient the risks of cyclophosphamide including but not limited to hair loss, hormonal abnormalities, decreased fertility, abdominal pain, diarrhea, nausea and vomiting, bone marrow suppression and infection. The patient understands that monitoring is required while taking this medication.
Thalidomide Counseling: I discussed with the patient the risks of thalidomide including but not limited to birth defects, anxiety, weakness, chest pain, dizziness, cough and severe allergy.
Quinolones Counseling:  I discussed with the patient the risks of fluoroquinolones including but not limited to GI upset, allergic reaction, drug rash, diarrhea, dizziness, photosensitivity, yeast infections, liver function test abnormalities, tendonitis/tendon rupture.
Hydroxychloroquine Pregnancy And Lactation Text: This medication has been shown to cause fetal harm but it isn't assigned a Pregnancy Risk Category. There are small amounts excreted in breast milk.
Topical Clindamycin Pregnancy And Lactation Text: This medication is Pregnancy Category B and is considered safe during pregnancy. It is unknown if it is excreted in breast milk.
Dapsone Counseling: I discussed with the patient the risks of dapsone including but not limited to hemolytic anemia, agranulocytosis, rashes, methemoglobinemia, kidney failure, peripheral neuropathy, headaches, GI upset, and liver toxicity.  Patients who start dapsone require monitoring including baseline LFTs and weekly CBCs for the first month, then every month thereafter.  The patient verbalized understanding of the proper use and possible adverse effects of dapsone.  All of the patient's questions and concerns were addressed.
Griseofulvin Pregnancy And Lactation Text: This medication is Pregnancy Category X and is known to cause serious birth defects. It is unknown if this medication is excreted in breast milk but breast feeding should be avoided.
Adbry Counseling: I discussed with the patient the risks of tralokinumab including but not limited to eye infection and irritation, cold sores, injection site reactions, worsening of asthma, allergic reactions and increased risk of parasitic infection.  Live vaccines should be avoided while taking tralokinumab. The patient understands that monitoring is required and they must alert us or the primary physician if symptoms of infection or other concerning signs are noted.
Xelprasannaz Pregnancy And Lactation Text: This medication is Pregnancy Category D and is not considered safe during pregnancy.  The risk during breast feeding is also uncertain.
Solaraze Counseling:  I discussed with the patient the risks of Solaraze including but not limited to erythema, scaling, itching, weeping, crusting, and pain.
Rituxan Pregnancy And Lactation Text: This medication is Pregnancy Category C and it isn't know if it is safe during pregnancy. It is unknown if this medication is excreted in breast milk but similar antibodies are known to be excreted.
Doxepin Counseling:  Patient advised that the medication is sedating and not to drive a car after taking this medication. Patient informed of potential adverse effects including but not limited to dry mouth, urinary retention, and blurry vision.  The patient verbalized understanding of the proper use and possible adverse effects of doxepin.  All of the patient's questions and concerns were addressed.
Enbrel Counseling:  I discussed with the patient the risks of etanercept including but not limited to myelosuppression, immunosuppression, autoimmune hepatitis, demyelinating diseases, lymphoma, and infections.  The patient understands that monitoring is required including a PPD at baseline and must alert us or the primary physician if symptoms of infection or other concerning signs are noted.
Opzelura Pregnancy And Lactation Text: There is insufficient data to evaluate drug-associated risk for major birth defects, miscarriage, or other adverse maternal or fetal outcomes.  There is a pregnancy registry that monitors pregnancy outcomes in pregnant persons exposed to the medication during pregnancy.  It is unknown if this medication is excreted in breast milk.  Do not breastfeed during treatment and for about 4 weeks after the last dose.
Opioid Counseling: I discussed with the patient the potential side effects of opioids including but not limited to addiction, altered mental status, and depression. I stressed avoiding alcohol, benzodiazepines, muscle relaxants and sleep aids unless specifically okayed by a physician. The patient verbalized understanding of the proper use and possible adverse effects of opioids. All of the patient's questions and concerns were addressed. They were instructed to flush the remaining pills down the toilet if they did not need them for pain.
Cyclophosphamide Pregnancy And Lactation Text: This medication is Pregnancy Category D and it isn't considered safe during pregnancy. This medication is excreted in breast milk.
Oral Minoxidil Pregnancy And Lactation Text: This medication should only be used when clearly needed if you are pregnant, attempting to become pregnant or breast feeding.
Birth Control Pills Counseling: Birth Control Pill Counseling: I discussed with the patient the potential side effects of OCPs including but not limited to increased risk of stroke, heart attack, thrombophlebitis, deep venous thrombosis, hepatic adenomas, breast changes, GI upset, headaches, and depression.  The patient verbalized understanding of the proper use and possible adverse effects of OCPs. All of the patient's questions and concerns were addressed.
Imiquimod Counseling:  I discussed with the patient the risks of imiquimod including but not limited to erythema, scaling, itching, weeping, crusting, and pain.  Patient understands that the inflammatory response to imiquimod is variable from person to person and was educated regarded proper titration schedule.  If flu-like symptoms develop, patient knows to discontinue the medication and contact us.
Rifampin Pregnancy And Lactation Text: This medication is Pregnancy Category C and it isn't know if it is safe during pregnancy. It is also excreted in breast milk and should not be used if you are breast feeding.
Topical Ketoconazole Counseling: Patient counseled that this medication may cause skin irritation or allergic reactions.  In the event of skin irritation, the patient was advised to reduce the amount of the drug applied or use it less frequently.   The patient verbalized understanding of the proper use and possible adverse effects of ketoconazole.  All of the patient's questions and concerns were addressed.
Low Dose Naltrexone Counseling- I discussed with the patient the potential risks and side effects of low dose naltrexone including but not limited to: more vivid dreams, headaches, nausea, vomiting, abdominal pain, fatigue, dizziness, and anxiety.
Calcipotriene Counseling:  I discussed with the patient the risks of calcipotriene including but not limited to erythema, scaling, itching, and irritation.
Dapsone Pregnancy And Lactation Text: This medication is Pregnancy Category C and is not considered safe during pregnancy or breast feeding.
Litfulo Counseling: I discussed with the patient the risks of Litfulo therapy including but not limited to upper respiratory tract infections, shingles, cold sores, and nausea. Live vaccines should be avoided.  This medication has been linked to serious infections; higher rate of mortality; malignancy and lymphoproliferative disorders; major adverse cardiovascular events; thrombosis; gastrointestinal perforations; neutropenia; lymphopenia; anemia; liver enzyme elevations; and lipid elevations.
Adbry Pregnancy And Lactation Text: It is unknown if this medication will adversely affect pregnancy or breast feeding.
Clindamycin Pregnancy And Lactation Text: This medication can be used in pregnancy if certain situations. Clindamycin is also present in breast milk.
Doxepin Pregnancy And Lactation Text: This medication is Pregnancy Category C and it isn't known if it is safe during pregnancy. It is also excreted in breast milk and breast feeding isn't recommended.
Solaraze Pregnancy And Lactation Text: This medication is Pregnancy Category B and is considered safe. There is some data to suggest avoiding during the third trimester. It is unknown if this medication is excreted in breast milk.
Itraconazole Counseling:  I discussed with the patient the risks of itraconazole including but not limited to liver damage, nausea/vomiting, neuropathy, and severe allergy.  The patient understands that this medication is best absorbed when taken with acidic beverages such as non-diet cola or ginger ale.  The patient understands that monitoring is required including baseline LFTs and repeat LFTs at intervals.  The patient understands that they are to contact us or the primary physician if concerning signs are noted.
Siliq Counseling:  I discussed with the patient the risks of Siliq including but not limited to new or worsening depression, suicidal thoughts and behavior, immunosuppression, malignancy, posterior leukoencephalopathy syndrome, and serious infections.  The patient understands that monitoring is required including a PPD at baseline and must alert us or the primary physician if symptoms of infection or other concerning signs are noted. There is also a special program designed to monitor depression which is required with Siliq.
Litfulo Pregnancy And Lactation Text: Based on animal studies, Lifulo may cause embryo-fetal harm when administered to pregnant women.  The medication should not be used in pregnancy.  Breastfeeding is not recommended during treatment.
Calcipotriene Pregnancy And Lactation Text: The use of this medication during pregnancy or lactation is not recommended as there is insufficient data.
Cyclosporine Counseling:  I discussed with the patient the risks of cyclosporine including but not limited to hypertension, gingival hyperplasia,myelosuppression, immunosuppression, liver damage, kidney damage, neurotoxicity, lymphoma, and serious infections. The patient understands that monitoring is required including baseline blood pressure, CBC, CMP, lipid panel and uric acid, and then 1-2 times monthly CMP and blood pressure.
Picato Counseling:  I discussed with the patient the risks of Picato including but not limited to erythema, scaling, itching, weeping, crusting, and pain.
Otezla Counseling: The side effects of Otezla were discussed with the patient, including but not limited to worsening or new depression, weight loss, diarrhea, nausea, upper respiratory tract infection, and headache. Patient instructed to call the office should any adverse effect occur.  The patient verbalized understanding of the proper use and possible adverse effects of Otezla.  All the patient's questions and concerns were addressed.
Birth Control Pills Pregnancy And Lactation Text: This medication should be avoided if pregnant and for the first 30 days post-partum.
Winlevi Counseling:  I discussed with the patient the risks of topical clascoterone including but not limited to erythema, scaling, itching, and stinging. Patient voiced their understanding.
Libtayo Counseling- I discussed with the patient the risks of Libtayo including but not limited to nausea, vomiting, diarrhea, and bone or muscle pain.  The patient verbalized understanding of the proper use and possible adverse effects of Libtayo.  All of the patient's questions and concerns were addressed.
Aklief counseling:  Patient advised to apply a pea-sized amount only at bedtime and wait 30 minutes after washing their face before applying.  If too drying, patient may add a non-comedogenic moisturizer.  The most commonly reported side effects including irritation, redness, scaling, dryness, stinging, burning, itching, and increased risk of sunburn.  The patient verbalized understanding of the proper use and possible adverse effects of retinoids.  All of the patient's questions and concerns were addressed.
Gabapentin Counseling: I discussed with the patient the risks of gabapentin including but not limited to dizziness, somnolence, fatigue and ataxia.
Opioid Pregnancy And Lactation Text: These medications can lead to premature delivery and should be avoided during pregnancy. These medications are also present in breast milk in small amounts.
Sarecycline Counseling: Patient advised regarding possible photosensitivity and discoloration of the teeth, skin, lips, tongue and gums.  Patient instructed to avoid sunlight, if possible.  When exposed to sunlight, patients should wear protective clothing, sunglasses, and sunscreen.  The patient was instructed to call the office immediately if the following severe adverse effects occur:  hearing changes, easy bruising/bleeding, severe headache, or vision changes.  The patient verbalized understanding of the proper use and possible adverse effects of sarecycline.  All of the patient's questions and concerns were addressed.
Tremfya Counseling: I discussed with the patient the risks of guselkumab including but not limited to immunosuppression, serious infections, worsening of inflammatory bowel disease and drug reactions.  The patient understands that monitoring is required including a PPD at baseline and must alert us or the primary physician if symptoms of infection or other concerning signs are noted.
Acitretin Counseling:  I discussed with the patient the risks of acitretin including but not limited to hair loss, dry lips/skin/eyes, liver damage, hyperlipidemia, depression/suicidal ideation, photosensitivity.  Serious rare side effects can include but are not limited to pancreatitis, pseudotumor cerebri, bony changes, clot formation/stroke/heart attack.  Patient understands that alcohol is contraindicated since it can result in liver toxicity and significantly prolong the elimination of the drug by many years.
Doxycycline Counseling:  Patient counseled regarding possible photosensitivity and increased risk for sunburn.  Patient instructed to avoid sunlight, if possible.  When exposed to sunlight, patients should wear protective clothing, sunglasses, and sunscreen.  The patient was instructed to call the office immediately if the following severe adverse effects occur:  hearing changes, easy bruising/bleeding, severe headache, or vision changes.  The patient verbalized understanding of the proper use and possible adverse effects of doxycycline.  All of the patient's questions and concerns were addressed.
Cantharidin Counseling:  I discussed with the patient the risks of Cantharidin including but not limited to pain, redness, burning, itching, and blistering.
Tranexamic Acid Counseling:  Patient advised of the small risk of bleeding problems with tranexamic acid. They were also instructed to call if they developed any nausea, vomiting or diarrhea. All of the patient's questions and concerns were addressed.
Bimzelx Counseling:  I discussed with the patient the risks of Bimzelx including but not limited to depression, immunosuppression, allergic reactions and infections.  The patient understands that monitoring is required including a PPD at baseline and must alert us or the primary physician if symptoms of infection or other concerning signs are noted.
Low Dose Naltrexone Pregnancy And Lactation Text: Naltrexone is pregnancy category C.  There have been no adequate and well-controlled studies in pregnant women.  It should be used in pregnancy only if the potential benefit justifies the potential risk to the fetus.   Limited data indicates that naltrexone is minimally excreted into breastmilk.
Winlevi Pregnancy And Lactation Text: This medication is considered safe during pregnancy and breastfeeding.
Otezla Pregnancy And Lactation Text: This medication is Pregnancy Category C and it isn't known if it is safe during pregnancy. It is unknown if it is excreted in breast milk.
Spironolactone Counseling: Patient advised regarding risks of diarrhea, abdominal pain, hyperkalemia, birth defects (for female patients), liver toxicity and renal toxicity. The patient may need blood work to monitor liver and kidney function and potassium levels while on therapy. The patient verbalized understanding of the proper use and possible adverse effects of spironolactone.  All of the patient's questions and concerns were addressed.
Soolantra Counseling: I discussed with the patients the risks of topial Soolantra. This is a medicine which decreases the number of mites and inflammation in the skin. You experience burning, stinging, eye irritation or allergic reactions.  Please call our office if you develop any problems from using this medication.
Doxycycline Pregnancy And Lactation Text: This medication is Pregnancy Category D and not consider safe during pregnancy. It is also excreted in breast milk but is considered safe for shorter treatment courses.
Libtayo Pregnancy And Lactation Text: This medication is contraindicated in pregnancy and when breast feeding.
Hydroxyzine Counseling: Patient advised that the medication is sedating and not to drive a car after taking this medication.  Patient informed of potential adverse effects including but not limited to dry mouth, urinary retention, and blurry vision.  The patient verbalized understanding of the proper use and possible adverse effects of hydroxyzine.  All of the patient's questions and concerns were addressed.
5-Fu Counseling: 5-Fluorouracil Counseling:  I discussed with the patient the risks of 5-fluorouracil including but not limited to erythema, scaling, itching, weeping, crusting, and pain.
Rifampin Counseling: I discussed with the patient the risks of rifampin including but not limited to liver damage, kidney damage, red-orange body fluids, nausea/vomiting and severe allergy.
Cantharidin Pregnancy And Lactation Text: This medication has not been proven safe during pregnancy. It is unknown if this medication is excreted in breast milk.
Humira Counseling:  I discussed with the patient the risks of adalimumab including but not limited to myelosuppression, immunosuppression, autoimmune hepatitis, demyelinating diseases, lymphoma, and serious infections.  The patient understands that monitoring is required including a PPD at baseline and must alert us or the primary physician if symptoms of infection or other concerning signs are noted.
Acitretin Pregnancy And Lactation Text: This medication is Pregnancy Category X and should not be given to women who are pregnant or may become pregnant in the future. This medication is excreted in breast milk.
Azithromycin Counseling:  I discussed with the patient the risks of azithromycin including but not limited to GI upset, allergic reaction, drug rash, diarrhea, and yeast infections.
Bimzelx Pregnancy And Lactation Text: This medication crosses the placenta and the safety is uncertain during pregnancy. It is unknown if this medication is present in breast milk.
Tranexamic Acid Pregnancy And Lactation Text: It is unknown if this medication is safe during pregnancy or breast feeding.
Niacinamide Counseling: I recommended taking niacin or niacinamide, also know as vitamin B3, twice daily. Recent evidence suggests that taking vitamin B3 (500 mg twice daily) can reduce the risk of actinic keratoses and non-melanoma skin cancers. Side effects of vitamin B3 include flushing and headache.
Topical Metronidazole Counseling: Metronidazole is a topical antibiotic medication. You may experience burning, stinging, redness, or allergic reactions.  Please call our office if you develop any problems from using this medication.
Klisyri Counseling:  I discussed with the patient the risks of Klisyri including but not limited to erythema, scaling, itching, weeping, crusting, and pain.
Olumiant Counseling: I discussed with the patient the risks of Olumiant therapy including but not limited to upper respiratory tract infections, shingles, cold sores, and nausea. Live vaccines should be avoided.  This medication has been linked to serious infections; higher rate of mortality; malignancy and lymphoproliferative disorders; major adverse cardiovascular events; thrombosis; gastrointestinal perforations; neutropenia; lymphopenia; anemia; liver enzyme elevations; and lipid elevations.
Odomzo Counseling- I discussed with the patient the risks of Odomzo including but not limited to nausea, vomiting, diarrhea, constipation, weight loss, changes in the sense of taste, decreased appetite, muscle spasms, and hair loss.  The patient verbalized understanding of the proper use and possible adverse effects of Odomzo.  All of the patient's questions and concerns were addressed.
Soolantra Pregnancy And Lactation Text: This medication is Pregnancy Category C. This medication is considered safe during breast feeding.
Aklief Pregnancy And Lactation Text: It is unknown if this medication is safe to use during pregnancy.  It is unknown if this medication is excreted in breast milk.  Breastfeeding women should use the topical cream on the smallest area of the skin for the shortest time needed while breastfeeding.  Do not apply to nipple and areola.
Simponi Counseling:  I discussed with the patient the risks of golimumab including but not limited to myelosuppression, immunosuppression, autoimmune hepatitis, demyelinating diseases, lymphoma, and serious infections.  The patient understands that monitoring is required including a PPD at baseline and must alert us or the primary physician if symptoms of infection or other concerning signs are noted.
Arava Counseling:  Patient counseled regarding adverse effects of Arava including but not limited to nausea, vomiting, abnormalities in liver function tests. Patients may develop mouth sores, rash, diarrhea, and abnormalities in blood counts. The patient understands that monitoring is required including LFTs and blood counts.  There is a rare possibility of scarring of the liver and lung problems that can occur when taking methotrexate. Persistent nausea, loss of appetite, pale stools, dark urine, cough, and shortness of breath should be reported immediately. Patient advised to discontinue Arava treatment and consult with a physician prior to attempting conception. The patient will have to undergo a treatment to eliminate Arava from the body prior to conception.
Hydroxyzine Pregnancy And Lactation Text: This medication is not safe during pregnancy and should not be taken. It is also excreted in breast milk and breast feeding isn't recommended.
Ketoconazole Counseling:   Patient counseled regarding improving absorption with orange juice.  Adverse effects include but are not limited to breast enlargement, headache, diarrhea, nausea, upset stomach, liver function test abnormalities, taste disturbance, and stomach pain.  There is a rare possibility of liver failure that can occur when taking ketoconazole. The patient understands that monitoring of LFTs may be required, especially at baseline. The patient verbalized understanding of the proper use and possible adverse effects of ketoconazole.  All of the patient's questions and concerns were addressed.
VTAMA Counseling: I discussed with the patient that VTAMA is not for use in the eyes, mouth or mouth. They should call the office if they develop any signs of allergic reactions to VTAMA. The patient verbalized understanding of the proper use and possible adverse effects of VTAMA.  All of the patient's questions and concerns were addressed.
Olumiant Pregnancy And Lactation Text: Based on animal studies, Olumiant may cause embryo-fetal harm when administered to pregnant women.  The medication should not be used in pregnancy.  Breastfeeding is not recommended during treatment.
Azelaic Acid Counseling: Patient counseled that medicine may cause skin irritation and to avoid applying near the eyes.  In the event of skin irritation, the patient was advised to reduce the amount of the drug applied or use it less frequently.   The patient verbalized understanding of the proper use and possible adverse effects of azelaic acid.  All of the patient's questions and concerns were addressed.
Oxybutynin Counseling:  I discussed with the patient the risks of oxybutynin including but not limited to skin rash, drowsiness, dry mouth, difficulty urinating, and blurred vision.
Spironolactone Pregnancy And Lactation Text: This medication can cause feminization of the male fetus and should be avoided during pregnancy. The active metabolite is also found in breast milk.
Klisyri Pregnancy And Lactation Text: It is unknown if this medication can harm a developing fetus or if it is excreted in breast milk.
Topical Metronidazole Pregnancy And Lactation Text: This medication is Pregnancy Category B and considered safe during pregnancy.  It is also considered safe to use while breastfeeding.
Niacinamide Pregnancy And Lactation Text: These medications are considered safe during pregnancy.
Glycopyrrolate Counseling:  I discussed with the patient the risks of glycopyrrolate including but not limited to skin rash, drowsiness, dry mouth, difficulty urinating, and blurred vision.
Protopic Counseling: Patient may experience a mild burning sensation during topical application. Protopic is not approved in children less than 2 years of age. There have been case reports of hematologic and skin malignancies in patients using topical calcineurin inhibitors although causality is questionable.
Xolair Counseling:  Patient informed of potential adverse effects including but not limited to fever, muscle aches, rash and allergic reactions.  The patient verbalized understanding of the proper use and possible adverse effects of Xolair.  All of the patient's questions and concerns were addressed.
Methotrexate Counseling:  Patient counseled regarding adverse effects of methotrexate including but not limited to nausea, vomiting, abnormalities in liver function tests. Patients may develop mouth sores, rash, diarrhea, and abnormalities in blood counts. The patient understands that monitoring is required including LFT's and blood counts.  There is a rare possibility of scarring of the liver and lung problems that can occur when taking methotrexate. Persistent nausea, loss of appetite, pale stools, dark urine, cough, and shortness of breath should be reported immediately. Patient advised to discontinue methotrexate treatment at least three months before attempting to become pregnant.  I discussed the need for folate supplements while taking methotrexate.  These supplements can decrease side effects during methotrexate treatment. The patient verbalized understanding of the proper use and possible adverse effects of methotrexate.  All of the patient's questions and concerns were addressed.
Erythromycin Counseling:  I discussed with the patient the risks of erythromycin including but not limited to GI upset, allergic reaction, drug rash, diarrhea, increase in liver enzymes, and yeast infections.
Valtrex Counseling: I discussed with the patient the risks of valacyclovir including but not limited to kidney damage, nausea, vomiting and severe allergy.  The patient understands that if the infection seems to be worsening or is not improving, they are to call.
Topical Retinoid counseling:  Patient advised to apply a pea-sized amount only at bedtime and wait 30 minutes after washing their face before applying.  If too drying, patient may add a non-comedogenic moisturizer. The patient verbalized understanding of the proper use and possible adverse effects of retinoids.  All of the patient's questions and concerns were addressed.
Tetracycline Counseling: Patient counseled regarding possible photosensitivity and increased risk for sunburn.  Patient instructed to avoid sunlight, if possible.  When exposed to sunlight, patients should wear protective clothing, sunglasses, and sunscreen.  The patient was instructed to call the office immediately if the following severe adverse effects occur:  hearing changes, easy bruising/bleeding, severe headache, or vision changes.  The patient verbalized understanding of the proper use and possible adverse effects of tetracycline.  All of the patient's questions and concerns were addressed. Patient understands to avoid pregnancy while on therapy due to potential birth defects.
Ketoconazole Pregnancy And Lactation Text: This medication is Pregnancy Category C and it isn't know if it is safe during pregnancy. It is also excreted in breast milk and breast feeding isn't recommended.
Cimzia Counseling:  I discussed with the patient the risks of Cimzia including but not limited to immunosuppression, allergic reactions and infections.  The patient understands that monitoring is required including a PPD at baseline and must alert us or the primary physician if symptoms of infection or other concerning signs are noted.
Bexarotene Counseling:  I discussed with the patient the risks of bexarotene including but not limited to hair loss, dry lips/skin/eyes, liver abnormalities, hyperlipidemia, pancreatitis, depression/suicidal ideation, photosensitivity, drug rash/allergic reactions, hypothyroidism, anemia, leukopenia, infection, cataracts, and teratogenicity.  Patient understands that they will need regular blood tests to check lipid profile, liver function tests, white blood cell count, thyroid function tests and pregnancy test if applicable.
Azithromycin Pregnancy And Lactation Text: This medication is considered safe during pregnancy and is also secreted in breast milk.
Topical Steroids Counseling: I discussed with the patient that prolonged use of topical steroids can result in the increased appearance of superficial blood vessels (telangiectasias), lightening (hypopigmentation) and thinning of the skin (atrophy).  Patient understands to avoid using high potency steroids in skin folds, the groin or the face.  The patient verbalized understanding of the proper use and possible adverse effects of topical steroids.  All of the patient's questions and concerns were addressed.
Nsaids Counseling: NSAID Counseling: I discussed with the patient that NSAIDs should be taken with food. Prolonged use of NSAIDs can result in the development of stomach ulcers.  Patient advised to stop taking NSAIDs if abdominal pain occurs.  The patient verbalized understanding of the proper use and possible adverse effects of NSAIDs.  All of the patient's questions and concerns were addressed.
Azelaic Acid Pregnancy And Lactation Text: This medication is considered safe during pregnancy and breast feeding.
Glycopyrrolate Pregnancy And Lactation Text: This medication is Pregnancy Category B and is considered safe during pregnancy. It is unknown if it is excreted breast milk.
Xolair Pregnancy And Lactation Text: This medication is Pregnancy Category B and is considered safe during pregnancy. This medication is excreted in breast milk.
Protopic Pregnancy And Lactation Text: This medication is Pregnancy Category C. It is unknown if this medication is excreted in breast milk when applied topically.
Methotrexate Pregnancy And Lactation Text: This medication is Pregnancy Category X and is known to cause fetal harm. This medication is excreted in breast milk.
Bexarotene Pregnancy And Lactation Text: This medication is Pregnancy Category X and should not be given to women who are pregnant or may become pregnant. This medication should not be used if you are breast feeding.
Bactrim Counseling:  I discussed with the patient the risks of sulfa antibiotics including but not limited to GI upset, allergic reaction, drug rash, diarrhea, dizziness, photosensitivity, and yeast infections.  Rarely, more serious reactions can occur including but not limited to aplastic anemia, agranulocytosis, methemoglobinemia, blood dyscrasias, liver or kidney failure, lung infiltrates or desquamative/blistering drug rashes.
Ilumya Counseling: I discussed with the patient the risks of tildrakizumab including but not limited to immunosuppression, malignancy, posterior leukoencephalopathy syndrome, and serious infections.  The patient understands that monitoring is required including a PPD at baseline and must alert us or the primary physician if symptoms of infection or other concerning signs are noted.
Terbinafine Counseling: Patient counseling regarding adverse effects of terbinafine including but not limited to headache, diarrhea, rash, upset stomach, liver function test abnormalities, itching, taste/smell disturbance, nausea, abdominal pain, and flatulence.  There is a rare possibility of liver failure that can occur when taking terbinafine.  The patient understands that a baseline LFT and kidney function test may be required. The patient verbalized understanding of the proper use and possible adverse effects of terbinafine.  All of the patient's questions and concerns were addressed.
Skyrizi Counseling: I discussed with the patient the risks of risankizumab-rzaa including but not limited to immunosuppression, and serious infections.  The patient understands that monitoring is required including a PPD at baseline and must alert us or the primary physician if symptoms of infection or other concerning signs are noted.
Erythromycin Pregnancy And Lactation Text: This medication is Pregnancy Category B and is considered safe during pregnancy. It is also excreted in breast milk.
Azathioprine Counseling:  I discussed with the patient the risks of azathioprine including but not limited to myelosuppression, immunosuppression, hepatotoxicity, lymphoma, and infections.  The patient understands that monitoring is required including baseline LFTs, Creatinine, possible TPMP genotyping and weekly CBCs for the first month and then every 2 weeks thereafter.  The patient verbalized understanding of the proper use and possible adverse effects of azathioprine.  All of the patient's questions and concerns were addressed.
Drysol Counseling:  I discussed with the patient the risks of drysol/aluminum chloride including but not limited to skin rash, itching, irritation, burning.
Minoxidil Counseling: Minoxidil is a topical medication which can increase blood flow where it is applied. It is uncertain how this medication increases hair growth. Side effects are uncommon and include stinging and allergic reactions.
Rinvoq Counseling: I discussed with the patient the risks of Rinvoq therapy including but not limited to upper respiratory tract infections, shingles, cold sores, bronchitis, nausea, cough, fever, acne, and headache. Live vaccines should be avoided.  This medication has been linked to serious infections; higher rate of mortality; malignancy and lymphoproliferative disorders; major adverse cardiovascular events; thrombosis; thrombocytopenia, anemia, and neutropenia; lipid elevations; liver enzyme elevations; and gastrointestinal perforations.
Cimzia Pregnancy And Lactation Text: This medication crosses the placenta but can be considered safe in certain situations. Cimzia may be excreted in breast milk.
Albendazole Counseling:  I discussed with the patient the risks of albendazole including but not limited to cytopenia, kidney damage, nausea/vomiting and severe allergy.  The patient understands that this medication is being used in an off-label manner.
Metronidazole Counseling:  I discussed with the patient the risks of metronidazole including but not limited to seizures, nausea/vomiting, a metallic taste in the mouth, nausea/vomiting and severe allergy.
Rinvoq Pregnancy And Lactation Text: Based on animal studies, Rinvoq may cause embryo-fetal harm when administered to pregnant women.  The medication should not be used in pregnancy.  Breastfeeding is not recommended during treatment and for 6 days after the last dose.
Clofazimine Counseling:  I discussed with the patient the risks of clofazimine including but not limited to skin and eye pigmentation, liver damage, nausea/vomiting, gastrointestinal bleeding and allergy.
Valtrex Pregnancy And Lactation Text: this medication is Pregnancy Category B and is considered safe during pregnancy. This medication is not directly found in breast milk but it's metabolite acyclovir is present.
Prednisone Counseling:  I discussed with the patient the risks of prolonged use of prednisone including but not limited to weight gain, insomnia, osteoporosis, mood changes, diabetes, susceptibility to infection, glaucoma and high blood pressure.  In cases where prednisone use is prolonged, patients should be monitored with blood pressure checks, serum glucose levels and an eye exam.  Additionally, the patient may need to be placed on GI prophylaxis, PCP prophylaxis, and calcium and vitamin D supplementation and/or a bisphosphonate.  The patient verbalized understanding of the proper use and the possible adverse effects of prednisone.  All of the patient's questions and concerns were addressed.
Zoryve Counseling:  I discussed with the patient that Zoryve is not for use in the eyes, mouth or vagina. The most commonly reported side effects include diarrhea, headache, insomnia, application site pain, upper respiratory tract infections, and urinary tract infections.  All of the patient's questions and concerns were addressed.
Propranolol Counseling:  I discussed with the patient the risks of propranolol including but not limited to low heart rate, low blood pressure, low blood sugar, restlessness and increased cold sensitivity. They should call the office if they experience any of these side effects.
Qbrexza Counseling:  I discussed with the patient the risks of Qbrexza including but not limited to headache, mydriasis, blurred vision, dry eyes, nasal dryness, dry mouth, dry throat, dry skin, urinary hesitation, and constipation.  Local skin reactions including erythema, burning, stinging, and itching can also occur.

## 2024-01-09 NOTE — PROCEDURE: MIPS QUALITY
Quality 130: Documentation Of Current Medications In The Medical Record: Current Medications Documented
Detail Level: Detailed
Quality 226: Preventive Care And Screening: Tobacco Use: Screening And Cessation Intervention: Patient screened for tobacco use and is an ex/non-smoker
Quality 226: Preventive Care And Screening: Tobacco Use: Screening And Cessation Intervention: Tobacco Screening not Performed

## 2024-01-09 NOTE — PROCEDURE: LIQUID NITROGEN
Add 52 Modifier (Optional): no
Medical Necessity Clause: This procedure was medically necessary because the lesions that were treated were:
Consent: The patient's consent was obtained including but not limited to risks of crusting, scabbing, blistering, scarring, darker or lighter pigmentary change, recurrence, incomplete removal and infection.
Medical Necessity Information: It is in your best interest to select a reason for this procedure from the list below. All of these items fulfill various CMS LCD requirements except the new and changing color options.
Show Spray Paint Technique Variable?: Yes
Application Tool (Optional): Liquid Nitrogen Sprayer
Duration Of Freeze Thaw-Cycle (Seconds): 3
Detail Level: Detailed
Post-Care Instructions: I reviewed with the patient in detail post-care instructions. Patient is to wear sunprotection, and avoid picking at any of the treated lesions. Pt may apply Vaseline to crusted or scabbing areas.
Spray Paint Text: The liquid nitrogen was applied to the skin utilizing a spray paint frosting technique.
Number Of Freeze-Thaw Cycles: 3 freeze-thaw cycles

## 2024-01-17 ENCOUNTER — RX ONLY (OUTPATIENT)
Age: 54
Setting detail: RX ONLY
End: 2024-01-17

## 2024-01-17 RX ORDER — CLOBETASOL PROPIONATE 0.5 MG/G
CREAM TOPICAL
Qty: 30 | Refills: 1 | Status: ERX | COMMUNITY
Start: 2024-01-17

## 2024-01-19 DIAGNOSIS — E06.3 HASHIMOTO'S THYROIDITIS: ICD-10-CM

## 2024-01-21 RX ORDER — LEVOTHYROXINE SODIUM 0.1 MG/1
100 TABLET ORAL
Qty: 90 TABLET | Refills: 0 | Status: SHIPPED | OUTPATIENT
Start: 2024-01-21

## 2024-01-22 NOTE — TELEPHONE ENCOUNTER
Requested Prescriptions     Pending Prescriptions Disp Refills    levothyroxine (SYNTHROID) 100 MCG Tab 90 Tablet 0     Sig: Take 1 Tablet by mouth every morning on an empty stomach.       JANY Montano.

## 2024-02-13 ENCOUNTER — HOSPITAL ENCOUNTER (OUTPATIENT)
Dept: LAB | Facility: MEDICAL CENTER | Age: 54
End: 2024-02-13
Attending: NURSE PRACTITIONER
Payer: OTHER GOVERNMENT

## 2024-02-13 DIAGNOSIS — E06.3 HASHIMOTO'S THYROIDITIS: ICD-10-CM

## 2024-02-13 LAB
T3FREE SERPL-MCNC: 1.87 PG/ML (ref 2–4.4)
T4 FREE SERPL-MCNC: 1.07 NG/DL (ref 0.93–1.7)
TSH SERPL DL<=0.005 MIU/L-ACNC: 3.82 UIU/ML (ref 0.38–5.33)

## 2024-02-13 PROCEDURE — 84439 ASSAY OF FREE THYROXINE: CPT

## 2024-02-13 PROCEDURE — 84481 FREE ASSAY (FT-3): CPT

## 2024-02-13 PROCEDURE — 84443 ASSAY THYROID STIM HORMONE: CPT

## 2024-02-13 PROCEDURE — 36415 COLL VENOUS BLD VENIPUNCTURE: CPT

## 2024-02-14 ENCOUNTER — PATIENT MESSAGE (OUTPATIENT)
Dept: BARIATRICS | Facility: CLINIC | Age: 54
End: 2024-02-14
Payer: OTHER GOVERNMENT

## 2024-02-20 ENCOUNTER — PATIENT MESSAGE (OUTPATIENT)
Dept: BARIATRICS | Facility: CLINIC | Age: 54
End: 2024-02-20
Payer: OTHER GOVERNMENT

## 2024-02-20 ENCOUNTER — OFFICE VISIT (OUTPATIENT)
Dept: MEDICAL GROUP | Facility: PHYSICIAN GROUP | Age: 54
End: 2024-02-20
Payer: OTHER GOVERNMENT

## 2024-02-20 VITALS
RESPIRATION RATE: 16 BRPM | HEIGHT: 68 IN | TEMPERATURE: 98 F | HEART RATE: 64 BPM | WEIGHT: 237 LBS | DIASTOLIC BLOOD PRESSURE: 86 MMHG | BODY MASS INDEX: 35.92 KG/M2 | SYSTOLIC BLOOD PRESSURE: 132 MMHG | OXYGEN SATURATION: 97 %

## 2024-02-20 DIAGNOSIS — Z79.4 TYPE 2 DIABETES MELLITUS WITHOUT COMPLICATION, WITH LONG-TERM CURRENT USE OF INSULIN (HCC): ICD-10-CM

## 2024-02-20 DIAGNOSIS — Z00.00 ROUTINE HEALTH MAINTENANCE: ICD-10-CM

## 2024-02-20 DIAGNOSIS — E11.9 TYPE 2 DIABETES MELLITUS WITHOUT COMPLICATION, WITH LONG-TERM CURRENT USE OF INSULIN (HCC): ICD-10-CM

## 2024-02-20 DIAGNOSIS — E83.110 HEREDITARY HEMOCHROMATOSIS (HCC): ICD-10-CM

## 2024-02-20 DIAGNOSIS — E06.3 HASHIMOTO'S THYROIDITIS: ICD-10-CM

## 2024-02-20 DIAGNOSIS — D58.2 ELEVATED HEMOGLOBIN (HCC): ICD-10-CM

## 2024-02-20 DIAGNOSIS — E78.5 DYSLIPIDEMIA: ICD-10-CM

## 2024-02-20 DIAGNOSIS — E66.01 CLASS 2 SEVERE OBESITY DUE TO EXCESS CALORIES WITH SERIOUS COMORBIDITY AND BODY MASS INDEX (BMI) OF 36.0 TO 36.9 IN ADULT (HCC): ICD-10-CM

## 2024-02-20 DIAGNOSIS — Z12.31 ENCOUNTER FOR SCREENING MAMMOGRAM FOR BREAST CANCER: ICD-10-CM

## 2024-02-20 PROBLEM — E66.812 CLASS 2 SEVERE OBESITY DUE TO EXCESS CALORIES WITH SERIOUS COMORBIDITY AND BODY MASS INDEX (BMI) OF 36.0 TO 36.9 IN ADULT (HCC): Status: ACTIVE | Noted: 2023-05-15

## 2024-02-20 LAB
HBA1C MFR BLD: 6 % (ref ?–5.8)
POCT INT CON NEG: NEGATIVE
POCT INT CON POS: POSITIVE

## 2024-02-20 PROCEDURE — 3079F DIAST BP 80-89 MM HG: CPT | Performed by: NURSE PRACTITIONER

## 2024-02-20 PROCEDURE — 83036 HEMOGLOBIN GLYCOSYLATED A1C: CPT | Performed by: NURSE PRACTITIONER

## 2024-02-20 PROCEDURE — 3075F SYST BP GE 130 - 139MM HG: CPT | Performed by: NURSE PRACTITIONER

## 2024-02-20 PROCEDURE — 99214 OFFICE O/P EST MOD 30 MIN: CPT | Performed by: NURSE PRACTITIONER

## 2024-02-20 ASSESSMENT — FIBROSIS 4 INDEX: FIB4 SCORE: 1.85

## 2024-02-20 ASSESSMENT — PATIENT HEALTH QUESTIONNAIRE - PHQ9: CLINICAL INTERPRETATION OF PHQ2 SCORE: 0

## 2024-02-20 NOTE — ASSESSMENT & PLAN NOTE
Chronic, ongoing.  Hemoglobin A1c in November 2022 8.5%, last was 5.9%.  Currently in clinic today 6.0%.  Continues Rybelsus 7 mg daily and glimepiride 4 mg daily.

## 2024-02-20 NOTE — ASSESSMENT & PLAN NOTE
Chronic, ongoing. Feels that this is the most stable her thyroid has ever been. Continues levothyroxine 100 mcg/day.

## 2024-02-20 NOTE — PROGRESS NOTES
"CC: Diagnoses of Type 2 diabetes mellitus without complication, with long-term current use of insulin (HCC), Class 2 severe obesity due to excess calories with serious comorbidity and body mass index (BMI) of 36.0 to 36.9 in adult (HCC), Hereditary hemochromatosis (HCC), Elevated hemoglobin (HCC), Hashimoto's thyroiditis, Dyslipidemia, Encounter for screening mammogram for breast cancer, and Routine health maintenance were pertinent to this visit.                                                                                                                                       HPI:    presents today with the following concerns:    History of Present Illness  The patient is a 53-year-old female who is here to follow up on her thyroid labs.    She feels better than she ever has on her current dose of levothyroxine. She feels exhausted due to her hemochromatosis. She fell asleep at her desk. She feels bottomed out, but her thyroid is better. She feels drained at times. She has good days and bad days. She thinks her thyroid has been stable since her weight loss surgery. She takes levothyroxine 100 mcg at 3am in the morning.    She has a slow digestive system. She has had an endoscopy and a digestion study. When she is able to finally have a bowel movement, it is just a solid. She takes protein shakes and protein bars. She does not feel like she is fully empty. She takes MiraLAX mostly in her coffee. She took 3 laxatives last night. She drinks a 48-ounce of water all the time. She has never tried Metamucil.    She is still taking Rybelsus 7 mg a day and glimepiride 4 mg a day.   She had RSV.    Hereditary hemochromatosis (HCC)  Chronic, ongoing. Scheduled to establish with hematology on 3/19/24. Reports that she is feeling \"really blah overall\", thinks its due to her hemochromatosis.    Hashimoto's thyroiditis  Chronic, ongoing. Feels that this is the most stable her thyroid has ever been. Continues levothyroxine " 100 mcg/day.    Type 2 diabetes mellitus without complication, with long-term current use of insulin ([facility])  Chronic, ongoing.  Hemoglobin A1c in November 2022 8.5%, last was 5.9%.  Currently in clinic today 6.0%.  Continues Rybelsus 7 mg daily and glimepiride 4 mg daily.    Patient Active Problem List    Diagnosis Date Noted    Hereditary hemochromatosis ([facility]) 11/30/2023    Elevated hemoglobin ([facility]) 11/20/2023    Elevated hematocrit 11/20/2023    Chronic use of benzodiazepine for therapeutic purpose 05/15/2023    Class 2 severe obesity due to excess calories with serious comorbidity and body mass index (BMI) of 36.0 to 36.9 in adult ([facility]) 05/15/2023    Primary insomnia 05/15/2023    Mild episode of recurrent major depressive disorder ([facility]) 05/15/2023    Hashimoto's thyroiditis 05/15/2023    Type 2 diabetes mellitus without complication, with long-term current use of insulin ([facility]) 05/15/2023    Dyslipidemia 05/15/2023    Mild intermittent asthma without complication 05/15/2023    Vitamin D deficiency 05/15/2023     Current Outpatient Medications   Medication Sig Dispense Refill    levothyroxine (SYNTHROID) 100 MCG Tab Take 1 Tablet by mouth every morning on an empty stomach. 90 Tablet 0    methylPREDNISolone (MEDROL DOSEPAK) 4 MG Tablet Therapy Pack As directed on the packaging label. 21 Tablet 0    albuterol 108 (90 Base) MCG/ACT Aero Soln inhalation aerosol Inhale 1-2 Puffs every 6 hours as needed for Shortness of Breath. 18 g 3    amitriptyline (ELAVIL) 10 MG Tab Take 1 Tablet by mouth every evening. 90 Tablet 3    fluticasone-salmeterol (ADVAIR) 250-50 MCG/ACT AEROSOL POWDER, BREATH ACTIVATED Inhale 1 Puff every 12 hours. 180 Each 3    glimepiride (AMARYL) 4 MG Tab Take 1 Tablet by mouth every morning. 90 Tablet 3    pravastatin (PRAVACHOL) 20 MG Tab Take 1 Tablet by mouth every evening. 90 Tablet 3    RYBELSUS 7 MG Tab Take 7 mg by mouth every day. 90 Tablet 3    vortioxetine (TRINTELLIX) 10 MG tablet Take 1  "Tablet by mouth every day. 90 Tablet 3    ALPRAZolam (XANAX) 1 MG Tab       NON SPECIFIED Flinstone's Vitamin with Iron      BIOTIN PO Take  by mouth.      Calcium Carb-Cholecalciferol (CALCIUM 600 + D PO) Take  by mouth.      SUPER B COMPLEX/C PO Take  by mouth.      ASHWAGANDHA PO Take  by mouth.      GLUCOSAMINE-CHONDROITIN PO Take  by mouth.       No current facility-administered medications for this visit.     Allergies as of 02/20/2024 - Reviewed 02/20/2024   Allergen Reaction Noted    Bactrim ds Hives 05/15/2023      ROS:  See HPI    /86 (BP Location: Left arm, Patient Position: Sitting, BP Cuff Size: Adult)   Pulse 64   Temp 36.7 °C (98 °F) (Tympanic)   Resp 16   Ht 1.727 m (5' 8\")   Wt 108 kg (237 lb)   LMP 01/01/2017 (Within Months)   SpO2 97%   BMI 36.04 kg/m²     Physical Exam:  General: Well nourished, well developed female in NAD, awake and conversant.  Eyes: Normal conjunctiva, anicteric.  Round symmetrical pupils.  ENT: Hearing grossly intact.  No nasal discharge.  Neck: Neck is supple.  No masses or thyromegaly.  CV: No lower extremity edema.  Respiratory: Respirations are nonlabored.  No wheezing.  Abdomen: Non-Distended.  Skin: Warm.  No rashes or ulcers.  MSK: Normal ambulation.  No clubbing or cyanosis.  Neuro: Sensation and CN II-XII grossly normal.  Psych: Alert and oriented.  Cooperative, appropriate mood and affect, normal judgment.      Assessment and Plan.   53 y.o. female with the following issues:  Assessment & Plan  1. Type 2 diabetes mellitus without complication, with long-term current use of insulin (HCC)  Chronic, ongoing. POCT A1c 6.0% in clinic today. Continue rybelsus 7 mg/day and glimepiride 4 mg daily. Due for updated annual labs in September 2024 prior to annual follow up.  - POCT  A1C  - HEMOGLOBIN A1C; Future  - Comp Metabolic Panel; Future  - Lipid Profile; Future  - MICROALBUMIN CREAT RATIO URINE; Future    2. Class 2 severe obesity due to excess calories " with serious comorbidity and body mass index (BMI) of 36.0 to 36.9 in adult (HCC)  Chronic, ongoing.  Encourage diet high in fruits, vegetables, and fiber. And a diet low in salt, refined carbohydrates, cholesterol, saturated fat, and trans fatty acids.    Encourage a minimum of 30 minutes of moderate intensity aerobic exercise (eg, brisk walking) is recommended on five days each week. Or 30 minutes of vigorous-intensity aerobic exercise (eg, jogging) on three days each week.   Patient's body mass index is 36.04 kg/m². Exercise and nutrition counseling were performed at this visit.  Due for updated annual labs in September 2024 prior to annual follow up.  - HEMOGLOBIN A1C; Future  - CBC WITH DIFFERENTIAL; Future  - Comp Metabolic Panel; Future  - Lipid Profile; Future  - TSH; Future    3. Hereditary hemochromatosis (HCC)  4. Elevated hemoglobin (HCC)  Chronic, ongoing. Keep appointment with hematology on 3/19/2024.  - CBC WITH DIFFERENTIAL; Future    5. Hashimoto's thyroiditis  Chronic, stable. Her TSH is normal. Her free T4 is normal. Her T3 is 1.87. I will not adjust her levothyroxine, continue levothyroxine 100 mcg every morning. Due for updated annual labs in September 2024 prior to annual follow up.  - CBC WITH DIFFERENTIAL; Future    6. Dyslipidemia  Chronic, ongoing. Continue pravastatin 20 mg nightly. Due for updated annual labs in September 2024 prior to annual follow up.  - HEMOGLOBIN A1C; Future  - Comp Metabolic Panel; Future  - Lipid Profile; Future  - TSH; Future    7. Encounter for screening mammogram for breast cancer  Due for screening.  - MA-SCREENING MAMMO BILAT W/TOMOSYNTHESIS W/CAD; Future    8. Routine health maintenance  Due for updated annual labs in September 2024 prior to annual follow up.  - HEMOGLOBIN A1C; Future  - CBC WITH DIFFERENTIAL; Future  - Comp Metabolic Panel; Future  - Lipid Profile; Future  - MICROALBUMIN CREAT RATIO URINE; Future  - T3 FREE; Future  - FREE THYROXINE;  Future  - TSH; Future      Return in about 29 weeks (around 9/10/2024) for Preventative Annual, Follow up Labs.     Please note that this dictation was created using voice recognition software. I have worked with consultants from the vendor as well as technical experts from [facility] to optimize the interface. I have made every reasonable attempt to correct obvious errors, but I expect that there are errors of grammar and possibly content that I did not discover before finalizing the note.      Verbal consent was acquired by the patient to use [employer] listening note generation during this visit Yes

## 2024-02-20 NOTE — ASSESSMENT & PLAN NOTE
"Chronic, ongoing. Scheduled to establish with hematology on 3/19/24. Reports that she is feeling \"really blah overall\", thinks its due to her hemochromatosis.  "

## 2024-02-29 ENCOUNTER — PATIENT MESSAGE (OUTPATIENT)
Dept: BARIATRICS | Facility: CLINIC | Age: 54
End: 2024-02-29
Payer: OTHER GOVERNMENT

## 2024-03-06 ENCOUNTER — PATIENT MESSAGE (OUTPATIENT)
Dept: BARIATRICS | Facility: CLINIC | Age: 54
End: 2024-03-06
Payer: OTHER GOVERNMENT

## 2024-03-06 NOTE — PROGRESS NOTES
03/19/24    Subjective    Chief Complaint:  Hemochromatosis    HPI:  53 female referred for consultation by JANY Fleming because of a history of hemochromatosis. 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 She also has a slightly elevated H/H. Had bariatric surgery 11/22 - has lost 70 lbs. Very tired. Moved here from [place].     ROS:    Constitutional: 70 lb weight loss  Skin: No rash or jaundice  HENT: No change in eyesight or hearing  Cardiovascular:No chest pain or arrythmia  Respiratory:No cough or SOB  GI:No nausea, vomiting, diarrhea, constipation  :No dysuria or frequency  Musculoskeletal:No bone or joint pain  Neuro:No sx's of neuropathy  Psych: No complaints    PMH:      Allergies   Allergen Reactions    Bactrim Ds Hives       Past Medical History:   Diagnosis Date    Allergy     Anxiety     BRCA1 negative     Cellulitis, face 09/28/2017    COVID-19 11/20/2020    Depression     Diverticulitis 05/21/2019    Pneumonia 2009    RSV infection     Sepsis (HCC) 11/17/2018    cat fish sting    Thyroid disease         Past Surgical History:   Procedure Laterality Date    GASTRIC BANDING LAPAROSCOPIC  11/26/2022    ELBOW ARTHROTOMY Left 07/01/2020    CHOLECYSTECTOMY  2017    LIPOMA EXCISION  2017    ABDOMINAL HYSTERECTOMY TOTAL  2017    1 ovary remains    LUMPECTOMY  05/2015    LUMBAR DISC REPLACEMENT  2001    L5    PRIMARY C SECTION  06/30/1995 9/8/1999    TONSILLECTOMY  1984    BRCA1&2 GEN FULL SEQ DUP/DEL      CERVICAL FUSION POSTERIOR      8/2017 & 12/7/2020    HERNIA REPAIR          Medications:    Current Outpatient Medications on File Prior to Encounter   Medication Sig Dispense Refill    pantoprazole (PROTONIX) 40 MG Tablet Delayed Response Take 1 Tablet by mouth every day. 90 Tablet 1    levothyroxine (SYNTHROID) 100 MCG Tab Take 1 Tablet by mouth every morning on an empty stomach. 90 Tablet 0    methylPREDNISolone (MEDROL DOSEPAK) 4 MG Tablet Therapy Pack As directed on the  "packaging label. 21 Tablet 0    albuterol 108 (90 Base) MCG/ACT Aero Soln inhalation aerosol Inhale 1-2 Puffs every 6 hours as needed for Shortness of Breath. 18 g 3    amitriptyline (ELAVIL) 10 MG Tab Take 1 Tablet by mouth every evening. 90 Tablet 3    fluticasone-salmeterol (ADVAIR) 250-50 MCG/ACT AEROSOL POWDER, BREATH ACTIVATED Inhale 1 Puff every 12 hours. 180 Each 3    glimepiride (AMARYL) 4 MG Tab Take 1 Tablet by mouth every morning. 90 Tablet 3    pravastatin (PRAVACHOL) 20 MG Tab Take 1 Tablet by mouth every evening. 90 Tablet 3    RYBELSUS 7 MG Tab Take 7 mg by mouth every day. 90 Tablet 3    vortioxetine (TRINTELLIX) 10 MG tablet Take 1 Tablet by mouth every day. 90 Tablet 3    ALPRAZolam (XANAX) 1 MG Tab       NON SPECIFIED Flinstone's Vitamin with Iron      BIOTIN PO Take  by mouth.      Calcium Carb-Cholecalciferol (CALCIUM 600 + D PO) Take  by mouth.      SUPER B COMPLEX/C PO Take  by mouth.      ASHWAGANDHA PO Take  by mouth.      GLUCOSAMINE-CHONDROITIN PO Take  by mouth.       No current facility-administered medications on file prior to encounter.       Social History     Tobacco Use    Smoking status: Never    Smokeless tobacco: Never   Substance Use Topics    Alcohol use: Not Currently        Family History   Problem Relation Age of Onset    No Known Problems Mother     Obesity Sister     Thyroid Sister     No Known Problems Sister     No Known Problems Brother     Obesity Paternal Aunt     Diabetes Paternal Aunt     No Known Problems Maternal Grandmother     No Known Problems Maternal Grandfather     No Known Problems Paternal Grandmother     No Known Problems Paternal Grandfather     No Known Problems Daughter     No Known Problems Daughter         Objective    Vitals:    BP (!) 144/98 (BP Location: Left arm, Patient Position: Sitting, BP Cuff Size: Adult)   Pulse 84   Temp 36.7 °C (98 °F) (Temporal)   Resp 16   Ht 1.727 m (5' 8\")   Wt 106 kg (234 lb 7.4 oz)   LMP 01/01/2017 (Within " Months)   SpO2 94%   BMI 35.65 kg/m²     Physical Exam:    Appears well-developed and well-nourished. Evidence of obvious weight loss. No distress.    Head -  Normocephalic .   Eyes - Pupils are equal. Conjunctivae normal. No scleral icterus.   Ears - normal hearing  Neck - Neck supple. No thyromegaly  Cardiovascular - Normal rate, regular rhythm, normal heart sounds and intact distal pulses. No  gallop, murmur or rub  Pulmonary - Normal breath sounds.  No wheeze, rales or rhonchi  Breast - symmetrical. No mass on indentation.  Abdominal -Soft. No distension, tenderness, organomegaly or mass  Extremities-  No edema or tenderness.    Nodes - No submental, submandibular, preauricular, cervical, axillary or inguinal adenopathy.    Neurological -   Alert and oriented.  Skin - Skin is warm and dry. No rash noted. Not diaphoretic. No erythema. No pallor. No jaundice   Psychiatric -  Normal mood and affect.    Labs:     Most Recent   C282Y Mutation Heterozygous  11/20/23 08:17   H63D Mutation Heterozygous  11/20/23 08:17   S65C Mutation Negative  11/20/23 08:17      Latest Reference Range & Units 06/03/23 08:24 11/13/23 15:46   WBC 4.8 - 10.8 K/uL 5.0 7.4   RBC 4.20 - 5.40 M/uL 5.70 (H) 5.38   Hemoglobin 12.0 - 16.0 g/dL 17.1 (H) 16.6 (H)   Hematocrit 37.0 - 47.0 % 50.7 (H) 48.7 (H)   MCV 81.4 - 97.8 fL 88.9 90.5   MCH 27.0 - 33.0 pg 30.0 30.9   MCHC 32.2 - 35.5 g/dL 33.7 34.1   RDW 35.9 - 50.0 fL 39.8 38.9   Platelet Count 164 - 446 K/uL 251 267       Assessment  Double heterozygote for hemochromatosis. May be protected somewhat by the iron deficiency commonly seen with bariatric surgery. Polycythemia may be adjustment to new altitude after move from [place]  Imp:    Visit Diagnosis:    1. Hereditary hemochromatosis (HCC)  FERRITIN    IRON/TOTAL IRON BIND    CBC WITH DIFFERENTIAL      2. Elevated hemoglobin (HCC)        3. Type 2 diabetes mellitus without complication, with long-term current use of insulin (HCC)         4. Hashimoto's thyroiditis        5. Polycythemia  ERYTHROPOIETIN    CBC WITH DIFFERENTIAL          Plan:  Above lab - return in a week  Daughters need to be screened    Greg Rain M.D.

## 2024-03-18 DIAGNOSIS — Z98.84 HISTORY OF BARIATRIC SURGERY: ICD-10-CM

## 2024-03-18 RX ORDER — PANTOPRAZOLE SODIUM 40 MG/1
40 TABLET, DELAYED RELEASE ORAL DAILY
Qty: 90 TABLET | Refills: 1 | Status: SHIPPED | OUTPATIENT
Start: 2024-03-18

## 2024-03-18 NOTE — TELEPHONE ENCOUNTER
Requested Prescriptions     Pending Prescriptions Disp Refills    pantoprazole (PROTONIX) 40 MG Tablet Delayed Response 90 Tablet 1     Sig: Take 1 Tablet by mouth every day.       JANY Montano.

## 2024-03-18 NOTE — TELEPHONE ENCOUNTER
Could you please prescribe Pantoprazole 40MG? I took them after my bariatric surgery and have run out.

## 2024-03-18 NOTE — TELEPHONE ENCOUNTER
Received request via: Patient    Was the patient seen in the last year in this department? Yes    Does the patient have an active prescription (recently filled or refills available) for medication(s) requested? No    Pharmacy Name: [facility]    Does the patient have senior living Plus and need 100 day supply (blood pressure, diabetes and cholesterol meds only)? Patient does not have SCP

## 2024-03-19 ENCOUNTER — HOSPITAL ENCOUNTER (OUTPATIENT)
Dept: LAB | Facility: MEDICAL CENTER | Age: 54
End: 2024-03-19
Attending: INTERNAL MEDICINE
Payer: OTHER GOVERNMENT

## 2024-03-19 ENCOUNTER — HOSPITAL ENCOUNTER (OUTPATIENT)
Dept: HEMATOLOGY ONCOLOGY | Facility: MEDICAL CENTER | Age: 54
End: 2024-03-19
Attending: INTERNAL MEDICINE
Payer: OTHER GOVERNMENT

## 2024-03-19 VITALS
SYSTOLIC BLOOD PRESSURE: 144 MMHG | HEIGHT: 68 IN | TEMPERATURE: 98 F | RESPIRATION RATE: 16 BRPM | WEIGHT: 234.46 LBS | DIASTOLIC BLOOD PRESSURE: 98 MMHG | BODY MASS INDEX: 35.53 KG/M2 | OXYGEN SATURATION: 94 % | HEART RATE: 84 BPM

## 2024-03-19 DIAGNOSIS — D75.1 POLYCYTHEMIA: ICD-10-CM

## 2024-03-19 DIAGNOSIS — Z79.4 TYPE 2 DIABETES MELLITUS WITHOUT COMPLICATION, WITH LONG-TERM CURRENT USE OF INSULIN (HCC): ICD-10-CM

## 2024-03-19 DIAGNOSIS — E06.3 HASHIMOTO'S THYROIDITIS: ICD-10-CM

## 2024-03-19 DIAGNOSIS — D58.2 ELEVATED HEMOGLOBIN (HCC): ICD-10-CM

## 2024-03-19 DIAGNOSIS — E11.9 TYPE 2 DIABETES MELLITUS WITHOUT COMPLICATION, WITH LONG-TERM CURRENT USE OF INSULIN (HCC): ICD-10-CM

## 2024-03-19 DIAGNOSIS — E83.110 HEREDITARY HEMOCHROMATOSIS (HCC): ICD-10-CM

## 2024-03-19 LAB
BASOPHILS # BLD AUTO: 1.4 % (ref 0–1.8)
BASOPHILS # BLD: 0.09 K/UL (ref 0–0.12)
EOSINOPHIL # BLD AUTO: 0.59 K/UL (ref 0–0.51)
EOSINOPHIL NFR BLD: 8.9 % (ref 0–6.9)
ERYTHROCYTE [DISTWIDTH] IN BLOOD BY AUTOMATED COUNT: 40.5 FL (ref 35.9–50)
FERRITIN SERPL-MCNC: 89 NG/ML (ref 10–291)
HCT VFR BLD AUTO: 48.8 % (ref 37–47)
HGB BLD-MCNC: 16.4 G/DL (ref 12–16)
IMM GRANULOCYTES # BLD AUTO: 0.02 K/UL (ref 0–0.11)
IMM GRANULOCYTES NFR BLD AUTO: 0.3 % (ref 0–0.9)
IRON SATN MFR SERPL: 39 % (ref 15–55)
IRON SERPL-MCNC: 100 UG/DL (ref 40–170)
LYMPHOCYTES # BLD AUTO: 2.43 K/UL (ref 1–4.8)
LYMPHOCYTES NFR BLD: 36.5 % (ref 22–41)
MCH RBC QN AUTO: 30.5 PG (ref 27–33)
MCHC RBC AUTO-ENTMCNC: 33.6 G/DL (ref 32.2–35.5)
MCV RBC AUTO: 90.7 FL (ref 81.4–97.8)
MONOCYTES # BLD AUTO: 0.38 K/UL (ref 0–0.85)
MONOCYTES NFR BLD AUTO: 5.7 % (ref 0–13.4)
NEUTROPHILS # BLD AUTO: 3.15 K/UL (ref 1.82–7.42)
NEUTROPHILS NFR BLD: 47.2 % (ref 44–72)
NRBC # BLD AUTO: 0 K/UL
NRBC BLD-RTO: 0 /100 WBC (ref 0–0.2)
PLATELET # BLD AUTO: 266 K/UL (ref 164–446)
PMV BLD AUTO: 9.5 FL (ref 9–12.9)
RBC # BLD AUTO: 5.38 M/UL (ref 4.2–5.4)
TIBC SERPL-MCNC: 255 UG/DL (ref 250–450)
UIBC SERPL-MCNC: 155 UG/DL (ref 110–370)
WBC # BLD AUTO: 6.7 K/UL (ref 4.8–10.8)

## 2024-03-19 PROCEDURE — 82728 ASSAY OF FERRITIN: CPT

## 2024-03-19 PROCEDURE — 99212 OFFICE O/P EST SF 10 MIN: CPT | Performed by: INTERNAL MEDICINE

## 2024-03-19 PROCEDURE — 99204 OFFICE O/P NEW MOD 45 MIN: CPT | Performed by: INTERNAL MEDICINE

## 2024-03-19 PROCEDURE — 85025 COMPLETE CBC W/AUTO DIFF WBC: CPT

## 2024-03-19 PROCEDURE — 82668 ASSAY OF ERYTHROPOIETIN: CPT

## 2024-03-19 PROCEDURE — 83540 ASSAY OF IRON: CPT

## 2024-03-19 PROCEDURE — 36415 COLL VENOUS BLD VENIPUNCTURE: CPT

## 2024-03-19 PROCEDURE — 83550 IRON BINDING TEST: CPT

## 2024-03-19 ASSESSMENT — FIBROSIS 4 INDEX: FIB4 SCORE: 1.85

## 2024-03-19 ASSESSMENT — PAIN SCALES - GENERAL: PAINLEVEL: 2=MINIMAL-SLIGHT

## 2024-03-21 LAB — EPO SERPL-ACNC: 8 MU/ML (ref 4–27)

## 2024-03-22 NOTE — PROGRESS NOTES
03/26/24    Subjective    Chief Complaint:  Follow up from consultation for hemochromatosis and polycythemia. 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  She also has h/o bariatric surgery in 2022. Epo level is normal and ferritin 89. H/H coming down.     HPI:      ROS:    Constitutional: No weight loss  Skin: No rash or jaundice  HENT: No change in eyesight or hearing  Cardiovascular:No chest pain or arrythmia  Respiratory:No cough or SOB  GI:No nausea, vomiting, diarrhea, constipation  :No dysuria or frequency  Musculoskeletal:No bone or joint pain  Neuro:No sx's of neuropathy  Psych: No complaints    PMH:      Allergies   Allergen Reactions    Bactrim Ds Hives       Past Medical History:   Diagnosis Date    Allergy     Anxiety     BRCA1 negative     Cellulitis, face 09/28/2017    COVID-19 11/20/2020    Depression     Diverticulitis 05/21/2019    Pneumonia 2009    RSV infection     Sepsis (HCC) 11/17/2018    cat fish sting    Thyroid disease         Past Surgical History:   Procedure Laterality Date    GASTRIC BANDING LAPAROSCOPIC  11/26/2022    ELBOW ARTHROTOMY Left 07/01/2020    CHOLECYSTECTOMY  2017    LIPOMA EXCISION  2017    ABDOMINAL HYSTERECTOMY TOTAL  2017    1 ovary remains    LUMPECTOMY  05/2015    LUMBAR DISC REPLACEMENT  2001    L5    PRIMARY C SECTION  06/30/1995 9/8/1999    TONSILLECTOMY  1984    BRCA1&2 GEN FULL SEQ DUP/DEL      CERVICAL FUSION POSTERIOR      8/2017 & 12/7/2020    HERNIA REPAIR          Medications:    Current Outpatient Medications on File Prior to Encounter   Medication Sig Dispense Refill    pantoprazole (PROTONIX) 40 MG Tablet Delayed Response Take 1 Tablet by mouth every day. 90 Tablet 1    levothyroxine (SYNTHROID) 100 MCG Tab Take 1 Tablet by mouth every morning on an empty stomach. 90 Tablet 0    methylPREDNISolone (MEDROL DOSEPAK) 4 MG Tablet Therapy Pack As directed on the packaging label. 21 Tablet 0    albuterol 108 (90 Base) MCG/ACT Aero Soln inhalation  "aerosol Inhale 1-2 Puffs every 6 hours as needed for Shortness of Breath. 18 g 3    amitriptyline (ELAVIL) 10 MG Tab Take 1 Tablet by mouth every evening. 90 Tablet 3    fluticasone-salmeterol (ADVAIR) 250-50 MCG/ACT AEROSOL POWDER, BREATH ACTIVATED Inhale 1 Puff every 12 hours. 180 Each 3    glimepiride (AMARYL) 4 MG Tab Take 1 Tablet by mouth every morning. 90 Tablet 3    pravastatin (PRAVACHOL) 20 MG Tab Take 1 Tablet by mouth every evening. 90 Tablet 3    RYBELSUS 7 MG Tab Take 7 mg by mouth every day. 90 Tablet 3    vortioxetine (TRINTELLIX) 10 MG tablet Take 1 Tablet by mouth every day. 90 Tablet 3    ALPRAZolam (XANAX) 1 MG Tab       NON SPECIFIED Flinstone's Vitamin with Iron      BIOTIN PO Take  by mouth.      Calcium Carb-Cholecalciferol (CALCIUM 600 + D PO) Take  by mouth.      SUPER B COMPLEX/C PO Take  by mouth.      ASHWAGANDHA PO Take  by mouth.      GLUCOSAMINE-CHONDROITIN PO Take  by mouth.       No current facility-administered medications on file prior to encounter.       Social History     Tobacco Use    Smoking status: Never    Smokeless tobacco: Never   Substance Use Topics    Alcohol use: Not Currently        Family History   Problem Relation Age of Onset    No Known Problems Mother     Obesity Sister     Thyroid Sister     No Known Problems Sister     No Known Problems Brother     Obesity Paternal Aunt     Diabetes Paternal Aunt     No Known Problems Maternal Grandmother     No Known Problems Maternal Grandfather     No Known Problems Paternal Grandmother     No Known Problems Paternal Grandfather     No Known Problems Daughter     No Known Problems Daughter         Objective    Vitals:    /78 (BP Location: Left arm, Patient Position: Sitting, BP Cuff Size: Adult)   Pulse 75   Temp 36.9 °C (98.5 °F) (Temporal)   Resp 13   Ht 1.727 m (5' 8\")   Wt 107 kg (236 lb)   LMP 01/01/2017 (Within Months)   SpO2 93%   BMI 35.88 kg/m²     Physical Exam:    Appears well-developed and " well-nourished. No distress.    Head -  Normocephalic .   Eyes - Pupils are equal. Conjunctivae normal. No scleral icterus.   Ears - normal hearing  Neurological -   Alert and oriented.  Skin - Skin is warm and dry. No rash noted. Not diaphoretic. No erythema. No pallor. No jaundice   Psychiatric -  Normal mood and affect.    Labs:     Latest Reference Range & Units 03/19/24 14:46   WBC 4.8 - 10.8 K/uL 6.7   RBC 4.20 - 5.40 M/uL 5.38   Hemoglobin 12.0 - 16.0 g/dL 16.4 (H)   Hematocrit 37.0 - 47.0 % 48.8 (H)   MCV 81.4 - 97.8 fL 90.7   MCH 27.0 - 33.0 pg 30.5   MCHC 32.2 - 35.5 g/dL 33.6   RDW 35.9 - 50.0 fL 40.5   Platelet Count 164 - 446 K/uL 266      Latest Reference Range & Units 03/19/24 14:46   Ferritin 10.0 - 291.0 ng/mL 89.0      Latest Reference Range & Units 03/19/24 14:46   Erythropoietin 4 - 27 mU/mL 8     Assessment    Imp:    Visit Diagnosis:    1. Hereditary hemochromatosis (HCC)  FERRITIN    IRON/TOTAL IRON BIND    CBC WITH DIFFERENTIAL      2. Elevated hemoglobin (HCC)        3. Polycythemia  FERRITIN    IRON/TOTAL IRON BIND    CBC WITH DIFFERENTIAL        Plan:  Monitor iron and CBC    Greg Rain M.D.

## 2024-03-26 ENCOUNTER — HOSPITAL ENCOUNTER (OUTPATIENT)
Dept: HEMATOLOGY ONCOLOGY | Facility: MEDICAL CENTER | Age: 54
End: 2024-03-26
Attending: INTERNAL MEDICINE
Payer: OTHER GOVERNMENT

## 2024-03-26 VITALS
DIASTOLIC BLOOD PRESSURE: 78 MMHG | BODY MASS INDEX: 35.77 KG/M2 | HEIGHT: 68 IN | RESPIRATION RATE: 13 BRPM | HEART RATE: 75 BPM | OXYGEN SATURATION: 93 % | SYSTOLIC BLOOD PRESSURE: 130 MMHG | WEIGHT: 236 LBS | TEMPERATURE: 98.5 F

## 2024-03-26 DIAGNOSIS — D58.2 ELEVATED HEMOGLOBIN (HCC): ICD-10-CM

## 2024-03-26 DIAGNOSIS — D75.1 POLYCYTHEMIA: ICD-10-CM

## 2024-03-26 DIAGNOSIS — E83.110 HEREDITARY HEMOCHROMATOSIS (HCC): ICD-10-CM

## 2024-03-26 PROCEDURE — 99212 OFFICE O/P EST SF 10 MIN: CPT

## 2024-03-26 PROCEDURE — 99213 OFFICE O/P EST LOW 20 MIN: CPT | Performed by: INTERNAL MEDICINE

## 2024-03-26 PROCEDURE — 99212 OFFICE O/P EST SF 10 MIN: CPT | Performed by: INTERNAL MEDICINE

## 2024-03-26 ASSESSMENT — FIBROSIS 4 INDEX: FIB4 SCORE: 1.86

## 2024-03-26 ASSESSMENT — PAIN SCALES - GENERAL: PAINLEVEL: 2=MINIMAL-SLIGHT

## 2024-04-03 ENCOUNTER — PATIENT MESSAGE (OUTPATIENT)
Dept: BARIATRICS | Facility: CLINIC | Age: 54
End: 2024-04-03
Payer: OTHER GOVERNMENT

## 2024-04-09 ENCOUNTER — PATIENT MESSAGE (OUTPATIENT)
Dept: BARIATRICS | Facility: CLINIC | Age: 54
End: 2024-04-09
Payer: OTHER GOVERNMENT

## 2024-04-21 DIAGNOSIS — E06.3 HASHIMOTO'S THYROIDITIS: ICD-10-CM

## 2024-04-22 RX ORDER — LEVOTHYROXINE SODIUM 100 MCG
100 TABLET ORAL
Qty: 90 TABLET | Refills: 3 | Status: SHIPPED | OUTPATIENT
Start: 2024-04-22

## 2024-04-22 NOTE — TELEPHONE ENCOUNTER
Received request via: Pharmacy    Was the patient seen in the last year in this department? Yes    Does the patient have an active prescription (recently filled or refills available) for medication(s) requested? No    [facility]     Does the patient have [facility] and need 100 day supply (blood pressure, diabetes and cholesterol meds only)? Patient does not have SCP

## 2024-04-22 NOTE — TELEPHONE ENCOUNTER
Requested Prescriptions     Pending Prescriptions Disp Refills    SYNTHROID 100 MCG Tab [Pharmacy Med Name: SYNTHROID TABS 100MCG] 90 Tablet 3     Sig: TAKE 1 TABLET EVERY MORNING ON AN EMPTY STOMACH       JANY Montano.

## 2024-04-24 ENCOUNTER — PATIENT MESSAGE (OUTPATIENT)
Dept: BARIATRICS | Facility: CLINIC | Age: 54
End: 2024-04-24
Payer: OTHER GOVERNMENT

## 2024-04-24 ENCOUNTER — TELEPHONE (OUTPATIENT)
Dept: BARIATRICS | Facility: CLINIC | Age: 54
End: 2024-04-24
Payer: OTHER GOVERNMENT

## 2024-05-14 ENCOUNTER — PATIENT MESSAGE (OUTPATIENT)
Dept: BARIATRICS | Facility: CLINIC | Age: 54
End: 2024-05-14
Payer: OTHER GOVERNMENT

## 2024-05-24 ENCOUNTER — HOSPITAL ENCOUNTER (OUTPATIENT)
Dept: RADIOLOGY | Facility: MEDICAL CENTER | Age: 54
End: 2024-05-24
Attending: NURSE PRACTITIONER
Payer: OTHER GOVERNMENT

## 2024-05-24 DIAGNOSIS — Z12.31 ENCOUNTER FOR SCREENING MAMMOGRAM FOR BREAST CANCER: ICD-10-CM

## 2024-06-11 ENCOUNTER — PATIENT MESSAGE (OUTPATIENT)
Dept: BARIATRICS | Facility: CLINIC | Age: 54
End: 2024-06-11
Payer: OTHER GOVERNMENT

## 2024-07-03 ENCOUNTER — PATIENT MESSAGE (OUTPATIENT)
Dept: BARIATRICS | Facility: CLINIC | Age: 54
End: 2024-07-03
Payer: OTHER GOVERNMENT

## 2024-07-09 ENCOUNTER — PATIENT MESSAGE (OUTPATIENT)
Dept: BARIATRICS | Facility: CLINIC | Age: 54
End: 2024-07-09
Payer: OTHER GOVERNMENT

## 2024-07-20 DIAGNOSIS — D17.1 FLANK LIPOMA: ICD-10-CM

## 2024-07-23 ENCOUNTER — OFFICE VISIT (OUTPATIENT)
Dept: URGENT CARE | Facility: PHYSICIAN GROUP | Age: 54
End: 2024-07-23
Payer: OTHER GOVERNMENT

## 2024-07-23 VITALS
RESPIRATION RATE: 16 BRPM | HEIGHT: 69 IN | SYSTOLIC BLOOD PRESSURE: 140 MMHG | OXYGEN SATURATION: 96 % | DIASTOLIC BLOOD PRESSURE: 78 MMHG | WEIGHT: 227 LBS | TEMPERATURE: 97.2 F | HEART RATE: 68 BPM | BODY MASS INDEX: 33.62 KG/M2

## 2024-07-23 DIAGNOSIS — K57.92 DIVERTICULITIS: ICD-10-CM

## 2024-07-23 PROCEDURE — 99214 OFFICE O/P EST MOD 30 MIN: CPT | Performed by: FAMILY MEDICINE

## 2024-07-23 PROCEDURE — 3077F SYST BP >= 140 MM HG: CPT | Performed by: FAMILY MEDICINE

## 2024-07-23 PROCEDURE — 3078F DIAST BP <80 MM HG: CPT | Performed by: FAMILY MEDICINE

## 2024-07-23 RX ORDER — ONDANSETRON HYDROCHLORIDE 8 MG/1
8 TABLET, FILM COATED ORAL EVERY 8 HOURS PRN
Qty: 15 TABLET | Refills: 0 | Status: SHIPPED | OUTPATIENT
Start: 2024-07-23

## 2024-07-23 RX ORDER — METRONIDAZOLE 500 MG/1
500 TABLET ORAL EVERY 8 HOURS
Qty: 21 TABLET | Refills: 0 | Status: SHIPPED | OUTPATIENT
Start: 2024-07-23 | End: 2024-07-30

## 2024-07-23 RX ORDER — CIPROFLOXACIN 500 MG/1
500 TABLET, FILM COATED ORAL 2 TIMES DAILY
Qty: 14 TABLET | Refills: 0 | Status: SHIPPED | OUTPATIENT
Start: 2024-07-23 | End: 2024-07-30

## 2024-07-23 ASSESSMENT — FIBROSIS 4 INDEX: FIB4 SCORE: 1.86

## 2024-07-24 ASSESSMENT — ENCOUNTER SYMPTOMS: ABDOMINAL PAIN: 1

## 2024-08-12 ENCOUNTER — PATIENT MESSAGE (OUTPATIENT)
Dept: BARIATRICS | Facility: CLINIC | Age: 54
End: 2024-08-12
Payer: OTHER GOVERNMENT

## 2024-09-03 ENCOUNTER — PATIENT MESSAGE (OUTPATIENT)
Dept: BARIATRICS | Facility: CLINIC | Age: 54
End: 2024-09-03
Payer: OTHER GOVERNMENT

## 2024-09-06 ENCOUNTER — HOSPITAL ENCOUNTER (OUTPATIENT)
Dept: LAB | Facility: MEDICAL CENTER | Age: 54
End: 2024-09-06
Attending: INTERNAL MEDICINE
Payer: OTHER GOVERNMENT

## 2024-09-06 ENCOUNTER — HOSPITAL ENCOUNTER (OUTPATIENT)
Dept: LAB | Facility: MEDICAL CENTER | Age: 54
End: 2024-09-06
Attending: NURSE PRACTITIONER
Payer: OTHER GOVERNMENT

## 2024-09-06 DIAGNOSIS — E83.110 HEREDITARY HEMOCHROMATOSIS (HCC): ICD-10-CM

## 2024-09-06 DIAGNOSIS — E06.3 HASHIMOTO'S THYROIDITIS: ICD-10-CM

## 2024-09-06 DIAGNOSIS — Z00.00 ROUTINE HEALTH MAINTENANCE: ICD-10-CM

## 2024-09-06 DIAGNOSIS — D58.2 ELEVATED HEMOGLOBIN (HCC): ICD-10-CM

## 2024-09-06 DIAGNOSIS — E78.5 DYSLIPIDEMIA: ICD-10-CM

## 2024-09-06 DIAGNOSIS — E11.9 TYPE 2 DIABETES MELLITUS WITHOUT COMPLICATION, WITH LONG-TERM CURRENT USE OF INSULIN (HCC): ICD-10-CM

## 2024-09-06 DIAGNOSIS — E66.01 CLASS 2 SEVERE OBESITY DUE TO EXCESS CALORIES WITH SERIOUS COMORBIDITY AND BODY MASS INDEX (BMI) OF 36.0 TO 36.9 IN ADULT (HCC): ICD-10-CM

## 2024-09-06 DIAGNOSIS — Z79.4 TYPE 2 DIABETES MELLITUS WITHOUT COMPLICATION, WITH LONG-TERM CURRENT USE OF INSULIN (HCC): ICD-10-CM

## 2024-09-06 DIAGNOSIS — D75.1 POLYCYTHEMIA: ICD-10-CM

## 2024-09-06 LAB
ALBUMIN SERPL BCP-MCNC: 4.3 G/DL (ref 3.2–4.9)
ALBUMIN/GLOB SERPL: 1.3 G/DL
ALP SERPL-CCNC: 62 U/L (ref 30–99)
ALT SERPL-CCNC: 14 U/L (ref 2–50)
ANION GAP SERPL CALC-SCNC: 13 MMOL/L (ref 7–16)
AST SERPL-CCNC: 32 U/L (ref 12–45)
BASOPHILS # BLD AUTO: 1.6 % (ref 0–1.8)
BASOPHILS # BLD: 0.1 K/UL (ref 0–0.12)
BILIRUB SERPL-MCNC: 0.4 MG/DL (ref 0.1–1.5)
BUN SERPL-MCNC: 12 MG/DL (ref 8–22)
CALCIUM ALBUM COR SERPL-MCNC: 10.3 MG/DL (ref 8.5–10.5)
CALCIUM SERPL-MCNC: 10.5 MG/DL (ref 8.5–10.5)
CHLORIDE SERPL-SCNC: 101 MMOL/L (ref 96–112)
CHOLEST SERPL-MCNC: 184 MG/DL (ref 100–199)
CO2 SERPL-SCNC: 29 MMOL/L (ref 20–33)
CREAT SERPL-MCNC: 0.78 MG/DL (ref 0.5–1.4)
CREAT UR-MCNC: 201.2 MG/DL
EOSINOPHIL # BLD AUTO: 0.75 K/UL (ref 0–0.51)
EOSINOPHIL NFR BLD: 11.8 % (ref 0–6.9)
ERYTHROCYTE [DISTWIDTH] IN BLOOD BY AUTOMATED COUNT: 41.3 FL (ref 35.9–50)
EST. AVERAGE GLUCOSE BLD GHB EST-MCNC: 123 MG/DL
FASTING STATUS PATIENT QL REPORTED: NORMAL
FERRITIN SERPL-MCNC: 116 NG/ML (ref 10–291)
GFR SERPLBLD CREATININE-BSD FMLA CKD-EPI: 90 ML/MIN/1.73 M 2
GLOBULIN SER CALC-MCNC: 3.3 G/DL (ref 1.9–3.5)
GLUCOSE SERPL-MCNC: 103 MG/DL (ref 65–99)
HBA1C MFR BLD: 5.9 % (ref 4–5.6)
HCT VFR BLD AUTO: 47.1 % (ref 37–47)
HDLC SERPL-MCNC: 69 MG/DL
HGB BLD-MCNC: 16.2 G/DL (ref 12–16)
IMM GRANULOCYTES # BLD AUTO: 0.02 K/UL (ref 0–0.11)
IMM GRANULOCYTES NFR BLD AUTO: 0.3 % (ref 0–0.9)
IRON SATN MFR SERPL: 49 % (ref 15–55)
IRON SERPL-MCNC: 116 UG/DL (ref 40–170)
LDLC SERPL CALC-MCNC: 95 MG/DL
LYMPHOCYTES # BLD AUTO: 2.4 K/UL (ref 1–4.8)
LYMPHOCYTES NFR BLD: 37.6 % (ref 22–41)
MCH RBC QN AUTO: 31.4 PG (ref 27–33)
MCHC RBC AUTO-ENTMCNC: 34.4 G/DL (ref 32.2–35.5)
MCV RBC AUTO: 91.3 FL (ref 81.4–97.8)
MICROALBUMIN UR-MCNC: 4.2 MG/DL
MICROALBUMIN/CREAT UR: 21 MG/G (ref 0–30)
MONOCYTES # BLD AUTO: 0.48 K/UL (ref 0–0.85)
MONOCYTES NFR BLD AUTO: 7.5 % (ref 0–13.4)
NEUTROPHILS # BLD AUTO: 2.63 K/UL (ref 1.82–7.42)
NEUTROPHILS NFR BLD: 41.2 % (ref 44–72)
NRBC # BLD AUTO: 0 K/UL
NRBC BLD-RTO: 0 /100 WBC (ref 0–0.2)
PLATELET # BLD AUTO: 271 K/UL (ref 164–446)
PMV BLD AUTO: 9.4 FL (ref 9–12.9)
POTASSIUM SERPL-SCNC: 4.5 MMOL/L (ref 3.6–5.5)
PROT SERPL-MCNC: 7.6 G/DL (ref 6–8.2)
RBC # BLD AUTO: 5.16 M/UL (ref 4.2–5.4)
SODIUM SERPL-SCNC: 143 MMOL/L (ref 135–145)
T3FREE SERPL-MCNC: 2.21 PG/ML (ref 2–4.4)
T4 FREE SERPL-MCNC: 1.36 NG/DL (ref 0.93–1.7)
TIBC SERPL-MCNC: 238 UG/DL (ref 250–450)
TRIGL SERPL-MCNC: 98 MG/DL (ref 0–149)
TSH SERPL-ACNC: 7.45 UIU/ML (ref 0.35–5.5)
UIBC SERPL-MCNC: 122 UG/DL (ref 110–370)
WBC # BLD AUTO: 6.4 K/UL (ref 4.8–10.8)

## 2024-09-06 PROCEDURE — 84481 FREE ASSAY (FT-3): CPT

## 2024-09-06 PROCEDURE — 83550 IRON BINDING TEST: CPT

## 2024-09-06 PROCEDURE — 84443 ASSAY THYROID STIM HORMONE: CPT

## 2024-09-06 PROCEDURE — 83540 ASSAY OF IRON: CPT

## 2024-09-06 PROCEDURE — 84439 ASSAY OF FREE THYROXINE: CPT

## 2024-09-06 PROCEDURE — 85025 COMPLETE CBC W/AUTO DIFF WBC: CPT

## 2024-09-06 PROCEDURE — 82570 ASSAY OF URINE CREATININE: CPT

## 2024-09-06 PROCEDURE — 82043 UR ALBUMIN QUANTITATIVE: CPT

## 2024-09-06 PROCEDURE — 82728 ASSAY OF FERRITIN: CPT

## 2024-09-06 PROCEDURE — 80053 COMPREHEN METABOLIC PANEL: CPT

## 2024-09-06 PROCEDURE — 36415 COLL VENOUS BLD VENIPUNCTURE: CPT

## 2024-09-06 PROCEDURE — 80061 LIPID PANEL: CPT

## 2024-09-06 PROCEDURE — 83036 HEMOGLOBIN GLYCOSYLATED A1C: CPT

## 2024-09-06 SDOH — HEALTH STABILITY: PHYSICAL HEALTH: ON AVERAGE, HOW MANY MINUTES DO YOU ENGAGE IN EXERCISE AT THIS LEVEL?: 20 MIN

## 2024-09-06 SDOH — HEALTH STABILITY: PHYSICAL HEALTH: ON AVERAGE, HOW MANY DAYS PER WEEK DO YOU ENGAGE IN MODERATE TO STRENUOUS EXERCISE (LIKE A BRISK WALK)?: 2 DAYS

## 2024-09-06 SDOH — ECONOMIC STABILITY: FOOD INSECURITY: WITHIN THE PAST 12 MONTHS, THE FOOD YOU BOUGHT JUST DIDN'T LAST AND YOU DIDN'T HAVE MONEY TO GET MORE.: NEVER TRUE

## 2024-09-06 SDOH — ECONOMIC STABILITY: FOOD INSECURITY: WITHIN THE PAST 12 MONTHS, YOU WORRIED THAT YOUR FOOD WOULD RUN OUT BEFORE YOU GOT MONEY TO BUY MORE.: NEVER TRUE

## 2024-09-06 SDOH — ECONOMIC STABILITY: INCOME INSECURITY: IN THE LAST 12 MONTHS, WAS THERE A TIME WHEN YOU WERE NOT ABLE TO PAY THE MORTGAGE OR RENT ON TIME?: NO

## 2024-09-06 SDOH — ECONOMIC STABILITY: INCOME INSECURITY: HOW HARD IS IT FOR YOU TO PAY FOR THE VERY BASICS LIKE FOOD, HOUSING, MEDICAL CARE, AND HEATING?: NOT HARD AT ALL

## 2024-09-06 ASSESSMENT — LIFESTYLE VARIABLES
AUDIT-C TOTAL SCORE: 0
SKIP TO QUESTIONS 9-10: 1
HOW OFTEN DO YOU HAVE SIX OR MORE DRINKS ON ONE OCCASION: NEVER
HOW OFTEN DO YOU HAVE A DRINK CONTAINING ALCOHOL: NEVER
HOW MANY STANDARD DRINKS CONTAINING ALCOHOL DO YOU HAVE ON A TYPICAL DAY: PATIENT DOES NOT DRINK

## 2024-09-06 ASSESSMENT — SOCIAL DETERMINANTS OF HEALTH (SDOH)
HOW OFTEN DO YOU ATTENT MEETINGS OF THE CLUB OR ORGANIZATION YOU BELONG TO?: PATIENT DECLINED
HOW OFTEN DO YOU GET TOGETHER WITH FRIENDS OR RELATIVES?: PATIENT DECLINED
HOW OFTEN DO YOU ATTEND CHURCH OR RELIGIOUS SERVICES?: PATIENT DECLINED
IN A TYPICAL WEEK, HOW MANY TIMES DO YOU TALK ON THE PHONE WITH FAMILY, FRIENDS, OR NEIGHBORS?: PATIENT DECLINED
HOW MANY DRINKS CONTAINING ALCOHOL DO YOU HAVE ON A TYPICAL DAY WHEN YOU ARE DRINKING: PATIENT DOES NOT DRINK
DO YOU BELONG TO ANY CLUBS OR ORGANIZATIONS SUCH AS CHURCH GROUPS UNIONS, FRATERNAL OR ATHLETIC GROUPS, OR SCHOOL GROUPS?: NO
IN THE PAST 12 MONTHS, HAS THE ELECTRIC, GAS, OIL, OR WATER COMPANY THREATENED TO SHUT OFF SERVICE IN YOUR HOME?: NO
IN A TYPICAL WEEK, HOW MANY TIMES DO YOU TALK ON THE PHONE WITH FAMILY, FRIENDS, OR NEIGHBORS?: PATIENT DECLINED
DO YOU BELONG TO ANY CLUBS OR ORGANIZATIONS SUCH AS CHURCH GROUPS UNIONS, FRATERNAL OR ATHLETIC GROUPS, OR SCHOOL GROUPS?: NO
HOW OFTEN DO YOU ATTENT MEETINGS OF THE CLUB OR ORGANIZATION YOU BELONG TO?: PATIENT DECLINED
HOW HARD IS IT FOR YOU TO PAY FOR THE VERY BASICS LIKE FOOD, HOUSING, MEDICAL CARE, AND HEATING?: NOT HARD AT ALL
WITHIN THE PAST 12 MONTHS, YOU WORRIED THAT YOUR FOOD WOULD RUN OUT BEFORE YOU GOT THE MONEY TO BUY MORE: NEVER TRUE
HOW OFTEN DO YOU HAVE SIX OR MORE DRINKS ON ONE OCCASION: NEVER
HOW OFTEN DO YOU ATTEND CHURCH OR RELIGIOUS SERVICES?: PATIENT DECLINED
HOW OFTEN DO YOU HAVE A DRINK CONTAINING ALCOHOL: NEVER
HOW OFTEN DO YOU GET TOGETHER WITH FRIENDS OR RELATIVES?: PATIENT DECLINED

## 2024-09-10 ENCOUNTER — PATIENT MESSAGE (OUTPATIENT)
Dept: BARIATRICS | Facility: CLINIC | Age: 54
End: 2024-09-10
Payer: OTHER GOVERNMENT

## 2024-09-10 ENCOUNTER — OFFICE VISIT (OUTPATIENT)
Dept: MEDICAL GROUP | Facility: PHYSICIAN GROUP | Age: 54
End: 2024-09-10
Payer: OTHER GOVERNMENT

## 2024-09-10 VITALS
SYSTOLIC BLOOD PRESSURE: 120 MMHG | TEMPERATURE: 97.9 F | OXYGEN SATURATION: 94 % | HEIGHT: 68 IN | DIASTOLIC BLOOD PRESSURE: 70 MMHG | HEART RATE: 78 BPM | BODY MASS INDEX: 33.19 KG/M2 | WEIGHT: 219 LBS

## 2024-09-10 DIAGNOSIS — E06.3 HASHIMOTO'S THYROIDITIS: ICD-10-CM

## 2024-09-10 DIAGNOSIS — E11.9 TYPE 2 DIABETES MELLITUS WITHOUT COMPLICATION, WITH LONG-TERM CURRENT USE OF INSULIN (HCC): ICD-10-CM

## 2024-09-10 DIAGNOSIS — Z23 NEED FOR VACCINATION: ICD-10-CM

## 2024-09-10 DIAGNOSIS — L29.9 EAR ITCHING: ICD-10-CM

## 2024-09-10 DIAGNOSIS — M26.623 TENDERNESS OF BOTH TEMPOROMANDIBULAR JOINTS: ICD-10-CM

## 2024-09-10 DIAGNOSIS — Z79.4 TYPE 2 DIABETES MELLITUS WITHOUT COMPLICATION, WITH LONG-TERM CURRENT USE OF INSULIN (HCC): ICD-10-CM

## 2024-09-10 DIAGNOSIS — F51.01 PRIMARY INSOMNIA: ICD-10-CM

## 2024-09-10 DIAGNOSIS — Z00.00 ROUTINE HEALTH MAINTENANCE: ICD-10-CM

## 2024-09-10 DIAGNOSIS — D17.1 LIPOMA OF TORSO: ICD-10-CM

## 2024-09-10 DIAGNOSIS — E83.110 HEREDITARY HEMOCHROMATOSIS (HCC): ICD-10-CM

## 2024-09-10 DIAGNOSIS — Z00.00 ENCOUNTER FOR WELL ADULT EXAM WITHOUT ABNORMAL FINDINGS: ICD-10-CM

## 2024-09-10 PROBLEM — D17.9 LIPOMA: Status: ACTIVE | Noted: 2024-09-09

## 2024-09-10 PROBLEM — Z79.899 CHRONIC USE OF BENZODIAZEPINE FOR THERAPEUTIC PURPOSE: Status: RESOLVED | Noted: 2023-05-15 | Resolved: 2024-09-10

## 2024-09-10 PROCEDURE — 90471 IMMUNIZATION ADMIN: CPT | Performed by: NURSE PRACTITIONER

## 2024-09-10 PROCEDURE — 90746 HEPB VACCINE 3 DOSE ADULT IM: CPT | Performed by: NURSE PRACTITIONER

## 2024-09-10 PROCEDURE — 3078F DIAST BP <80 MM HG: CPT | Performed by: NURSE PRACTITIONER

## 2024-09-10 PROCEDURE — 3074F SYST BP LT 130 MM HG: CPT | Performed by: NURSE PRACTITIONER

## 2024-09-10 PROCEDURE — 99214 OFFICE O/P EST MOD 30 MIN: CPT | Mod: 25 | Performed by: NURSE PRACTITIONER

## 2024-09-10 RX ORDER — TRAZODONE HYDROCHLORIDE 50 MG/1
50-100 TABLET, FILM COATED ORAL NIGHTLY
Qty: 180 TABLET | Refills: 0 | Status: SHIPPED | OUTPATIENT
Start: 2024-09-10 | End: 2024-09-16 | Stop reason: SDUPTHER

## 2024-09-10 RX ORDER — LEVOTHYROXINE SODIUM 112 UG/1
112 TABLET ORAL
Qty: 90 TABLET | Refills: 0 | Status: SHIPPED | OUTPATIENT
Start: 2024-09-10

## 2024-09-10 ASSESSMENT — FIBROSIS 4 INDEX: FIB4 SCORE: 1.7

## 2024-09-10 NOTE — LETTER
[facility]  KIRTI MontanoRALICIA.  [address]  [place] [address]  Fax: [phone]   Authorization for Release/Disclosure of   Protected Health Information   Name: JEAN ASHBY : 1970 SSN: [id number]   Address: [facility] [place] [address] Phone:    There are no phone numbers on file.   I authorize the entity listed below to release/disclose the PHI below to:   [facility]/COREY Montano and JANY Montano.   Provider or Entity Name:  [facility]   Address   City, [facility], [facility] Phone:      Fax:     Reason for request: continuity of care   Information to be released:    [  ] LAST COLONOSCOPY,  including any PATH REPORT and follow-up  [  ] LAST FIT/COLOGUARD RESULT [  ] LAST DEXA  [  ] LAST MAMMOGRAM  [  ] LAST PAP  [  ] LAST LABS [  ] RETINA EXAM REPORT  [  ] IMMUNIZATION RECORDS  [  ] Release all info      [  ] Check here and initial the line next to each item to release ALL health information INCLUDING  _____ Care and treatment for drug and / or alcohol abuse  _____ HIV testing, infection status, or AIDS  _____ Genetic Testing    DATES OF SERVICE OR TIME PERIOD TO BE DISCLOSED: _____________  I understand and acknowledge that:  * This Authorization may be revoked at any time by you in writing, except if your health information has already been used or disclosed.  * Your health information that will be used or disclosed as a result of you signing this authorization could be re-disclosed by the recipient. If this occurs, your re-disclosed health information may no longer be protected by State or Federal laws.  * You may refuse to sign this Authorization. Your refusal will not affect your ability to obtain treatment.  * This Authorization becomes effective upon signing and will  on (date) __________.      If no date is indicated, this Authorization will  one (1) year from the signature date.    Name:   Tigre  Signature: Date:   9/10/2024     PLEASE FAX REQUESTED RECORDS BACK TO: [phone]

## 2024-09-10 NOTE — PROGRESS NOTES
Subjective:   CC:   Chief Complaint   Patient presents with    Annual Exam    Lab Results   Verbal consent was acquired by the patient to use [employer] ambient listening note generation during this visit Yes      HPI:   Jean Landeros is a 53 y.o. female who presents for annual exam:    History of Present Illness  The patient presents for evaluation of multiple medical concerns.    She reports no wounds on her feet, but mentions they are dry. She has been taking vitamin B complex following weight loss surgery and is curious about the potential benefits of vitamin B injections over oral intake.    She experienced involuntary jerking movements in her legs and arms after a bout of diverticulitis, which she believes may be related to her medication or illness. She also had severe diarrhea. The jerking movements have since improved.    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 This sensation is particularly noticeable at night and upon waking in the morning. She has undergone two neck surgeries.    She is not currently taking Xanax due to running out of the medication, which she had been using for sleep for 10 years. She reports poor sleep quality and is seeking alternative treatments. She has tried amitriptyline for sleep but found it unhelpful. She has never tried trazodone. She reports no sleep apnea or snoring. Continues to use marijuana regularly.    She has been taking glimepiride and Rybelsus for diabetes management but feels she has reached a plateau and is considering switching medications. Her levothyroxine dosage was adjusted from 225 to 175, then to 100. She admits to not exercising as much as she should due to recent illness. Her diet is high in protein. She does not consume alcohol.    She uses sunscreen for sun protection and regularly visits her dentist and eye doctor. She has undergone colon cancer screening,  endoscopy, and a gastric emptying study. She had a hysterectomy in 2017 and does not use hormone replacement therapy. She experiences night sweats and occasionally performs self-breast exams. She reports no presence of blood in her urine or stool. She experiences leg swelling, which improves with elevation. She stays hydrated by drinking water and coffee and maintains a low-salt diet. She walks frequently.    She experiences constant cracking sounds in her ears and is unsure if this is due to allergies or the dry climate. She visited a dentist yesterday for a root canal and had two teeth capped. She did not mention her ear symptoms to the dentist. She has been living in a [place] climate for nearly 2 years.    She recently had lab tests done for Dr. Rain and has an upcoming appointment with his PA. She does not smoke.    She had an RSV infection in the past year. She just got over diverticulitis. She has a referral for her lipoma and is going to see them on Friday. She had removal of lipomas done before, but they come back again. She is going to [facility]. She is tired of having this done. She is not old enough to get the RSV vaccine. She had BRCA testing done.    SOCIAL HISTORY  She works for [employer].     Anticipatory Guidance:  Cholesterol screenin2024   LDL controlled: 95  Diabetes screenin2024   A1c controlled: 5.9%   UALB/CR: WNL  Retinal exam: Records requested  Monofilament: 9/10/2024   Diet: Recommend more lean meats, fruits, vegetables, whole grains.   Exercise: Encourage regular exercise.   Substance abuse: NA, uses marijuana  Safe in relationship: Yes  Seatbelts, bike/motorcycle helmet: Yes  Sun protection: Yes   Dentist: Up to date   Eye doctor: Up to date     Cancer Screening:  Colorectal cancer screenin/15/2018   Cervical cancer screening: Total hysterectomy  Breast cancer screenin2024    Infectious Disease Screening/Immunizations:  STI screening:  Declines  Chlamydia/Gonorrhea screening: Declines  Hep C screening: Completed  HIV screen: Declines  Practices safe sex: Yes    Immunizations:   Influenza: Unavailable   Tetanus: 2018   Hep B: 9/10/2024, second dose due after 10/8/2024   Pneumonia: Completed  RSV: NA due at age 60  Shingrix: Completed    Received HPV series: Aged out    Preventative Care Screening:   Osteoporosis Screening: NA, due at age 65  Tobacco Screening: Never smoker   AAA Screening: NA    Patient's last menstrual period was 2017 (within months).  She has not utilized hormone replacement therapy.  Reports night sweats  No significant bloating/fluid retention, pelvic pain, or dyspareunia. No abnormal vaginal discharge.   No breast tenderness, mass, nipple discharge or changes in size or contour.    OB History    Para Term  AB Living   3 2 2 0 1 2   SAB IAB Ectopic Molar Multiple Live Births   0 0 0 0 0 2     She  reports being sexually active and has had partner(s) who are male. She reports using the following method of birth control/protection: Surgical.  She  has a past medical history of Allergy, Anxiety, BRCA1 negative, Cellulitis, face (2017), COVID-19 (2020), Depression, Diverticulitis (2019), Pneumonia (), RSV infection, Sepsis ([facility]) (2018), and Thyroid disease.  She  has a past surgical history that includes gastric banding laparoscopic (2022); primary c section (1995); lumbar disc replacement (); cholecystectomy (); lipoma excision (); lumpectomy (2015); tonsillectomy (); abdominal hysterectomy total (); cervical fusion posterior; elbow arthrotomy (Left, 2020); hernia repair; and brca1&2 gen full seq dup/del.    Family History   Problem Relation Age of Onset    No Known Problems Mother     Obesity Sister     Thyroid Sister     No Known Problems Sister     No Known Problems Brother     Obesity Paternal Aunt     Diabetes Paternal Aunt     No Known  Problems Maternal Grandmother     No Known Problems Maternal Grandfather     No Known Problems Paternal Grandmother     No Known Problems Paternal Grandfather     No Known Problems Daughter     No Known Problems Daughter      Social History     Tobacco Use    Smoking status: Never    Smokeless tobacco: Never   Vaping Use    Vaping status: Never Used   Substance Use Topics    Alcohol use: Not Currently    Drug use: Not Currently     Types: Marijuana     Comment: sometimes     Patient Active Problem List    Diagnosis Date Noted    Lipoma 09/09/2024    Hereditary hemochromatosis ([facility]) 11/30/2023    Elevated hemoglobin ([facility]) 11/20/2023    Elevated hematocrit 11/20/2023    Class 2 severe obesity due to excess calories with serious comorbidity and body mass index (BMI) of 36.0 to 36.9 in adult ([facility]) 05/15/2023    Primary insomnia 05/15/2023    Mild episode of recurrent major depressive disorder ([facility]) 05/15/2023    Hashimoto's thyroiditis 05/15/2023    Type 2 diabetes mellitus without complication, with long-term current use of insulin ([facility]) 05/15/2023    Dyslipidemia 05/15/2023    Mild intermittent asthma without complication 05/15/2023    Vitamin D deficiency 05/15/2023     Current Outpatient Medications   Medication Sig Dispense Refill    traZODone (DESYREL) 50 MG Tab Take 1-2 Tablets by mouth every evening. 180 Tablet 0    levothyroxine (SYNTHROID) 112 MCG Tab Take 1 Tablet by mouth every morning on an empty stomach. 90 Tablet 0    ondansetron (ZOFRAN) 8 MG Tab Take 1 Tablet by mouth every 8 hours as needed for Nausea/Vomiting. 15 Tablet 0    pantoprazole (PROTONIX) 40 MG Tablet Delayed Response Take 1 Tablet by mouth every day. 90 Tablet 1    albuterol 108 (90 Base) MCG/ACT Aero Soln inhalation aerosol Inhale 1-2 Puffs every 6 hours as needed for Shortness of Breath. 18 g 3    amitriptyline (ELAVIL) 10 MG Tab Take 1 Tablet by mouth every evening. 90 Tablet 3    fluticasone-salmeterol (ADVAIR) 250-50 MCG/ACT AEROSOL  "POWDER, BREATH ACTIVATED Inhale 1 Puff every 12 hours. 180 Each 3    glimepiride (AMARYL) 4 MG Tab Take 1 Tablet by mouth every morning. 90 Tablet 3    pravastatin (PRAVACHOL) 20 MG Tab Take 1 Tablet by mouth every evening. 90 Tablet 3    RYBELSUS 7 MG Tab Take 7 mg by mouth every day. 90 Tablet 3    vortioxetine (TRINTELLIX) 10 MG tablet Take 1 Tablet by mouth every day. 90 Tablet 3    NON SPECIFIED Flinstone's Vitamin with Iron      BIOTIN PO Take  by mouth.      Calcium Carb-Cholecalciferol (CALCIUM 600 + D PO) Take  by mouth.      SUPER B COMPLEX/C PO Take  by mouth.      ASHWAGANDHA PO Take  by mouth.      GLUCOSAMINE-CHONDROITIN PO Take  by mouth.       No current facility-administered medications for this visit.     Allergies   Allergen Reactions    Bactrim Ds Hives     Review of Systems   Constitutional: Negative for fever, chills and malaise/fatigue.   HENT: Negative for congestion.    Eyes: Negative for pain.   Respiratory: Negative for cough and shortness of breath.    Cardiovascular: Negative for chest pain and leg swelling.   Gastrointestinal: Negative for nausea, vomiting, abdominal pain and diarrhea.   Genitourinary: Negative for dysuria and hematuria.   Skin: Negative for rash.   Neurological: Negative for dizziness, focal weakness and headaches.   Endo/Heme/Allergies: Does not bruise/bleed easily.   Psychiatric/Behavioral: Negative for depression.  The patient is not nervous/anxious.      Objective:   /70 (BP Location: Left arm, Patient Position: Sitting, BP Cuff Size: Adult)   Pulse 78   Temp 36.6 °C (97.9 °F) (Temporal)   Ht 1.727 m (5' 8\")   Wt 99.3 kg (219 lb)   LMP 01/01/2017 (Within Months)   SpO2 94%   BMI 33.30 kg/m²     Wt Readings from Last 4 Encounters:   09/10/24 99.3 kg (219 lb)   07/23/24 103 kg (227 lb)   03/26/24 107 kg (236 lb)   03/19/24 106 kg (234 lb 7.4 oz)     Physical Exam:  Constitutional: Well-developed and well-nourished. Not diaphoretic. No distress.   Skin: " Skin is warm and dry. No rash noted.  Head: Atraumatic without lesions.  Eyes: Conjunctivae and extraocular motions are normal. Pupils are equal, round, and reactive to light. No scleral icterus.   Ears:  External ears unremarkable. Tympanic membranes clear and intact.  Nose: Nares patent. Septum midline. Turbinates without erythema nor edema. No discharge.   Mouth/Throat: Tongue normal. Oropharynx is clear and moist. Posterior pharynx without erythema or exudates.  Neck: Supple, trachea midline. Normal range of motion. No thyromegaly present. No lymphadenopathy--cervical or supraclavicular.  Cardiovascular: Regular rate and rhythm, S1 and S2 without murmur, rubs, or gallops.    Respiratory: Effort normal. Clear to auscultation throughout. No adventitious sounds.   Breast:  Breast exam deferred. Discussed monthly self exams and what to look for, including peau d'orange or nipple retraction, discharge, breasts moving freely and equally without restriction, axillary/supraclavicular adenopathy, or palpable masses/nodules.  Abdomen: Soft, non tender, and without distention. Active bowel sounds in all four quadrants. No rebound, guarding.  Extremities: No cyanosis, clubbing, erythema, nor edema. Radial pulses intact and symmetric.   Musculoskeletal: All major joints AROM full in all directions without pain.  Neurological: Alert and oriented x 3. Grossly non-focal. Strength and sensation grossly intact.   Psychiatric:  Behavior, mood, and affect are appropriate.    Monofilament testing with a 10 gram force: sensation intact: intact bilaterally  Visual Inspection: Feet without maceration, ulcers, fissures.  Pedal pulses: intact bilaterally     Assessment and Plan:   1. Encounter for well adult exam without abnormal findings    2. Type 2 diabetes mellitus without complication, with long-term current use of insulin (HCC)  Chronic, ongoing. Continue glimepiride 4 mg daily and rybelsus 7 mg daily. Her hemoglobin A1c has  improved from 6.0 in 02/2024 to 5.9 currently. Fasting blood sugar is slightly elevated at 103. She is advised to continue her current medication regimen and maintain a balanced diet rich in whole grains, lean proteins, fruits, vegetables, and healthy fats. Monofilament completed today.  - Diabetic Monofilament LE Exam    3. Hereditary hemochromatosis (HCC)  Chronic, ongoing. Continue to follow with hematology.    4. Primary insomnia  Chronic, ongoing. Plan to start Trazodone 50 mg, with instructions to take 1 to 2 tablets at night, not exceeding 3 tablets. If there is no improvement in her symptoms, other medications will be considered.  - traZODone (DESYREL) 50 MG Tab; Take 1-2 Tablets by mouth every evening.  Dispense: 180 Tablet; Refill: 0    5. Hashimoto's thyroiditis  Chronic, ongoing. TSH levels have increased slightly, while T4 and T3 levels are normal. Her levothyroxine dosage will be increased from 100 mcg to 112 mcg. Thyroid function tests will be repeated before her next visit in 2 months.  - levothyroxine (SYNTHROID) 112 MCG Tab; Take 1 Tablet by mouth every morning on an empty stomach.  Dispense: 90 Tablet; Refill: 0  - T3 FREE; Future  - FREE THYROXINE; Future  - TSH; Future    6. Tenderness of both temporomandibular joints  New to examiner. The cracking sound in her ears might be due to TMJ. She is advised to monitor her symptoms and follow up if they persist. If symptoms continue, a TMJ x-ray may be considered.    7. Ear itching  New to examiner. The presence of fluid in her ear suggests possible allergies. There is also some wax in her ear, but it is not causing blockage. The cracking sound she reports could be due to temporomandibular joint dysfunction (TMJ) rather than an ear issue. A slight clicking sound was noted when she opened her mouth. She is advised to try Flonase, 1 spray in each nostril once a day, which can be purchased over the counter. It is recommended to use it after showering. If  her symptoms persist, a TMJ x-ray may be considered.    8. Lipoma of torso  Chronic, ongoing. She has a referral for lipoma removal and will follow up with the specialist. She is advised to voice her concerns about the recurrence of lipomas and discuss potential diagnostic imaging options with the specialist.    9. Routine health maintenance  She will receive her first hepatitis B vaccine today. Thyroid function tests will be repeated before her next visit in 2 months. She is encouraged to maintain an active lifestyle, including regular walking, to help manage leg swelling. She is advised to perform self-breast exams and to report any new or concerning findings. Bone density testing will be initiated at age 64. She is advised to use sunscreen for sun protection and to maintain regular dental and eye check-ups. She is advised to undergo colon cancer screening every 10 years and to follow a high-fiber diet. She is advised to undergo mammogram screening every 2 years, with the next one due in 05/2026.    10. Need for vaccination  Given today, due for second dose after 10/8/2024.  - Hepatitis B Vaccine Adult IM      Health maintenance: Up to date   Labs per orders  Immunizations:  Per orders   Patient counseled about skin care, diet, supplements, and exercise.  Discussed  breast self exam, mammography screening, use and side effects of HRT, menopause, osteoporosis, adequate intake of calcium and vitamin D, diet and exercise, colorectal cancer screening.     Follow-up: Return in about 2 months (around 11/10/2024) for Thyroid, Follow up Labs, Hep B.     I have placed the below orders and discussed them with an approved delegating provider. The MA is performing the below orders under the direction of Dr. Parra.       Please note that this dictation was created using voice recognition software. I have worked with consultants from the vendor as well as technical experts from [employer] to optimize the interface. I have  made every reasonable attempt to correct obvious errors, but I expect that there are errors of grammar and possibly content that I did not discover before finalizing the note.

## 2024-09-16 DIAGNOSIS — F51.01 PRIMARY INSOMNIA: ICD-10-CM

## 2024-09-16 RX ORDER — TRAZODONE HYDROCHLORIDE 50 MG/1
50-100 TABLET, FILM COATED ORAL NIGHTLY PRN
Qty: 180 TABLET | Refills: 1 | Status: SHIPPED | OUTPATIENT
Start: 2024-09-16

## 2024-09-16 NOTE — PROGRESS NOTES
Requested Prescriptions     Signed Prescriptions Disp Refills    traZODone (DESYREL) 50 MG Tab 180 Tablet 1     Sig: Take 1-2 Tablets by mouth at bedtime as needed for Sleep.       JANY Montano.

## 2024-09-26 ENCOUNTER — HOSPITAL ENCOUNTER (OUTPATIENT)
Dept: HEMATOLOGY ONCOLOGY | Facility: MEDICAL CENTER | Age: 54
End: 2024-09-26
Attending: NURSE PRACTITIONER
Payer: OTHER GOVERNMENT

## 2024-09-26 VITALS
WEIGHT: 232 LBS | HEART RATE: 79 BPM | BODY MASS INDEX: 35.16 KG/M2 | OXYGEN SATURATION: 96 % | DIASTOLIC BLOOD PRESSURE: 60 MMHG | HEIGHT: 68 IN | SYSTOLIC BLOOD PRESSURE: 120 MMHG | TEMPERATURE: 98.6 F

## 2024-09-26 DIAGNOSIS — E83.110 HEREDITARY HEMOCHROMATOSIS (HCC): ICD-10-CM

## 2024-09-26 DIAGNOSIS — D72.19 OTHER EOSINOPHILIA: ICD-10-CM

## 2024-09-26 DIAGNOSIS — Z09 ENCOUNTER FOR HEMATOLOGY FOLLOW-UP: ICD-10-CM

## 2024-09-26 PROCEDURE — 99212 OFFICE O/P EST SF 10 MIN: CPT | Performed by: NURSE PRACTITIONER

## 2024-09-26 PROCEDURE — 99213 OFFICE O/P EST LOW 20 MIN: CPT | Performed by: NURSE PRACTITIONER

## 2024-09-26 ASSESSMENT — ENCOUNTER SYMPTOMS
HEADACHES: 0
WHEEZING: 0
SHORTNESS OF BREATH: 0
TINGLING: 0
VOMITING: 0
MYALGIAS: 0
COUGH: 0
CONSTIPATION: 1
FEVER: 0
DIARRHEA: 0
BLOOD IN STOOL: 0
WEIGHT LOSS: 1
PALPITATIONS: 0
CHILLS: 0
NAUSEA: 0
DIZZINESS: 0
INSOMNIA: 1

## 2024-09-26 ASSESSMENT — FIBROSIS 4 INDEX: FIB4 SCORE: 1.74

## 2024-09-26 NOTE — PROGRESS NOTES
"Subjective     Jean Landeros is a 54 y.o. female who presents with Follow-Up (Rain/Stormy/Elevated hemoglobin/6 mo)            HPI    hemochromatosis and polycythemia. 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         Review of Systems   Constitutional:  Positive for weight loss (4# since March). Negative for chills, fever and malaise/fatigue.   Respiratory:  Negative for cough, shortness of breath and wheezing.    Cardiovascular:  Negative for chest pain, palpitations and leg swelling.   Gastrointestinal:  Positive for constipation (consistent w/ bsaelien; miralax PRN). Negative for blood in stool, diarrhea, melena, nausea and vomiting.        S/p gastric sleeve 2022; pre/pro biotic   Genitourinary:  Negative for dysuria and hematuria.        Hyst, retained 1 ovary - age 46   Musculoskeletal:  Negative for joint pain and myalgias.   Neurological:  Negative for dizziness, tingling and headaches.   Psychiatric/Behavioral:  The patient has insomnia (consistent with baseline).             Objective     /60 (BP Location: Right arm, Patient Position: Sitting, BP Cuff Size: Adult)   Pulse 79   Temp 37 °C (98.6 °F) (Temporal)   Ht 1.727 m (5' 8\")   Wt 105 kg (232 lb)   LMP 01/01/2017 (Within Months)   SpO2 96%   BMI 35.28 kg/m²      Physical Exam         Assessment & Plan     No diagnosis found.       Would benefit from blood donation    If there is an I ssue we can order TP   2-4 times per year              " "Tablet 0    pantoprazole (PROTONIX) 40 MG Tablet Delayed Response Take 1 Tablet by mouth every day. 90 Tablet 1    albuterol 108 (90 Base) MCG/ACT Aero Soln inhalation aerosol Inhale 1-2 Puffs every 6 hours as needed for Shortness of Breath. 18 g 3    amitriptyline (ELAVIL) 10 MG Tab Take 1 Tablet by mouth every evening. 90 Tablet 3    fluticasone-salmeterol (ADVAIR) 250-50 MCG/ACT AEROSOL POWDER, BREATH ACTIVATED Inhale 1 Puff every 12 hours. 180 Each 3    glimepiride (AMARYL) 4 MG Tab Take 1 Tablet by mouth every morning. 90 Tablet 3    pravastatin (PRAVACHOL) 20 MG Tab Take 1 Tablet by mouth every evening. 90 Tablet 3    RYBELSUS 7 MG Tab Take 7 mg by mouth every day. 90 Tablet 3    vortioxetine (TRINTELLIX) 10 MG tablet Take 1 Tablet by mouth every day. 90 Tablet 3    NON SPECIFIED Flinstone's Vitamin with Iron      BIOTIN PO Take  by mouth.      Calcium Carb-Cholecalciferol (CALCIUM 600 + D PO) Take  by mouth.      SUPER B COMPLEX/C PO Take  by mouth.      ASHWAGANDHA PO Take  by mouth.      GLUCOSAMINE-CHONDROITIN PO Take  by mouth.      ondansetron (ZOFRAN) 8 MG Tab Take 1 Tablet by mouth every 8 hours as needed for Nausea/Vomiting. (Patient not taking: Reported on 9/26/2024) 15 Tablet 0     No current facility-administered medications on file prior to encounter.              Objective     /60 (BP Location: Right arm, Patient Position: Sitting, BP Cuff Size: Adult)   Pulse 79   Temp 37 °C (98.6 °F) (Temporal)   Ht 1.727 m (5' 8\")   Wt 105 kg (232 lb)   LMP 01/01/2017 (Within Months)   SpO2 96%   BMI 35.28 kg/m²      Physical Exam  Vitals reviewed.   Constitutional:       General: She is not in acute distress.     Appearance: She is well-developed. She is not diaphoretic.   HENT:      Head: Normocephalic and atraumatic.      Mouth/Throat:      Pharynx: No oropharyngeal exudate.   Eyes:      General: No scleral icterus.        Right eye: No discharge.         Left eye: No discharge.      " Conjunctiva/sclera: Conjunctivae normal.      Pupils: Pupils are equal, round, and reactive to light.   Cardiovascular:      Rate and Rhythm: Normal rate and regular rhythm.      Heart sounds: Normal heart sounds. No murmur heard.     No friction rub. No gallop.   Pulmonary:      Effort: Pulmonary effort is normal. No respiratory distress.      Breath sounds: Normal breath sounds. No wheezing.   Abdominal:      General: Bowel sounds are normal. There is no distension.      Palpations: Abdomen is soft.      Tenderness: There is no abdominal tenderness.   Musculoskeletal:         General: Normal range of motion.      Cervical back: Normal range of motion and neck supple.   Skin:     General: Skin is warm and dry.      Coloration: Skin is not pale.      Findings: No erythema or rash.   Neurological:      Mental Status: She is alert and oriented to person, place, and time.   Psychiatric:         Mood and Affect: Mood normal.         Behavior: Behavior normal.         No visits with results within 1 Day(s) from this visit.   Latest known visit with results is:   Hospital Outpatient Visit on 09/06/2024   Component Date Value Ref Range Status    TSH 09/06/2024 7.450 (H)  0.350 - 5.500 uIU/mL Final    Free T-4 09/06/2024 1.36  0.93 - 1.70 ng/dL Final    T3,Free 09/06/2024 2.21  2.00 - 4.40 pg/mL Final    Creatinine, Urine 09/06/2024 201.20  mg/dL Final    Microalbumin, Urine Random 09/06/2024 4.2  mg/dL Final    Micro Alb Creat Ratio 09/06/2024 21  0 - 30 mg/g Final    Cholesterol,Tot 09/06/2024 184  100 - 199 mg/dL Final    Triglycerides 09/06/2024 98  0 - 149 mg/dL Final    HDL 09/06/2024 69  >=40 mg/dL Final    LDL 09/06/2024 95  <100 mg/dL Final    Sodium 09/06/2024 143  135 - 145 mmol/L Final    Potassium 09/06/2024 4.5  3.6 - 5.5 mmol/L Final    Chloride 09/06/2024 101  96 - 112 mmol/L Final    Co2 09/06/2024 29  20 - 33 mmol/L Final    Anion Gap 09/06/2024 13.0  7.0 - 16.0 Final    Glucose 09/06/2024 103 (H)  65 - 99  mg/dL Final    Bun 09/06/2024 12  8 - 22 mg/dL Final    Creatinine 09/06/2024 0.78  0.50 - 1.40 mg/dL Final    Calcium 09/06/2024 10.5  8.5 - 10.5 mg/dL Final    Correct Calcium 09/06/2024 10.3  8.5 - 10.5 mg/dL Final    AST(SGOT) 09/06/2024 32  12 - 45 U/L Final    ALT(SGPT) 09/06/2024 14  2 - 50 U/L Final    Alkaline Phosphatase 09/06/2024 62  30 - 99 U/L Final    Total Bilirubin 09/06/2024 0.4  0.1 - 1.5 mg/dL Final    Albumin 09/06/2024 4.3  3.2 - 4.9 g/dL Final    Total Protein 09/06/2024 7.6  6.0 - 8.2 g/dL Final    Globulin 09/06/2024 3.3  1.9 - 3.5 g/dL Final    A-G Ratio 09/06/2024 1.3  g/dL Final    WBC 09/06/2024 6.4  4.8 - 10.8 K/uL Final    RBC 09/06/2024 5.16  4.20 - 5.40 M/uL Final    Hemoglobin 09/06/2024 16.2 (H)  12.0 - 16.0 g/dL Final    Hematocrit 09/06/2024 47.1 (H)  37.0 - 47.0 % Final    MCV 09/06/2024 91.3  81.4 - 97.8 fL Final    MCH 09/06/2024 31.4  27.0 - 33.0 pg Final    MCHC 09/06/2024 34.4  32.2 - 35.5 g/dL Final    RDW 09/06/2024 41.3  35.9 - 50.0 fL Final    Platelet Count 09/06/2024 271  164 - 446 K/uL Final    MPV 09/06/2024 9.4  9.0 - 12.9 fL Final    Neutrophils-Polys 09/06/2024 41.20 (L)  44.00 - 72.00 % Final    Lymphocytes 09/06/2024 37.60  22.00 - 41.00 % Final    Monocytes 09/06/2024 7.50  0.00 - 13.40 % Final    Eosinophils 09/06/2024 11.80 (H)  0.00 - 6.90 % Final    Basophils 09/06/2024 1.60  0.00 - 1.80 % Final    Immature Granulocytes 09/06/2024 0.30  0.00 - 0.90 % Final    Nucleated RBC 09/06/2024 0.00  0.00 - 0.20 /100 WBC Final    Neutrophils (Absolute) 09/06/2024 2.63  1.82 - 7.42 K/uL Final    Includes immature neutrophils, if present.    Lymphs (Absolute) 09/06/2024 2.40  1.00 - 4.80 K/uL Final    Monos (Absolute) 09/06/2024 0.48  0.00 - 0.85 K/uL Final    Eos (Absolute) 09/06/2024 0.75 (H)  0.00 - 0.51 K/uL Final    Baso (Absolute) 09/06/2024 0.10  0.00 - 0.12 K/uL Final    Immature Granulocytes (abs) 09/06/2024 0.02  0.00 - 0.11 K/uL Final    NRBC (Absolute)  09/06/2024 0.00  K/uL Final    Glycohemoglobin 09/06/2024 5.9 (H)  4.0 - 5.6 % Final    Comment: Increased risk for diabetes:  5.7 -6.4%  Diabetes:  >6.4%  Glycemic control for adults with diabetes:  <7.0%    The above interpretations are per ADA guidelines.  Diagnosis  of diabetes mellitus on the basis of elevated Hemoglobin A1c  should be confirmed by repeating the Hb A1c test.      Est Avg Glucose 09/06/2024 123  mg/dL Final    Comment: The eAG calculation is based on the A1c-Derived Daily Glucose  (ADAG) study.  See the ADA's website for additional information.      Fasting Status 09/06/2024 Fasting   Final    GFR (CKD-EPI) 09/06/2024 90  >60 mL/min/1.73 m 2 Final    Comment: Estimated Glomerular Filtration Rate is calculated using  race neutral CKD-EPI 2021 equation per NKF-ASN recommendations.                    Assessment & Plan     1. Hereditary hemochromatosis (HCC)  CBC WITH DIFFERENTIAL    FERRITIN      2. Encounter for hematology follow-up        3. Other eosinophilia             1.  Eosinophilia: Ongoing, fairly stable, suspect r/t HH or baseline Hahsimoto's, will query Hematology, continue to monitor. She is new to the area and is encouraged to try local honey for allergen exposure.    2. Polycythemia/HH: Diagnosed 11/20/23; no treatment required to date    Polycythemia is mild, fairly stable; ferritin is up trending but remains within acceptable limits. She could benefit from blood donation, if there is an issue with that we will consider infrequent TP. She will continue to monitro symptoms, repeat labs, and return for re-evaluation in 6 months, sooner as needed.        The patient verbalized agreement and understanding of current plan. All questions and concerns were addressed at time of visit.    Please note that this dictation was created using voice recognition software. I have made every reasonable attempt to correct obvious errors, but I expect that there are errors of grammar and possibly  content that I did not discover before finalizing the note.

## 2024-10-01 ENCOUNTER — PATIENT MESSAGE (OUTPATIENT)
Dept: BARIATRICS | Facility: CLINIC | Age: 54
End: 2024-10-01
Payer: OTHER GOVERNMENT

## 2024-10-08 ENCOUNTER — PATIENT MESSAGE (OUTPATIENT)
Dept: BARIATRICS | Facility: CLINIC | Age: 54
End: 2024-10-08
Payer: OTHER GOVERNMENT

## 2024-10-21 PROBLEM — F32.A ANXIETY AND DEPRESSION: Status: ACTIVE | Noted: 2019-05-18

## 2024-10-21 PROBLEM — F41.9 ANXIETY AND DEPRESSION: Status: ACTIVE | Noted: 2019-05-18

## 2024-10-21 PROBLEM — M48.02 CERVICAL STENOSIS OF SPINAL CANAL: Status: ACTIVE | Noted: 2017-08-28

## 2024-11-02 ENCOUNTER — PATIENT MESSAGE (OUTPATIENT)
Dept: BARIATRICS | Facility: CLINIC | Age: 54
End: 2024-11-02
Payer: OTHER GOVERNMENT

## 2024-11-09 ENCOUNTER — HOSPITAL ENCOUNTER (OUTPATIENT)
Dept: LAB | Facility: MEDICAL CENTER | Age: 54
End: 2024-11-09
Attending: NURSE PRACTITIONER
Payer: OTHER GOVERNMENT

## 2024-11-09 DIAGNOSIS — E06.3 HASHIMOTO'S THYROIDITIS: ICD-10-CM

## 2024-11-09 LAB
T3FREE SERPL-MCNC: 2.09 PG/ML (ref 2–4.4)
T4 FREE SERPL-MCNC: 1.45 NG/DL (ref 0.93–1.7)
TSH SERPL-ACNC: 7.53 UIU/ML (ref 0.35–5.5)

## 2024-11-09 PROCEDURE — 84439 ASSAY OF FREE THYROXINE: CPT

## 2024-11-09 PROCEDURE — 36415 COLL VENOUS BLD VENIPUNCTURE: CPT

## 2024-11-09 PROCEDURE — 84443 ASSAY THYROID STIM HORMONE: CPT

## 2024-11-09 PROCEDURE — 84481 FREE ASSAY (FT-3): CPT

## 2024-11-12 ENCOUNTER — PATIENT MESSAGE (OUTPATIENT)
Dept: BARIATRICS | Facility: CLINIC | Age: 54
End: 2024-11-12
Payer: OTHER GOVERNMENT

## 2024-11-13 ENCOUNTER — TELEPHONE (OUTPATIENT)
Dept: MEDICAL GROUP | Facility: PHYSICIAN GROUP | Age: 54
End: 2024-11-13
Payer: OTHER GOVERNMENT

## 2024-11-13 DIAGNOSIS — E06.3 HASHIMOTO'S THYROIDITIS: ICD-10-CM

## 2024-11-14 ENCOUNTER — APPOINTMENT (OUTPATIENT)
Dept: MEDICAL GROUP | Facility: PHYSICIAN GROUP | Age: 54
End: 2024-11-14
Payer: OTHER GOVERNMENT

## 2024-11-14 NOTE — TELEPHONE ENCOUNTER
Patient appt was cancelled on 11/13/24, patient states does not need to be seen at this time but did ask if you were going to represcribe a medication based off lab results. Please advise/mg

## 2024-11-16 RX ORDER — LEVOTHYROXINE SODIUM 125 UG/1
125 TABLET ORAL
Qty: 90 TABLET | Refills: 0 | Status: SHIPPED | OUTPATIENT
Start: 2024-11-16

## 2024-11-16 NOTE — TELEPHONE ENCOUNTER
Can you please call the patient and let her know that I sent a new prescription for levothyroxine 125 mcg daily to her pharmacy and we will plan to repeat labs in 8-10 weeks.

## 2024-11-16 NOTE — TELEPHONE ENCOUNTER
1. Hashimoto's thyroiditis  TSH continues to be elevated, increase levothyroxine to 125 mcg daily and repeat labs in 8-10 weeks.   - levothyroxine (SYNTHROID) 125 MCG Tab; Take 1 Tablet by mouth every morning on an empty stomach.  Dispense: 90 Tablet; Refill: 0  - TSH WITH REFLEX TO FT4; Future

## 2024-11-27 NOTE — ASSESSMENT & PLAN NOTE
A1C7.5 in 12/2019, stable on Rybelsus 14 mg, glimepiride 4 mg. no issues with hypoglycemia.  No issues with feet, UTD on eye exam.  Hasn't been eating a lot, has protein shake for breakfast.  Not exercising.  Eating a lot of turkey and eggs.   Palliative care encounter

## 2024-12-03 ENCOUNTER — PATIENT MESSAGE (OUTPATIENT)
Dept: BARIATRICS | Facility: CLINIC | Age: 54
End: 2024-12-03
Payer: OTHER GOVERNMENT

## 2024-12-10 ENCOUNTER — PATIENT MESSAGE (OUTPATIENT)
Dept: BARIATRICS | Facility: CLINIC | Age: 54
End: 2024-12-10
Payer: OTHER GOVERNMENT

## 2025-01-06 ENCOUNTER — PATIENT MESSAGE (OUTPATIENT)
Dept: BARIATRICS | Facility: CLINIC | Age: 55
End: 2025-01-06

## 2025-01-14 ENCOUNTER — PATIENT MESSAGE (OUTPATIENT)
Dept: BARIATRICS | Facility: CLINIC | Age: 55
End: 2025-01-14

## 2025-01-16 ENCOUNTER — APPOINTMENT (OUTPATIENT)
Dept: MEDICAL GROUP | Facility: PHYSICIAN GROUP | Age: 55
End: 2025-01-16
Payer: OTHER GOVERNMENT

## 2025-01-16 VITALS
HEART RATE: 67 BPM | HEIGHT: 68 IN | BODY MASS INDEX: 35.06 KG/M2 | SYSTOLIC BLOOD PRESSURE: 118 MMHG | OXYGEN SATURATION: 94 % | TEMPERATURE: 97.8 F | DIASTOLIC BLOOD PRESSURE: 70 MMHG | WEIGHT: 231.3 LBS

## 2025-01-16 DIAGNOSIS — L98.9 SKIN LESION: ICD-10-CM

## 2025-01-16 DIAGNOSIS — D58.2 ELEVATED HEMOGLOBIN (HCC): ICD-10-CM

## 2025-01-16 DIAGNOSIS — E11.9 TYPE 2 DIABETES MELLITUS WITHOUT COMPLICATION, WITH LONG-TERM CURRENT USE OF INSULIN (HCC): ICD-10-CM

## 2025-01-16 DIAGNOSIS — R71.8 ELEVATED HEMATOCRIT: ICD-10-CM

## 2025-01-16 DIAGNOSIS — Z23 NEED FOR VACCINATION: ICD-10-CM

## 2025-01-16 DIAGNOSIS — E83.110 HEREDITARY HEMOCHROMATOSIS (HCC): ICD-10-CM

## 2025-01-16 DIAGNOSIS — D17.1 LIPOMA OF TORSO: ICD-10-CM

## 2025-01-16 DIAGNOSIS — J45.20 MILD INTERMITTENT ASTHMA WITHOUT COMPLICATION: ICD-10-CM

## 2025-01-16 DIAGNOSIS — Z79.4 TYPE 2 DIABETES MELLITUS WITHOUT COMPLICATION, WITH LONG-TERM CURRENT USE OF INSULIN (HCC): ICD-10-CM

## 2025-01-16 PROCEDURE — 3078F DIAST BP <80 MM HG: CPT | Performed by: NURSE PRACTITIONER

## 2025-01-16 PROCEDURE — 3074F SYST BP LT 130 MM HG: CPT | Performed by: NURSE PRACTITIONER

## 2025-01-16 PROCEDURE — 99214 OFFICE O/P EST MOD 30 MIN: CPT | Mod: 25 | Performed by: NURSE PRACTITIONER

## 2025-01-16 PROCEDURE — 92250 FUNDUS PHOTOGRAPHY W/I&R: CPT | Mod: TC | Performed by: NURSE PRACTITIONER

## 2025-01-16 PROCEDURE — 90746 HEPB VACCINE 3 DOSE ADULT IM: CPT | Performed by: NURSE PRACTITIONER

## 2025-01-16 PROCEDURE — 90471 IMMUNIZATION ADMIN: CPT | Performed by: NURSE PRACTITIONER

## 2025-01-16 RX ORDER — INHALER, ASSIST DEVICES
1 SPACER (EA) MISCELLANEOUS ONCE
Qty: 1 EACH | Refills: 0 | Status: SHIPPED | OUTPATIENT
Start: 2025-01-16 | End: 2025-01-16

## 2025-01-16 ASSESSMENT — PATIENT HEALTH QUESTIONNAIRE - PHQ9
4. FEELING TIRED OR HAVING LITTLE ENERGY: NOT AT ALL
9. THOUGHTS THAT YOU WOULD BE BETTER OFF DEAD, OR OF HURTING YOURSELF: NOT AT ALL
SUM OF ALL RESPONSES TO PHQ9 QUESTIONS 1 AND 2: 0
1. LITTLE INTEREST OR PLEASURE IN DOING THINGS: NOT AT ALL
SUM OF ALL RESPONSES TO PHQ QUESTIONS 1-9: 0
6. FEELING BAD ABOUT YOURSELF - OR THAT YOU ARE A FAILURE OR HAVE LET YOURSELF OR YOUR FAMILY DOWN: NOT AL ALL
8. MOVING OR SPEAKING SO SLOWLY THAT OTHER PEOPLE COULD HAVE NOTICED. OR THE OPPOSITE, BEING SO FIGETY OR RESTLESS THAT YOU HAVE BEEN MOVING AROUND A LOT MORE THAN USUAL: NOT AT ALL
3. TROUBLE FALLING OR STAYING ASLEEP OR SLEEPING TOO MUCH: NOT AT ALL
2. FEELING DOWN, DEPRESSED, IRRITABLE, OR HOPELESS: NOT AT ALL
5. POOR APPETITE OR OVEREATING: NOT AT ALL
7. TROUBLE CONCENTRATING ON THINGS, SUCH AS READING THE NEWSPAPER OR WATCHING TELEVISION: NOT AT ALL

## 2025-01-16 ASSESSMENT — FIBROSIS 4 INDEX: FIB4 SCORE: 1.74

## 2025-01-20 LAB — RETINAL SCREEN: NEGATIVE

## 2025-01-20 ASSESSMENT — ENCOUNTER SYMPTOMS
SHORTNESS OF BREATH: 1
WOUND: 1
CHEST TIGHTNESS: 1
WHEEZING: 1

## 2025-01-20 NOTE — PROGRESS NOTES
"Verbal consent was acquired by the patient to use [employer] ambient listening note generation during this visit Yes      Subjective   Tim Landeros is a 54 y.o. female who presents for:  History of Present Illness  The patient presents for dermatological referral, asthma, and health maintenance.    She has a persistent lesion on her elbow that is not responding to treatment. The lesion is described as small but painful. She reports a similar lesion on the back of her leg, which was successfully treated at [facility] in [place]. The current lesion appears to improve intermittently but then worsens.    She is seeking an alternative asthma inhaler, as she finds albuterol ineffective, particularly in cold weather. She experiences shortness of breath when walking uphill outdoors. She also has hemochromatosis, which may exacerbate her symptoms. She is requesting a medication that provides more rapid relief. She continues to use Wixela nightly and has not attempted to use a spacer with her albuterol inhaler. Her symptoms fluctuate, with some days being symptom-free and others characterized by chest tightness and crackling sounds.    She had her lipoma surgery done by Dr. Browne. It feels much better now. She can bend at the waist without any discomfort.    MEDICATIONS  Current: Albuterol, fluticasone/salmeterol (Wixela)    Review of Systems   Respiratory:  Positive for chest tightness, shortness of breath and wheezing.    Skin:  Positive for wound (Left elbow).   All other systems reviewed and are negative.    Objective   /70 (BP Location: Right arm, Patient Position: Sitting, BP Cuff Size: Adult)   Pulse 67   Temp 36.6 °C (97.8 °F) (Temporal)   Ht 1.727 m (5' 8\")   Wt 105 kg (231 lb 4.8 oz)   SpO2 94%   Physical Exam  General: Well nourished, well developed female in NAD, awake and conversant.  Eyes: Normal conjunctiva, anicteric.  Round symmetrical pupils.  ENT: Hearing grossly intact.  No " nasal discharge.  Neck: Neck is supple.  No masses or thyromegaly.  CV: No lower extremity edema.  Respiratory: Respirations are nonlabored.  No wheezing.  Abdomen: Non-Distended.  Skin: Warm.  No rashes or ulcers.  Left elbow nonhealing lesion.  MSK: Normal ambulation.  No clubbing or cyanosis.  Neuro: Sensation and CN II-XII grossly normal.  Psych: Alert and oriented.  Cooperative, appropriate mood and affect, normal judgment.      Assessment & Plan  1. Type 2 diabetes mellitus without complication, with long-term current use of insulin (HCC)  Chronic, ongoing.  Continue Rybelsus 7 mg daily, glimepiride 4 mg daily.  Retinal screening completed today.  - POCT Retinal Eye Exam    2. Skin lesion  New to examiner, ongoing for patient.  A referral to a dermatologist will be initiated for further evaluation and management of the non-healing lesion on her elbow.  - Referral to Dermatology    3. Mild intermittent asthma without complication  Chronic, ongoing.  She is advised to increase the frequency of Wixela 250-150 mcg/ACT (fluticasone/salmeterol) to twice daily as prescribed, currently only using once daily at night. Additionally, she will be provided with a spacer to use in conjunction with her albuterol inhaler. The prescription for the spacer will be sent to [facility].  - Spacer/Aero-Holding Chambers (AEROCHAMBER MV) Misc; 1 Each one time for 1 dose.  Dispense: 1 Each; Refill: 0    4. Hereditary hemochromatosis (HCC)  5. Elevated hemoglobin (HCC)  6. Elevated hematocrit  Chronic, ongoing.  Continues to follow with hematology, next appointment 3/27/2025.    7. Lipoma of torso  Resolved, s/p lipoma surgery with Dr. Browne.    8. Need for vaccination  Given today, due for final dose after 3/13/2025.  - Hepatitis B Vaccine Adult 20+     Return for MA Hep B.      I have placed the below orders and discussed them with an approved delegating provider. The MA is performing the below orders under the direction of Dr. Parra.       Please note that this dictation was created using voice recognition software. I have made every reasonable attempt to correct obvious errors, but I expect that there are errors of grammar and possibly content that I did not discover before finalizing the note.

## 2025-01-21 NOTE — RESULT ENCOUNTER NOTE
Results released via [employer]. I attest my time as attending is greater than 50% of the total combined time spent on qualifying patient care activities by the PA/NP and attending.

## (undated) DEVICE — HEMOSTAT SURGICEL PWD 3G

## (undated) DEVICE — TROCAR ENDOPATH XCEL 12X100MM

## (undated) DEVICE — SUT GUT PL. 4-0 27 FS-2

## (undated) DEVICE — DRAPE CORETEMP FLD WRM 56X62IN

## (undated) DEVICE — RELOAD ECHELON FLEX GRN 60MM

## (undated) DEVICE — LUBRICANT SURGILUBE 2 OZ

## (undated) DEVICE — TUBING HF INSUFFLATION W/ FLTR

## (undated) DEVICE — NDL HYPO REG 25G X 1 1/2

## (undated) DEVICE — TROCAR ENDOPATH XCEL 12MM 10CM

## (undated) DEVICE — SHEARS HARMONIC 5CM 36CM

## (undated) DEVICE — Device

## (undated) DEVICE — SUT 0 VICRYL / UR6 (J603)

## (undated) DEVICE — ELECTRODE REM PLYHSV RETURN 9

## (undated) DEVICE — BLADE SURG CARBON STEEL SZ11

## (undated) DEVICE — RELOAD ECHELON FLEX BLU 60MM

## (undated) DEVICE — TROCAR ENDOPATH XCEL 5X100MM

## (undated) DEVICE — SUT STRATAFIX PDO 2-0 SH

## (undated) DEVICE — SEALANT VISTASEAL FIBRIN 10ML

## (undated) DEVICE — ADHESIVE DERMABOND ADVANCED

## (undated) DEVICE — CANNULA ENDOPATH XCEL 5X100MM

## (undated) DEVICE — STAPLER ECHELON FLEX 60MM 44CM

## (undated) DEVICE — APPLICATOR VISTASEAL RIG 35CM